# Patient Record
Sex: MALE | Race: WHITE | URBAN - METROPOLITAN AREA
[De-identification: names, ages, dates, MRNs, and addresses within clinical notes are randomized per-mention and may not be internally consistent; named-entity substitution may affect disease eponyms.]

---

## 2018-03-22 ENCOUNTER — TELEPHONE (OUTPATIENT)
Dept: SURGERY | Age: 33
End: 2018-03-22

## 2018-04-17 ENCOUNTER — TELEPHONE (OUTPATIENT)
Dept: SURGERY | Age: 33
End: 2018-04-17

## 2018-04-30 ENCOUNTER — TELEPHONE (OUTPATIENT)
Dept: SURGERY | Age: 33
End: 2018-04-30

## 2018-08-13 ENCOUNTER — TELEPHONE (OUTPATIENT)
Dept: SURGERY | Age: 33
End: 2018-08-13

## 2018-10-09 ENCOUNTER — TELEPHONE (OUTPATIENT)
Dept: SURGERY | Age: 33
End: 2018-10-09

## 2018-10-11 ENCOUNTER — TELEPHONE (OUTPATIENT)
Dept: SURGERY | Age: 33
End: 2018-10-11

## 2018-10-11 NOTE — TELEPHONE ENCOUNTER
Nursing called from [place] [address] homecare and stated patient is now discharged. Wound has healed. Per Vladimir Glover prn for follow up care. Sx sheet scanned to media.

## 2022-03-04 ENCOUNTER — PROCEDURE VISIT (OUTPATIENT)
Dept: PHYSICAL MEDICINE AND REHAB | Age: 37
End: 2022-03-04

## 2022-03-04 VITALS
HEART RATE: 99 BPM | DIASTOLIC BLOOD PRESSURE: 85 MMHG | SYSTOLIC BLOOD PRESSURE: 131 MMHG | WEIGHT: 315 LBS | BODY MASS INDEX: 46.65 KG/M2 | HEIGHT: 69 IN

## 2022-03-04 DIAGNOSIS — Z02.71 DISABILITY EXAMINATION: Primary | ICD-10-CM

## 2022-03-04 PROCEDURE — MISCBDD BUREAU OF DISABILITY DETERMINATION: Performed by: PHYSICAL MEDICINE & REHABILITATION

## 2022-03-04 NOTE — PROGRESS NOTES
Sandhya Rolon D.O. [facility]   [address]  Phone: [phone]  Fax: [phone]      3/4/2022     Corwin Fowler  : 1985    Corwin Fowler was seen in my office today for a Disability Determination Examination. The history was obtained from the claimant who was felt to be reliable. The claimant was identified by photo identification. I explained to the claimant that this examination was for evaluation purposes only and that no physician-patient relationship exists. The claimant expressed understanding and agreement. A release of information was signed and placed in the chart. I advised the claimant not to cause bodily harm or significant pain with any of the requested activities    Corwin Fowler is a right hand dominant man who reports to be disabled due to vision impairment and lymphedema. He has had lymphedema since childhood. 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 He is not a surgical candidate with the exception of cornea transplant which he declined due to risks. The onset of the disabling condition was with a sudden onset after a fall 7 years ago. The work up has included: Xray, [address]. Symptoms have been getting unchanged since onset. Currently, the pain is located in the bilateral legs and does not radiate. The pain is described as cracking, sore and rated as Pain Score:   5. The pain is intermittent and occurs daily. The pain is worse with activity and better with rest.  Associated symptoms include swelling. Prior treatment: Effective medications have included none. Ineffective medications have included none.      Records Reviewed   Continuing Disability Review Report   EILEEN Report:  Tony Owens DO; 61   Psychological Evaluation:  Abel Cline M.A; 1-9-10  Treatment YES/NO Effective/Ineffective   Therapy Yes Yes   Surgery Yes Yes   Injection No    Electric stimulation No    Massage No Ultrasound No    Heat No    Ice No    Chiropractic  No    Formal pain treatment program No      Past Medical History:   Diagnosis Date    Blood circulation, collateral     Chronic acquired lymphedema     Keratoconus     [facility] [address]     Sepsis ([facility] [address] 11/14/2014    due to cellulitis of LLE     Past Surgical History:   Procedure Laterality Date    BRONCHOSCOPY  04/18/2017    OTHER SURGICAL HISTORY Left 04/11/2017    direct excision of medial thigh lymphedema w/Dr Belen Gonzalez, Disection of lymph node w/Dr Analisa Antonio ,and control of bleeding w/dr DOWD    OTHER SURGICAL HISTORY Bilateral 08/22/2017    split thickness skin graft to l thigh, Right thigh shin graft site     Social History     Tobacco Use    Smoking status: Current Every Day Smoker     Years: 21.00     Types: E-Cigarettes, Cigarettes    Smokeless tobacco: Never Used   Substance Use Topics    Alcohol use: No    Drug use: No     The claimant has completed 10th grade education. 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 The claimant does not require the use of an assistive device. Guilherme Salamanca is not limited in activities of daily living including self care tasks of feeding, grooming, dressing, bathing,  and mobility tasks of getting out of bed, rising from chair, walking. He has difficulty balancing, cooking, cleaninggoing up and down steps. Guilherme Salamanca  feels his ADL's are limited due to pain, requiring increased time. The claimant reports sitting tolerance of  60 minutes but requires elevation of the legs, standing tolerance of 20 minutes, and the ability to lift unknown pounds. The claimant does not perform regular exercise. The claimant reports  difficulty walking.      Family History   Problem Relation Age of Onset    Diabetes Mother     Diabetes Father     Heart Disease Father        No Known Allergies    Current Outpatient Medications   Medication Sig Dispense Refill  ibuprofen (ADVIL;MOTRIN) 800 MG tablet       Probiotic Product (ADVANCED PROBIOTIC 10) CAPS as needed   5    bacitracin 500 UNIT/GM ointment Apply topically 2 times daily. (Patient not taking: Reported on 3/4/2022) 10 Tube 1    oxyCODONE-acetaminophen (PERCOCET) 5-325 MG per tablet Take 2 tablets by mouth every 4 hours as needed for Pain . (Patient not taking: Reported on 3/4/2022) 25 tablet 0    metFORMIN (GLUCOPHAGE) 500 MG tablet Take 500 mg by mouth 2 times daily (with meals) (Patient not taking: Reported on 3/4/2022)      mineral oil-hydrophilic petrolatum (HYDROPHOR) ointment Apply topically as needed. (Patient not taking: Reported on 3/4/2022) 1 Tube 0    furosemide (LASIX) 40 MG tablet Take 40 mg by mouth 2 times daily  (Patient not taking: Reported on 3/4/2022)  1    magnesium oxide (MAG-OX) 400 (240 MG) MG tablet Take 1 tablet by mouth daily (Patient not taking: Reported on 3/4/2022) 30 tablet 0    potassium chloride SA (K-DUR;KLOR-CON M) 20 MEQ tablet Take 1 tablet by mouth 2 times daily (Patient not taking: Reported on 3/4/2022) 60 tablet 0     No current facility-administered medications for this visit. Review of Systems - For review of systems, positive symptoms are underlined and negative findings are not underlined. General: chills, fatigue, fever, malaise, night sweats, weight gain,  weight loss. Psychological: anxiety, depression, suicidal ideation, sleep disturbances, behavioral disorder, difficulty concentrating, disorientation, hallucinations, mood swings, obsessive thoughts, physical abuse,  sexual abuse. Ophthalmic: blurry vision, decreased vision, double vision, loss of vision, photophobia, use of corrective device. Ear Nose Throat: hearing loss, tinnitus, phonophobia, sensitivity to smells, vertigo, or vocal changes. Allergy/Immunology: seasonal allergies, watery eyes, itchy eyes, frequent infections.   Hematological and Lymphatic: bleeding problems, blood clots, bruising, yellowing of the skin, swollen lymph nodes. Endocrine:  polydypsia, polyuria, temperature intolerance. Respiratory: cough, shortness of breath, wheezing. Cardiovascular: syncope, chest pain, dyspnea on exertion, edema, irregular heartbeat,  palpitations. Gastrointestinal: abdominal pain, constipation, diarrhea,  decreased appetite, heartburn, hematemesis, melena, nausea, vomiting, stool incontinence, abnormal swallowing. Genito-Urinary: dysuria, hematuria, incontinence, frequency, urgency. Musculoskeletal: joint pain, stiffness, swelling, muscle pain, muscle  tenderness. Neurological: confusion, memory loss, dizziness, gait disturbance, headaches, impaired coordination, decreased balance, numbness/tingling, seizures, speech problems, tremors,weakness. Dermatological:  hair changes, nail changes, pruritus, rash. PHYSICAL EXAMINATION: Blood pressure 131/85, pulse 99, height 5' 9\" (1.753 m), weight (!) 363 lb (164.7 kg). The claimant was cooperative with the examination. Please see the neuro-musculoskeletal data sheet for more details of the physical examination. General: No apparent distress. Appears of average intellect. Behavior was appropriate. Able to relate. Personal appearance was well groomed. Psychosocial: Mood and affect were appropriate. HEENT: Snellen eye chart unable. Intact to confrontation can count # of fingers and can see light. Unable to read distance. No palpable cervical lymph nodes. Anicteric. No conjunctival injection. Mucosa moist and pink. Cardiovascular: Regular Rate and Rhythm. Peripheral pulses 2+ at dorsalis pedis and radial arteries. Severe lymphedema left lower extremity, moderate on right. Peu d'orange skin with edema distally. Pulmonary: Unlabored and Regular Abdomen: Soft, non-tender. No abdominal bruit or pulsatile mass. Musculoskeletal: Spurling negative bilaterally. Straight leg raise negative bilaterally.    Otherwise, there was no crepitus, pain with ROM maneuvers, warmth, edema, effusion, erythema, tenderness to palpation, synovial thickening,  deformity including contracture, bony enlargement,varus/valgus deformity, ulnar deviation or joint instability or ligamentous laxity. Neurologic:  Awake, alert, and oriented to person place and time. Speech is fluent. Hearing is intact for conversation. Sensation was intact for temperature, light touch, vibration, proprioception. Coordination was intact in the upper extremities for finger to nose and in the lower extremities for heel to shin. Rapid alternating movements were intact in the upper and lower extremities. Muscle tendon reflexes were 2+ and symmetric at the biceps, triceps, brachioradialis, patella and achilles. Romberg was negative. Heel and toe walking were intact. Gait was wide based, antalgic. There was  a visible limp. It is safe for the claimant to walk without a hand-held assistive device. The claimant is able to walk both short and long distances on level and on uneven surfaces. Functional status: The claimant was able to sit/stand/walk without an assistive device independently. Lorita Fly  was able to dress independently and reach overhead. The claimant was able to write, shake hands and perform fine motor movements. Impression: This is a 42y.o. year old claimant with   1. Morbid obesity BMI 53  2. Bilateral lower extremity lymphedema  3. Vision impairment due to keratoconus bilaterally  4. Tobacco abuse    There are significant medically determinable impairments. The claimant can hear, speak, remember, understand, concentrate, interact, and adapt without limitation. The claimaint can lift/carry up to 20 pounds at the waist level. Lorita Fly can handle objects without limitation.  The claimant can sit for a maximum of 60 minute intervals for a maximum of 8 hours per day but should be able to elevate legs;  can stand for a maximum of 15 minute intervals for a total of 2 hours per day; can be in the upright position either standing or walking for a total of 2 hours a day. Renetta Gomez can walk independently short community distances without assistive device. The claimant is a falls risk and should avoid unprotected heights. Renetta Gomez  shoud avoid climbing stairs, ramps, stooping, kneeling, crouching, crawling, prolonged sitting/standing/walking. The claimant should have the ability to sit or stand at will. If awarded benefits the claimant would be able to manage them. The claimant does report psychiatric conditions as a cause of disability. A psychiatric consultative examination is  recommended to further evaluate. The above analysis is based upon the available information at the time of this examination including the history given by the claimant,  the medical records and tests reviewed and the physical findings. It is assumed that the material provided is correct. Any medical recommendations offered are provided as guidance and not as medical orders. I have not provided care for this claimaint. I have seen the claimaint one time on 3/4/2022 , for the purpose of a disability determination. The opinions expressed are based solely on the information provided to me and on the history and medical examination performed on 3/4/2022. Respectfully submitted,       Simon Singh D.O., P.T.   Board Certified Physical Medicine and Rehabilitation  Board Certified Electrodiagnostic Medicine

## 2023-05-18 NOTE — TELEPHONE ENCOUNTER
Office called patient regarding appointment on 3-21-18 that needed changed to Conner's schedule on 3-26-18. Patient stated has difficulty ambulating and is currently trying to establish with visiting physicians. Patient stated he would notify office when he knows appointment with visiting physician.
18-May-2023

## 2024-07-19 ENCOUNTER — APPOINTMENT (OUTPATIENT)
Dept: RADIOLOGY | Facility: HOSPITAL | Age: 39
End: 2024-07-19
Payer: COMMERCIAL

## 2024-07-19 ENCOUNTER — HOSPITAL ENCOUNTER (INPATIENT)
Facility: HOSPITAL | Age: 39
End: 2024-07-19
Attending: SURGERY | Admitting: SURGERY
Payer: COMMERCIAL

## 2024-07-19 ENCOUNTER — APPOINTMENT (OUTPATIENT)
Dept: CARDIOLOGY | Facility: HOSPITAL | Age: 39
End: 2024-07-19
Payer: COMMERCIAL

## 2024-07-19 DIAGNOSIS — R57.9 SHOCK (MULTI): ICD-10-CM

## 2024-07-19 DIAGNOSIS — M79.89 NECROTIZING SOFT TISSUE INFECTION: Primary | ICD-10-CM

## 2024-07-19 LAB
ABO GROUP (TYPE) IN BLOOD: NORMAL
ANION GAP BLDA CALCULATED.4IONS-SCNC: 10 MMO/L (ref 10–25)
ANION GAP BLDA CALCULATED.4IONS-SCNC: 14 MMO/L (ref 10–25)
ANION GAP SERPL CALC-SCNC: 19 MMOL/L (ref 10–20)
ANTIBODY SCREEN: NORMAL
AORTIC VALVE PEAK VELOCITY: 1.69 M/S
APPEARANCE UR: ABNORMAL
APTT PPP: 30 SECONDS (ref 27–38)
AV PEAK GRADIENT: 11.4 MMHG
AVA (PEAK VEL): 3.42 CM2
BASE EXCESS BLDA CALC-SCNC: -2 MMOL/L (ref -2–3)
BASE EXCESS BLDA CALC-SCNC: -3.9 MMOL/L (ref -2–3)
BILIRUB UR STRIP.AUTO-MCNC: NEGATIVE MG/DL
BODY TEMPERATURE: 37 DEGREES CELSIUS
BODY TEMPERATURE: 37 DEGREES CELSIUS
BUN SERPL-MCNC: 16 MG/DL (ref 6–23)
CA-I BLD-SCNC: 0.99 MMOL/L (ref 1.1–1.33)
CA-I BLDA-SCNC: 1.03 MMOL/L (ref 1.1–1.33)
CA-I BLDA-SCNC: 1.03 MMOL/L (ref 1.1–1.33)
CALCIUM SERPL-MCNC: 7.2 MG/DL (ref 8.6–10.6)
CFT FORM KAOLIN IND BLD RES TEG: 1.3 MIN (ref 0.8–2.1)
CHLORIDE BLDA-SCNC: 101 MMOL/L (ref 98–107)
CHLORIDE BLDA-SCNC: 104 MMOL/L (ref 98–107)
CHLORIDE SERPL-SCNC: 100 MMOL/L (ref 98–107)
CLOT ANGLE.KAOLIN INDUCED BLD RES TEG: 73 DEG (ref 63–78)
CLOT INIT KAO IND P HEP NEUT BLD RES TEG: 10.8 MIN (ref 4.6–9.1)
CLOT INIT KAO IND P HEP NEUT BLD RES TEG: 14.2 MIN (ref 4.3–8.3)
CO2 SERPL-SCNC: 22 MMOL/L (ref 21–32)
COLOR UR: ABNORMAL
CREAT SERPL-MCNC: 1.19 MG/DL (ref 0.5–1.3)
EGFRCR SERPLBLD CKD-EPI 2021: 79 ML/MIN/1.73M*2
EJECTION FRACTION APICAL 4 CHAMBER: 46.7
EJECTION FRACTION: 73 %
ERYTHROCYTE [DISTWIDTH] IN BLOOD BY AUTOMATED COUNT: 14.1 % (ref 11.5–14.5)
FIBRINOGEN BLD CALC-MCNC: 708 MG/DL (ref 278–581)
FIBRINOGEN PPP-MCNC: 509 MG/DL (ref 200–400)
GLUCOSE BLD MANUAL STRIP-MCNC: 160 MG/DL (ref 74–99)
GLUCOSE BLD MANUAL STRIP-MCNC: 186 MG/DL (ref 74–99)
GLUCOSE BLD MANUAL STRIP-MCNC: 222 MG/DL (ref 74–99)
GLUCOSE BLD MANUAL STRIP-MCNC: 269 MG/DL (ref 74–99)
GLUCOSE BLD MANUAL STRIP-MCNC: 291 MG/DL (ref 74–99)
GLUCOSE BLD MANUAL STRIP-MCNC: 330 MG/DL (ref 74–99)
GLUCOSE BLD MANUAL STRIP-MCNC: 382 MG/DL (ref 74–99)
GLUCOSE BLD MANUAL STRIP-MCNC: 396 MG/DL (ref 74–99)
GLUCOSE BLDA-MCNC: 357 MG/DL (ref 74–99)
GLUCOSE BLDA-MCNC: 441 MG/DL (ref 74–99)
GLUCOSE SERPL-MCNC: 428 MG/DL (ref 74–99)
GLUCOSE UR STRIP.AUTO-MCNC: ABNORMAL MG/DL
HCO3 BLDA-SCNC: 21.6 MMOL/L (ref 22–26)
HCO3 BLDA-SCNC: 23.1 MMOL/L (ref 22–26)
HCT VFR BLD AUTO: 25.4 % (ref 41–52)
HCT VFR BLD EST: 24 % (ref 41–52)
HCT VFR BLD EST: 32 % (ref 41–52)
HGB BLD-MCNC: 8.5 G/DL (ref 13.5–17.5)
HGB BLDA-MCNC: 10.7 G/DL (ref 13.5–17.5)
HGB BLDA-MCNC: 7.9 G/DL (ref 13.5–17.5)
INHALED O2 CONCENTRATION: 50 %
INHALED O2 CONCENTRATION: 50 %
INR PPP: 1.7 (ref 0.9–1.1)
KETONES UR STRIP.AUTO-MCNC: NEGATIVE MG/DL
LACTATE BLDA-SCNC: 3.2 MMOL/L (ref 0.4–2)
LACTATE BLDA-SCNC: 3.8 MMOL/L (ref 0.4–2)
LEFT ATRIUM VOLUME AREA LENGTH INDEX BSA: 17.3 ML/M2
LEFT VENTRICLE INTERNAL DIMENSION DIASTOLE: 4 CM (ref 3.5–6)
LEFT VENTRICULAR OUTFLOW TRACT DIAMETER: 2.3 CM
LEUKOCYTE ESTERASE UR QL STRIP.AUTO: ABNORMAL
MA KAOLIN BLD RES TEG: 70 MM (ref 52–69)
MA KAOLIN+TF BLD RES TEG: 71 MM (ref 52–70)
MA TF IND+IIB-IIIA INH BLD RES TEG: 39 MM (ref 15–32)
MAGNESIUM SERPL-MCNC: 1.34 MG/DL (ref 1.6–2.4)
MCH RBC QN AUTO: 27.5 PG (ref 26–34)
MCHC RBC AUTO-ENTMCNC: 33.5 G/DL (ref 32–36)
MCV RBC AUTO: 82 FL (ref 80–100)
MITRAL VALVE E/A RATIO: 1.49
MITRAL VALVE E/E' RATIO: 7.63
MUCOUS THREADS #/AREA URNS AUTO: ABNORMAL /LPF
NITRITE UR QL STRIP.AUTO: NEGATIVE
NRBC BLD-RTO: 0 /100 WBCS (ref 0–0)
OXYHGB MFR BLDA: 96.9 % (ref 94–98)
OXYHGB MFR BLDA: 97.2 % (ref 94–98)
PCO2 BLDA: 40 MM HG (ref 38–42)
PCO2 BLDA: 40 MM HG (ref 38–42)
PH BLDA: 7.34 PH (ref 7.38–7.42)
PH BLDA: 7.37 PH (ref 7.38–7.42)
PH UR STRIP.AUTO: 6 [PH]
PHOSPHATE SERPL-MCNC: 3.1 MG/DL (ref 2.5–4.9)
PLATELET # BLD AUTO: 230 X10*3/UL (ref 150–450)
PO2 BLDA: 204 MM HG (ref 85–95)
PO2 BLDA: 228 MM HG (ref 85–95)
POTASSIUM BLDA-SCNC: 3.2 MMOL/L (ref 3.5–5.3)
POTASSIUM BLDA-SCNC: 3.4 MMOL/L (ref 3.5–5.3)
POTASSIUM SERPL-SCNC: 3.5 MMOL/L (ref 3.5–5.3)
PROT UR STRIP.AUTO-MCNC: ABNORMAL MG/DL
PROTHROMBIN TIME: 19.4 SECONDS (ref 9.8–12.8)
RBC # BLD AUTO: 3.09 X10*6/UL (ref 4.5–5.9)
RBC # UR STRIP.AUTO: ABNORMAL /UL
RBC #/AREA URNS AUTO: >20 /HPF
RH FACTOR (ANTIGEN D): NORMAL
RIGHT VENTRICLE FREE WALL PEAK S': 23.3 CM/S
SAO2 % BLDA: 100 % (ref 94–100)
SAO2 % BLDA: 100 % (ref 94–100)
SODIUM BLDA-SCNC: 133 MMOL/L (ref 136–145)
SODIUM BLDA-SCNC: 134 MMOL/L (ref 136–145)
SODIUM SERPL-SCNC: 137 MMOL/L (ref 136–145)
SP GR UR STRIP.AUTO: 1.04
SQUAMOUS #/AREA URNS AUTO: ABNORMAL /HPF
TRICUSPID ANNULAR PLANE SYSTOLIC EXCURSION: 2.1 CM
UROBILINOGEN UR STRIP.AUTO-MCNC: ABNORMAL MG/DL
WBC # BLD AUTO: 25.4 X10*3/UL (ref 4.4–11.3)
WBC #/AREA URNS AUTO: ABNORMAL /HPF

## 2024-07-19 PROCEDURE — 99223 1ST HOSP IP/OBS HIGH 75: CPT | Performed by: SURGERY

## 2024-07-19 PROCEDURE — 86901 BLOOD TYPING SEROLOGIC RH(D): CPT | Performed by: STUDENT IN AN ORGANIZED HEALTH CARE EDUCATION/TRAINING PROGRAM

## 2024-07-19 PROCEDURE — 87040 BLOOD CULTURE FOR BACTERIA: CPT | Performed by: STUDENT IN AN ORGANIZED HEALTH CARE EDUCATION/TRAINING PROGRAM

## 2024-07-19 PROCEDURE — 36620 INSERTION CATHETER ARTERY: CPT

## 2024-07-19 PROCEDURE — 93005 ELECTROCARDIOGRAM TRACING: CPT

## 2024-07-19 PROCEDURE — 84132 ASSAY OF SERUM POTASSIUM: CPT

## 2024-07-19 PROCEDURE — 36415 COLL VENOUS BLD VENIPUNCTURE: CPT | Performed by: STUDENT IN AN ORGANIZED HEALTH CARE EDUCATION/TRAINING PROGRAM

## 2024-07-19 PROCEDURE — 93306 TTE W/DOPPLER COMPLETE: CPT

## 2024-07-19 PROCEDURE — 86923 COMPATIBILITY TEST ELECTRIC: CPT

## 2024-07-19 PROCEDURE — 2500000004 HC RX 250 GENERAL PHARMACY W/ HCPCS (ALT 636 FOR OP/ED): Performed by: STUDENT IN AN ORGANIZED HEALTH CARE EDUCATION/TRAINING PROGRAM

## 2024-07-19 PROCEDURE — 82947 ASSAY GLUCOSE BLOOD QUANT: CPT

## 2024-07-19 PROCEDURE — 71045 X-RAY EXAM CHEST 1 VIEW: CPT

## 2024-07-19 PROCEDURE — 82330 ASSAY OF CALCIUM: CPT | Performed by: STUDENT IN AN ORGANIZED HEALTH CARE EDUCATION/TRAINING PROGRAM

## 2024-07-19 PROCEDURE — 84132 ASSAY OF SERUM POTASSIUM: CPT | Performed by: STUDENT IN AN ORGANIZED HEALTH CARE EDUCATION/TRAINING PROGRAM

## 2024-07-19 PROCEDURE — 2500000004 HC RX 250 GENERAL PHARMACY W/ HCPCS (ALT 636 FOR OP/ED): Mod: JZ | Performed by: EMERGENCY MEDICINE

## 2024-07-19 PROCEDURE — 2500000005 HC RX 250 GENERAL PHARMACY W/O HCPCS

## 2024-07-19 PROCEDURE — 81001 URINALYSIS AUTO W/SCOPE: CPT | Performed by: STUDENT IN AN ORGANIZED HEALTH CARE EDUCATION/TRAINING PROGRAM

## 2024-07-19 PROCEDURE — 2500000004 HC RX 250 GENERAL PHARMACY W/ HCPCS (ALT 636 FOR OP/ED)

## 2024-07-19 PROCEDURE — 94002 VENT MGMT INPAT INIT DAY: CPT

## 2024-07-19 PROCEDURE — 93306 TTE W/DOPPLER COMPLETE: CPT | Performed by: INTERNAL MEDICINE

## 2024-07-19 PROCEDURE — 2500000005 HC RX 250 GENERAL PHARMACY W/O HCPCS: Performed by: EMERGENCY MEDICINE

## 2024-07-19 PROCEDURE — 3E043XZ INTRODUCTION OF VASOPRESSOR INTO CENTRAL VEIN, PERCUTANEOUS APPROACH: ICD-10-PCS | Performed by: STUDENT IN AN ORGANIZED HEALTH CARE EDUCATION/TRAINING PROGRAM

## 2024-07-19 PROCEDURE — 2500000004 HC RX 250 GENERAL PHARMACY W/ HCPCS (ALT 636 FOR OP/ED): Mod: JZ

## 2024-07-19 PROCEDURE — 2020000001 HC ICU ROOM DAILY

## 2024-07-19 PROCEDURE — 0BH17EZ INSERTION OF ENDOTRACHEAL AIRWAY INTO TRACHEA, VIA NATURAL OR ARTIFICIAL OPENING: ICD-10-PCS | Performed by: SURGERY

## 2024-07-19 PROCEDURE — P9045 ALBUMIN (HUMAN), 5%, 250 ML: HCPCS | Mod: JZ | Performed by: EMERGENCY MEDICINE

## 2024-07-19 PROCEDURE — 85610 PROTHROMBIN TIME: CPT | Performed by: STUDENT IN AN ORGANIZED HEALTH CARE EDUCATION/TRAINING PROGRAM

## 2024-07-19 PROCEDURE — 84100 ASSAY OF PHOSPHORUS: CPT | Performed by: STUDENT IN AN ORGANIZED HEALTH CARE EDUCATION/TRAINING PROGRAM

## 2024-07-19 PROCEDURE — 5A1955Z RESPIRATORY VENTILATION, GREATER THAN 96 CONSECUTIVE HOURS: ICD-10-PCS | Performed by: SURGERY

## 2024-07-19 PROCEDURE — 85347 COAGULATION TIME ACTIVATED: CPT | Performed by: STUDENT IN AN ORGANIZED HEALTH CARE EDUCATION/TRAINING PROGRAM

## 2024-07-19 PROCEDURE — 99291 CRITICAL CARE FIRST HOUR: CPT | Performed by: EMERGENCY MEDICINE

## 2024-07-19 PROCEDURE — 85730 THROMBOPLASTIN TIME PARTIAL: CPT | Performed by: STUDENT IN AN ORGANIZED HEALTH CARE EDUCATION/TRAINING PROGRAM

## 2024-07-19 PROCEDURE — 85027 COMPLETE CBC AUTOMATED: CPT | Performed by: STUDENT IN AN ORGANIZED HEALTH CARE EDUCATION/TRAINING PROGRAM

## 2024-07-19 PROCEDURE — 93010 ELECTROCARDIOGRAM REPORT: CPT | Performed by: INTERNAL MEDICINE

## 2024-07-19 PROCEDURE — 85384 FIBRINOGEN ACTIVITY: CPT | Performed by: STUDENT IN AN ORGANIZED HEALTH CARE EDUCATION/TRAINING PROGRAM

## 2024-07-19 PROCEDURE — 37799 UNLISTED PX VASCULAR SURGERY: CPT | Performed by: STUDENT IN AN ORGANIZED HEALTH CARE EDUCATION/TRAINING PROGRAM

## 2024-07-19 PROCEDURE — 71045 X-RAY EXAM CHEST 1 VIEW: CPT | Performed by: RADIOLOGY

## 2024-07-19 PROCEDURE — 83735 ASSAY OF MAGNESIUM: CPT | Performed by: STUDENT IN AN ORGANIZED HEALTH CARE EDUCATION/TRAINING PROGRAM

## 2024-07-19 PROCEDURE — 2500000005 HC RX 250 GENERAL PHARMACY W/O HCPCS: Performed by: STUDENT IN AN ORGANIZED HEALTH CARE EDUCATION/TRAINING PROGRAM

## 2024-07-19 PROCEDURE — 36600 WITHDRAWAL OF ARTERIAL BLOOD: CPT | Performed by: STUDENT IN AN ORGANIZED HEALTH CARE EDUCATION/TRAINING PROGRAM

## 2024-07-19 RX ORDER — FENTANYL CITRATE-0.9 % NACL/PF 10 MCG/ML
25-200 PLASTIC BAG, INJECTION (ML) INTRAVENOUS CONTINUOUS
Status: DISCONTINUED | OUTPATIENT
Start: 2024-07-19 | End: 2024-07-24

## 2024-07-19 RX ORDER — PROPOFOL 10 MG/ML
INJECTION, EMULSION INTRAVENOUS
Status: DISPENSED
Start: 2024-07-19 | End: 2024-07-20

## 2024-07-19 RX ORDER — ALBUMIN HUMAN 50 G/1000ML
25 SOLUTION INTRAVENOUS ONCE
Status: COMPLETED | OUTPATIENT
Start: 2024-07-19 | End: 2024-07-19

## 2024-07-19 RX ORDER — CLINDAMYCIN PHOSPHATE 900 MG/50ML
900 INJECTION, SOLUTION INTRAVENOUS EVERY 8 HOURS
Status: DISCONTINUED | OUTPATIENT
Start: 2024-07-19 | End: 2024-07-22

## 2024-07-19 RX ORDER — NOREPINEPHRINE BITARTRATE/D5W 8 MG/250ML
.01-1 PLASTIC BAG, INJECTION (ML) INTRAVENOUS CONTINUOUS
Status: DISCONTINUED | OUTPATIENT
Start: 2024-07-19 | End: 2024-07-19

## 2024-07-19 RX ORDER — SODIUM BICARBONATE 1 MEQ/ML
SYRINGE (ML) INTRAVENOUS
Status: COMPLETED
Start: 2024-07-19 | End: 2024-07-19

## 2024-07-19 RX ORDER — DEXTROSE 50 % IN WATER (D50W) INTRAVENOUS SYRINGE
12.5
Status: DISCONTINUED | OUTPATIENT
Start: 2024-07-19 | End: 2024-07-24

## 2024-07-19 RX ORDER — CALCIUM GLUCONATE 20 MG/ML
1 INJECTION, SOLUTION INTRAVENOUS EVERY 4 HOURS
Status: COMPLETED | OUTPATIENT
Start: 2024-07-19 | End: 2024-07-19

## 2024-07-19 RX ORDER — POTASSIUM CHLORIDE 29.8 MG/ML
40 INJECTION INTRAVENOUS ONCE
Status: COMPLETED | OUTPATIENT
Start: 2024-07-19 | End: 2024-07-19

## 2024-07-19 RX ORDER — FAMOTIDINE 10 MG/ML
20 INJECTION INTRAVENOUS 2 TIMES DAILY
Status: DISCONTINUED | OUTPATIENT
Start: 2024-07-19 | End: 2024-07-22

## 2024-07-19 RX ORDER — SODIUM CHLORIDE, SODIUM LACTATE, POTASSIUM CHLORIDE, CALCIUM CHLORIDE 600; 310; 30; 20 MG/100ML; MG/100ML; MG/100ML; MG/100ML
75 INJECTION, SOLUTION INTRAVENOUS CONTINUOUS
Status: DISCONTINUED | OUTPATIENT
Start: 2024-07-19 | End: 2024-07-25

## 2024-07-19 RX ORDER — ENOXAPARIN SODIUM 100 MG/ML
40 INJECTION SUBCUTANEOUS DAILY
Status: DISCONTINUED | OUTPATIENT
Start: 2024-07-19 | End: 2024-07-19

## 2024-07-19 RX ORDER — DEXTROSE 50 % IN WATER (D50W) INTRAVENOUS SYRINGE
25
Status: DISCONTINUED | OUTPATIENT
Start: 2024-07-19 | End: 2024-07-24

## 2024-07-19 RX ORDER — VANCOMYCIN HYDROCHLORIDE 1 G/20ML
INJECTION, POWDER, LYOPHILIZED, FOR SOLUTION INTRAVENOUS DAILY PRN
Status: DISCONTINUED | OUTPATIENT
Start: 2024-07-19 | End: 2024-07-23

## 2024-07-19 RX ORDER — ENOXAPARIN SODIUM 100 MG/ML
60 INJECTION SUBCUTANEOUS EVERY 12 HOURS SCHEDULED
Status: DISCONTINUED | OUTPATIENT
Start: 2024-07-19 | End: 2024-07-23

## 2024-07-19 RX ORDER — MAGNESIUM SULFATE HEPTAHYDRATE 40 MG/ML
4 INJECTION, SOLUTION INTRAVENOUS ONCE
Status: COMPLETED | OUTPATIENT
Start: 2024-07-19 | End: 2024-07-19

## 2024-07-19 RX ORDER — NOREPINEPHRINE BITARTRATE/D5W 8 MG/250ML
PLASTIC BAG, INJECTION (ML) INTRAVENOUS
Status: DISPENSED
Start: 2024-07-19 | End: 2024-07-20

## 2024-07-19 RX ORDER — ACETAMINOPHEN 10 MG/ML
1000 INJECTION, SOLUTION INTRAVENOUS ONCE
Status: COMPLETED | OUTPATIENT
Start: 2024-07-19 | End: 2024-07-19

## 2024-07-19 RX ORDER — PROPOFOL 10 MG/ML
5-40 INJECTION, EMULSION INTRAVENOUS CONTINUOUS
Status: DISCONTINUED | OUTPATIENT
Start: 2024-07-19 | End: 2024-07-23

## 2024-07-19 ASSESSMENT — PAIN - FUNCTIONAL ASSESSMENT
PAIN_FUNCTIONAL_ASSESSMENT: CPOT (CRITICAL CARE PAIN OBSERVATION TOOL)

## 2024-07-19 NOTE — PROGRESS NOTES
[facility]  TRAUMA ICU - PROGRESS NOTE    Patient Name: Artem Suarez  MRN: [id number]  Admit Date: 719  : 1985  AGE: 39 y.o.   GENDER: male  ==============================================================================  TODAY'S ASSESSMENT AND PLAN OF CARE:  Artem Suarez is a 39 y.o. 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     NEURO/PAIN/SEDATION:   Analgesia/Sedation: Propofol and Fentanyl    RESPIRATORY:    - Intubated    - Vent settings       - FiO2: 50       - Vt: 450       - RR: 16       - PEEP: 8    CARDIOVASC:    - MAP goal > 65    - requiring Levophed and Vasopressin   - start stress dose steroids    - echo ordered, follow up results    GI:    - maintain NPO    :    - keith in place   - follow up UA/UCx     FEN:    - on LR at 125cc/hr    - monitor electrolytes with RFP and Mg    - replete electrolytes    - follow up admission labs    HEMATOLOGIC:    - monitor Hgb with CBC   - follow up admission labs   - transfuse for symptomatic anemia, not currently indicated     ENDOCRINE:   #Diabetes, uncontrolled   - insulin drip     MUSCULOSKELETAL/SKIN:   #NSTI, Chiquita's gangrene   - antibiotics: vancomycin, zosyn, clindamycin   - possible return to the OR with ACS within the next 24 hours    INFECTIOUS DISEASE:   #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 Shabbir Askew MD  PGY-1   Trauma ICU  TSICU [id number]    ==============================================================================  CHIEF COMPLAINT / OVERNIGHT EVENTS / HPI:   Artem Ordazrudy is a 39 y.o. male with a history of diabetes and lymphadema  "who was found to have air in perineal and thigh wounds concerning for Chiquita's gangrene/NSTI at an OSH. At the OSH, he was taken to the OR overnight for initial debridement. During his hospitalization at the OSH, he was requiring variable pressors intraoperatively and postoperatively, as well as an insulin drip for hyperglycemia. He is now in the ICU for septic shock management in the setting of Chiquita's gangrene/NSTI.    PHYSICAL EXAM:  Heart Rate:  [102-131]   Temp:  [36.1 °C (97 °F)]   Resp:  [16-28]   BP: ()/(40-70)   Height:  [167 cm (5' 5.75\")]   Weight:  [181 kg (399 lb 0.5 oz)]   SpO2:  [100 %]   Physical Exam  Constitutional:       Appearance: He is obese.   HENT:      Head: Atraumatic.      Mouth/Throat:      Comments: ETT in place  Cardiovascular:      Rate and Rhythm: Tachycardia present.   Pulmonary:      Effort: No respiratory distress.      Comments: Mechanical ventilation, mechanical breath sounds  Abdominal:      General: There is no distension.      Palpations: Abdomen is soft.   Genitourinary:     Comments: Wolfe in place, concentrated cloudy urine  Musculoskeletal:      Comments: Bilateral inner thigh/perineum: extensive debridement, wound repacked with Kerlix     Skin:     General: Skin is dry.      Comments: Bilateral feet and lower extremity dryness and cracking  R big toe wound, dark in color   Neurological:      Comments: Intubated and sedated       IMAGING SUMMARY:  (summary of new imaging findings, not a copy of dictation)  N/A    LABS:  Results from last 7 days   Lab Units 07/19/24  1435   WBC AUTO x10*3/uL 25.4*   HEMOGLOBIN g/dL 8.5*   HEMATOCRIT % 25.4*   PLATELETS AUTO x10*3/uL 230     Results from last 7 days   Lab Units 07/19/24  1435   APTT seconds 30   INR  1.7*     Results from last 7 days   Lab Units 07/19/24  1435   SODIUM mmol/L 137   POTASSIUM mmol/L 3.5   CHLORIDE mmol/L 100   CO2 mmol/L 22   BUN mg/dL 16   CREATININE mg/dL 1.19   CALCIUM mg/dL 7.2*   GLUCOSE mg/dL " 428*         Results from last 7 days   Lab Units 07/19/24  1437   POCT PH, ARTERIAL pH 7.34*   POCT PCO2, ARTERIAL mm Hg 40   POCT PO2, ARTERIAL mm Hg 204*   POCT HCO3 CALCULATED, ARTERIAL mmol/L 21.6*   POCT BASE EXCESS, ARTERIAL mmol/L -3.9*     I have reviewed all medications, laboratory results, and imaging pertinent for today's encounter.

## 2024-07-19 NOTE — PROGRESS NOTES
Pharmacy Medication History Review    Artem Suarez is a 39 y.o. male admitted for Necrotizing soft tissue infection. Pharmacy reviewed the patient's [id number] medications and allergies for accuracy.    The list below reflects the updated PTA list. Comments regarding how patient may be taking medications differently can be found in the Admit Orders Activity  None        The list below reflects the updated allergy list. Please review each documented allergy for additional clarification and justification.  Allergies  Reviewed by Chandni Kolb PharmD on 7/19/2024   No Known Allergies         Patient accepts M2B at discharge. Pharmacy has been updated to [facility].    Sources used to complete the med history include: spoke with emergency contact (Rupali Palomo, [phone]) -  reported not taking any prescription or OTC medications at home/ Pharmacy - [facility] - [phone], has not filled any prescription for patient in years/ Chart review - [facility] clinical summary - any maintenance medications have start date of 2017, unlikely patient has taken within last few years    Chandni Kolb PharmD  Transitions of Care Pharmacist  [facility]  Please reach out via Secure Chat for questions, or if no response call "[employer] or [facility]

## 2024-07-19 NOTE — CONSULTS
"Vancomycin Dosing by Pharmacy  INITIAL CONSULT      Artem Suarez is a 39 y.o. male who Pharmacy is consulted to dose vancomycin for cellulitis/skin and soft tissue infection/Sepsis/Necrotizing soft skin tissue     Based on the patient's indication and renal status, this patient will be dosed based on a goal vancomycin AUC of 500-600.     Renal function is currently stable.    Estimated Creatinine Clearance: 125 mL/min (by C-G formula based on SCr of 1.19 mg/dL).    No results found for: \"VANCORANDOM\", \"VANCOTROUGH\"    Results from last 7 days   Lab Units 07/19/24  1435   CREATININE mg/dL 1.19   BUN mg/dL 16   WBC AUTO x10*3/uL 25.4*        Visit Vitals  Pulse (!) 112   Temp 36.1 °C (97 °F) (Temporal)   Resp (!) 28       No results found for: \"STAPHMRSASCR\", \"GRAMSTAIN\", \"URINECULTURE\", \"BLOODCULT\", \"TISWDCULTSME\"     No results found for the last 90 days.      Assessment/Plan     Will order loading dose of 2000 mg followed by maintenance dose of 1250 mg every 12 hours. This dosing regimen is predicted by [employer] to result in the following pharmacokinetic parameters:  Regimen: 1250 mg IV every 12 hours.  Start time: 15:52 on 07/19/2024  Exposure target: AUC24 (range)400-600 mg/L.hr   AUC24,ss: 526 mg/L.hr  Probability of AUC24 > 400: 70 %  Ctrough,ss: 14.2 mg/L  Probability of Ctrough,ss > 20: 34 %  Probability of nephrotoxicity (Lodise CURLY 2009): 9 %    Vancomycin follow-up level will be ordered for 7/20 at 12pm , unless clinically indicated sooner.  Will continue to monitor renal function daily while on vancomycin and order serum creatinine at least every 48 hours if not already ordered.  Will follow for continued vancomycin needs, clinical response, and signs/symptoms of toxicity.       Heriberto Vernon, PharmD     "

## 2024-07-19 NOTE — H&P
"[facility]  ACUTE CARE SURGERY - HISTORY AND PHYSICAL     Patient Name: Artem Suarez  MRN: [id number]  Admit Date: 719  : 1985  AGE: 39 y.o.   GENDER: male  ==============================================================================  TODAY'S ASSESSMENT AND PLAN OF CARE:  38 yo male presents as a transfer after surgical debridement of NSTI of perineum and thighs with ongoing evidence of septic shock    - ongoing resuscitation in ICIU  - broad spectrum abx (cefepime, vanc, clinda)  - anticipate return to OR within 24 hours for second look  - maintain keith    Seen w Dr. Wale Franklin  [facility]  [id number]    ==============================================================================  CHIEF COMPLAINT/REASON FOR CONSULT:  38 yo male, history of DM and lymphedema, was found to have significant amount of air in perineal wounds concerning for NSTI. Per report was having fevers and R thigh pain for 2 days pta. Was taken to OR overnight at OSH for initial debridement. Variable pressors doses intra-op and post-op. Insulin gtt for hyperglycemia. Transferred to [facility] for further management. Arrived intubated and sedated.    Procedure performed: \"wide debridement of bilateral thigh and perineal necrotic muscle and fascia (including epidermis, dermis, and subcutaneous tissue (30 x 30 x 8 [right], 15 x 15 x 2 [left], abscess drainage, debridement of cellulitis\"    EBL: 500 ml  IVF: 2.7 L    Received 3 units pRBC post-op    OSH labs (24 AM):  WBC 31  Hgb 7.7  Plt 286    INR 1.5    BUN 14  Cr 1.5  Glucose 465    CRP 34    PAST MEDICAL HISTORY:   PMH: DM, lymphedema, obesity    PSH: Wide debridement of perineum and thighs (24)  - Otherwise unknown    FH: unknown    SOCIAL HISTORY: no etoh or drugs use, \"heavy\" smoker and chewing tobacco per OSH records, lives at home with daughter and 2 roommates per records    ALLERGIES: NKDA per OSH records      REVIEW OF SYSTEMS:  Review of " "Systems unable to be obtained    PHYSICAL EXAM:  Physical Exam  Pulse (!) 112   Temp 36.1 °C (97 °F) (Temporal)   Resp (!) 28   Ht 1.67 m (5' 5.75\")   SpO2 100%   BMI 64.90 kg/m²     Intubated and sedated  ETT in place  Tachy to 110s  Stable on vent  Abd soft, nondistended  Wofle in place with clear yellow urine  Extensive debridement of perineum and inner thighs; exposed tissue overall appears viable (pictures in media tab)  R 1st digit ulcer        IMAGING SUMMARY:   CT a/p (OSH): extensive soft tissue gas of perineum and inner thighs    LABS:  Results from last 7 days   Lab Units 07/19/24  1435   WBC AUTO x10*3/uL 25.4*   HEMOGLOBIN g/dL 8.5*   HEMATOCRIT % 25.4*   PLATELETS AUTO x10*3/uL 230               No lab exists for component: \"LABALBU\"      Results from last 7 days   Lab Units 07/19/24  1437   POCT PH, ARTERIAL pH 7.34*   POCT PCO2, ARTERIAL mm Hg 40   POCT PO2, ARTERIAL mm Hg 204*   POCT HCO3 CALCULATED, ARTERIAL mmol/L 21.6*   POCT BASE EXCESS, ARTERIAL mmol/L -3.9*         I have reviewed all laboratory and imaging results ordered/pertinent for this encounter.   "

## 2024-07-19 NOTE — PROGRESS NOTES
[facility]  TRAUMA ICU - ATTENDING PROGRESS NOTE    I personally saw and evaluated the patient with the resident physician/advanced pactice provider.  I reviewed the case on multidisciplinary rounds this morning.  I reviewed all of the pertinent imaging and laboratory data.  I reviewed the resident/SIMEON note.  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  Can use ketamine as an adjunct if difficult to sedate  Scheduled Tylenol  Continue mechanical ventilation, not appropriate for weaning due to anticipated OR in the next 24 hours  Maintain MAP greater than 65.  Currently on Levophed.  Start vasopressin.  Add stress dose steroids  Check echocardiogram  N.p.o.  Urine output acceptable.  Electrolytes/CMP pending  Continue insulin infusion until critical illness has resolved  Continue vancomycin/Zosyn/clindamycin.  Repeat blood cultures here.  Duration to be determined pending clinical course  Transfuse for symptomatic anemia, no current indication      DVT Prophylaxis: Loveonx  GI Prophylaxis: Pepcid  Diet: NPO  CVC: RIJ POA  Mayra: Left Radial POA  Wolfe: Yes, POA  Restraints: yes  Code Status: Full Code  Dispo: ICU, Critically Ill    Critical Care Time: 32 min

## 2024-07-20 ENCOUNTER — APPOINTMENT (OUTPATIENT)
Dept: RADIOLOGY | Facility: HOSPITAL | Age: 39
End: 2024-07-20
Payer: COMMERCIAL

## 2024-07-20 ENCOUNTER — ANESTHESIA EVENT (OUTPATIENT)
Dept: OPERATING ROOM | Facility: HOSPITAL | Age: 39
End: 2024-07-20
Payer: COMMERCIAL

## 2024-07-20 ENCOUNTER — ANESTHESIA (OUTPATIENT)
Dept: OPERATING ROOM | Facility: HOSPITAL | Age: 39
End: 2024-07-20
Payer: COMMERCIAL

## 2024-07-20 PROBLEM — D64.9 ANEMIA: Status: ACTIVE | Noted: 2024-07-20

## 2024-07-20 PROBLEM — D68.9 COAGULOPATHY (MULTI): Status: ACTIVE | Noted: 2024-07-20

## 2024-07-20 PROBLEM — E11.9 DIABETES MELLITUS, TYPE 2 (MULTI): Status: ACTIVE | Noted: 2024-07-20

## 2024-07-20 LAB
ABO GROUP (TYPE) IN BLOOD: NORMAL
ALBUMIN SERPL BCP-MCNC: 2.3 G/DL (ref 3.4–5)
ALBUMIN SERPL BCP-MCNC: 2.7 G/DL (ref 3.4–5)
ANION GAP BLDA CALCULATED.4IONS-SCNC: 13 MMO/L (ref 10–25)
ANION GAP BLDA CALCULATED.4IONS-SCNC: 16 MMO/L (ref 10–25)
ANION GAP BLDA CALCULATED.4IONS-SCNC: 6 MMO/L (ref 10–25)
ANION GAP SERPL CALC-SCNC: 13 MMOL/L (ref 10–20)
ANION GAP SERPL CALC-SCNC: 19 MMOL/L (ref 10–20)
APTT PPP: 31 SECONDS (ref 27–38)
BASE EXCESS BLDA CALC-SCNC: -6.1 MMOL/L (ref -2–3)
BASE EXCESS BLDA CALC-SCNC: -6.4 MMOL/L (ref -2–3)
BASE EXCESS BLDA CALC-SCNC: 3.5 MMOL/L (ref -2–3)
BLOOD EXPIRATION DATE: NORMAL
BODY TEMPERATURE: 37 DEGREES CELSIUS
BUN SERPL-MCNC: 12 MG/DL (ref 6–23)
BUN SERPL-MCNC: 13 MG/DL (ref 6–23)
CA-I BLD-SCNC: 1.06 MMOL/L (ref 1.1–1.33)
CA-I BLD-SCNC: 1.06 MMOL/L (ref 1.1–1.33)
CA-I BLDA-SCNC: 1.02 MMOL/L (ref 1.1–1.33)
CA-I BLDA-SCNC: 1.08 MMOL/L (ref 1.1–1.33)
CA-I BLDA-SCNC: 1.11 MMOL/L (ref 1.1–1.33)
CALCIUM SERPL-MCNC: 6.9 MG/DL (ref 8.6–10.6)
CALCIUM SERPL-MCNC: 7.4 MG/DL (ref 8.6–10.6)
CFT FORM KAOLIN IND BLD RES TEG: 1.2 MIN (ref 0.8–2.1)
CFT FORM KAOLIN IND BLD RES TEG: 1.2 MIN (ref 0.8–2.1)
CHLORIDE BLDA-SCNC: 103 MMOL/L (ref 98–107)
CHLORIDE BLDA-SCNC: 104 MMOL/L (ref 98–107)
CHLORIDE BLDA-SCNC: 106 MMOL/L (ref 98–107)
CHLORIDE SERPL-SCNC: 100 MMOL/L (ref 98–107)
CHLORIDE SERPL-SCNC: 101 MMOL/L (ref 98–107)
CLOT ANGLE.KAOLIN INDUCED BLD RES TEG: 74 DEG (ref 63–78)
CLOT ANGLE.KAOLIN INDUCED BLD RES TEG: 75 DEG (ref 63–78)
CLOT INIT KAO IND P HEP NEUT BLD RES TEG: 5.9 MIN (ref 4.3–8.3)
CLOT INIT KAO IND P HEP NEUT BLD RES TEG: 8.3 MIN (ref 4.3–8.3)
CLOT INIT KAO IND P HEP NEUT BLD RES TEG: 8.4 MIN (ref 4.6–9.1)
CLOT INIT KAO IND P HEP NEUT BLD RES TEG: 8.4 MIN (ref 4.6–9.1)
CO2 SERPL-SCNC: 22 MMOL/L (ref 21–32)
CO2 SERPL-SCNC: 27 MMOL/L (ref 21–32)
CREAT SERPL-MCNC: 0.79 MG/DL (ref 0.5–1.3)
CREAT SERPL-MCNC: 0.81 MG/DL (ref 0.5–1.3)
DISPENSE STATUS: NORMAL
EGFRCR SERPLBLD CKD-EPI 2021: >90 ML/MIN/1.73M*2
EGFRCR SERPLBLD CKD-EPI 2021: >90 ML/MIN/1.73M*2
ERYTHROCYTE [DISTWIDTH] IN BLOOD BY AUTOMATED COUNT: 14.1 % (ref 11.5–14.5)
ERYTHROCYTE [DISTWIDTH] IN BLOOD BY AUTOMATED COUNT: 14.7 % (ref 11.5–14.5)
ERYTHROCYTE [DISTWIDTH] IN BLOOD BY AUTOMATED COUNT: 15.1 % (ref 11.5–14.5)
ERYTHROCYTE [DISTWIDTH] IN BLOOD BY AUTOMATED COUNT: 15.3 % (ref 11.5–14.5)
FIBRINOGEN BLD CALC-MCNC: 513 MG/DL (ref 278–581)
FIBRINOGEN BLD CALC-MCNC: 571 MG/DL (ref 278–581)
GLUCOSE BLD MANUAL STRIP-MCNC: 107 MG/DL (ref 74–99)
GLUCOSE BLD MANUAL STRIP-MCNC: 111 MG/DL (ref 74–99)
GLUCOSE BLD MANUAL STRIP-MCNC: 116 MG/DL (ref 74–99)
GLUCOSE BLD MANUAL STRIP-MCNC: 118 MG/DL (ref 74–99)
GLUCOSE BLD MANUAL STRIP-MCNC: 128 MG/DL (ref 74–99)
GLUCOSE BLD MANUAL STRIP-MCNC: 143 MG/DL (ref 74–99)
GLUCOSE BLD MANUAL STRIP-MCNC: 151 MG/DL (ref 74–99)
GLUCOSE BLD MANUAL STRIP-MCNC: 160 MG/DL (ref 74–99)
GLUCOSE BLD MANUAL STRIP-MCNC: 197 MG/DL (ref 74–99)
GLUCOSE BLD MANUAL STRIP-MCNC: 198 MG/DL (ref 74–99)
GLUCOSE BLD MANUAL STRIP-MCNC: 237 MG/DL (ref 74–99)
GLUCOSE BLD MANUAL STRIP-MCNC: 238 MG/DL (ref 74–99)
GLUCOSE BLD MANUAL STRIP-MCNC: 247 MG/DL (ref 74–99)
GLUCOSE BLD MANUAL STRIP-MCNC: 247 MG/DL (ref 74–99)
GLUCOSE BLD MANUAL STRIP-MCNC: 266 MG/DL (ref 74–99)
GLUCOSE BLD MANUAL STRIP-MCNC: 271 MG/DL (ref 74–99)
GLUCOSE BLD MANUAL STRIP-MCNC: 280 MG/DL (ref 74–99)
GLUCOSE BLD MANUAL STRIP-MCNC: 287 MG/DL (ref 74–99)
GLUCOSE BLD MANUAL STRIP-MCNC: 288 MG/DL (ref 74–99)
GLUCOSE BLD MANUAL STRIP-MCNC: 289 MG/DL (ref 74–99)
GLUCOSE BLD MANUAL STRIP-MCNC: 290 MG/DL (ref 74–99)
GLUCOSE BLD MANUAL STRIP-MCNC: 296 MG/DL (ref 74–99)
GLUCOSE BLD MANUAL STRIP-MCNC: 305 MG/DL (ref 74–99)
GLUCOSE BLDA-MCNC: 121 MG/DL (ref 74–99)
GLUCOSE BLDA-MCNC: 224 MG/DL (ref 74–99)
GLUCOSE BLDA-MCNC: 243 MG/DL (ref 74–99)
GLUCOSE SERPL-MCNC: 116 MG/DL (ref 74–99)
GLUCOSE SERPL-MCNC: 251 MG/DL (ref 74–99)
HCO3 BLDA-SCNC: 19.6 MMOL/L (ref 22–26)
HCO3 BLDA-SCNC: 20.2 MMOL/L (ref 22–26)
HCO3 BLDA-SCNC: 27.8 MMOL/L (ref 22–26)
HCT VFR BLD AUTO: 18.7 % (ref 41–52)
HCT VFR BLD AUTO: 18.8 % (ref 41–52)
HCT VFR BLD AUTO: 19.7 % (ref 41–52)
HCT VFR BLD AUTO: 21.2 % (ref 41–52)
HCT VFR BLD EST: 19 % (ref 41–52)
HCT VFR BLD EST: 19 % (ref 41–52)
HCT VFR BLD EST: 21 % (ref 41–52)
HGB BLD-MCNC: 6.4 G/DL (ref 13.5–17.5)
HGB BLD-MCNC: 6.5 G/DL (ref 13.5–17.5)
HGB BLD-MCNC: 6.5 G/DL (ref 13.5–17.5)
HGB BLD-MCNC: 7.3 G/DL (ref 13.5–17.5)
HGB BLDA-MCNC: 6.2 G/DL (ref 13.5–17.5)
HGB BLDA-MCNC: 6.4 G/DL (ref 13.5–17.5)
HGB BLDA-MCNC: 7 G/DL (ref 13.5–17.5)
INHALED O2 CONCENTRATION: 30 %
INHALED O2 CONCENTRATION: 30 %
INHALED O2 CONCENTRATION: 50 %
INR PPP: 1.3 (ref 0.9–1.1)
LACTATE BLDA-SCNC: 0.6 MMOL/L (ref 0.4–2)
LACTATE BLDA-SCNC: 1.2 MMOL/L (ref 0.4–2)
LACTATE BLDA-SCNC: 1.2 MMOL/L (ref 0.4–2)
LACTATE SERPL-SCNC: 1.2 MMOL/L (ref 0.4–2)
MA KAOLIN BLD RES TEG: 67 MM (ref 52–69)
MA KAOLIN BLD RES TEG: 69 MM (ref 52–69)
MA KAOLIN+TF BLD RES TEG: 66 MM (ref 52–70)
MA KAOLIN+TF BLD RES TEG: 68 MM (ref 52–70)
MA TF IND+IIB-IIIA INH BLD RES TEG: 28 MM (ref 15–32)
MA TF IND+IIB-IIIA INH BLD RES TEG: 31 MM (ref 15–32)
MAGNESIUM SERPL-MCNC: 1.69 MG/DL (ref 1.6–2.4)
MAGNESIUM SERPL-MCNC: 1.74 MG/DL (ref 1.6–2.4)
MCH RBC QN AUTO: 27.9 PG (ref 26–34)
MCH RBC QN AUTO: 28.6 PG (ref 26–34)
MCH RBC QN AUTO: 29 PG (ref 26–34)
MCH RBC QN AUTO: 29.4 PG (ref 26–34)
MCHC RBC AUTO-ENTMCNC: 33 G/DL (ref 32–36)
MCHC RBC AUTO-ENTMCNC: 34.2 G/DL (ref 32–36)
MCHC RBC AUTO-ENTMCNC: 34.4 G/DL (ref 32–36)
MCHC RBC AUTO-ENTMCNC: 34.6 G/DL (ref 32–36)
MCV RBC AUTO: 81 FL (ref 80–100)
MCV RBC AUTO: 84 FL (ref 80–100)
MCV RBC AUTO: 86 FL (ref 80–100)
MCV RBC AUTO: 88 FL (ref 80–100)
NRBC BLD-RTO: 0 /100 WBCS (ref 0–0)
NRBC BLD-RTO: 0 /100 WBCS (ref 0–0)
NRBC BLD-RTO: 0.1 /100 WBCS (ref 0–0)
NRBC BLD-RTO: 0.2 /100 WBCS (ref 0–0)
OXYHGB MFR BLDA: 96.6 % (ref 94–98)
OXYHGB MFR BLDA: 97 % (ref 94–98)
OXYHGB MFR BLDA: 97.5 % (ref 94–98)
PCO2 BLDA: 39 MM HG (ref 38–42)
PCO2 BLDA: 40 MM HG (ref 38–42)
PCO2 BLDA: 45 MM HG (ref 38–42)
PH BLDA: 7.26 PH (ref 7.38–7.42)
PH BLDA: 7.31 PH (ref 7.38–7.42)
PH BLDA: 7.45 PH (ref 7.38–7.42)
PHOSPHATE SERPL-MCNC: 1.8 MG/DL (ref 2.5–4.9)
PHOSPHATE SERPL-MCNC: 3.5 MG/DL (ref 2.5–4.9)
PLATELET # BLD AUTO: 136 X10*3/UL (ref 150–450)
PLATELET # BLD AUTO: 152 X10*3/UL (ref 150–450)
PLATELET # BLD AUTO: 158 X10*3/UL (ref 150–450)
PLATELET # BLD AUTO: 187 X10*3/UL (ref 150–450)
PO2 BLDA: 128 MM HG (ref 85–95)
PO2 BLDA: 130 MM HG (ref 85–95)
PO2 BLDA: 145 MM HG (ref 85–95)
POTASSIUM BLDA-SCNC: 3.2 MMOL/L (ref 3.5–5.3)
POTASSIUM BLDA-SCNC: 3.8 MMOL/L (ref 3.5–5.3)
POTASSIUM BLDA-SCNC: 4 MMOL/L (ref 3.5–5.3)
POTASSIUM SERPL-SCNC: 3.3 MMOL/L (ref 3.5–5.3)
POTASSIUM SERPL-SCNC: 4.1 MMOL/L (ref 3.5–5.3)
PRODUCT BLOOD TYPE: 5100
PRODUCT BLOOD TYPE: 9500
PRODUCT BLOOD TYPE: NORMAL
PRODUCT CODE: NORMAL
PROTHROMBIN TIME: 14.9 SECONDS (ref 9.8–12.8)
RBC # BLD AUTO: 2.24 X10*6/UL (ref 4.5–5.9)
RBC # BLD AUTO: 2.24 X10*6/UL (ref 4.5–5.9)
RBC # BLD AUTO: 2.33 X10*6/UL (ref 4.5–5.9)
RBC # BLD AUTO: 2.48 X10*6/UL (ref 4.5–5.9)
RH FACTOR (ANTIGEN D): NORMAL
SAO2 % BLDA: 100 % (ref 94–100)
SAO2 % BLDA: 100 % (ref 94–100)
SAO2 % BLDA: 99 % (ref 94–100)
SODIUM BLDA-SCNC: 135 MMOL/L (ref 136–145)
SODIUM SERPL-SCNC: 137 MMOL/L (ref 136–145)
SODIUM SERPL-SCNC: 138 MMOL/L (ref 136–145)
UNIT ABO: NORMAL
UNIT NUMBER: NORMAL
UNIT RH: NORMAL
UNIT VOLUME: 215
UNIT VOLUME: 270
UNIT VOLUME: 282
UNIT VOLUME: 298
UNIT VOLUME: 299
UNIT VOLUME: 350
VANCOMYCIN SERPL-MCNC: 11.1 UG/ML (ref 5–20)
WBC # BLD AUTO: 13.2 X10*3/UL (ref 4.4–11.3)
WBC # BLD AUTO: 13.9 X10*3/UL (ref 4.4–11.3)
WBC # BLD AUTO: 15.1 X10*3/UL (ref 4.4–11.3)
WBC # BLD AUTO: 16.3 X10*3/UL (ref 4.4–11.3)
XM INTEP: NORMAL

## 2024-07-20 PROCEDURE — 94003 VENT MGMT INPAT SUBQ DAY: CPT

## 2024-07-20 PROCEDURE — 36430 TRANSFUSION BLD/BLD COMPNT: CPT

## 2024-07-20 PROCEDURE — 2500000005 HC RX 250 GENERAL PHARMACY W/O HCPCS: Performed by: ANESTHESIOLOGIST ASSISTANT

## 2024-07-20 PROCEDURE — 84132 ASSAY OF SERUM POTASSIUM: CPT

## 2024-07-20 PROCEDURE — 83735 ASSAY OF MAGNESIUM: CPT

## 2024-07-20 PROCEDURE — 2500000005 HC RX 250 GENERAL PHARMACY W/O HCPCS: Performed by: SURGERY

## 2024-07-20 PROCEDURE — 2020000001 HC ICU ROOM DAILY

## 2024-07-20 PROCEDURE — 80202 ASSAY OF VANCOMYCIN: CPT

## 2024-07-20 PROCEDURE — 2500000004 HC RX 250 GENERAL PHARMACY W/ HCPCS (ALT 636 FOR OP/ED): Performed by: SURGERY

## 2024-07-20 PROCEDURE — 2500000004 HC RX 250 GENERAL PHARMACY W/ HCPCS (ALT 636 FOR OP/ED): Mod: JZ | Performed by: EMERGENCY MEDICINE

## 2024-07-20 PROCEDURE — 85027 COMPLETE CBC AUTOMATED: CPT

## 2024-07-20 PROCEDURE — 71045 X-RAY EXAM CHEST 1 VIEW: CPT | Performed by: RADIOLOGY

## 2024-07-20 PROCEDURE — 84132 ASSAY OF SERUM POTASSIUM: CPT | Performed by: ANESTHESIOLOGIST ASSISTANT

## 2024-07-20 PROCEDURE — 2500000005 HC RX 250 GENERAL PHARMACY W/O HCPCS

## 2024-07-20 PROCEDURE — 83036 HEMOGLOBIN GLYCOSYLATED A1C: CPT | Performed by: EMERGENCY MEDICINE

## 2024-07-20 PROCEDURE — 85384 FIBRINOGEN ACTIVITY: CPT

## 2024-07-20 PROCEDURE — 2500000004 HC RX 250 GENERAL PHARMACY W/ HCPCS (ALT 636 FOR OP/ED)

## 2024-07-20 PROCEDURE — 0JBL0ZZ EXCISION OF RIGHT UPPER LEG SUBCUTANEOUS TISSUE AND FASCIA, OPEN APPROACH: ICD-10-PCS | Performed by: SURGERY

## 2024-07-20 PROCEDURE — 85347 COAGULATION TIME ACTIVATED: CPT

## 2024-07-20 PROCEDURE — 85610 PROTHROMBIN TIME: CPT

## 2024-07-20 PROCEDURE — 82330 ASSAY OF CALCIUM: CPT

## 2024-07-20 PROCEDURE — 3700000001 HC GENERAL ANESTHESIA TIME - INITIAL BASE CHARGE: Performed by: SURGERY

## 2024-07-20 PROCEDURE — 85576 BLOOD PLATELET AGGREGATION: CPT

## 2024-07-20 PROCEDURE — 3700000002 HC GENERAL ANESTHESIA TIME - EACH INCREMENTAL 1 MINUTE: Performed by: SURGERY

## 2024-07-20 PROCEDURE — 2500000004 HC RX 250 GENERAL PHARMACY W/ HCPCS (ALT 636 FOR OP/ED): Performed by: ANESTHESIOLOGIST ASSISTANT

## 2024-07-20 PROCEDURE — 2720000007 HC OR 272 NO HCPCS: Performed by: SURGERY

## 2024-07-20 PROCEDURE — 99291 CRITICAL CARE FIRST HOUR: CPT | Performed by: EMERGENCY MEDICINE

## 2024-07-20 PROCEDURE — 11043 DBRDMT MUSC&/FSCA 1ST 20/<: CPT | Performed by: SURGERY

## 2024-07-20 PROCEDURE — 37799 UNLISTED PX VASCULAR SURGERY: CPT

## 2024-07-20 PROCEDURE — 2500000004 HC RX 250 GENERAL PHARMACY W/ HCPCS (ALT 636 FOR OP/ED): Performed by: STUDENT IN AN ORGANIZED HEALTH CARE EDUCATION/TRAINING PROGRAM

## 2024-07-20 PROCEDURE — 11046 DBRDMT MUSC&/FSCA EA ADDL: CPT | Performed by: SURGERY

## 2024-07-20 PROCEDURE — 0JBM0ZZ EXCISION OF LEFT UPPER LEG SUBCUTANEOUS TISSUE AND FASCIA, OPEN APPROACH: ICD-10-PCS | Performed by: SURGERY

## 2024-07-20 PROCEDURE — 82947 ASSAY GLUCOSE BLOOD QUANT: CPT

## 2024-07-20 PROCEDURE — 83605 ASSAY OF LACTIC ACID: CPT

## 2024-07-20 PROCEDURE — 2500000004 HC RX 250 GENERAL PHARMACY W/ HCPCS (ALT 636 FOR OP/ED): Mod: JZ | Performed by: STUDENT IN AN ORGANIZED HEALTH CARE EDUCATION/TRAINING PROGRAM

## 2024-07-20 PROCEDURE — P9017 PLASMA 1 DONOR FRZ W/IN 8 HR: HCPCS

## 2024-07-20 PROCEDURE — 71045 X-RAY EXAM CHEST 1 VIEW: CPT

## 2024-07-20 PROCEDURE — 3600000003 HC OR TIME - INITIAL BASE CHARGE - PROCEDURE LEVEL THREE: Performed by: SURGERY

## 2024-07-20 PROCEDURE — P9045 ALBUMIN (HUMAN), 5%, 250 ML: HCPCS | Mod: JZ | Performed by: EMERGENCY MEDICINE

## 2024-07-20 PROCEDURE — P9016 RBC LEUKOCYTES REDUCED: HCPCS

## 2024-07-20 PROCEDURE — 3600000008 HC OR TIME - EACH INCREMENTAL 1 MINUTE - PROCEDURE LEVEL THREE: Performed by: SURGERY

## 2024-07-20 PROCEDURE — 2500000005 HC RX 250 GENERAL PHARMACY W/O HCPCS: Performed by: STUDENT IN AN ORGANIZED HEALTH CARE EDUCATION/TRAINING PROGRAM

## 2024-07-20 RX ORDER — POTASSIUM CHLORIDE 29.8 MG/ML
40 INJECTION INTRAVENOUS ONCE
Status: COMPLETED | OUTPATIENT
Start: 2024-07-20 | End: 2024-07-20

## 2024-07-20 RX ORDER — ACETAMINOPHEN 325 MG/1
650 TABLET ORAL EVERY 6 HOURS PRN
Status: DISCONTINUED | OUTPATIENT
Start: 2024-07-20 | End: 2024-07-26

## 2024-07-20 RX ORDER — CALCIUM GLUCONATE 20 MG/ML
2 INJECTION, SOLUTION INTRAVENOUS ONCE
Status: COMPLETED | OUTPATIENT
Start: 2024-07-20 | End: 2024-07-20

## 2024-07-20 RX ORDER — SODIUM CHLORIDE 0.9 G/100ML
IRRIGANT IRRIGATION AS NEEDED
Status: DISCONTINUED | OUTPATIENT
Start: 2024-07-20 | End: 2024-07-20 | Stop reason: HOSPADM

## 2024-07-20 RX ORDER — ALBUMIN HUMAN 50 G/1000ML
25 SOLUTION INTRAVENOUS ONCE
Status: COMPLETED | OUTPATIENT
Start: 2024-07-20 | End: 2024-07-20

## 2024-07-20 RX ORDER — EPINEPHRINE 1 MG/ML
INJECTION, SOLUTION, CONCENTRATE INTRAVENOUS AS NEEDED
Status: DISCONTINUED | OUTPATIENT
Start: 2024-07-20 | End: 2024-07-20 | Stop reason: HOSPADM

## 2024-07-20 RX ORDER — ROCURONIUM BROMIDE 10 MG/ML
INJECTION, SOLUTION INTRAVENOUS AS NEEDED
Status: DISCONTINUED | OUTPATIENT
Start: 2024-07-20 | End: 2024-07-20

## 2024-07-20 RX ORDER — FAMOTIDINE 10 MG/ML
INJECTION INTRAVENOUS AS NEEDED
Status: DISCONTINUED | OUTPATIENT
Start: 2024-07-20 | End: 2024-07-20

## 2024-07-20 RX ORDER — HYDROMORPHONE HYDROCHLORIDE 1 MG/ML
INJECTION, SOLUTION INTRAMUSCULAR; INTRAVENOUS; SUBCUTANEOUS AS NEEDED
Status: DISCONTINUED | OUTPATIENT
Start: 2024-07-20 | End: 2024-07-20

## 2024-07-20 RX ORDER — FENTANYL CITRATE 50 UG/ML
INJECTION, SOLUTION INTRAMUSCULAR; INTRAVENOUS AS NEEDED
Status: DISCONTINUED | OUTPATIENT
Start: 2024-07-20 | End: 2024-07-20

## 2024-07-20 RX ORDER — MIDAZOLAM HYDROCHLORIDE 1 MG/ML
INJECTION INTRAMUSCULAR; INTRAVENOUS AS NEEDED
Status: DISCONTINUED | OUTPATIENT
Start: 2024-07-20 | End: 2024-07-20

## 2024-07-20 SDOH — SOCIAL STABILITY: SOCIAL INSECURITY: ARE THERE ANY APPARENT SIGNS OF INJURIES/BEHAVIORS THAT COULD BE RELATED TO ABUSE/NEGLECT?: UNABLE TO ASSESS

## 2024-07-20 SDOH — HEALTH STABILITY: MENTAL HEALTH: HOW OFTEN DO YOU HAVE A DRINK CONTAINING ALCOHOL?: PATIENT UNABLE TO ANSWER

## 2024-07-20 SDOH — SOCIAL STABILITY: SOCIAL INSECURITY: DO YOU FEEL UNSAFE GOING BACK TO THE PLACE WHERE YOU ARE LIVING?: UNABLE TO ASSESS

## 2024-07-20 SDOH — HEALTH STABILITY: MENTAL HEALTH: HOW OFTEN DO YOU HAVE 6 OR MORE DRINKS ON ONE OCCASION?: PATIENT UNABLE TO ANSWER

## 2024-07-20 SDOH — SOCIAL STABILITY: SOCIAL INSECURITY: HAVE YOU HAD THOUGHTS OF HARMING ANYONE ELSE?: UNABLE TO ASSESS

## 2024-07-20 SDOH — SOCIAL STABILITY: SOCIAL INSECURITY: WERE YOU ABLE TO COMPLETE ALL THE BEHAVIORAL HEALTH SCREENINGS?: NO

## 2024-07-20 SDOH — SOCIAL STABILITY: SOCIAL INSECURITY: DO YOU FEEL ANYONE HAS EXPLOITED OR TAKEN ADVANTAGE OF YOU FINANCIALLY OR OF YOUR PERSONAL PROPERTY?: UNABLE TO ASSESS

## 2024-07-20 SDOH — SOCIAL STABILITY: SOCIAL INSECURITY: ARE YOU OR HAVE YOU BEEN THREATENED OR ABUSED PHYSICALLY, EMOTIONALLY, OR SEXUALLY BY ANYONE?: UNABLE TO ASSESS

## 2024-07-20 SDOH — SOCIAL STABILITY: SOCIAL INSECURITY: HAVE YOU HAD ANY THOUGHTS OF HARMING ANYONE ELSE?: UNABLE TO ASSESS

## 2024-07-20 SDOH — HEALTH STABILITY: MENTAL HEALTH: CURRENT SMOKER: 1

## 2024-07-20 SDOH — SOCIAL STABILITY: SOCIAL INSECURITY: HAS ANYONE EVER THREATENED TO HURT YOUR FAMILY OR YOUR PETS?: UNABLE TO ASSESS

## 2024-07-20 SDOH — SOCIAL STABILITY: SOCIAL INSECURITY: DOES ANYONE TRY TO KEEP YOU FROM HAVING/CONTACTING OTHER FRIENDS OR DOING THINGS OUTSIDE YOUR HOME?: UNABLE TO ASSESS

## 2024-07-20 SDOH — HEALTH STABILITY: MENTAL HEALTH: HOW MANY STANDARD DRINKS CONTAINING ALCOHOL DO YOU HAVE ON A TYPICAL DAY?: PATIENT UNABLE TO ANSWER

## 2024-07-20 SDOH — SOCIAL STABILITY: SOCIAL INSECURITY: ABUSE: ADULT

## 2024-07-20 ASSESSMENT — ACTIVITIES OF DAILY LIVING (ADL)
PATIENT'S MEMORY ADEQUATE TO SAFELY COMPLETE DAILY ACTIVITIES?: UNABLE TO ASSESS
ADEQUATE_TO_COMPLETE_ADL: UNABLE TO ASSESS
GROOMING: UNABLE TO ASSESS
TOILETING: UNABLE TO ASSESS
HEARING - RIGHT EAR: UNABLE TO ASSESS
ADEQUATE_TO_COMPLETE_ADL: UNABLE TO ASSESS
ASSISTIVE_DEVICE: OTHER (COMMENT)
PATIENT'S MEMORY ADEQUATE TO SAFELY COMPLETE DAILY ACTIVITIES?: UNABLE TO ASSESS
HEARING - LEFT EAR: UNABLE TO ASSESS
WALKS IN HOME: UNABLE TO ASSESS
TOILETING: UNABLE TO ASSESS
HEARING - RIGHT EAR: UNABLE TO ASSESS
DRESSING YOURSELF: UNABLE TO ASSESS
LACK_OF_TRANSPORTATION: PATIENT UNABLE TO ANSWER
FEEDING YOURSELF: UNABLE TO ASSESS
FEEDING YOURSELF: UNABLE TO ASSESS
DRESSING YOURSELF: UNABLE TO ASSESS
WALKS IN HOME: UNABLE TO ASSESS
JUDGMENT_ADEQUATE_SAFELY_COMPLETE_DAILY_ACTIVITIES: UNABLE TO ASSESS
BATHING: UNABLE TO ASSESS
HEARING - LEFT EAR: UNABLE TO ASSESS
ASSISTIVE_DEVICE: OTHER (COMMENT)
BATHING: UNABLE TO ASSESS
JUDGMENT_ADEQUATE_SAFELY_COMPLETE_DAILY_ACTIVITIES: UNABLE TO ASSESS

## 2024-07-20 ASSESSMENT — LIFESTYLE VARIABLES
SKIP TO QUESTIONS 9-10: 0
AUDIT-C TOTAL SCORE: -1
HOW MANY STANDARD DRINKS CONTAINING ALCOHOL DO YOU HAVE ON A TYPICAL DAY: PATIENT UNABLE TO ANSWER
HOW OFTEN DO YOU HAVE 6 OR MORE DRINKS ON ONE OCCASION: PATIENT UNABLE TO ANSWER
AUDIT-C TOTAL SCORE: -1
AUDIT-C TOTAL SCORE: -1
HOW OFTEN DO YOU HAVE A DRINK CONTAINING ALCOHOL: PATIENT UNABLE TO ANSWER
SKIP TO QUESTIONS 9-10: 0

## 2024-07-20 ASSESSMENT — PATIENT HEALTH QUESTIONNAIRE - PHQ9
1. LITTLE INTEREST OR PLEASURE IN DOING THINGS: NOT AT ALL
2. FEELING DOWN, DEPRESSED OR HOPELESS: NOT AT ALL
SUM OF ALL RESPONSES TO PHQ9 QUESTIONS 1 & 2: 0

## 2024-07-20 ASSESSMENT — PAIN - FUNCTIONAL ASSESSMENT
PAIN_FUNCTIONAL_ASSESSMENT: CPOT (CRITICAL CARE PAIN OBSERVATION TOOL)

## 2024-07-20 ASSESSMENT — COGNITIVE AND FUNCTIONAL STATUS - GENERAL: PATIENT BASELINE BEDBOUND: UNABLE TO ASSESS AT THIS TIME

## 2024-07-20 NOTE — PROCEDURES
Right radial arterial line placement    A time out was performed identifying the correct procedure and correct location with nursing staff.  The right radial site was prepped with 2% Chlorhexidine and draped with a sterile sheet in the usual fashion. 1% lidocaine was administered subcutaneously for local anesthesia. The artery was cannulated and a catheter was placed over the wire with return of pulsatile red blood. The catheter was sutured in place and a sterile dressing was applied to the site. The line flushes and drawsn back blood easily. The catheter was attached to a monitor which revealed an appropriate arterial waveform. No complications were noted from this procedure.     Obinna Grewal PA-C  Trauma Surgery  [id number]

## 2024-07-20 NOTE — BRIEF OP NOTE
Date: 2024 - 2024  OR Location: [facility] OR    Name: Artem Suarez : 1985, Age: 39 y.o., MRN: [id number], Sex: male    Diagnosis  Pre-op Diagnosis      * Necrotizing soft tissue infection [M79.89] Post-op Diagnosis     * Necrotizing soft tissue infection [M79.89]     Procedures  Debridement Lower Extremity  78028 - WI DEBRIDEMENT MUSCLE &/FASCIA 1ST 20 SQ CM/<      Surgeons      * Kimberly Lopez - Primary    Resident/Fellow/Other Assistant:  Surgeons and Role:  * No surgeons found with a matching role *    Procedure Summary  Anesthesia: Anesthesia type not filed in the log.  ASA: IV  Anesthesia Staff: Anesthesiologist: Leon Brar DO  C-AA: GLORIA Vargas; GLORIA Ignacio  Estimated Blood Loss: 750mL  Intra-op Medications:   Administrations occurring from 0800 to 0915 on 24:   Medication Name Total Dose   clindamycin (Cleocin) 900 mg in dextrose 5% IV 50 mL Cannot be calculated   dextrose 50 % injection 12.5 g Cannot be calculated   dextrose 50 % injection 25 g Cannot be calculated   enoxaparin (Lovenox) syringe 60 mg Cannot be calculated   famotidine PF (Pepcid) injection 20 mg Cannot be calculated   fentanyl (Sublimaze) 1000 mcg in sodium chloride 0.9% 100 mL (10 mcg/mL) infusion (premix) Cannot be calculated   glucagon (Glucagen) injection 1 mg Cannot be calculated   glucagon (Glucagen) injection 1 mg Cannot be calculated   hydrocortisone sod succ (PF) (Solu-CORTEF) injection 50 mg Cannot be calculated   insulin regular (HumuLIN, NovoLIN) bolus from bag 2-6 Units Cannot be calculated   insulin regular 100 unit/100 mL (1 unit/mL) in 0.9 % NaCl infusion 5.63 Units   lactated Ringer's infusion Cannot be calculated   perflutren protein A microsphere (Optison) injection 0.5 mL Cannot be calculated   piperacillin-tazobactam (Zosyn) 4.5 g in dextrose (iso)  mL Cannot be calculated   propofol (Diprivan) infusion 81.45 mg   sulfur hexafluoride microsphr  (Lumason) injection 24.28 mg Cannot be calculated   vancomycin (Vancocin) in dextrose 5 % water (D5W) 250 mL IV 1,250 mg Cannot be calculated   vancomycin (Vancocin) pharmacy to dose - pharmacy monitoring Cannot be calculated   vasopressin (Vasostrict) 0.2 unit/mL in 5% dextrose 100 mL IV 1.56 Units   potassium chloride 40 mEq in sterile water for injection 100 mL Cannot be calculated              Anesthesia Record               Intraprocedure I/O Totals          Intake    Fentanyl Drip 0.00 mL    The total shown is the total volume documented since Anesthesia Start was filed.    PLASMA 515.00 mL    PRBC 350.00 mL    Transfuse RBC :30 Minutes 700.00 mL    Norepinephrine Drip 0.00 mL    The total shown is the total volume documented since Anesthesia Start was filed.    Insulin Drip 0.00 mL    The total shown is the total volume documented since Anesthesia Start was filed.    Propofol Drip 0.00 mL    The total shown is the total volume documented since Anesthesia Start was filed.    Vasopressin Drip 0.00 mL    The total shown is the total volume documented since Anesthesia Start was filed.    Total Intake 1565 mL       Output    Urine 175 mL    Est. Blood Loss 100 mL    Total Output 275 mL       Net    Net Volume 1290 mL          Specimen: No specimens collected     Staff:   Circulator: Juana  Scrub Person: Kathryn          Findings: necrotic tissue surrounding the posterior aspect of the bilateral groin incision and on the medial aspect of the bilateral incisions, areas of dark but blanching tissue to the posterior, debrided    Complications:  None; patient tolerated the procedure well.     Disposition: ICU - intubated and critically ill.  Condition: stable  Specimens Collected: No specimens collected    Kimberly Lopez  Phone Number: [phone]

## 2024-07-20 NOTE — ANESTHESIA PREPROCEDURE EVALUATION
Patient: Artem Suarez    Procedure Information       Anesthesia Start Date/Time: 07/20/24 0724    Procedure: Debridement Lower Extremity (Right)    Location: [facility] OR 11 / [facility] OR    Surgeons: Kimberly Lopez DO        40 yo male presents as a transfer after surgical debridement of NSTI of perineum and thighs with ongoing evidence of septic shock     Relevant Problems   Anesthesia (within normal limits)      Cardiac  Sepsis Vasopressin 0.03 Unit/min; Norepinephrine 0.08 mcg/kg/min      Pulmonary  Respiratory failure - FiO2: 50, Vt: 450, RR: 16, PEEP: 8        Neuro  Sedation - Propofol 30mcg/kg/min; Fentanyl 25mcg/hr      Endocrine  Insulin gtt   (+) Diabetes mellitus, type 2 (Multi)      Hematology   (+) Anemia   (+) Coagulopathy (Multi)      ID   (+) Necrotizing soft tissue infection     Vitals:    07/20/24 0600   BP: (!) 107/48   Pulse: 83   Resp:    Temp: (!) 38.4 °C (101.1 °F)   SpO2: 96%       History reviewed. No pertinent surgical history.  History reviewed. No pertinent past medical history.    Current Facility-Administered Medications:     clindamycin (Cleocin) 900 mg in dextrose 5% IV 50 mL, 900 mg, intravenous, q8h, Randall Ledbetter MD, Stopped at 07/20/24 0453    dextrose 50 % injection 12.5 g, 12.5 g, intravenous, q15 min PRN, Randall Ledbetter MD    dextrose 50 % injection 25 g, 25 g, intravenous, q15 min PRN, Randall Ledbetter MD    enoxaparin (Lovenox) syringe 60 mg, 60 mg, subcutaneous, q12h CHICHI, Randall Ledbetter MD, 60 mg at 07/19/24 2047    famotidine PF (Pepcid) injection 20 mg, 20 mg, intravenous, BID, Randall Ledbetter MD, 20 mg at 07/19/24 2042    fentanyl (Sublimaze) 1000 mcg in sodium chloride 0.9% 100 mL (10 mcg/mL) infusion (premix),  mcg/hr, intravenous, Continuous, Randall Ledbetter MD, Last Rate: 2.5 mL/hr at 07/19/24 1500, 25 mcg/hr at 07/19/24 1500    glucagon (Glucagen) injection 1 mg, 1 mg, intramuscular, q15 min PRN, Randall Ledbetter MD    glucagon (Glucagen) injection 1 mg, 1 mg,  intramuscular, q15 min PRN, Randall Ledbetter MD    hydrocortisone sod succ (PF) (Solu-CORTEF) injection 50 mg, 50 mg, intravenous, q6h, Randall Ledbetter MD, 50 mg at 07/20/24 0507    insulin regular (HumuLIN, NovoLIN) bolus from bag 2-6 Units, 2-6 Units, intravenous, PRN, Randall Ledbetter MD, 2 Units at 07/19/24 2005    insulin regular 100 unit/100 mL (1 unit/mL) in 0.9 % NaCl infusion, 0-40 Units/hr, intravenous, Continuous, Randall Ledbetter MD, Last Rate: 4.5 mL/hr at 07/20/24 0700, 4.5 Units/hr at 07/20/24 0700    lactated Ringer's infusion, 125 mL/hr, intravenous, Continuous, Randall Ledbetter MD, Last Rate: 125 mL/hr at 07/19/24 2354, 125 mL/hr at 07/19/24 2354    norepinephrine (Levophed) 16 mg in dextrose 5% 250 mL (0.064 mg/mL) infusion, 0-0.5 mcg/kg/min, intravenous, Continuous, Randall Ledbetter MD, Last Rate: 13.58 mL/hr at 07/20/24 0453, 0.08 mcg/kg/min at 07/20/24 0453    oxygen (O2) therapy, , inhalation, Continuous - Inhalation, Ulises Shea DO, 40 percent at 07/19/24 2000    oxygen (O2) therapy, , inhalation, Continuous - Inhalation, Randall Ledbetter MD, 40 percent at 07/19/24 2000    perflutren protein A microsphere (Optison) injection 0.5 mL, 0.5 mL, intravenous, Once in imaging, Ulises Shea DO    piperacillin-tazobactam (Zosyn) 4.5 g in dextrose (iso)  mL, 4.5 g, intravenous, q6h, Randall Ledbetter MD, Stopped at 07/20/24 0632    potassium chloride 40 mEq in sterile water for injection 100 mL, 40 mEq, intravenous, Once, Obinna Grewal PA-C, Last Rate: 25 mL/hr at 07/20/24 0444, 40 mEq at 07/20/24 0444    propofol (Diprivan) infusion, 5-40 mcg/kg/min, intravenous, Continuous, Randall Ledbetter MD, Last Rate: 32.6 mL/hr at 07/20/24 0539, 30 mcg/kg/min at 07/20/24 0539    sulfur hexafluoride microsphr (Lumason) injection 24.28 mg, 2 mL, intravenous, Once in imaging, Ulises Shea,     vancomycin (Vancocin) in dextrose 5 % water (D5W) 250 mL IV 1,250 mg, 1,250 mg, intravenous, q12h, Randall Ledbetter MD, Last Rate: 200 mL/hr at  07/20/24 0623, 1,250 mg at 07/20/24 0623    vancomycin (Vancocin) pharmacy to dose - pharmacy monitoring, , miscellaneous, Daily PRN, Randall Ledbetter MD    vasopressin (Vasostrict) 0.2 unit/mL in 5% dextrose 100 mL IV, 0.03 Units/min, intravenous, Continuous, Randall Ledbetter MD, Last Rate: 9 mL/hr at 07/19/24 2256, 0.03 Units/min at 07/19/24 2256  Prior to Admission medications    Not on File     No Known Allergies  Social History     Tobacco Use    Smoking status: Unknown    Smokeless tobacco: Not on file   Substance Use Topics    Alcohol use: Not on file         Chemistry    Lab Results   Component Value Date/Time     07/20/2024 0057    K 3.3 (L) 07/20/2024 0057     07/20/2024 0057    CO2 27 07/20/2024 0057    BUN 13 07/20/2024 0057    CREATININE 0.79 07/20/2024 0057    Lab Results   Component Value Date/Time    CALCIUM 7.4 (L) 07/20/2024 0057          Lab Results   Component Value Date/Time    WBC 15.1 (H) 07/20/2024 0057    HGB 6.5 (LL) 07/20/2024 0057    HCT 18.8 (L) 07/20/2024 0057     07/20/2024 0057     Lab Results   Component Value Date/Time    PROTIME 19.4 (H) 07/19/2024 1435    INR 1.7 (H) 07/19/2024 1435     Clinical information reviewed:   Tobacco  Allergies  Meds   Med Hx  Surg Hx   Fam Hx  Soc Hx        NPO Detail:  No data recorded     Physical Exam    Airway  Mallampati: unable to assess  Comments: Intubated   Cardiovascular   Rhythm: regular     Dental    Pulmonary   (+) decreased breath sounds     Abdominal            Anesthesia Plan    History of general anesthesia?: yes  History of complications of general anesthesia?: no    ASA 4 - emergent     general   (GETA; arterial line and central line in-situ. Anesthesia discussed with emergency contact)  The patient is a current smoker.    Plan/risks discussed with: Rupali Beal (Emergency Contact) jeremie telephone.  Okay for blood.  Use of blood products discussed with who consented to blood products.    Plan discussed with  CAA.       Repair Type: Repair deferred until later date

## 2024-07-20 NOTE — PROGRESS NOTES
"[facility]  ACUTE CARE SURGERY - PROGRESS NOTE    Patient Name: Artem Suarez  MRN: [id number]  Admit Date: 719  : 1985  AGE: 39 y.o.   GENDER: male  ==============================================================================  TODAY'S ASSESSMENT AND PLAN OF CARE:  39 M presented as a transfer for NSTI of perineum and thighs in septic shock. Remains in the ICU with ongoing evidence of septic shock, requiring pressors. Drop in Hb requiring blood products. Will plan to proceed to OR for second look and additional debridement of thigh, buttock, perineum and scrotum.    - OR with ACS  - 2u of pRBCs on hold for OR  - continue broad spectrum abx  - maintain keith  - resuscitation per ICU    Plans discussed with Attending Physician Dr. Echevarria.    Alisha Abarca MD PGY-2  Acute Care Surgery [id number]      ==============================================================================  CHIEF COMPLAINT / EVENTS LAST 24HRS / HPI:  Patient admitted to ICU. Hb dropped to 6.5 from 8.5, failed to increment after 1u pRBCs. Febrile to 38.8 Remains on levo 0.08 and vaso 0.0.3. WBC decreased to 15.    MEDICAL HISTORY / ROS:   Admission history and ROS reviewed. Pertinent changes as follows:  none    PHYSICAL EXAM:  Heart Rate:  []   Temp:  [36.1 °C (97 °F)-38.8 °C (101.8 °F)]   Resp:  [20-28]   BP: ()/(38-70)   Height:  [167 cm (5' 5.75\")]   SpO2:  [88 %-100 %]   Physical Exam  Intubated, sedated  ETT in place, stable on vent  Abd soft, ND  Keith in place draining clear yellow urine  Debridement of perineum and inner thighs, packed with kerlix  1st R digit ulcer      IMAGING SUMMARY:   CXR with ETT in place, L Injection at atriocaval junction    LABS:  Results from last 7 days   Lab Units 24  0701 24  0057 24  1435   WBC AUTO x10*3/uL 13.9* 15.1* 25.4*   HEMOGLOBIN g/dL 6.4* 6.5* 8.5*   HEMATOCRIT % 18.7* 18.8* 25.4*   PLATELETS AUTO x10*3/uL 158 187 230 "     Results from last 7 days   Lab Units 07/19/24  1435   APTT seconds 30   INR  1.7*     Results from last 7 days   Lab Units 07/20/24  0057 07/19/24  1435   SODIUM mmol/L 138 137   POTASSIUM mmol/L 3.3* 3.5   CHLORIDE mmol/L 101 100   CO2 mmol/L 27 22   BUN mg/dL 13 16   CREATININE mg/dL 0.79 1.19   CALCIUM mg/dL 7.4* 7.2*   GLUCOSE mg/dL 116* 428*         Results from last 7 days   Lab Units 07/20/24  0057 07/19/24  1630 07/19/24  1437   POCT PH, ARTERIAL pH 7.45* 7.37* 7.34*   POCT PCO2, ARTERIAL mm Hg 40 40 40   POCT PO2, ARTERIAL mm Hg 130* 228* 204*   POCT HCO3 CALCULATED, ARTERIAL mmol/L 27.8* 23.1 21.6*   POCT BASE EXCESS, ARTERIAL mmol/L 3.5* -2.0 -3.9*       I have reviewed all medications, laboratory results, and imaging pertinent for today's encounter.

## 2024-07-20 NOTE — PROGRESS NOTES
Vancomycin Dosing by Pharmacy  FOLLOW UP    Artem Suarez is a 39 y.o. male who Pharmacy is consulted to dose vancomycin for cellulitis/skin and soft tissue infection, sepsis, necrotizing soft tissue infection    Based on the patient's indication and renal status, this patient is being dosed based on a goal vancomycin AUC of 500-600.     Current vancomycin dose: 1250 mg every 12 hours    Estimated vancomycin AUC on current dose: 261 mg/L.hr    Renal function is currently stable.    Estimated Creatinine Clearance: 125 mL/min (by C-G formula based on SCr of 0.81 mg/dL).    Vancomycin   Date Value Ref Range Status   07/20/2024 11.1 5.0 - 20.0 ug/mL Final       Results from last 7 days   Lab Units 07/20/24  1159 07/20/24  0701 07/20/24  0057 07/19/24  1435   CREATININE mg/dL 0.81  --  0.79 1.19   BUN mg/dL 12  --  13 16   WBC AUTO x10*3/uL 13.2* 13.9* 15.1* 25.4*        Visit Vitals  /58   Pulse 102   Temp 37.6 °C (99.7 °F) (Bladder)   Resp 21       Blood Culture   Date/Time Value Ref Range Status   07/19/2024 03:57 PM Loaded on Instrument - Culture in progress  Preliminary   07/19/2024 03:57 PM Loaded on Instrument - Culture in progress  Preliminary        No results found for the last 90 days.      Assessment/Plan    AUC is below goal range. Orders placed for new vancomycin regimen of 1750mg every 8 hours. This dosing regimen is predicted by [facility] to result in the following pharmacokinetic parameters:    The next vancomycin level will be ordered for 7/21 at AM, unless clinically indicated sooner.  Will continue to monitor renal function daily while on vancomycin and order serum creatinine at least every 48 hours if not already ordered.  Will follow for continued vancomycin needs, clinical response, and signs/symptoms of toxicity.       Heriberto Vernon, PharmD

## 2024-07-20 NOTE — NURSING NOTE
Report received from OR 1110 (Gila)  In OR 3 units PRBCs and 2 units plasma    1145 patient returned from OR. Levo and vaso restarted upon return to unit (MAPs in 40s). Prop rate/dose change to 30. Fent @25. Insulin @6units/hr. Per anesthesia patient not reversed.

## 2024-07-20 NOTE — SIGNIFICANT EVENT
Reached out to patient emergency contact Qian Beal ([phone]) to discuss procedure. She provided contact info for 19 yo daughter Sydney Suarez ([phone]). Attempted to reach out to daughter for consent, unable to reach her at the number provided. Will plan to consent emergency contact as we are unable to reach NOK.     Alisha Abarca MD  Acute Care Surgery [id number]

## 2024-07-20 NOTE — PROGRESS NOTES
[facility]  TRAUMA ICU - PROGRESS NOTE    Patient Name: Artem Suarez  MRN: [id number]  Admit Date: 719  : 1985  AGE: 39 y.o.   GENDER: male  ==============================================================================  TODAY'S ASSESSMENT AND PLAN OF CARE:  Artem Suarez is a 39 y.o. male with a history of severe lymphedema, here for management of septic shock with suspected source bilateral NSTI of the perineum and posterior medial thigh. Recovering from further debridement of NSTI at 8AM    NEURO/PAIN/SEDATION:   GCS of 7T, Propofol 30, Fent 25  RASS of -4    RESPIRATORY:   Intubated   Vent settings FiO2: 30 TV: 450 RR: 16 PEEP: 8    CARDIOVASC/CIRCULATORY:   Hx of poorly managed lymphedema since , monitor edema and fluids, strict I/O  Requiring Levo at 0.04, titrating down, and Vaso at 0.03, Hydrocortisone started yesterday  BP has been persistently hypertensive on 131-172, MAP goal > 65  Echo revealed no gross abnormalities, poorly visualized exam due to body habitus    GI:   TF, ADAT    :   Wolfe in place  Given 5.4 L of fluids, Produced 1.8L, Net of 3.6L  On LR at 125 ml, 500 Bolus  Monitor Electrolytes     HEMATOLOGIC:   Given 3U PRBC, 2U FFP in OR, 4U of PRBC outside of OR, monitor CBC, ABG    ENDOCRINE:   Hx of Uncontrolled Diabetes, last glucose 237, Managed with insulin drip     MUSCULOSKELETAL/SKIN:   NSTI, Further debridement to be determined  Hx of limited Mobility  Hx of Removal of Non malignant Left Thigh Mass    INFECTIOUS DISEASE:   Septic shock due to NSTI, Temperature of 101, persistent fever, WBC of 13.9, trending down   Blood Cultures drawn , awaiting results  Antibiotics: vancomycin, zosyn, clindamycin      GI PROPHYLAXIS: Pepcid    DVT PROPHYLAXIS: Lovenox     Lines/Drains/Tubes:  R. Art Line, CVIJ, Wolfe    DISPOSITION: remain in the TSICU        ==============================================================================  CHIEF  "COMPLAINT / OVERNIGHT EVENTS / HPI:   Artem Suarez is a 39 y.o. 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 At the OSH, he was taken to the OR overnight for initial debridement. During his hospitalization at the OSH, he was requiring variable pressors intraoperatively and postoperatively, as well as an insulin drip for hyperglycemia. He is now in the ICU for septic shock management in the setting of Chiquita's gangrene/NSTI.    Review of Systems   Reason unable to perform ROS: Patient is Sedated and Intubated.        PHYSICAL EXAM:  Heart Rate:  []   Temp:  [36.1 °C (97 °F)-38.8 °C (101.8 °F)]   Resp:  [20-28]   BP: ()/(38-70)   Height:  [167 cm (5' 5.75\")]   SpO2:  [88 %-100 %]   Physical Exam  Constitutional:       Appearance: He is morbidly obese. He is ill-appearing.      Interventions: He is sedated and intubated.   HENT:      Head: Normocephalic.      Mouth/Throat:      Mouth: Mucous membranes are dry.   Cardiovascular:      Rate and Rhythm: Normal rate and regular rhythm.   Pulmonary:      Effort: No respiratory distress. He is intubated.   Abdominal:      Palpations: Abdomen is soft.      Tenderness: There is no guarding.   Musculoskeletal:         General: Swelling present.   Feet:      Right foot:      Skin integrity: Skin breakdown and dry skin present.      Left foot:      Skin integrity: Dry skin present.   Skin:     Findings: Wound present.      Comments: Bilateral Debridement of LE, perineum, inner thighs    Neurological:      Mental Status: He is lethargic and disoriented.      GCS: GCS eye subscore is 2. GCS verbal subscore is 1. GCS motor subscore is 4.         IMAGING SUMMARY:   XR chest 1 view    Result Date: 7/20/2024  Interpreted By:  Johanna Hernandez, STUDY: XR CHEST 1 VIEW; 7/20/2024 6:52 am   INDICATION: Signs/Symptoms:Intubated.   COMPARISON: Radiograph dated " 07/19/2024   ACCESSION NUMBER(S): [id number]   ORDERING CLINICIAN: KAMALJIT LEOS   FINDINGS: ET tube is terminating slightly below the clavicular heads. Enteric tube is in place with the tip beyond the field of view. Right IJ central venous catheter is projecting over mid SVC.   The cardiac silhouette size is within normal limits. There is no focal consolidation, edema or pneumothorax. No sizeable pleural effusion. No acute osseous abnormality.       1. Medical devices as described above. 2. No focal infiltrate, pleural effusion, edema or pneumothorax.       Signed by: Johanna Steele 7/20/2024 12:32 PM Dictation workstation:   [id number]    XR chest 1 view    Result Date: 7/20/2024  Interpreted By:  Johanna Hernandez,  Margarita Dailey STUDY: XR CHEST 1 VIEW;  7/19/2024 4:39 pm   INDICATION: Signs/Symptoms:intubated, check tube position.   COMPARISON: Chest radiographs since 07/18/2024.   ACCESSION NUMBER(S): [id number]   ORDERING CLINICIAN: BRANDI GOMEZ   FINDINGS: AP radiograph of the chest was provided.   MEDICAL DEVICES: Enteric tube overlies the esophagus and stomach with distal tip not imaged. ETT distal tip 7.2 cm from the elyse. Right IJ CVC distal tip overlying the mid SVC.   CARDIOMEDIASTINAL SILHOUETTE: Cardiomediastinal silhouette is normal in size and configuration.   LUNGS: No pneumothorax, pleural effusion or focal consolidation.   ABDOMEN: No remarkable upper abdominal findings.   BONES: No acute osseous changes.       1.  No focal infiltrate, pleural effusion, edema or pneumothorax. 2. ETT distal tip 7.2 cm from the elyse.   I personally reviewed the images/study and I agree with the findings as stated by Dr. Asim Judge. This study was interpreted at [place], [place].   MACRO: None   Signed by: Johanna Steele 7/20/2024 12:32 PM Dictation workstation:   [id number]    Transthoracic Echo (TTE) Complete    Result Date:  7/19/2024   [facility], [address] [place] [address]                Tel [phone] and Fax [phone] TRANSTHORACIC ECHOCARDIOGRAM REPORT  Patient Name:      MILADIS CRAIG         Reading Physician:    [id number] Luis Felipe Ravi MD Study Date:        7/19/2024            Ordering Provider:    [id number] TIFFANIE MARCUM MRN/PID:           [id number]             Fellow:               [id number] Mandi Snow MD Accession#:        [id number]         Nurse: Date of Birth/Age: 1985 / 39 years  Sonographer:          Rico Puga                                                               RDCS, RCS, FASE,                                                               ACS Gender:            M                    Additional Staff: Height:            167.64 cm            Admit Date:           7/19/2024 Weight:            180.99 kg            Admission Status:     Inpatient -                                                               Routine BSA / BMI:         2.68 m2 / 64.40      Encounter#:           [id number]                    kg/m2 Blood Pressure:    96/48 mmHg           Department Location:  [facility] Study Type:    TRANSTHORACIC ECHO (TTE) COMPLETE Diagnosis/ICD: Shock, unspecified-R57.9 Patient History: Pertinent History: Septic shock, Chiquita's gangrene, S/P surgical debridement                    of perineum and thighs, DM, lymphedema. Study Detail: The following Echo studies were performed: 2D, M-Mode, color flow               and Doppler. Technically challenging study due to poor acoustic               windows, patient lying in supine position and body habitus.               Definity used as a contrast agent for endocardial border               definition. Total contrast used for this procedure  was 4.0 mL via               IV push.  PHYSICIAN INTERPRETATION: Left Ventricle: The left ventricular systolic function is hyperdynamic, with a visually estimated ejection fraction of 70-75%. There are no regional wall motion abnormalities. The left ventricular cavity size is normal. There is left ventricular concentric remodeling. Abnormal (paradoxical) septal motion, consistent with an intraventricular conduction delay. Spectral Doppler shows a normal pattern of left ventricular diastolic filling. There is no definite left ventricular thrombus visualized. Left Atrium: The left atrium is normal in size. Right Ventricle: The right ventricle is normal in size. There is normal right ventricular global systolic function. Right Atrium: The right atrium is normal in size. Aortic Valve: The aortic valve is probably trileaflet. There is no evidence of aortic valve regurgitation. The peak instantaneous gradient of the aortic valve is 11.4 mmHg. Mitral Valve: The mitral valve is normal in structure. There is no evidence of mitral valve regurgitation. Tricuspid Valve: The tricuspid valve was not well visualized. No evidence of tricuspid regurgitation. The right ventricular systolic pressure is unable to be estimated. Pulmonic Valve: The pulmonic valve is structurally normal. There is physiologic pulmonic valve regurgitation. Pericardium: There is no pericardial effusion noted. Aorta: The aortic root is normal. Pulmonary Artery: The pulmonary artery is not well visualized. Systemic Veins: The inferior vena cava appears mildly dilated. On PPV, no variability seen. In comparison to the previous echocardiogram(s): There are no prior studies on this patient for comparison purposes.  CONCLUSIONS:  1. Poorly visualized anatomical structures due to suboptimal image quality.  2. The left ventricular systolic function is hyperdynamic, with a visually estimated ejection fraction of 70-75%.  3. No left ventricular thrombus visualized.   4. There is normal right ventricular global systolic function.  5. The pulmonary artery is not well visualized. QUANTITATIVE DATA SUMMARY: 2D MEASUREMENTS:                          Normal Ranges: Ao Root d:     2.90 cm   (2.0-3.7cm) LAs:           3.00 cm   (2.7-4.0cm) IVSd:          1.00 cm   (0.6-1.1cm) LVPWd:         1.00 cm   (0.6-1.1cm) LVIDd:         4.00 cm   (3.9-5.9cm) LVIDs:         2.60 cm LV Mass Index: 47.4 g/m2 LV % FS        35.0 % LA VOLUME:                               Normal Ranges: LA Vol A4C:        30.4 ml    (22+/-6mL/m2) LA Vol A2C:        66.8 ml LA Vol BP:         46.3 ml LA Vol Index A4C:  11.3ml/m2 LA Vol Index A2C:  24.9 ml/m2 LA Vol Index BP:   17.3 ml/m2 LA Area A4C:       14.1 cm2 LA Area A2C:       21.5 cm2 LA Major Axis A4C: 5.6 cm LA Major Axis A2C: 5.9 cm LA Volume Index:   15.9 ml/m2 RA VOLUME BY A/L METHOD:                       Normal Ranges: RA Area A4C: 11.9 cm2 AORTA MEASUREMENTS:                    Normal Ranges: Asc Ao, d: 2.60 cm (2.1-3.4cm) LV SYSTOLIC FUNCTION BY 2D PLANIMETRY (MOD):                      Normal Ranges: EF-A4C View:    47 % (>=55%) EF-A2C View:    61 % EF-Biplane:     56 % EF-Visual:      73 % LV EF Reported: 73 % LV DIASTOLIC FUNCTION:                              Normal Ranges: MV Peak E:        0.80 m/s   (0.7-1.2 m/s) MV Peak A:        0.54 m/s   (0.42-0.7 m/s) E/A Ratio:        1.49       (1.0-2.2) MV e'             0.105 m/s  (>8.0) MV lateral e'     0.11 m/s MV medial e'      0.10 m/s E/e' Ratio:       7.63       (<8.0) PulmV Sys Rob:    55.00 cm/s PulmV Lieberman Rob:   57.80 cm/s PulmV S/D Rob:    1.00 PulmV A Revs Rob: 42.60 cm/s MITRAL VALVE:                 Normal Ranges: MV DT: 275 msec (150-240msec) AORTIC VALVE:                          Normal Ranges: AoV Vmax:      1.69 m/s  (<=1.7m/s) AoV Peak P.4 mmHg (<20mmHg) LVOT Max Rob:  1.39 m/s  (<=1.1m/s) LVOT VTI:      17.80 cm LVOT Diameter: 2.30 cm   (1.8-2.4cm) AoV Area,Vmax: 3.42 cm2   (2.5-4.5cm2)  RIGHT VENTRICLE: TAPSE: 21.4 mm RV s'  0.23 m/s TRICUSPID VALVE/RVSP:                           Normal Ranges: Est. RA Pressure: 8 mmHg IVC Diam:         2.40 cm PULMONIC VALVE:                         Normal Ranges: PV Accel Time: 135 msec (>120ms) PV Max Rob:    1.0 m/s  (0.6-0.9m/s) PV Max P.4 mmHg Pulmonary Veins: PulmV A Revs Rob: 42.60 cm/s PulmV Lieberman Rob:   57.80 cm/s PulmV S/D Rob:    1.00 PulmV Sys Rob:    55.00 cm/s  [id number] Luis Felipe Ravi MD Electronically signed on 2024 at 5:58:24 PM  ** Final **     LABS:  Results from last 7 days   Lab Units 24  1435   WBC AUTO x10*3/uL 15.1* 25.4*   HEMOGLOBIN g/dL 6.5* 8.5*   HEMATOCRIT % 18.8* 25.4*   PLATELETS AUTO x10*3/uL 187 230     Results from last 7 days   Lab Units 24  1435   APTT seconds 30   INR  1.7*     Results from last 7 days   Lab Units 24  1435   SODIUM mmol/L 138 137   POTASSIUM mmol/L 3.3* 3.5   CHLORIDE mmol/L 101 100   CO2 mmol/L 27 22   BUN mg/dL 13 16   CREATININE mg/dL 0.79 1.19   CALCIUM mg/dL 7.4* 7.2*   GLUCOSE mg/dL 116* 428*         Results from last 7 days   Lab Units 24  1630 24  1437   POCT PH, ARTERIAL pH 7.45* 7.37* 7.34*   POCT PCO2, ARTERIAL mm Hg 40 40 40   POCT PO2, ARTERIAL mm Hg 130* 228* 204*   POCT HCO3 CALCULATED, ARTERIAL mmol/L 27.8* 23.1 21.6*   POCT BASE EXCESS, ARTERIAL mmol/L 3.5* -2.0 -3.9*     I have reviewed all medications, laboratory results, and imaging pertinent for today's encounter.

## 2024-07-20 NOTE — ANESTHESIA POSTPROCEDURE EVALUATION
Patient: Artem Suarez    Procedure Summary       Date: 07/20/24 Room / Location: [facility] OR 11 / [facility] OR    Anesthesia Start: 0825 Anesthesia Stop: 1138    Procedure: Debridement Lower Extremity (Bilateral) Diagnosis:       Necrotizing soft tissue infection      (Necrotizing soft tissue infection [M79.89])    Surgeons: Kimberly Lopez DO Responsible Provider: Leon Brar DO    Anesthesia Type: general ASA Status: 4 - Emergent            Anesthesia Type: general    Vitals Value Taken Time   /59 07/20/24 1400   Temp 37.7 °C (99.9 °F) 07/20/24 1339   Pulse 98 07/20/24 1400   Resp 21 07/20/24 1400   SpO2 100 % 07/20/24 1400   Vitals shown include unfiled device data.    Anesthesia Post Evaluation    Patient location during evaluation: ICU  Patient participation: complete - patient cannot participate  Level of consciousness: sedated  Pain management: adequate  Airway patency: patent  Cardiovascular status: acceptable and blood pressure returned to baseline  Respiratory status: acceptable and ETT  Hydration status: acceptable  Postoperative Nausea and Vomiting: none        There were no known notable events for this encounter.

## 2024-07-20 NOTE — OP NOTE
Debridement B groins and thighs Operative Note     Date: 2024  OR Location: [facility] OR    Name: Aretm Suarez : 1985, Age: 39 y.o., MRN: [id number], Sex: male    Diagnosis  Pre-op Diagnosis      * Necrotizing soft tissue infection [M79.89] Post-op Diagnosis     * Necrotizing soft tissue infection [M79.89]     Procedures  Sharp Debridement of B groins and thighs skin, subcutaneous tissue and fascia  R [id number] cm  L 06f23h6 c      Surgeons      * Kimberly Lopez - Primary    Resident/Fellow/Other Assistant:  Alisha Abarca MD resident assisting    Procedure Summary  Anesthesia: General  ASA: IV  Anesthesia Staff: Anesthesiologist: Leon Brar DO  C-AA: GLORIA Vargas; GLORIA Ignacio  Estimated Blood Loss: 750mL  Intra-op Medications:   Administrations occurring from 0800 to 0915 on 24:   Medication Name Total Dose   clindamycin (Cleocin) 900 mg in dextrose 5% IV 50 mL Cannot be calculated   dextrose 50 % injection 12.5 g Cannot be calculated   dextrose 50 % injection 25 g Cannot be calculated   enoxaparin (Lovenox) syringe 60 mg Cannot be calculated   famotidine PF (Pepcid) injection 20 mg Cannot be calculated   fentanyl (Sublimaze) 1000 mcg in sodium chloride 0.9% 100 mL (10 mcg/mL) infusion (premix) Cannot be calculated   glucagon (Glucagen) injection 1 mg Cannot be calculated   glucagon (Glucagen) injection 1 mg Cannot be calculated   hydrocortisone sod succ (PF) (Solu-CORTEF) injection 50 mg Cannot be calculated   insulin regular (HumuLIN, NovoLIN) bolus from bag 2-6 Units Cannot be calculated   insulin regular 100 unit/100 mL (1 unit/mL) in 0.9 % NaCl infusion 5.63 Units   lactated Ringer's infusion Cannot be calculated   piperacillin-tazobactam (Zosyn) 4.5 g in dextrose (iso)  mL Cannot be calculated   propofol (Diprivan) infusion 81.45 mg   vancomycin (Vancocin) in dextrose 5 % water (D5W) 250 mL IV 1,250 mg Cannot be calculated   vancomycin (Vancocin)  pharmacy to dose - pharmacy monitoring Cannot be calculated   potassium chloride 40 mEq in sterile water for injection 100 mL Cannot be calculated   perflutren protein A microsphere (Optison) injection 0.5 mL Cannot be calculated   sulfur hexafluoride microsphr (Lumason) injection 24.28 mg Cannot be calculated   vasopressin (Vasostrict) 0.2 unit/mL in 5% dextrose 100 mL IV 1.56 Units              Anesthesia Record               Intraprocedure I/O Totals          Intake    Fentanyl Drip 0.00 mL    The total shown is the total volume documented since Anesthesia Start was filed.    PLASMA 515.00 mL    PRBC 350.00 mL    Transfuse RBC :30 Minutes 700.00 mL    Norepinephrine Drip 0.00 mL    The total shown is the total volume documented since Anesthesia Start was filed.    Insulin Drip 0.00 mL    The total shown is the total volume documented since Anesthesia Start was filed.    Propofol Drip 0.00 mL    The total shown is the total volume documented since Anesthesia Start was filed.    lactated Ringer's infusion 500.00 mL    Vasopressin Drip 0.00 mL    The total shown is the total volume documented since Anesthesia Start was filed.    Total Intake 2065 mL       Output    Urine 175 mL    Est. Blood Loss 750 mL    Total Output 925 mL       Net    Net Volume 1140 mL          Specimen: No specimens collected     Staff:   Circulator: Juana Parkub Person: Kathryn         Drains and/or Catheters:   NG/OG/Feeding Tube Center mouth (Active)   Tube Status Low intermittent suction 07/20/24 1200   Placement Verification Measurements 07/20/24 1200   Distal Tube Measurement 60 cm 07/20/24 1200   Site Assessment Dry;Intact 07/20/24 1200   Irrigant Other (Comment) 07/20/24 0800   Response To Intervention No resistance met 07/20/24 0800   Output (mL) 50 mL 07/20/24 1200       Urethral Catheter (Active)   Site Assessment Edema 07/20/24 1200   Collection Container Urometer 07/20/24 1200   Securement Method Securing device (Describe)  07/20/24 1200   Reason for Continuing Urinary Catheterization accurate hourly measurement of urine volume in a critically ill patient that cannot be assessed by other volumes and urine collection strategies 07/20/24 1200   Output (mL) 250 mL 07/20/24 1200       [REMOVED] Urethral Catheter (Removed)   Site Assessment Edema 07/19/24 1500   Collection Container Urometer 07/19/24 1500   Securement Method Securing device (Describe) 07/19/24 1500   Reason for Continuing Urinary Catheterization accurate hourly measurement of urine volume in a critically ill patient that cannot be assessed by other volumes and urine collection strategies 07/19/24 1500   Output (mL) 245 mL 07/19/24 1600         The patient was seen and evaluated in the intensive care unit.  His labs were reviewed.  Had been receiving transfusions and resuscitation overnight.  His pressor requirement had decreased.  Per the referring hospital, the patient had a girlfriend and a child.  We did have a phone number for the girlfriend and we reached out to her.  She provided us with the child's contact information but we were unable to reach her.  The patient's status was then discussed with the girlfriend.  Informed consent was obtained from her as we were unable to reach the patient's child.    The patient was subsequently transported to the operating room.  He was properly identified.  He was transferred to the OR bed and placed in a supine position.  General anesthesia was induced there was pre-existing endotracheal tube.  The patient already had a Wolfe catheter in place.  He was on scheduled antibiotics.  He was receiving blood transfusions.  He was then placed in a low lithotomy position, the previous packing was removed, and the area was prepped and draped with Betadine.  A critical pause occurred and all were in agreement.    Debridement occurred of the skin, subcutaneous tissues, and fascia of the bilateral thighs and into the right groin region.  This  was undertaken with electrocautery.  Electrocautery as well as suture ligation was used to obtain hemostasis throughout the case.  Epinephrine soaked sponges were also placed on the wound bed to assist with hemostasis.    The left thigh was much more vascular than the right thigh.  Small amount of tissue was debrided in comparison to the right thigh.  The right thigh was more edematous and more consistent with lymphedema.  There was some skin that was not debrided but had been isolated from the first surgery.  This was debrided as it was ischemic.  There was some necrotic tissue within the wound that was debrided as well.  The patient's bilateral buttocks were a purple hue.  The wounds were palpated and there was no evidence that the infection tunnel down to these areas.  The skin was blanching and perfused.  At this point, it should be noted that the patient was off of pressors that he had been on starting the case.  Examination of the right-sided wound showed an easily created tunnel in the right groin crease.  This was opened with electrocautery but again, this was more consistent with lymphedema than infection.    At the end of the case, the right sided wound measured 30 x 35 x 5 cm and the left-sided wound measured 25 x 14 x 4 cm.  The wounds were packed with Kerlix and covered with ABD pads.    All sponge, needle, instrument counts were correct prior to the termination of the procedure.    The patient was transported back to the intensive care unit intubated and in critical condition.    Attending Attestation: I was present and scrubbed for the entire procedure.    Kimberly Lopez  Phone Number: [phone]

## 2024-07-20 NOTE — PROGRESS NOTES
[facility]  TRAUMA ICU - ATTENDING PROGRESS NOTE    I personally saw and evaluated the patient with the resident physician/advanced pactice provider.  I reviewed the case on multidisciplinary rounds this morning.  I reviewed all of the pertinent imaging and laboratory data.  I reviewed the resident/SIMEON note.  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    Scheduled Tylenol  Continue mechanical ventilation, not appropriate for weaning due to anticipated OR in the next 24 hours  Maintain MAP greater than 65.  Currently on Levophed/Vaso.  CVP = 3.  Still volume response   N.p.o., start tube feeds today   Urine output acceptable.   Continue insulin infusion until critical illness has resolved  Continue vancomycin/Zosyn/clindamycin.  Repeat blood cultures pending.  Duration to be determined pending clinical course  Ongoing PRBC needs - check TEG    DVT Prophylaxis: Loveonx  GI Prophylaxis: Pepcid  Diet: NPO  CVC: RIJ POA  Mayra: Left Radial POA  Wolfe: Yes, POA  Restraints: yes  Code Status: Full Code  Dispo: ICU, Critically Ill    Critical Care Time: 32 min

## 2024-07-20 NOTE — NURSING NOTE
1430 patient arrived to [facility]  ETT 24 @lip   OG 60@ lip    RIJ Triple lumen, 20 R AC, 18 L AC, L radial christiano    Gtts running on arrival to unit: insulin, ketamine, bicarb, levo    Patient started on insulin gtt, levo, propofol, fentanyl, LR, and vaso.     TICU team/ ACS team at bedside assessing wounds.   Four eyes assessment completed by HONORIO Gregg, RN and HONORIO Cardenas RN. Wound consults placed    Patient blood pressure labile with pressors. Team at bedside for titrations. (MAP goal >65)  TICU notified of levo titrations (lowest  0.1 to highest 0.35)   1600: 2 L LR boluses ordered, keith exchanged and cultures sent   Patient T waves appeared peaked on monitor - EKG obtained. TICU MD at bedside.     1645: 1 L albumin ordered      1900: patient OG to WS per Dr. Anatoly MD. 290ml of green output suctioned out - TICU MD at bedside to assess.

## 2024-07-20 NOTE — NURSING NOTE
2012: Notified NEVAEH Rajan regarding patient's arterial line not transducing and not having a reliable wave form. NEVAEH Yeboah at bedside. Awaiting new arterial line placement  2235: New arterial line placed in right radial artery   2310: Notified TICU team regarding patient become febrile, tylenol ordered and ice packs placed. Also notified team regarding patient's decreased urine output, fluid bolus ordered

## 2024-07-21 ENCOUNTER — APPOINTMENT (OUTPATIENT)
Dept: RADIOLOGY | Facility: HOSPITAL | Age: 39
End: 2024-07-21
Payer: COMMERCIAL

## 2024-07-21 ENCOUNTER — ANESTHESIA EVENT (OUTPATIENT)
Dept: OPERATING ROOM | Facility: HOSPITAL | Age: 39
End: 2024-07-21
Payer: COMMERCIAL

## 2024-07-21 ENCOUNTER — ANESTHESIA (OUTPATIENT)
Dept: OPERATING ROOM | Facility: HOSPITAL | Age: 39
End: 2024-07-21
Payer: COMMERCIAL

## 2024-07-21 LAB
ALBUMIN SERPL BCP-MCNC: 2.4 G/DL (ref 3.4–5)
ALBUMIN SERPL BCP-MCNC: 2.6 G/DL (ref 3.4–5)
ANION GAP BLDA CALCULATED.4IONS-SCNC: 10 MMO/L (ref 10–25)
ANION GAP BLDA CALCULATED.4IONS-SCNC: 17 MMO/L (ref 10–25)
ANION GAP SERPL CALC-SCNC: 15 MMOL/L (ref 10–20)
ANION GAP SERPL CALC-SCNC: 19 MMOL/L (ref 10–20)
APTT PPP: 28 SECONDS (ref 27–38)
APTT PPP: 30 SECONDS (ref 27–38)
BACTERIA BLD CULT: NORMAL
BACTERIA BLD CULT: NORMAL
BASE EXCESS BLDA CALC-SCNC: -4.9 MMOL/L (ref -2–3)
BASE EXCESS BLDA CALC-SCNC: 0.8 MMOL/L (ref -2–3)
BLOOD EXPIRATION DATE: NORMAL
BODY TEMPERATURE: 37 DEGREES CELSIUS
BODY TEMPERATURE: 37 DEGREES CELSIUS
BUN SERPL-MCNC: 6 MG/DL (ref 6–23)
BUN SERPL-MCNC: 8 MG/DL (ref 6–23)
CA-I BLD-SCNC: 1.1 MMOL/L (ref 1.1–1.33)
CA-I BLD-SCNC: 1.11 MMOL/L (ref 1.1–1.33)
CA-I BLDA-SCNC: 1.12 MMOL/L (ref 1.1–1.33)
CA-I BLDA-SCNC: 1.19 MMOL/L (ref 1.1–1.33)
CALCIUM SERPL-MCNC: 7.1 MG/DL (ref 8.6–10.6)
CALCIUM SERPL-MCNC: 7.5 MG/DL (ref 8.6–10.6)
CHLORIDE BLDA-SCNC: 100 MMOL/L (ref 98–107)
CHLORIDE BLDA-SCNC: 104 MMOL/L (ref 98–107)
CHLORIDE SERPL-SCNC: 101 MMOL/L (ref 98–107)
CHLORIDE SERPL-SCNC: 99 MMOL/L (ref 98–107)
CO2 SERPL-SCNC: 21 MMOL/L (ref 21–32)
CO2 SERPL-SCNC: 26 MMOL/L (ref 21–32)
CREAT SERPL-MCNC: 0.6 MG/DL (ref 0.5–1.3)
CREAT SERPL-MCNC: 0.62 MG/DL (ref 0.5–1.3)
DISPENSE STATUS: NORMAL
EGFRCR SERPLBLD CKD-EPI 2021: >90 ML/MIN/1.73M*2
EGFRCR SERPLBLD CKD-EPI 2021: >90 ML/MIN/1.73M*2
ERYTHROCYTE [DISTWIDTH] IN BLOOD BY AUTOMATED COUNT: 14.7 % (ref 11.5–14.5)
ERYTHROCYTE [DISTWIDTH] IN BLOOD BY AUTOMATED COUNT: 14.8 % (ref 11.5–14.5)
ERYTHROCYTE [DISTWIDTH] IN BLOOD BY AUTOMATED COUNT: 14.9 % (ref 11.5–14.5)
EST. AVERAGE GLUCOSE BLD GHB EST-MCNC: 160 MG/DL
GLUCOSE BLD MANUAL STRIP-MCNC: 114 MG/DL (ref 74–99)
GLUCOSE BLD MANUAL STRIP-MCNC: 121 MG/DL (ref 74–99)
GLUCOSE BLD MANUAL STRIP-MCNC: 136 MG/DL (ref 74–99)
GLUCOSE BLD MANUAL STRIP-MCNC: 141 MG/DL (ref 74–99)
GLUCOSE BLD MANUAL STRIP-MCNC: 151 MG/DL (ref 74–99)
GLUCOSE BLD MANUAL STRIP-MCNC: 160 MG/DL (ref 74–99)
GLUCOSE BLD MANUAL STRIP-MCNC: 185 MG/DL (ref 74–99)
GLUCOSE BLD MANUAL STRIP-MCNC: 186 MG/DL (ref 74–99)
GLUCOSE BLD MANUAL STRIP-MCNC: 206 MG/DL (ref 74–99)
GLUCOSE BLD MANUAL STRIP-MCNC: 206 MG/DL (ref 74–99)
GLUCOSE BLD MANUAL STRIP-MCNC: 209 MG/DL (ref 74–99)
GLUCOSE BLD MANUAL STRIP-MCNC: 227 MG/DL (ref 74–99)
GLUCOSE BLD MANUAL STRIP-MCNC: 239 MG/DL (ref 74–99)
GLUCOSE BLD MANUAL STRIP-MCNC: 241 MG/DL (ref 74–99)
GLUCOSE BLD MANUAL STRIP-MCNC: 242 MG/DL (ref 74–99)
GLUCOSE BLD MANUAL STRIP-MCNC: 246 MG/DL (ref 74–99)
GLUCOSE BLD MANUAL STRIP-MCNC: 255 MG/DL (ref 74–99)
GLUCOSE BLD MANUAL STRIP-MCNC: 266 MG/DL (ref 74–99)
GLUCOSE BLD MANUAL STRIP-MCNC: 267 MG/DL (ref 74–99)
GLUCOSE BLD MANUAL STRIP-MCNC: 267 MG/DL (ref 74–99)
GLUCOSE BLD MANUAL STRIP-MCNC: 278 MG/DL (ref 74–99)
GLUCOSE BLDA-MCNC: 253 MG/DL (ref 74–99)
GLUCOSE BLDA-MCNC: 279 MG/DL (ref 74–99)
GLUCOSE SERPL-MCNC: 252 MG/DL (ref 74–99)
GLUCOSE SERPL-MCNC: 271 MG/DL (ref 74–99)
HBA1C MFR BLD: 7.2 %
HCO3 BLDA-SCNC: 20.6 MMOL/L (ref 22–26)
HCO3 BLDA-SCNC: 26 MMOL/L (ref 22–26)
HCT VFR BLD AUTO: 21.3 % (ref 41–52)
HCT VFR BLD AUTO: 21.4 % (ref 41–52)
HCT VFR BLD AUTO: 22.5 % (ref 41–52)
HCT VFR BLD EST: 21 % (ref 41–52)
HCT VFR BLD EST: 24 % (ref 41–52)
HGB BLD-MCNC: 7.2 G/DL (ref 13.5–17.5)
HGB BLD-MCNC: 7.3 G/DL (ref 13.5–17.5)
HGB BLD-MCNC: 7.5 G/DL (ref 13.5–17.5)
HGB BLDA-MCNC: 7.1 G/DL (ref 13.5–17.5)
HGB BLDA-MCNC: 8 G/DL (ref 13.5–17.5)
INHALED O2 CONCENTRATION: 30 %
INHALED O2 CONCENTRATION: 40 %
INR PPP: 1.1 (ref 0.9–1.1)
INR PPP: 1.1 (ref 0.9–1.1)
LACTATE BLDA-SCNC: 1 MMOL/L (ref 0.4–2)
LACTATE BLDA-SCNC: 1.1 MMOL/L (ref 0.4–2)
MAGNESIUM SERPL-MCNC: 1.82 MG/DL (ref 1.6–2.4)
MAGNESIUM SERPL-MCNC: 2.01 MG/DL (ref 1.6–2.4)
MCH RBC QN AUTO: 29.1 PG (ref 26–34)
MCH RBC QN AUTO: 29.3 PG (ref 26–34)
MCH RBC QN AUTO: 29.5 PG (ref 26–34)
MCHC RBC AUTO-ENTMCNC: 33.3 G/DL (ref 32–36)
MCHC RBC AUTO-ENTMCNC: 33.8 G/DL (ref 32–36)
MCHC RBC AUTO-ENTMCNC: 34.1 G/DL (ref 32–36)
MCV RBC AUTO: 85 FL (ref 80–100)
MCV RBC AUTO: 87 FL (ref 80–100)
MCV RBC AUTO: 88 FL (ref 80–100)
NRBC BLD-RTO: 0 /100 WBCS (ref 0–0)
NRBC BLD-RTO: 0.2 /100 WBCS (ref 0–0)
NRBC BLD-RTO: 0.3 /100 WBCS (ref 0–0)
OXYHGB MFR BLDA: 94.5 % (ref 94–98)
OXYHGB MFR BLDA: 96.2 % (ref 94–98)
PCO2 BLDA: 39 MM HG (ref 38–42)
PCO2 BLDA: 44 MM HG (ref 38–42)
PH BLDA: 7.33 PH (ref 7.38–7.42)
PH BLDA: 7.38 PH (ref 7.38–7.42)
PHOSPHATE SERPL-MCNC: 1.7 MG/DL (ref 2.5–4.9)
PHOSPHATE SERPL-MCNC: 1.8 MG/DL (ref 2.5–4.9)
PLATELET # BLD AUTO: 138 X10*3/UL (ref 150–450)
PLATELET # BLD AUTO: 139 X10*3/UL (ref 150–450)
PLATELET # BLD AUTO: 174 X10*3/UL (ref 150–450)
PO2 BLDA: 116 MM HG (ref 85–95)
PO2 BLDA: 72 MM HG (ref 85–95)
POTASSIUM BLDA-SCNC: 3.2 MMOL/L (ref 3.5–5.3)
POTASSIUM BLDA-SCNC: 3.7 MMOL/L (ref 3.5–5.3)
POTASSIUM SERPL-SCNC: 3.2 MMOL/L (ref 3.5–5.3)
POTASSIUM SERPL-SCNC: 3.7 MMOL/L (ref 3.5–5.3)
PRODUCT BLOOD TYPE: 5100
PRODUCT BLOOD TYPE: 600
PRODUCT BLOOD TYPE: 6200
PRODUCT BLOOD TYPE: 6200
PRODUCT CODE: NORMAL
PROTHROMBIN TIME: 12.2 SECONDS (ref 9.8–12.8)
PROTHROMBIN TIME: 12.9 SECONDS (ref 9.8–12.8)
RBC # BLD AUTO: 2.44 X10*6/UL (ref 4.5–5.9)
RBC # BLD AUTO: 2.51 X10*6/UL (ref 4.5–5.9)
RBC # BLD AUTO: 2.56 X10*6/UL (ref 4.5–5.9)
SAO2 % BLDA: 97 % (ref 94–100)
SAO2 % BLDA: 99 % (ref 94–100)
SODIUM BLDA-SCNC: 134 MMOL/L (ref 136–145)
SODIUM BLDA-SCNC: 136 MMOL/L (ref 136–145)
SODIUM SERPL-SCNC: 136 MMOL/L (ref 136–145)
SODIUM SERPL-SCNC: 138 MMOL/L (ref 136–145)
UNIT ABO: NORMAL
UNIT NUMBER: NORMAL
UNIT RH: NORMAL
UNIT VOLUME: 276
UNIT VOLUME: 278
UNIT VOLUME: 285
UNIT VOLUME: 297
UNIT VOLUME: 309
UNIT VOLUME: 327
UNIT VOLUME: 350
UNIT VOLUME: 357
VANCOMYCIN SERPL-MCNC: 14 UG/ML (ref 5–20)
WBC # BLD AUTO: 16.5 X10*3/UL (ref 4.4–11.3)
WBC # BLD AUTO: 8.7 X10*3/UL (ref 4.4–11.3)
WBC # BLD AUTO: 8.7 X10*3/UL (ref 4.4–11.3)
XM INTEP: NORMAL

## 2024-07-21 PROCEDURE — 85027 COMPLETE CBC AUTOMATED: CPT

## 2024-07-21 PROCEDURE — 2500000004 HC RX 250 GENERAL PHARMACY W/ HCPCS (ALT 636 FOR OP/ED)

## 2024-07-21 PROCEDURE — 94003 VENT MGMT INPAT SUBQ DAY: CPT

## 2024-07-21 PROCEDURE — 11042 DBRDMT SUBQ TIS 1ST 20SQCM/<: CPT | Performed by: SURGERY

## 2024-07-21 PROCEDURE — 84132 ASSAY OF SERUM POTASSIUM: CPT

## 2024-07-21 PROCEDURE — 3700000001 HC GENERAL ANESTHESIA TIME - INITIAL BASE CHARGE: Performed by: SURGERY

## 2024-07-21 PROCEDURE — 2500000004 HC RX 250 GENERAL PHARMACY W/ HCPCS (ALT 636 FOR OP/ED): Performed by: EMERGENCY MEDICINE

## 2024-07-21 PROCEDURE — 2500000005 HC RX 250 GENERAL PHARMACY W/O HCPCS

## 2024-07-21 PROCEDURE — 71045 X-RAY EXAM CHEST 1 VIEW: CPT | Performed by: RADIOLOGY

## 2024-07-21 PROCEDURE — 85610 PROTHROMBIN TIME: CPT

## 2024-07-21 PROCEDURE — 2720000007 HC OR 272 NO HCPCS: Performed by: SURGERY

## 2024-07-21 PROCEDURE — 2020000001 HC ICU ROOM DAILY

## 2024-07-21 PROCEDURE — 83735 ASSAY OF MAGNESIUM: CPT

## 2024-07-21 PROCEDURE — 99291 CRITICAL CARE FIRST HOUR: CPT | Performed by: EMERGENCY MEDICINE

## 2024-07-21 PROCEDURE — 0KBQ0ZZ EXCISION OF RIGHT UPPER LEG MUSCLE, OPEN APPROACH: ICD-10-PCS | Performed by: SURGERY

## 2024-07-21 PROCEDURE — 0JBM0ZZ EXCISION OF LEFT UPPER LEG SUBCUTANEOUS TISSUE AND FASCIA, OPEN APPROACH: ICD-10-PCS | Performed by: SURGERY

## 2024-07-21 PROCEDURE — 3600000003 HC OR TIME - INITIAL BASE CHARGE - PROCEDURE LEVEL THREE: Performed by: SURGERY

## 2024-07-21 PROCEDURE — 37799 UNLISTED PX VASCULAR SURGERY: CPT

## 2024-07-21 PROCEDURE — 36430 TRANSFUSION BLD/BLD COMPNT: CPT | Mod: GC

## 2024-07-21 PROCEDURE — 36430 TRANSFUSION BLD/BLD COMPNT: CPT

## 2024-07-21 PROCEDURE — 82330 ASSAY OF CALCIUM: CPT

## 2024-07-21 PROCEDURE — 3600000008 HC OR TIME - EACH INCREMENTAL 1 MINUTE - PROCEDURE LEVEL THREE: Performed by: SURGERY

## 2024-07-21 PROCEDURE — 71045 X-RAY EXAM CHEST 1 VIEW: CPT

## 2024-07-21 PROCEDURE — 3700000002 HC GENERAL ANESTHESIA TIME - EACH INCREMENTAL 1 MINUTE: Performed by: SURGERY

## 2024-07-21 PROCEDURE — 80202 ASSAY OF VANCOMYCIN: CPT

## 2024-07-21 PROCEDURE — P9016 RBC LEUKOCYTES REDUCED: HCPCS

## 2024-07-21 PROCEDURE — 2500000005 HC RX 250 GENERAL PHARMACY W/O HCPCS: Performed by: SURGERY

## 2024-07-21 PROCEDURE — 82947 ASSAY GLUCOSE BLOOD QUANT: CPT

## 2024-07-21 RX ORDER — POTASSIUM CHLORIDE 29.8 MG/ML
40 INJECTION INTRAVENOUS ONCE
Status: COMPLETED | OUTPATIENT
Start: 2024-07-21 | End: 2024-07-21

## 2024-07-21 RX ORDER — SODIUM CHLORIDE 0.9 G/100ML
IRRIGANT IRRIGATION AS NEEDED
Status: DISCONTINUED | OUTPATIENT
Start: 2024-07-21 | End: 2024-07-21 | Stop reason: HOSPADM

## 2024-07-21 RX ORDER — POTASSIUM CHLORIDE 14.9 MG/ML
20 INJECTION INTRAVENOUS ONCE
Status: COMPLETED | OUTPATIENT
Start: 2024-07-21 | End: 2024-07-21

## 2024-07-21 RX ORDER — MAGNESIUM SULFATE HEPTAHYDRATE 40 MG/ML
2 INJECTION, SOLUTION INTRAVENOUS ONCE
Status: COMPLETED | OUTPATIENT
Start: 2024-07-21 | End: 2024-07-21

## 2024-07-21 RX ORDER — ROCURONIUM BROMIDE 10 MG/ML
INJECTION, SOLUTION INTRAVENOUS AS NEEDED
Status: DISCONTINUED | OUTPATIENT
Start: 2024-07-21 | End: 2024-07-21

## 2024-07-21 RX ADMIN — ROCURONIUM 50 MG: 50 INJECTION, SOLUTION INTRAVENOUS at 15:48

## 2024-07-21 RX ADMIN — ROCURONIUM 50 MG: 50 INJECTION, SOLUTION INTRAVENOUS at 14:53

## 2024-07-21 RX ADMIN — SODIUM CHLORIDE, SODIUM LACTATE, POTASSIUM CHLORIDE, AND CALCIUM CHLORIDE: 600; 310; 30; 20 INJECTION, SOLUTION INTRAVENOUS at 14:52

## 2024-07-21 RX ADMIN — PROPOFOL 40 MG: 10 INJECTION, EMULSION INTRAVENOUS at 14:53

## 2024-07-21 SDOH — HEALTH STABILITY: MENTAL HEALTH: CURRENT SMOKER: 1

## 2024-07-21 ASSESSMENT — PAIN - FUNCTIONAL ASSESSMENT
PAIN_FUNCTIONAL_ASSESSMENT: CPOT (CRITICAL CARE PAIN OBSERVATION TOOL)

## 2024-07-21 NOTE — PROGRESS NOTES
[facility]  TRAUMA ICU - PROGRESS NOTE    Patient Name: Artem Suarez  MRN: [id number]  Admit Date: 719  : 1985  AGE: 39 y.o.   GENDER: male  ==============================================================================  TODAY'S ASSESSMENT AND PLAN OF CARE:  Artem Suarez is a 39 y.o. male with a history of severe lymphedema, TICU day 2 here for management of septic shock with source of bilateral NSTI of the perineum and posterior medial thigh. Has underwent two debridements, most recent yesterday, and is pending scheduling for additional debridement today.    AOE:   During dressing change there was a small arterial bleeding vessel, one 3.0 silk Figure 8 suture placed with hemostasis achieved  at bedside      NEURO/PAIN/SEDATION:   GCS of 9T, responds to commands, opens eyes to stimulation, Propofol 35, Fent 100  RASS of -3    RESPIRATORY:   Intubated   Vent settings FiO2: 30 TV: 450 RR: 16 PEEP: 8    CARDIOVASC/CIRCULATORY:   Hx of poorly managed lymphedema since , monitor edema and fluids, strict I/O  Requiring Levo at 0.02, titrating down, and off Vaso, Hydrocortisone started yesterday  BP has been persistently hypertensive on 112-185, AVG in 140s, continue to wean off pressors, MAP goal > 65  Echo revealed no gross abnormalities, poorly visualized exam due to body habitus    GI:   NPO for OR,   Post OR: TF    :   Wolfe in place  Given 10.2 L of fluids, Produced 3.8 L, Net of 6.4L  On LR at 125 ml  Monitor Electrolytes    HEMATOLOGIC:   Yesterday given 3U PRBC, 2U FFP in OR, 4U of PRBC outside of OR, monitor CBC, ABG  Hemoglobin of 7.5, trending up    ENDOCRINE:   Hx of Uncontrolled Diabetes, last glucose 185, trending down, Managed with insulin drip , if trending back up will expand insulin range    MUSCULOSKELETAL/SKIN:   NSTI, Further debridement to be determined  Hx of limited Mobility  Hx of Removal of Non malignant Left Thigh Mass    INFECTIOUS DISEASE:    Septic shock due to NSTI, Temperature of 98.2, fever resolved, WBC 16.5, trending up post OR visit yesterday  Blood Cultures drawn 7/19, awaiting results  Antibiotics: vancomycin, zosyn, clindamycin      GI PROPHYLAXIS: Pepcid    DVT PROPHYLAXIS: Lovenox     Lines/Drains/Tubes:  R. Art Line, IJCVC, Peripheral IVs, Foleys    DISPOSITION: remain in the TSICU        ==============================================================================  CHIEF COMPLAINT / OVERNIGHT EVENTS / HPI:   Artem Suarez is a 39 y.o. 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 At the OSH, he was taken to the OR overnight for initial debridement. During his hospitalization at the OSH, he was requiring variable pressors intraoperatively and postoperatively, as well as an insulin drip for hyperglycemia. He is now in the ICU for septic shock management in the setting of Chiquita's gangrene/NSTI.    Review of Systems   Reason unable to perform ROS: Patient is Sedated and Intubated.        PHYSICAL EXAM:  Heart Rate:  []   Temp:  [36.8 °C (98.2 °F)-38.4 °C (101.1 °F)]   Resp:  [14-30]   BP: ()/()   Weight:  [186 kg (409 lb 6.3 oz)]   SpO2:  [91 %-100 %]   Physical Exam  Constitutional:       Appearance: He is morbidly obese.      Interventions: He is sedated and intubated.   HENT:      Head: Normocephalic.      Mouth/Throat:      Mouth: Mucous membranes are moist.   Cardiovascular:      Rate and Rhythm: Normal rate and regular rhythm.   Pulmonary:      Effort: No respiratory distress. He is intubated.   Abdominal:      Palpations: Abdomen is soft.   Musculoskeletal:         General: Swelling present.   Feet:      Right foot:      Skin integrity: Skin breakdown and dry skin present.      Left foot:      Skin integrity: Dry skin present.   Skin:     Findings: Wound present.      Comments: Bilateral Debridement of LE,  perineum, inner thighs    Neurological:      Mental Status: He is lethargic and disoriented.      GCS: GCS eye subscore is 2. GCS verbal subscore is 1. GCS motor subscore is 4.         IMAGING SUMMARY:   XR chest 1 view    Result Date: 7/20/2024  Interpreted By:  Johanna Hernandez, STUDY: XR CHEST 1 VIEW; 7/20/2024 6:52 am   INDICATION: Signs/Symptoms:Intubated.   COMPARISON: Radiograph dated 07/19/2024   ACCESSION NUMBER(S): [id number]   ORDERING CLINICIAN: KAMALJIT LEOS   FINDINGS: ET tube is terminating slightly below the clavicular heads. Enteric tube is in place with the tip beyond the field of view. Right IJ central venous catheter is projecting over mid SVC.   The cardiac silhouette size is within normal limits. There is no focal consolidation, edema or pneumothorax. No sizeable pleural effusion. No acute osseous abnormality.       1. Medical devices as described above. 2. No focal infiltrate, pleural effusion, edema or pneumothorax.       Signed by: Johanna Steele 7/20/2024 12:32 PM Dictation workstation:   [id number]    XR chest 1 view    Result Date: 7/20/2024  Interpreted By:  Johanna Hernandez,  and Nu Dailey STUDY: XR CHEST 1 VIEW;  7/19/2024 4:39 pm   INDICATION: Signs/Symptoms:intubated, check tube position.   COMPARISON: Chest radiographs since 07/18/2024.   ACCESSION NUMBER(S): [id number]   ORDERING CLINICIAN: BRANDI GOMEZ   FINDINGS: AP radiograph of the chest was provided.   MEDICAL DEVICES: Enteric tube overlies the esophagus and stomach with distal tip not imaged. ETT distal tip 7.2 cm from the elyse. Right IJ CVC distal tip overlying the mid SVC.   CARDIOMEDIASTINAL SILHOUETTE: Cardiomediastinal silhouette is normal in size and configuration.   LUNGS: No pneumothorax, pleural effusion or focal consolidation.   ABDOMEN: No remarkable upper abdominal findings.   BONES: No acute osseous changes.       1.  No focal infiltrate, pleural effusion, edema or  pneumothorax. 2. ETT distal tip 7.2 cm from the elyse.   I personally reviewed the images/study and I agree with the findings as stated by Dr. Asim Judge. This study was interpreted at [place], [place].   MACRO: None   Signed by: Andraevane Benitezlalita Steele 7/20/2024 12:32 PM Dictation workstation:   [id number]    Transthoracic Echo (TTE) Complete    Result Date: 7/19/2024   [facility], [address] [place] [address]                Tel [phone] and Fax [phone] TRANSTHORACIC ECHOCARDIOGRAM REPORT  Patient Name:      MILADIS CRAIG         Reading Physician:    [id number] Luis Felipe Ravi MD Study Date:        7/19/2024            Ordering Provider:    [id number] TIFFANIE MARCUM MRN/PID:           [id number]             Fellow:               [id number] Mandi Snow MD Accession#:        [id number]         Nurse: Date of Birth/Age: 1985 / 39 years  Sonographer:          Rico Puga RDCS, RCS, FASE,                                                               ACS Gender:            M                    Additional Staff: Height:            167.64 cm            Admit Date:           7/19/2024 Weight:            180.99 kg            Admission Status:     Inpatient -                                                               Routine BSA / BMI:         2.68 m2 / 64.40      Encounter#:           [id number]                    kg/m2 Blood Pressure:    96/48 mmHg           Department Location:  [facility] Study Type:    TRANSTHORACIC ECHO (TTE) COMPLETE Diagnosis/ICD: Shock, unspecified-R57.9 Patient History: Pertinent History: Septic shock, Chiquita's gangrene, S/P surgical debridement                    of  perineum and thighs, DM, lymphedema. Study Detail: The following Echo studies were performed: 2D, M-Mode, color flow               and Doppler. Technically challenging study due to poor acoustic               windows, patient lying in supine position and body habitus.               Definity used as a contrast agent for endocardial border               definition. Total contrast used for this procedure was 4.0 mL via               IV push.  PHYSICIAN INTERPRETATION: Left Ventricle: The left ventricular systolic function is hyperdynamic, with a visually estimated ejection fraction of 70-75%. There are no regional wall motion abnormalities. The left ventricular cavity size is normal. There is left ventricular concentric remodeling. Abnormal (paradoxical) septal motion, consistent with an intraventricular conduction delay. Spectral Doppler shows a normal pattern of left ventricular diastolic filling. There is no definite left ventricular thrombus visualized. Left Atrium: The left atrium is normal in size. Right Ventricle: The right ventricle is normal in size. There is normal right ventricular global systolic function. Right Atrium: The right atrium is normal in size. Aortic Valve: The aortic valve is probably trileaflet. There is no evidence of aortic valve regurgitation. The peak instantaneous gradient of the aortic valve is 11.4 mmHg. Mitral Valve: The mitral valve is normal in structure. There is no evidence of mitral valve regurgitation. Tricuspid Valve: The tricuspid valve was not well visualized. No evidence of tricuspid regurgitation. The right ventricular systolic pressure is unable to be estimated. Pulmonic Valve: The pulmonic valve is structurally normal. There is physiologic pulmonic valve regurgitation. Pericardium: There is no pericardial effusion noted. Aorta: The aortic root is normal. Pulmonary Artery: The pulmonary artery is not well visualized. Systemic Veins: The inferior vena cava appears mildly  dilated. On PPV, no variability seen. In comparison to the previous echocardiogram(s): There are no prior studies on this patient for comparison purposes.  CONCLUSIONS:  1. Poorly visualized anatomical structures due to suboptimal image quality.  2. The left ventricular systolic function is hyperdynamic, with a visually estimated ejection fraction of 70-75%.  3. No left ventricular thrombus visualized.  4. There is normal right ventricular global systolic function.  5. The pulmonary artery is not well visualized. QUANTITATIVE DATA SUMMARY: 2D MEASUREMENTS:                          Normal Ranges: Ao Root d:     2.90 cm   (2.0-3.7cm) LAs:           3.00 cm   (2.7-4.0cm) IVSd:          1.00 cm   (0.6-1.1cm) LVPWd:         1.00 cm   (0.6-1.1cm) LVIDd:         4.00 cm   (3.9-5.9cm) LVIDs:         2.60 cm LV Mass Index: 47.4 g/m2 LV % FS        35.0 % LA VOLUME:                               Normal Ranges: LA Vol A4C:        30.4 ml    (22+/-6mL/m2) LA Vol A2C:        66.8 ml LA Vol BP:         46.3 ml LA Vol Index A4C:  11.3ml/m2 LA Vol Index A2C:  24.9 ml/m2 LA Vol Index BP:   17.3 ml/m2 LA Area A4C:       14.1 cm2 LA Area A2C:       21.5 cm2 LA Major Axis A4C: 5.6 cm LA Major Axis A2C: 5.9 cm LA Volume Index:   15.9 ml/m2 RA VOLUME BY A/L METHOD:                       Normal Ranges: RA Area A4C: 11.9 cm2 AORTA MEASUREMENTS:                    Normal Ranges: Asc Ao, d: 2.60 cm (2.1-3.4cm) LV SYSTOLIC FUNCTION BY 2D PLANIMETRY (MOD):                      Normal Ranges: EF-A4C View:    47 % (>=55%) EF-A2C View:    61 % EF-Biplane:     56 % EF-Visual:      73 % LV EF Reported: 73 % LV DIASTOLIC FUNCTION:                              Normal Ranges: MV Peak E:        0.80 m/s   (0.7-1.2 m/s) MV Peak A:        0.54 m/s   (0.42-0.7 m/s) E/A Ratio:        1.49       (1.0-2.2) MV e'             0.105 m/s  (>8.0) MV lateral e'     0.11 m/s MV medial e'      0.10 m/s E/e' Ratio:       7.63       (<8.0) PulmV Sys Rob:    55.00 cm/s  PulmV Lieberman Rob:   57.80 cm/s PulmV S/D Rob:    1.00 PulmV A Revs Rob: 42.60 cm/s MITRAL VALVE:                 Normal Ranges: MV DT: 275 msec (150-240msec) AORTIC VALVE:                          Normal Ranges: AoV Vmax:      1.69 m/s  (<=1.7m/s) AoV Peak P.4 mmHg (<20mmHg) LVOT Max Rob:  1.39 m/s  (<=1.1m/s) LVOT VTI:      17.80 cm LVOT Diameter: 2.30 cm   (1.8-2.4cm) AoV Area,Vmax: 3.42 cm2  (2.5-4.5cm2)  RIGHT VENTRICLE: TAPSE: 21.4 mm RV s'  0.23 m/s TRICUSPID VALVE/RVSP:                           Normal Ranges: Est. RA Pressure: 8 mmHg IVC Diam:         2.40 cm PULMONIC VALVE:                         Normal Ranges: PV Accel Time: 135 msec (>120ms) PV Max Rob:    1.0 m/s  (0.6-0.9m/s) PV Max P.4 mmHg Pulmonary Veins: PulmV A Revs Rob: 42.60 cm/s PulmV Lieberman Rob:   57.80 cm/s PulmV S/D Rob:    1.00 PulmV Sys Rob:    55.00 cm/s  [id number] Luis Felipe Ravi MD Electronically signed on 2024 at 5:58:24 PM  ** Final **     LABS:  Results from last 7 days   Lab Units 24  0011 24  1810 24  1159   WBC AUTO x10*3/uL 16.5* 16.3* 13.2*   HEMOGLOBIN g/dL 7.5* 7.3* 6.5*   HEMATOCRIT % 22.5* 21.2* 19.7*   PLATELETS AUTO x10*3/uL 174 152 136*     Results from last 7 days   Lab Units 24  0018 24  1159 24  1435   APTT seconds 30 31 30   INR  1.1 1.3* 1.7*     Results from last 7 days   Lab Units 24  0012 24  1159 24  0057   SODIUM mmol/L 136 137 138   POTASSIUM mmol/L 3.2* 4.1 3.3*   CHLORIDE mmol/L 99 100 101   CO2 mmol/L 21 22 27   BUN mg/dL 8 12 13   CREATININE mg/dL 0.60 0.81 0.79   CALCIUM mg/dL 7.5* 6.9* 7.4*   GLUCOSE mg/dL 271* 251* 116*         Results from last 7 days   Lab Units 24  0018 24  1159 24  0954   POCT PH, ARTERIAL pH 7.33* 7.31* 7.26*   POCT PCO2, ARTERIAL mm Hg 39 39 45*   POCT PO2, ARTERIAL mm Hg 116* 128* 145*   POCT HCO3 CALCULATED, ARTERIAL mmol/L 20.6* 19.6* 20.2*   POCT BASE EXCESS, ARTERIAL mmol/L -4.9* -6.1* -6.4*      I have reviewed all medications, laboratory results, and imaging pertinent for today's encounter.

## 2024-07-21 NOTE — PROGRESS NOTES
[facility]  ACUTE CARE SURGERY - PROGRESS NOTE    Patient Name: Artem Suarez  MRN: [id number]  Admit Date: 719  : 1985  AGE: 39 y.o.   GENDER: male  ==============================================================================  TODAY'S ASSESSMENT AND PLAN OF CARE:  39 M presented as a transfer for NSTI of perineum and thighs in septic shock. Remains in the ICU with ongoing evidence of septic shock, requiring pressors. Continued leukocytosis on labs. Remains intubated and sedated.     Went to OR with ACS  for second look with additional debridement, Continues to require pressors in the ICU      - OR with ACS for third look incision and debridement  - 2u of pRBCs on hold for OR  - continue broad spectrum abx  - maintain keith  - resuscitation per ICU    Plans discussed with Attending Physician Dr. Echevarria.    Alisha Abarca MD PGY-2  Acute Care Surgery [id number]      ==============================================================================  CHIEF COMPLAINT / EVENTS LAST 24HRS / HPI:  To OR with ACS for additional debridement. Bleeding from surgical site overnight, stitch placed. Remains intubated and sedated. On levo and vaso.    MEDICAL HISTORY / ROS:   Admission history and ROS reviewed. Pertinent changes as follows:  none    PHYSICAL EXAM:  Heart Rate:  []   Temp:  [36.3 °C (97.3 °F)-38 °C (100.4 °F)]   Resp:  [7-27]   BP: ()/()   Weight:  [186 kg (409 lb 6.3 oz)]   SpO2:  [91 %-100 %]   Physical Exam  Intubated, sedated  ETT in place, stable on vent  Abd soft, ND  Keith in place draining clear yellow urine  Debridement of perineum and inner thighs, packed with kerlix  1st R digit ulcer      IMAGING SUMMARY:   CXR with ETT in place, L Injection at atriocaval junction    LABS:  Results from last 7 days   Lab Units 24  0011 24  1810 24  1159   WBC AUTO x10*3/uL 16.5* 16.3* 13.2*   HEMOGLOBIN g/dL 7.5* 7.3* 6.5*   HEMATOCRIT %  22.5* 21.2* 19.7*   PLATELETS AUTO x10*3/uL 174 152 136*     Results from last 7 days   Lab Units 07/21/24  0018 07/20/24  1159 07/19/24  1435   APTT seconds 30 31 30   INR  1.1 1.3* 1.7*     Results from last 7 days   Lab Units 07/21/24  0012 07/20/24  1159 07/20/24  0057   SODIUM mmol/L 136 137 138   POTASSIUM mmol/L 3.2* 4.1 3.3*   CHLORIDE mmol/L 99 100 101   CO2 mmol/L 21 22 27   BUN mg/dL 8 12 13   CREATININE mg/dL 0.60 0.81 0.79   CALCIUM mg/dL 7.5* 6.9* 7.4*   GLUCOSE mg/dL 271* 251* 116*         Results from last 7 days   Lab Units 07/21/24  0018 07/20/24  1159 07/20/24  0954   POCT PH, ARTERIAL pH 7.33* 7.31* 7.26*   POCT PCO2, ARTERIAL mm Hg 39 39 45*   POCT PO2, ARTERIAL mm Hg 116* 128* 145*   POCT HCO3 CALCULATED, ARTERIAL mmol/L 20.6* 19.6* 20.2*   POCT BASE EXCESS, ARTERIAL mmol/L -4.9* -6.1* -6.4*       I have reviewed all medications, laboratory results, and imaging pertinent for today's encounter.

## 2024-07-21 NOTE — PROGRESS NOTES
Communication Note    Patient Name: Artem Suarez  MRN: [id number]  Today's Date: 7/21/2024   Room: 01/01-A    Discipline: Physical Therapy      Missed Visit: Yes  Missed Visit Reason:  (PT evaluation received and reviewed, patient pending further medical stability and workup.  Will monitor and follow up as appropriate.)      07/21/24 at 8:39 AM   Benedicto La, PT   Rehab Office: [phone]

## 2024-07-21 NOTE — PROGRESS NOTES
Vancomycin Dosing by Pharmacy- FOLLOW UP    Artem Suarez is a 39 y.o. year old male who Pharmacy has been consulted for vancomycin dosing for sepsis/septic shock 2/2 to Chiquita's gangrene. Based on the patient's indication and renal status this patient is being dosed based on a goal AUC of 500-600.     Renal function is currently Stable.    Current vancomycin dose: 1750 mg every 8 hours    Estimated vancomycin AUC on current dose: 448 mg/L.hr     Estimated Creatinine Clearance: 125 mL/min (by C-G formula based on SCr of 0.6 mg/dL).     Results from last 7 days   Lab Units 07/21/24  0456 07/21/24  0012 07/21/24  0011 07/20/24  1810 07/20/24  1159 07/20/24  0701 07/20/24  0057   BUN mg/dL  --  8  --   --  12  --  13   CREATININE mg/dL  --  0.60  --   --  0.81  --  0.79   WBC AUTO x10*3/uL  --   --  16.5* 16.3* 13.2*   < > 15.1*   VANCOMYCIN RM ug/mL 14.0  --   --   --  11.1   < >  --     < > = values in this interval not displayed.        Blood Culture   Date/Time Value Ref Range Status   07/19/2024 03:57 PM No growth at 1 day  Preliminary   07/19/2024 03:57 PM No growth at 1 day  Preliminary        No results found for the last 90 days.      Visit Vitals  /76   Pulse 69   Temp 36.8 °C (98.2 °F) (Bladder)   Resp 15        Assessment/Plan    Below goal AUC. Orders placed for new vancomcyin regimen of 2000 mg every 8 hours to begin at 7/21 @1430.     This dosing regimen is predicted by [employer] to result in the following pharmacokinetic parameters:  Regimen: 2000 mg IV every 8 hours.  Start time: 13:50 on 07/21/2024  Exposure target: AUC24 (range)400-600 mg/L.hr   AUC24,ss: 511 mg/L.hr  Probability of AUC24 > 400: 100 %  Ctrough,ss: 14.9 mg/L  Probability of Ctrough,ss > 20: 1 %  Probability of nephrotoxicity (Lodise CURLY 2009): 10 %      The next level will be obtained on 7/22 AM labs. May be obtained sooner if clinically indicated.   Will continue to monitor renal function daily while on vancomycin and order  serum creatinine at least every 48 hours if not already ordered.  Follow for continued vancomycin needs, clinical response, and signs/symptoms of toxicity.       Karina Bustamante, PharmD

## 2024-07-21 NOTE — ANESTHESIA POSTPROCEDURE EVALUATION
Patient: Artem Suarez    Procedure Summary       Date: 07/21/24 Room / Location: [facility] OR 11 / [facility] OR    Anesthesia Start: 1452 Anesthesia Stop: 1715    Procedure: Debridement Lower Extremity (Bilateral: Thigh - Leg Upper) Diagnosis:       Necrotizing soft tissue infection      (Necrotizing soft tissue infection [M79.89])    Surgeons: Kimberly Lopez DO Responsible Provider: Leon Brar DO    Anesthesia Type: general ASA Status: 4 - Emergent            Anesthesia Type: general    Vitals Value Taken Time   /57 07/21/24 1721   Temp 36.2 07/21/24 1724   Pulse 77 07/21/24 1723   Resp 18 07/21/24 1723   SpO2 100 % 07/21/24 1723   Vitals shown include unfiled device data.    Anesthesia Post Evaluation    Patient location during evaluation: ICU  Patient participation: complete - patient cannot participate  Level of consciousness: sedated  Pain management: adequate  Multimodal analgesia pain management approach  Airway patency: patent  Cardiovascular status: acceptable  Respiratory status: acceptable, ETT, ventilator and intubated  Hydration status: acceptable  Postoperative Nausea and Vomiting: none        No notable events documented.

## 2024-07-21 NOTE — ANESTHESIA PREPROCEDURE EVALUATION
Patient: Artem Suarez    Procedure Information       Anesthesia Start Date/Time: 07/20/24 0724    Procedure: Debridement Lower Extremity (Right)    Location: [facility] OR 11 / [facility] OR    Surgeons: Kimberly Lopez DO        40 yo male presents as a transfer after surgical debridement of NSTI of perineum and thighs with ongoing evidence of septic shock     Relevant Problems   Anesthesia (within normal limits)      Cardiac  Sepsis; Norepinephrine 0.02 mcg/kg/min      Pulmonary  Respiratory failure - FiO2: 50, Vt: 450, RR: 16, PEEP: 8        Neuro  Sedation - Propofol 30mcg/kg/min; Fentanyl 100mcg/hr      Endocrine  Insulin gtt   (+) Diabetes mellitus, type 2 (Multi)      Hematology   (+) Anemia   (+) Coagulopathy (Multi)      ID   (+) Necrotizing soft tissue infection     Vitals:    07/21/24 1345   BP:    Pulse: 77   Resp: (!) 0   Temp:    SpO2: 100%       History reviewed. No pertinent surgical history.  History reviewed. No pertinent past medical history.    Current Facility-Administered Medications:     acetaminophen (Tylenol) tablet 650 mg, 650 mg, oral, q6h PRN, Ulises Shea DO    clindamycin (Cleocin) 900 mg in dextrose 5% IV 50 mL, 900 mg, intravenous, q8h, Bryanna Askew MD, Stopped at 07/21/24 1102    dextrose 50 % injection 12.5 g, 12.5 g, intravenous, q15 min PRN, Bryanna Askew MD    dextrose 50 % injection 25 g, 25 g, intravenous, q15 min PRN, Bryanna Askew MD    enoxaparin (Lovenox) syringe 60 mg, 60 mg, subcutaneous, q12h CHICHI, Bryanna Askew MD, 60 mg at 07/21/24 0804    famotidine PF (Pepcid) injection 20 mg, 20 mg, intravenous, BID, Bryanna Askew MD, 20 mg at 07/21/24 0804    fentanyl (Sublimaze) 1000 mcg in sodium chloride 0.9% 100 mL (10 mcg/mL) infusion (premix),  mcg/hr, intravenous, Continuous, Bryanna Askew MD, Last Rate: 10 mL/hr at 07/21/24 0700, 100 mcg/hr at 07/21/24 0700    glucagon (Glucagen) injection 1 mg, 1 mg,  intramuscular, q15 min PRN, Bryanna Askew MD    glucagon (Glucagen) injection 1 mg, 1 mg, intramuscular, q15 min PRN, Bryanna Askew MD    hydrocortisone sod succ (PF) (Solu-CORTEF) injection 50 mg, 50 mg, intravenous, q6h, Ulises Shea DO, 50 mg at 07/21/24 1248    insulin regular (HumuLIN, NovoLIN) bolus from bag 2-6 Units, 2-6 Units, intravenous, PRN, Bryanna Askew MD, 4 Units at 07/20/24 1804    insulin regular 100 unit/100 mL (1 unit/mL) in 0.9 % NaCl infusion, 0-40 Units/hr, intravenous, Continuous, Bryanna Askew MD, Last Rate: 4 mL/hr at 07/21/24 1300, 4 Units/hr at 07/21/24 1300    lactated Ringer's infusion, 125 mL/hr, intravenous, Continuous, Bryanna Askew MD, Last Rate: 125 mL/hr at 07/21/24 0137, 125 mL/hr at 07/21/24 0137    norepinephrine (Levophed) 16 mg in dextrose 5% 250 mL (0.064 mg/mL) infusion, 0-0.5 mcg/kg/min, intravenous, Continuous, Bryanna Askew MD, Last Rate: 3.39 mL/hr at 07/21/24 1036, 0.02 mcg/kg/min at 07/21/24 1036    oxygen (O2) therapy, , inhalation, Continuous - Inhalation, Bryanna Askew MD, 40 percent at 07/19/24 2000    oxygen (O2) therapy, , inhalation, Continuous - Inhalation, Bryanna Askew MD, 40 percent at 07/19/24 2000    piperacillin-tazobactam (Zosyn) 4.5 g in dextrose (iso)  mL, 4.5 g, intravenous, q6h, Bryanna Askew MD, Stopped at 07/21/24 1317    propofol (Diprivan) infusion, 5-40 mcg/kg/min, intravenous, Continuous, Bryanna Askew MD, Last Rate: 38 mL/hr at 07/21/24 1249, 35 mcg/kg/min at 07/21/24 1249    vancomycin (Vancocin) 2000 mg/500 ml in D5W 500 mL IV piggyback 2,000 mg, 2,000 mg, intravenous, q8h, Jose De Jesus H Robby, PharmD    vancomycin (Vancocin) pharmacy to dose - pharmacy monitoring, , miscellaneous, Daily PRN, Bryanna Askew MD  Prior to Admission medications    Not on File     No Known Allergies  Social History     Tobacco Use    Smoking status: Unknown    Smokeless tobacco: Not  on file   Substance Use Topics    Alcohol use: Not on file         Chemistry    Lab Results   Component Value Date/Time     07/21/2024 0012    K 3.2 (L) 07/21/2024 0012    CL 99 07/21/2024 0012    CO2 21 07/21/2024 0012    BUN 8 07/21/2024 0012    CREATININE 0.60 07/21/2024 0012    Lab Results   Component Value Date/Time    CALCIUM 7.5 (L) 07/21/2024 0012          Lab Results   Component Value Date/Time    WBC 16.5 (H) 07/21/2024 0011    HGB 7.5 (L) 07/21/2024 0011    HCT 22.5 (L) 07/21/2024 0011     07/21/2024 0011     Lab Results   Component Value Date/Time    PROTIME 12.9 (H) 07/21/2024 0018    INR 1.1 07/21/2024 0018     Clinical information reviewed:   Tobacco  Allergies  Meds   Med Hx  Surg Hx   Fam Hx  Soc Hx        NPO Detail:  No data recorded     Physical Exam    Airway  Mallampati: unable to assess  Comments: Intubated   Cardiovascular   Rhythm: regular     Dental    Pulmonary   (+) decreased breath sounds     Abdominal            Anesthesia Plan    History of general anesthesia?: yes  History of complications of general anesthesia?: no    ASA 4 - emergent     general   (GETA; arterial line and central line in-situ. Anesthesia discussed with emergency contact)  The patient is a current smoker.    Plan/risks discussed with: Rupali Beal (Emergency Contact) at bedside.  Okay for blood.  Use of blood products discussed with who consented to blood products.    Plan discussed with resident.

## 2024-07-21 NOTE — SIGNIFICANT EVENT
Called to room while nursing was performing dressing change of patients R groin/perineum NSTI. Patient noted to have a small arterial bleeding vessel to the R lateral outer wound edge, attempted direct pressure with Surgicel and pressure but continued to bleed. One 3.0 silk Figure 8 suture placed with hemostasis achieved. Standard wound care and packing completed bedside by nursing. Will continue to monitor.    aCndis South,    EM PGY-3

## 2024-07-21 NOTE — BRIEF OP NOTE
Date: 2024  OR Location: [facility] OR    Name: Artem Suarez : 1985, Age: 39 y.o., MRN: [id number], Sex: male    Diagnosis  Pre-op Diagnosis      * Necrotizing soft tissue infection [M79.89] Post-op Diagnosis     * Necrotizing soft tissue infection [M79.89]     Procedures  Debridement Lower Extremity  43376 - CT DEBRIDEMENT MUSCLE &/FASCIA 1ST 20 SQ CM/<      Surgeons      * Kimberly Lopez - Primary    Resident/Fellow/Other Assistant:  Surgeons and Role:     * Major Kumar MD - Resident - Assisting    Procedure Summary  Anesthesia: General  ASA: IV  Anesthesia Staff: Anesthesiologist: Leon Brar DO  Anesthesia Resident: Torsten Orta DO  Estimated Blood Loss: 25mL  Intra-op Medications: * Intraprocedure medication information is unavailable because the case start and end events have not been set *           Anesthesia Record               Intraprocedure I/O Totals          Intake    Fentanyl Drip 0.00 mL    The total shown is the total volume documented since Anesthesia Start was filed.    PRBC 350.00 mL    LR bolus 500.00 mL    Norepinephrine Drip 0.00 mL    The total shown is the total volume documented since Anesthesia Start was filed.    Insulin Drip 0.00 mL    The total shown is the total volume documented since Anesthesia Start was filed.    Propofol Drip 0.00 mL    The total shown is the total volume documented since Anesthesia Start was filed.    Total Intake 850 mL       Output    Urine 200 mL    Est. Blood Loss 25 mL    Total Output 225 mL       Net    Net Volume 625 mL          Specimen: No specimens collected     Staff:   Circulator: Silvestre Elmore Person: Shruti Brand Circulator: Alley          Findings: Further debridement of prior incisions to healthy tissue. Appeared less necrotic compared to prior takebacks.     Complications:  None; patient tolerated the procedure well.     Disposition: ICU - intubated and hemodynamically stable.  Condition:  stable  Specimens Collected: No specimens collected  Attending Attestation:     Kimberly Lopez  Phone Number: [phone]

## 2024-07-21 NOTE — ANESTHESIA POSTPROCEDURE EVALUATION
Patient: Artem Suarez    Procedure Summary       Date: 07/21/24 Room / Location: [facility] OR 11 / Virtual [facility] OR    Anesthesia Start: 1452 Anesthesia Stop: 1715    Procedure: Debridement Lower Extremity (Bilateral: Thigh - Leg Upper) Diagnosis:       Necrotizing soft tissue infection      (Necrotizing soft tissue infection [M79.89])    Surgeons: Kimberly Lopez DO Responsible Provider: Leon Brar DO    Anesthesia Type: general ASA Status: 4 - Emergent            Anesthesia Type: general    Vitals Value Taken Time   /60 07/21/24 1831   Temp 35.9 °C (96.6 °F) 07/21/24 1810   Pulse 73 07/21/24 1835   Resp 10 07/21/24 1835   SpO2 100 % 07/21/24 1835   Vitals shown include unfiled device data.    Anesthesia Post Evaluation    Patient location during evaluation: PACU  Patient participation: complete - patient cannot participate  Level of consciousness: sedated  Pain management: adequate  Airway patency: patent  Cardiovascular status: acceptable and hemodynamically stable (Norepinephrine off)  Respiratory status: ETT, intubated, ventilator and acceptable  Hydration status: acceptable  Postoperative Nausea and Vomiting: none        No notable events documented.

## 2024-07-21 NOTE — NURSING NOTE
RN alerted resident Candis South of patient insulin drip max of 40 units/hr but patient needing 43 units/hr according to the scale of increasing by 25%. Per Candis South, run drip at 40 units .

## 2024-07-21 NOTE — PROGRESS NOTES
[facility]  TRAUMA ICU - ATTENDING PROGRESS NOTE    I personally saw and evaluated the patient with the resident physician/advanced pactice provider.  I reviewed the case on multidisciplinary rounds this morning.  I reviewed all of the pertinent imaging and laboratory data.  I reviewed the resident/SIMEON note.  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    Continue mechanical ventilation, not appropriate for weaning due to anticipated OR in the next 24 hours  Maintain MAP greater than 65.  Currently on Levophed.  Off Vaso.  Levo weaning down.   N.p.o., tube feeds post op   Urine output acceptable.   Continue insulin infusion until critical illness has resolved. Check A1C  Stress dose steroids thru 7/24  Continue vancomycin/Zosyn/clindamycin. Cultures have been negative.  Duration to be determined pending clinical course  Transfusion requirements improved.     DVT Prophylaxis: Loveonx  GI Prophylaxis: Pepcid  Diet: NPO  CVC: RIJ POA  Mayra: Left Radial POA  Wolfe: Yes, POA  Restraints: yes  Code Status: Full Code  Dispo: ICU, Critically Ill    Critical Care Time: 32 min

## 2024-07-22 ENCOUNTER — ANESTHESIA EVENT (OUTPATIENT)
Dept: OPERATING ROOM | Facility: HOSPITAL | Age: 39
End: 2024-07-22
Payer: COMMERCIAL

## 2024-07-22 ENCOUNTER — APPOINTMENT (OUTPATIENT)
Dept: RADIOLOGY | Facility: HOSPITAL | Age: 39
End: 2024-07-22
Payer: COMMERCIAL

## 2024-07-22 VITALS
HEART RATE: 74 BPM | RESPIRATION RATE: 18 BRPM | SYSTOLIC BLOOD PRESSURE: 103 MMHG | DIASTOLIC BLOOD PRESSURE: 52 MMHG | BODY MASS INDEX: 50.62 KG/M2 | TEMPERATURE: 97.2 F | OXYGEN SATURATION: 99 % | HEIGHT: 66 IN | WEIGHT: 315 LBS

## 2024-07-22 LAB
ALBUMIN SERPL BCP-MCNC: 2.5 G/DL (ref 3.4–5)
ALBUMIN SERPL BCP-MCNC: 2.5 G/DL (ref 3.4–5)
ANION GAP SERPL CALC-SCNC: 11 MMOL/L (ref 10–20)
ANION GAP SERPL CALC-SCNC: 13 MMOL/L (ref 10–20)
BASOPHILS # BLD MANUAL: 0 X10*3/UL (ref 0–0.1)
BASOPHILS NFR BLD MANUAL: 0 %
BLOOD EXPIRATION DATE: NORMAL
BUN SERPL-MCNC: 11 MG/DL (ref 6–23)
BUN SERPL-MCNC: 8 MG/DL (ref 6–23)
CA-I BLD-SCNC: 1.07 MMOL/L (ref 1.1–1.33)
CALCIUM SERPL-MCNC: 7.2 MG/DL (ref 8.6–10.6)
CALCIUM SERPL-MCNC: 7.6 MG/DL (ref 8.6–10.6)
CHLORIDE SERPL-SCNC: 101 MMOL/L (ref 98–107)
CHLORIDE SERPL-SCNC: 99 MMOL/L (ref 98–107)
CO2 SERPL-SCNC: 27 MMOL/L (ref 21–32)
CO2 SERPL-SCNC: 29 MMOL/L (ref 21–32)
CREAT SERPL-MCNC: 0.99 MG/DL (ref 0.5–1.3)
CREAT SERPL-MCNC: 1.44 MG/DL (ref 0.5–1.3)
DISPENSE STATUS: NORMAL
EGFRCR SERPLBLD CKD-EPI 2021: 63 ML/MIN/1.73M*2
EGFRCR SERPLBLD CKD-EPI 2021: >90 ML/MIN/1.73M*2
EOSINOPHIL # BLD MANUAL: 0 X10*3/UL (ref 0–0.7)
EOSINOPHIL NFR BLD MANUAL: 0 %
ERYTHROCYTE [DISTWIDTH] IN BLOOD BY AUTOMATED COUNT: 15.2 % (ref 11.5–14.5)
ERYTHROCYTE [DISTWIDTH] IN BLOOD BY AUTOMATED COUNT: 15.2 % (ref 11.5–14.5)
GLUCOSE BLD MANUAL STRIP-MCNC: 115 MG/DL (ref 74–99)
GLUCOSE BLD MANUAL STRIP-MCNC: 132 MG/DL (ref 74–99)
GLUCOSE BLD MANUAL STRIP-MCNC: 138 MG/DL (ref 74–99)
GLUCOSE BLD MANUAL STRIP-MCNC: 138 MG/DL (ref 74–99)
GLUCOSE BLD MANUAL STRIP-MCNC: 157 MG/DL (ref 74–99)
GLUCOSE BLD MANUAL STRIP-MCNC: 162 MG/DL (ref 74–99)
GLUCOSE BLD MANUAL STRIP-MCNC: 168 MG/DL (ref 74–99)
GLUCOSE BLD MANUAL STRIP-MCNC: 169 MG/DL (ref 74–99)
GLUCOSE BLD MANUAL STRIP-MCNC: 180 MG/DL (ref 74–99)
GLUCOSE BLD MANUAL STRIP-MCNC: 183 MG/DL (ref 74–99)
GLUCOSE BLD MANUAL STRIP-MCNC: 192 MG/DL (ref 74–99)
GLUCOSE BLD MANUAL STRIP-MCNC: 194 MG/DL (ref 74–99)
GLUCOSE BLD MANUAL STRIP-MCNC: 203 MG/DL (ref 74–99)
GLUCOSE BLD MANUAL STRIP-MCNC: 203 MG/DL (ref 74–99)
GLUCOSE BLD MANUAL STRIP-MCNC: 204 MG/DL (ref 74–99)
GLUCOSE BLD MANUAL STRIP-MCNC: 204 MG/DL (ref 74–99)
GLUCOSE BLD MANUAL STRIP-MCNC: 208 MG/DL (ref 74–99)
GLUCOSE BLD MANUAL STRIP-MCNC: 213 MG/DL (ref 74–99)
GLUCOSE BLD MANUAL STRIP-MCNC: 230 MG/DL (ref 74–99)
GLUCOSE BLD MANUAL STRIP-MCNC: 234 MG/DL (ref 74–99)
GLUCOSE BLD MANUAL STRIP-MCNC: 248 MG/DL (ref 74–99)
GLUCOSE BLD MANUAL STRIP-MCNC: 258 MG/DL (ref 74–99)
GLUCOSE BLD MANUAL STRIP-MCNC: 274 MG/DL (ref 74–99)
GLUCOSE BLD MANUAL STRIP-MCNC: 281 MG/DL (ref 74–99)
GLUCOSE SERPL-MCNC: 201 MG/DL (ref 74–99)
GLUCOSE SERPL-MCNC: 257 MG/DL (ref 74–99)
HCT VFR BLD AUTO: 22.2 % (ref 41–52)
HCT VFR BLD AUTO: 22.5 % (ref 41–52)
HGB BLD-MCNC: 7.4 G/DL (ref 13.5–17.5)
HGB BLD-MCNC: 7.5 G/DL (ref 13.5–17.5)
IMM GRANULOCYTES # BLD AUTO: 1.07 X10*3/UL (ref 0–0.7)
IMM GRANULOCYTES NFR BLD AUTO: 8.8 % (ref 0–0.9)
LYMPHOCYTES # BLD MANUAL: 0.95 X10*3/UL (ref 1.2–4.8)
LYMPHOCYTES NFR BLD MANUAL: 7.8 %
MAGNESIUM SERPL-MCNC: 2.02 MG/DL (ref 1.6–2.4)
MCH RBC QN AUTO: 28.9 PG (ref 26–34)
MCH RBC QN AUTO: 29.3 PG (ref 26–34)
MCHC RBC AUTO-ENTMCNC: 32.9 G/DL (ref 32–36)
MCHC RBC AUTO-ENTMCNC: 33.8 G/DL (ref 32–36)
MCV RBC AUTO: 87 FL (ref 80–100)
MCV RBC AUTO: 88 FL (ref 80–100)
METAMYELOCYTES # BLD MANUAL: 0.21 X10*3/UL
METAMYELOCYTES NFR BLD MANUAL: 1.7 %
MONOCYTES # BLD MANUAL: 0.74 X10*3/UL (ref 0.1–1)
MONOCYTES NFR BLD MANUAL: 6.1 %
MYELOCYTES # BLD MANUAL: 0.11 X10*3/UL
MYELOCYTES NFR BLD MANUAL: 0.9 %
NEUTROPHILS # BLD MANUAL: 10.19 X10*3/UL (ref 1.2–7.7)
NEUTS BAND # BLD MANUAL: 1.06 X10*3/UL (ref 0–0.7)
NEUTS BAND NFR BLD MANUAL: 8.7 %
NEUTS SEG # BLD MANUAL: 9.13 X10*3/UL (ref 1.2–7)
NEUTS SEG NFR BLD MANUAL: 74.8 %
NRBC BLD-RTO: 0.4 /100 WBCS (ref 0–0)
NRBC BLD-RTO: 1 /100 WBCS (ref 0–0)
PHOSPHATE SERPL-MCNC: 2.2 MG/DL (ref 2.5–4.9)
PHOSPHATE SERPL-MCNC: 3.9 MG/DL (ref 2.5–4.9)
PLATELET # BLD AUTO: 141 X10*3/UL (ref 150–450)
PLATELET # BLD AUTO: 158 X10*3/UL (ref 150–450)
POTASSIUM SERPL-SCNC: 3.8 MMOL/L (ref 3.5–5.3)
POTASSIUM SERPL-SCNC: 3.9 MMOL/L (ref 3.5–5.3)
PRODUCT BLOOD TYPE: 5100
PRODUCT BLOOD TYPE: 6200
PRODUCT BLOOD TYPE: 6200
PRODUCT BLOOD TYPE: NORMAL
PRODUCT CODE: NORMAL
RBC # BLD AUTO: 2.56 X10*6/UL (ref 4.5–5.9)
RBC # BLD AUTO: 2.56 X10*6/UL (ref 4.5–5.9)
RBC MORPH BLD: ABNORMAL
SODIUM SERPL-SCNC: 135 MMOL/L (ref 136–145)
SODIUM SERPL-SCNC: 137 MMOL/L (ref 136–145)
TOTAL CELLS COUNTED BLD: 115
UNIT ABO: NORMAL
UNIT NUMBER: NORMAL
UNIT RH: NORMAL
UNIT VOLUME: 276
UNIT VOLUME: 282
UNIT VOLUME: 285
UNIT VOLUME: 309
UNIT VOLUME: 327
UNIT VOLUME: 350
VANCOMYCIN SERPL-MCNC: 35.7 UG/ML (ref 5–20)
VANCOMYCIN TROUGH SERPL-MCNC: 39 UG/ML (ref 5–20)
WBC # BLD AUTO: 12.2 X10*3/UL (ref 4.4–11.3)
WBC # BLD AUTO: 9 X10*3/UL (ref 4.4–11.3)
XM INTEP: NORMAL

## 2024-07-22 PROCEDURE — 37799 UNLISTED PX VASCULAR SURGERY: CPT | Performed by: STUDENT IN AN ORGANIZED HEALTH CARE EDUCATION/TRAINING PROGRAM

## 2024-07-22 PROCEDURE — 71045 X-RAY EXAM CHEST 1 VIEW: CPT | Performed by: RADIOLOGY

## 2024-07-22 PROCEDURE — 2500000004 HC RX 250 GENERAL PHARMACY W/ HCPCS (ALT 636 FOR OP/ED): Performed by: STUDENT IN AN ORGANIZED HEALTH CARE EDUCATION/TRAINING PROGRAM

## 2024-07-22 PROCEDURE — 80069 RENAL FUNCTION PANEL: CPT | Performed by: STUDENT IN AN ORGANIZED HEALTH CARE EDUCATION/TRAINING PROGRAM

## 2024-07-22 PROCEDURE — 85027 COMPLETE CBC AUTOMATED: CPT | Performed by: STUDENT IN AN ORGANIZED HEALTH CARE EDUCATION/TRAINING PROGRAM

## 2024-07-22 PROCEDURE — 2500000004 HC RX 250 GENERAL PHARMACY W/ HCPCS (ALT 636 FOR OP/ED)

## 2024-07-22 PROCEDURE — 2500000005 HC RX 250 GENERAL PHARMACY W/O HCPCS

## 2024-07-22 PROCEDURE — 99291 CRITICAL CARE FIRST HOUR: CPT | Performed by: PHYSICIAN ASSISTANT

## 2024-07-22 PROCEDURE — 82330 ASSAY OF CALCIUM: CPT

## 2024-07-22 PROCEDURE — 83735 ASSAY OF MAGNESIUM: CPT

## 2024-07-22 PROCEDURE — 80069 RENAL FUNCTION PANEL: CPT

## 2024-07-22 PROCEDURE — 37799 UNLISTED PX VASCULAR SURGERY: CPT

## 2024-07-22 PROCEDURE — 94003 VENT MGMT INPAT SUBQ DAY: CPT

## 2024-07-22 PROCEDURE — 80202 ASSAY OF VANCOMYCIN: CPT

## 2024-07-22 PROCEDURE — 85027 COMPLETE CBC AUTOMATED: CPT

## 2024-07-22 PROCEDURE — 82947 ASSAY GLUCOSE BLOOD QUANT: CPT

## 2024-07-22 PROCEDURE — 71045 X-RAY EXAM CHEST 1 VIEW: CPT

## 2024-07-22 PROCEDURE — 2020000001 HC ICU ROOM DAILY

## 2024-07-22 PROCEDURE — 2500000005 HC RX 250 GENERAL PHARMACY W/O HCPCS: Performed by: STUDENT IN AN ORGANIZED HEALTH CARE EDUCATION/TRAINING PROGRAM

## 2024-07-22 PROCEDURE — 99291 CRITICAL CARE FIRST HOUR: CPT | Performed by: SURGERY

## 2024-07-22 PROCEDURE — 80202 ASSAY OF VANCOMYCIN: CPT | Performed by: STUDENT IN AN ORGANIZED HEALTH CARE EDUCATION/TRAINING PROGRAM

## 2024-07-22 PROCEDURE — 85007 BL SMEAR W/DIFF WBC COUNT: CPT | Performed by: STUDENT IN AN ORGANIZED HEALTH CARE EDUCATION/TRAINING PROGRAM

## 2024-07-22 RX ORDER — POTASSIUM CHLORIDE 14.9 MG/ML
20 INJECTION INTRAVENOUS
Status: DISCONTINUED | OUTPATIENT
Start: 2024-07-22 | End: 2024-07-22

## 2024-07-22 ASSESSMENT — PAIN - FUNCTIONAL ASSESSMENT
PAIN_FUNCTIONAL_ASSESSMENT: CPOT (CRITICAL CARE PAIN OBSERVATION TOOL)

## 2024-07-22 ASSESSMENT — ACTIVITIES OF DAILY LIVING (ADL): LACK_OF_TRANSPORTATION: PATIENT UNABLE TO ANSWER

## 2024-07-22 NOTE — PROGRESS NOTES
Vancomycin Dosing by Pharmacy- FOLLOW UP    Artem Suarez is a 39 y.o. year old male who Pharmacy has been consulted for vancomycin dosing for Necrotizing soft tissue infection . Based on the patient's indication and renal status this patient is being dosed based on a goal AUC of 500-600.     Renal function is currently stable.    Current vancomycin dose: 2000 mg given every 8 hours    Estimated vancomycin AUC on current dose: 1054 mg/L.hr     Visit Vitals  /75   Pulse 68   Temp 36.2 °C (97.2 °F) (Bladder)   Resp 16        Lab Results   Component Value Date    CREATININE 0.99 2024    CREATININE 0.62 2024    CREATININE 0.60 2024    CREATININE 0.81 2024        Patient weight is as follows:   Vitals:    24 0100   Weight: (!) 186 kg (409 lb 6.3 oz)       Cultures:  No results found for the encounter in last 14 days.       I/O last 3 completed shifts:  In: 25695.8 (54.5 mL/kg) [I.V.:7210.8 (38.8 mL/kg); Blood:626; NG/GT:390; IV Piggyback:1900]  Out: 3540 (19.1 mL/kg) [Urine:3415 (0.5 mL/kg/hr); Emesis/NG output:100; Blood:25]  Weight: 185.7 kg   I/O during current shift:  I/O this shift:  In: 3462.6 [I.V.:1177.6; NG/GT:330; IV Piggyback:]  Out: 160 [Urine:160]    Temp (24hrs), Av.1 °C (96.9 °F), Min:35.9 °C (96.6 °F), Max:36.2 °C (97.2 °F)      Assessment/Plan    Above goal AUC. Orders placed for new vancomcyin regimen of 1500 every 12 hours to begin at 24 at 0800. Patient, appears to be, starting to accumulate drug    This dosing regimen is predicted by [employer] to result in the following pharmacokinetic parameters:  Loading dose: N/A  Regimen: 1500 mg IV every 12 hours.  Start time: 22:13 on 2024  Exposure target: AUC24 (range)500-600 mg/L.hr   AUC24,ss: 527 mg/L.hr  Probability of AUC24 > 400: 100 %  Ctrough,ss: 9.2 mg/L  Probability of Ctrough,ss > 20: 0 %  Probability of nephrotoxicity (Lodise CURLY ): 5 %      The next level will be obtained on 24 at [facility]  AM labs. May be obtained sooner if clinically indicated.   Will continue to monitor renal function daily while on vancomycin and order serum creatinine at least every 48 hours if not already ordered.  Follow for continued vancomycin needs, clinical response, and signs/symptoms of toxicity.       Oskar Pereira, PharmD

## 2024-07-22 NOTE — CARE PLAN
Problem: Skin  Goal: Decreased wound size/increased tissue granulation at next dressing change  7/22/2024 1537 by William Rosenberg RN  Outcome: Progressing  Flowsheets (Taken 7/22/2024 1537)  Decreased wound size/increased tissue granulation at next dressing change: Utilize specialty bed per algorithm  7/22/2024 1531 by William Rosenberg RN  Outcome: Progressing  Flowsheets (Taken 7/22/2024 1531)  Decreased wound size/increased tissue granulation at next dressing change:   Protective dressings over bony prominences   Utilize specialty bed per algorithm  Goal: Participates in plan/prevention/treatment measures  7/22/2024 1537 by William Rosenberg RN  Outcome: Progressing  Flowsheets (Taken 7/22/2024 1537)  Participates in plan/prevention/treatment measures: Elevate heels  7/22/2024 1531 by William Rosenberg RN  Outcome: Progressing  Flowsheets (Taken 7/22/2024 1531)  Participates in plan/prevention/treatment measures: Elevate heels  Goal: Prevent/manage excess moisture  7/22/2024 1537 by William Rosenberg RN  Outcome: Progressing  Flowsheets (Taken 7/22/2024 1537)  Prevent/manage excess moisture: Follow provider orders for dressing changes  7/22/2024 1532 by William Rosenberg RN  Flowsheets (Taken 7/22/2024 1532)  Prevent/manage excess moisture: Follow provider orders for dressing changes  7/22/2024 1531 by William Rosenberg RN  Outcome: Progressing  Goal: Prevent/minimize sheer/friction injuries  7/22/2024 1537 by William Rosenberg RN  Outcome: Progressing  Flowsheets (Taken 7/22/2024 1537)  Prevent/minimize sheer/friction injuries: Turn/reposition every 2 hours/use positioning/transfer devices  7/22/2024 1531 by William Rosenberg RN  Outcome: Progressing  Goal: Promote/optimize nutrition  7/22/2024 1537 by William Rosenberg RN  Outcome: Progressing  Flowsheets (Taken 7/22/2024 1537)  Promote/optimize nutrition: Monitor/record intake including meals  7/22/2024 1531 by William Rosenberg RN  Outcome: Progressing  Goal: Promote skin  healing  7/22/2024 1537 by William Rosenberg RN  Outcome: Progressing  Flowsheets (Taken 7/22/2024 1537)  Promote skin healing: Protective dressings over bony prominences  7/22/2024 1531 by William Rosenberg RN  Outcome: Progressing     Problem: Pain  Goal: Takes deep breaths with improved pain control throughout the shift  7/22/2024 1537 by William Rosenberg RN  Outcome: Progressing  7/22/2024 1531 by William Rosenberg RN  Outcome: Progressing  Goal: Turns in bed with improved pain control throughout the shift  7/22/2024 1537 by William Rosenberg RN  Outcome: Progressing  7/22/2024 1531 by William Rosenberg RN  Outcome: Progressing  Goal: Walks with improved pain control throughout the shift  7/22/2024 1537 by William Rosenberg RN  Outcome: Progressing  7/22/2024 1531 by William Rosenberg RN  Outcome: Progressing  Goal: Performs ADL's with improved pain control throughout shift  7/22/2024 1537 by William Rosenberg RN  Outcome: Progressing  7/22/2024 1531 by William Rosenberg RN  Outcome: Progressing  Goal: Participates in PT with improved pain control throughout the shift  7/22/2024 1537 by William Rosenberg RN  Outcome: Progressing  7/22/2024 1531 by William Rosenberg RN  Outcome: Progressing  Goal: Free from opioid side effects throughout the shift  7/22/2024 1537 by William Rosenberg RN  Outcome: Progressing  7/22/2024 1531 by William Rosenberg RN  Outcome: Progressing  Goal: Free from acute confusion related to pain meds throughout the shift  7/22/2024 1537 by William Rosenberg RN  Outcome: Progressing  7/22/2024 1531 by William Rosenberg RN  Outcome: Progressing     Problem: Safety - Medical Restraint  Goal: Remains free of injury from restraints (Restraint for Interference with Medical Device)  7/22/2024 1537 by William Rosenberg RN  Outcome: Progressing  7/22/2024 1531 by William Rosenberg RN  Outcome: Progressing  Goal: Free from restraint(s) (Restraint for Interference with Medical Device)  7/22/2024 1537 by William Rosenberg RN  Outcome: Progressing  7/22/2024  1531 by William Rosenberg RN  Outcome: Progressing   The patient's goals for the shift include      The clinical goals for the shift include hemodynamically stable

## 2024-07-22 NOTE — OP NOTE
Debridement Lower Extremity (B) Operative Note     Date: 2024  OR Location: [facility] OR    Name: Artem Suarez : 1985, Age: 39 y.o., MRN: [id number], Sex: male    Diagnosis  Pre-op Diagnosis      * Necrotizing soft tissue infection [M79.89] Post-op Diagnosis     * Necrotizing soft tissue infection [M79.89]     Procedures  Sharp excisional debridement of subcutaneous fat, muscle and fascia of right groin and thigh  Sharp excisional debridement of subcutaneous fat left thigh      Surgeons      * Kimberly Lopez - Primary    Resident/Fellow/Other Assistant:  Surgeons and Role:     * Major Kumar MD - Resident - Assisting    Procedure Summary  Anesthesia: General  ASA: IV  Anesthesia Staff: Anesthesiologist: Leon Brar DO  Anesthesia Resident: Torsten Orta DO  Estimated Blood Loss: 75mL  Intra-op Medications: * Intraprocedure medication information is unavailable because the case start and end events have not been set *           Anesthesia Record               Intraprocedure I/O Totals          Intake    Fentanyl Drip 22.00 mL    The total shown is the total volume documented since Anesthesia Start was filed.    PRBC 350.00 mL    LR bolus 500.00 mL    Norepinephrine Drip 0.00 mL    The total shown is the total volume documented since Anesthesia Start was filed.    Insulin Drip 9.00 mL    The total shown is the total volume documented since Anesthesia Start was filed.    Propofol Drip 115.58 mL    The total shown is the total volume documented since Anesthesia Start was filed.    lactated Ringer's infusion 281.25 mL    Total Intake 1277.83 mL       Output    Urine 200 mL    Est. Blood Loss 25 mL    Total Output 225 mL       Net    Net Volume 1052.83 mL          Specimen: No specimens collected     Staff:   Circulator: Silvestre Elmore Person: Shruti Brand Circulator: Alley         Drains and/or Catheters:   NG/OG/Feeding Tube Center mouth (Active)   Tube Status Continuous tube  feeding 07/22/24 0400   Placement Verification Measurements 07/21/24 2000   Distal Tube Measurement 60 cm 07/21/24 2000   Site Assessment Clean;Dry;Intact 07/22/24 0357   Irrigant Tap water 07/21/24 2000   Response To Intervention No resistance met 07/21/24 2000   Intake (mL) 40 mL 07/22/24 0300   Intake - Flush (mL) 40 mL 07/21/24 2200   Output (mL) 50 mL 07/21/24 2000       Urethral Catheter (Active)   Site Assessment Skin intact;Clean 07/22/24 0400   Collection Container Standard drainage bag 07/22/24 0400   Securement Method Securing device (Describe) 07/22/24 0400   Reason for Continuing Urinary Catheterization accurate hourly measurement of urine volume in a critically ill patient that cannot be assessed by other volumes and urine collection strategies 07/21/24 2000   Output (mL) 35 mL 07/22/24 0700       [REMOVED] Urethral Catheter (Removed)   Site Assessment Edema 07/19/24 1500   Collection Container Urometer 07/19/24 1500   Securement Method Securing device (Describe) 07/19/24 1500   Reason for Continuing Urinary Catheterization accurate hourly measurement of urine volume in a critically ill patient that cannot be assessed by other volumes and urine collection strategies 07/19/24 1500   Output (mL) 245 mL 07/19/24 1600       The patient was brought from the ICU to the operating suite where he was properly identified.  He was moved to the OR table and placed in the supine position. General anesthesia was induced via existing endotracheal tube.  A keith was already in place. SCDs and safety straps were applied.  The patient was then placed in the lo lithotomy position. Existing dressings were removed and the operative area was prepped and draped in the usual sterile fashion. He was on scheduled antibiotics and DVT chemoprophylaxis. A critical pause occurred and all were in agreement.    The right sided wound was addressed first. The area near the inguinal ligament and groin crease were sharply debrided of  grey appearing fat.  Following this down, the posterior thigh fascia and muscle were debrided as well.  There were a few spotty areas of ischemia, but overall improved from prior debridements.     The left sided wound required minimal sharp debridement of fat.      Electrocautery and suture ligation were used for hemostasis.  The area was irrigated and repacked with Kerlix.  ABD pads were placed over the Kerlix.      All sponge, needle, and instrument counts were correct prior to the termination of the procedure.      The patient was transported back to ICU intubated with no pressor requirement.    Kimberly Lopez  Phone Number: [phone]

## 2024-07-22 NOTE — ANESTHESIA PREPROCEDURE EVALUATION
"Patient: Miladis Suarez    Procedure Information       Date/Time: 07/23/24 0950    Procedure: Wound Debridement of perineum and thighs    Location: [facility] [place] OR 11 / [facility]    Surgeons: Tyson Owens MD        ALLERGIES:  No Known Allergies     MEDICAL HISTORY:  History reviewed. No pertinent past medical history.     Relevant Problems   Endocrine   (+) Diabetes mellitus, type 2 (Multi)      Hematology   (+) Anemia   (+) Coagulopathy (Multi)      ID   (+) Necrotizing soft tissue infection        SURGICAL HISTORY:  History reviewed. No pertinent surgical history.     MEDICATIONS:  No current outpatient medications     VITALS:      7/22/2024    12:00 PM 7/22/2024    11:55 AM 7/22/2024    11:00 AM   Vitals   Systolic 99  117   Diastolic 51  70   Heart Rate 77  82   Resp 16 19 13       LABS:   BMP   Lab Results   Component Value Date    GLUCOSE 257 (H) 07/22/2024    CALCIUM 7.2 (L) 07/22/2024     07/22/2024    K 3.8 07/22/2024    CO2 27 07/22/2024     07/22/2024    BUN 8 07/22/2024    CREATININE 0.99 07/22/2024   , LFT No results found for: \"ALT\", \"AST\", \"GGT\", \"ALKPHOS\", \"BILITOT\", CBC  Lab Results   Component Value Date    WBC 9.0 07/22/2024    HGB 7.4 (L) 07/22/2024    HCT 22.5 (L) 07/22/2024    MCV 88 07/22/2024     (L) 07/22/2024          , Coags   Lab Results   Component Value Date/Time    PROTIME 12.2 07/21/2024 1745    INR 1.1 07/21/2024 1745    APTT 28 07/21/2024 1745      , A1C   Lab Results   Component Value Date    HGBA1C 7.2 (H) 07/20/2024       IMAGES:  EKG        No results found for this or any previous visit (from the past 4464 hour(s)).   , ECHO         Transthoracic Echo (TTE) Complete With Contrast 07/19/2024    [facility], [address] [place] [address]  Tel [phone] and Fax [phone]    TRANSTHORACIC ECHOCARDIOGRAM REPORT      Patient Name:      MILADIS SUAREZ         Reading Physician:    [id number] Luis Felipe Ravi MD  Study " Date:        7/19/2024            Ordering Provider:    [id number] TIFFANIE MARCUM  MRN/PID:           [id number]             Fellow:               [id number] Mandi Snow MD  Accession#:        [id number]         Nurse:  Date of Birth/Age: 1985 / 39 years  Sonographer:          Rico Puga  RDCS, NE, EDVIN,  ACS  Gender:            M                    Additional Staff:  Height:            167.64 cm            Admit Date:           7/19/2024  Weight:            180.99 kg            Admission Status:     Inpatient -  Routine  BSA / BMI:         2.68 m2 / 64.40      Encounter#:           [id number]  kg/m2  Blood Pressure:    96/48 mmHg           Department Location:  [facility]    Study Type:    TRANSTHORACIC ECHO (TTE) COMPLETE  Diagnosis/ICD: Shock, unspecified-R57.9    Patient History:  Pertinent History: Septic shock, Chiquita's gangrene, S/P surgical debridement  of perineum and thighs, DM, lymphedema.    Study Detail: The following Echo studies were performed: 2D, M-Mode, color flow  and Doppler. Technically challenging study due to poor acoustic  windows, patient lying in supine position and body habitus.  Definity used as a contrast agent for endocardial border  definition. Total contrast used for this procedure was 4.0 mL via  IV push.      PHYSICIAN INTERPRETATION:  Left Ventricle: The left ventricular systolic function is hyperdynamic, with a visually estimated ejection fraction of 70-75%. There are no regional wall motion abnormalities. The left ventricular cavity size is normal. There is left ventricular concentric remodeling. Abnormal (paradoxical) septal motion, consistent with an intraventricular conduction delay. Spectral Doppler shows a normal pattern of left ventricular diastolic filling. There is no definite left ventricular thrombus visualized.  Left Atrium: The left atrium is normal in size.  Right Ventricle: The right ventricle is normal in size. There is normal right ventricular  global systolic function.  Right Atrium: The right atrium is normal in size.  Aortic Valve: The aortic valve is probably trileaflet. There is no evidence of aortic valve regurgitation. The peak instantaneous gradient of the aortic valve is 11.4 mmHg.  Mitral Valve: The mitral valve is normal in structure. There is no evidence of mitral valve regurgitation.  Tricuspid Valve: The tricuspid valve was not well visualized. No evidence of tricuspid regurgitation. The right ventricular systolic pressure is unable to be estimated.  Pulmonic Valve: The pulmonic valve is structurally normal. There is physiologic pulmonic valve regurgitation.  Pericardium: There is no pericardial effusion noted.  Aorta: The aortic root is normal.  Pulmonary Artery: The pulmonary artery is not well visualized.  Systemic Veins: The inferior vena cava appears mildly dilated. On PPV, no variability seen.  In comparison to the previous echocardiogram(s): There are no prior studies on this patient for comparison purposes.      CONCLUSIONS:  1. Poorly visualized anatomical structures due to suboptimal image quality.  2. The left ventricular systolic function is hyperdynamic, with a visually estimated ejection fraction of 70-75%.  3. No left ventricular thrombus visualized.  4. There is normal right ventricular global systolic function.  5. The pulmonary artery is not well visualized.    QUANTITATIVE DATA SUMMARY:  2D MEASUREMENTS:  Normal Ranges:  Ao Root d:     2.90 cm   (2.0-3.7cm)  LAs:           3.00 cm   (2.7-4.0cm)  IVSd:          1.00 cm   (0.6-1.1cm)  LVPWd:         1.00 cm   (0.6-1.1cm)  LVIDd:         4.00 cm   (3.9-5.9cm)  LVIDs:         2.60 cm  LV Mass Index: 47.4 g/m2  LV % FS        35.0 %    LA VOLUME:  Normal Ranges:  LA Vol A4C:        30.4 ml    (22+/-6mL/m2)  LA Vol A2C:        66.8 ml  LA Vol BP:         46.3 ml  LA Vol Index A4C:  11.3ml/m2  LA Vol Index A2C:  24.9 ml/m2  LA Vol Index BP:   17.3 ml/m2  LA Area A4C:       14.1  cm2  LA Area A2C:       21.5 cm2  LA Major Axis A4C: 5.6 cm  LA Major Axis A2C: 5.9 cm  LA Volume Index:   15.9 ml/m2    RA VOLUME BY A/L METHOD:  Normal Ranges:  RA Area A4C: 11.9 cm2    AORTA MEASUREMENTS:  Normal Ranges:  Asc Ao, d: 2.60 cm (2.1-3.4cm)    LV SYSTOLIC FUNCTION BY 2D PLANIMETRY (MOD):  Normal Ranges:  EF-A4C View:    47 % (>=55%)  EF-A2C View:    61 %  EF-Biplane:     56 %  EF-Visual:      73 %  LV EF Reported: 73 %    LV DIASTOLIC FUNCTION:  Normal Ranges:  MV Peak E:        0.80 m/s   (0.7-1.2 m/s)  MV Peak A:        0.54 m/s   (0.42-0.7 m/s)  E/A Ratio:        1.49       (1.0-2.2)  MV e'             0.105 m/s  (>8.0)  MV lateral e'     0.11 m/s  MV medial e'      0.10 m/s  E/e' Ratio:       7.63       (<8.0)  PulmV Sys Rob:    55.00 cm/s  PulmV Lieberman Rob:   57.80 cm/s  PulmV S/D Rob:    1.00  PulmV A Revs Rob: 42.60 cm/s    MITRAL VALVE:  Normal Ranges:  MV DT: 275 msec (150-240msec)    AORTIC VALVE:  Normal Ranges:  AoV Vmax:      1.69 m/s  (<=1.7m/s)  AoV Peak P.4 mmHg (<20mmHg)  LVOT Max Rob:  1.39 m/s  (<=1.1m/s)  LVOT VTI:      17.80 cm  LVOT Diameter: 2.30 cm   (1.8-2.4cm)  AoV Area,Vmax: 3.42 cm2  (2.5-4.5cm2)      RIGHT VENTRICLE:  TAPSE: 21.4 mm  RV s'  0.23 m/s    TRICUSPID VALVE/RVSP:  Normal Ranges:  Est. RA Pressure: 8 mmHg  IVC Diam:         2.40 cm    PULMONIC VALVE:  Normal Ranges:  PV Accel Time: 135 msec (>120ms)  PV Max Rob:    1.0 m/s  (0.6-0.9m/s)  PV Max P.4 mmHg    Pulmonary Veins:  PulmV A Revs Rob: 42.60 cm/s  PulmV Lieberman Rob:   57.80 cm/s  PulmV S/D Rob:    1.00  PulmV Sys Rob:    55.00 cm/s      [id number] Luis Felipe Ravi MD  Electronically signed on 2024 at 5:58:24 PM        ** Final **    , CARDIAC CATH      No results found for this or any previous visit from the past 730 days.   , CXR       XR chest 1 view 2024    Narrative  Interpreted By:  Johanna Hernandez,  and Nu Dailey  STUDY:  XR CHEST 1 VIEW;  2024 8:20  am    INDICATION:  Signs/Symptoms:Intubated ICU patient, comparison.    COMPARISON:  Chest radiographs since 07/18/2024.    ACCESSION NUMBER(S):  [id number]    ORDERING CLINICIAN:  HERB MORENO    FINDINGS:  AP radiograph of the chest was provided.    MEDICAL DEVICES:  ETT distal tip 6.8 cm from the elyse.  Right IJ CVC distal tip overlying the superior cavoatrial junction.    CARDIOMEDIASTINAL SILHOUETTE:  Cardiomediastinal silhouette is stable in size and configuration.    LUNGS:  Interval improvement in pulmonary vascular congestion. No  pneumothorax, pleural effusion or focal consolidation.    ABDOMEN:  No remarkable upper abdominal findings.    BONES:  No acute osseous changes.    Impression  1.  Interval improvement in pulmonary vascular congestion.    I personally reviewed the images/study and I agree with the findings  as stated by Dr. Asim Judge. This study was interpreted at  [place], [place].    MACRO:  None    Signed by: Johanna Steele 7/22/2024 10:50 AM  Dictation workstation:   [id number]    , CT Head/Neck     No results found for this or any previous visit from the past 760 days.    , CT Chest      No results found for this or any previous visit from the past 730 days.   , CT Abdomin     No results found for this or any previous visit from the past 730 days.    SOCIAL:  Social History     Tobacco Use   Smoking Status Unknown   Smokeless Tobacco Not on file      Social History     Substance and Sexual Activity   Alcohol Use None      Social History     Substance and Sexual Activity   Drug Use Not on file        NPO STATUS:  No data recorded    Clinical Areas Reviewed:   Tobacco  Allergies  Meds   Med Hx  Surg Hx   Fam Hx  Soc Hx        Anesthesia Assessment:    Physical Exam    Airway  Mallampati: unable to assess     Cardiovascular    Dental    Pulmonary    Abdominal            Anesthesia Plan    History of general anesthesia?:  yes  History of complications of general anesthesia?: no    ASA 4     general     intravenous induction     Plan discussed with CAA.

## 2024-07-22 NOTE — PROGRESS NOTES
07/22/24 0910   Discharge Planning   Living Arrangements Spouse/significant other   Support Systems Spouse/significant other;Family members;Friends/neighbors   Assistance Needed IPTA   Type of Residence Private residence   Number of Stairs Within Residence 15   Who is requesting discharge planning? Provider   Expected Discharge Disposition [facility]   Does the patient need discharge transport arranged? Yes   RoundTrip coordination needed? Yes   Financial Resource Strain   How hard is it for you to pay for the very basics like food, housing, medical care, and heating? Pt Unable   Housing Stability   In the last 12 months, was there a time when you were not able to pay the mortgage or rent on time? Pt Unable   In the past 12 months, how many times have you moved where you were living? 1   At any time in the past 12 months, were you homeless or living in a shelter (including now)? Pt Unable   Transportation Needs   In the past 12 months, has lack of transportation kept you from medical appointments or from getting medications? Pt Unable   In the past 12 months, has lack of transportation kept you from meetings, work, or from getting things needed for daily living? Pt Unable   Patient Choice   Provider Choice list and CMS website (https://medicare.gov/care-compare#search) for post-acute Quality and Resource Measure Data were provided and reviewed with: Family         PLAN: 39 M presented as a transfer for NSTI of perineum and thighs in septic shock. Remains in the ICU with ongoing evidence of septic shock, requiring pressors. Continued leukocytosis on labs. Remains intubated and sedated.      Went to OR with [facility] 7/20 for second look with additional debridement, Continues to require pressors in the ICU        - OR with [facility] for third look incision and debridement  - 2u of pRBCs on hold for OR  - continue broad spectrum abx  - maintain keith  - resuscitation per ICU  -off pressors  -SBT today    PAYOR: Sharon- needs  precert for placement     DISPO: LTAC for complex wound care, discussed with Qian and referral sent to [facility]     SUPPORT/CONTACT: Qian [phone], Sydney (dtr) [phone]  Spoke with Qian to complete assessment. Patient resides with her in [address] [facility] home at [address] [place] [address]. 15+ stairs to access BR, IPTA, not active with PCP, denies use of DME. Dispo pending medical clearance.

## 2024-07-22 NOTE — CONSULTS
"Nutrition Initial Assessment:   Nutrition Assessment    Reason for Assessment: Admission nursing screening    Patient is a 39 y.o. male presenting with NSTI of perineum and thighs in septic shock. Currently is intubated and sedated (fentanyl, propofol @ 32.6 ml/hr = 1174 kcal/day). S/p debridement at OSH as well as on 7/20 and 7/21. Plan to return to OR on 7/23. Enteral feeds of Glucerna 1.5 running at 40 ml/hr. Per nursing pt has been on/off levo.    Nutrition History:  Food and Nutrient History: unable to obtain from pt as pt is currently intubated and sedated  Food Allergies/Intolerances:  None  GI Symptoms: None  Oral Problems: None     Anthropometrics:  Height: 167 cm (5' 5.75\")   Weight: (!) 186 kg (409 lb 6.3 oz)   BMI (Calculated): 66.59  IBW/kg (Dietitian Calculated): 64.5 kg    Weight History:   Wt Readings from Last 10 Encounters:   07/21/24 (!) 186 kg (409 lb 6.3 oz)   07/19/24 (!) 181 kg (399 lb 0.5 oz)     Nutrition Focused Physical Exam Findings:    Subcutaneous Fat Loss:   Orbital Fat Pads: Well nourished (slightly bulging fat pads)  Buccal Fat Pads: Well nourished (full, rounded cheeks)  Triceps: Well nourished (ample fat tissue)  Muscle Wasting:  Temporalis: Well nourished (well-defined muscle)  Pectoralis (Clavicular Region): Well nourished (clavicle not visible)  Deltoid/Trapezius: Well nourished (rounded appearance at arm, shoulder, neck)  Edema:  Edema: +1 trace  Edema Location: generalized  Physical Findings:  Skin:  (soft tissue necrosis groin/thighs, area to buttocks, diabetic toe ulcer)    Nutrition Significant Labs:  BG POCT trend:   Results from last 7 days   Lab Units 07/22/24  1401 07/22/24  1309 07/22/24  1156 07/22/24  1057 07/22/24  0959   POCT GLUCOSE mg/dL 138* 138* 115* 132* 157*    , Renal Lab Trend:   Results from last 7 days   Lab Units 07/22/24  0314 07/21/24  1745 07/21/24  0012 07/20/24  1159   POTASSIUM mmol/L 3.8 3.7 3.2* 4.1   PHOSPHORUS mg/dL 2.2* 1.8* 1.7* 3.5   SODIUM " mmol/L 137 138 136 137   MAGNESIUM mg/dL 2.02 2.01 1.82 1.74   EGFR mL/min/1.73m*2 >90 >90 >90 >90   BUN mg/dL 8 6 8 12   CREATININE mg/dL 0.99 0.62 0.60 0.81        Nutrition Specific Medications:  Continuous medications  fentaNYL,  mcg/hr, Last Rate: 100 mcg/hr (07/22/24 1200)  insulin regular infusion, 0-40 Units/hr, Last Rate: 1.5 Units/hr (07/22/24 1401)  lactated Ringer's, 75 mL/hr, Last Rate: 75 mL/hr (07/22/24 1200)  norepinephrine, 0-0.5 mcg/kg/min, Last Rate: 0.01 mcg/kg/min (07/22/24 1400)  propofol, 5-40 mcg/kg/min, Last Rate: 30 mcg/kg/min (07/22/24 1230)      I/O:    ; Stool Appearance: Unable to assess (07/22/24 0801)    Dietary Orders (From admission, onward)       Start     Ordered    07/21/24 1853  Enteral feeding with NPO Glucerna 1.5; NG (nasogastric tube); 40; Water; Tap water (50); Other (specify); Flush as needed with 50cc water to ensure tube does not become clogged.  Diet effective now        Comments: Start at 10cc/hr and advance as tolerated by 10cc every 6 hours.   Question Answer Comment   Tube feeding formula: Glucerna 1.5    Feeding route: NG (nasogastric tube)    Tube feeding continuous rate (mL/hr): 40    Flush type: Water    Water type: Tap water 50   Flush frequency: Other (specify)    Additional Details Flush as needed with 50cc water to ensure tube does not become clogged.        07/21/24 1904                     Estimated Needs:   Total Energy Estimated Needs (kCal):  (4649-5116)  Method for Estimating Needs: 22-25 kcal/kg IBW for BMI >50  Total Protein Estimated Needs (g):  (130-160)  Method for Estimating Needs: 2.0-2.5 g/kg IBW  Total Fluid Estimated Needs (mL):  (per TICU)      Nutrition Diagnosis   Malnutrition Diagnosis  Patient has Malnutrition Diagnosis: No    Nutrition Diagnosis  Patient has Nutrition Diagnosis: Yes  Diagnosis Status (1): New  Nutrition Diagnosis 1: Increased nutrient needs (protein)  Related to (1): increased metabolic demand  As Evidenced by  (1): critical illness, NSTI requiring multiple trips to OR.       Nutrition Interventions/Recommendations         Nutrition Prescription:  Individualized Nutrition Prescription Provided for : enteral nutrition support        Nutrition Interventions:   Interventions: Enteral intake, Vitamin supplement therapy  Enteral Intake: Modify composition of enteral nutrition, Modify rate of enteral nutrition  Goal: While on propofol would run Pivot 1.5 @ 15 ml/hr + 1 pkt prostat (640 kcal, 51 g protein)  once off propofol would increase Pivot 1.5 to 40 ml/hr + 3 pkt prostat daily (1740 kcal, 141 g protein, 720  ml free H20)  Minimum free H20 flush to prevent tube clogging is 30 ml q4h  There is a <20 g carb per day difference between Pivot 1.5 at goal rate and Glucerna 1.5 that is currently running--Pivot 1.5 would be able to better meet protein needs.  Vitamin Supplement Therapy: Other (Comment)  Goal: 1/2 dose liquid MVI daily due to low enteral formula volume    Collaboration and Referral of Nutrition Care: Other (Comment)  Goal: pt would benfit from indirect calorimetry/metabolic study        Nutrition Monitoring and Evaluation   Food/Nutrient Related History Monitoring  Monitoring and Evaluation Plan: Enteral and parenteral nutrition intake  Enteral and Parenteral Nutrition Intake: Enteral nutrition intake, Enteral nutrition formula/solution  Criteria: Pt will receive >/= 75% recommended enteral feeds         Biochemical Data, Medical Tests and Procedures  Monitoring and Evaluation Plan: Electrolyte/renal panel, Glucose/endocrine profile  Criteria: lytes WNL  Criteria: POC glucose 140-180 mg/dl      Time Spent (min): 45 minutes

## 2024-07-22 NOTE — CONSULTS
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      Pertinent Labs:   Albumin   Date Value Ref Range Status   07/22/2024 2.5 (L) 3.4 - 5.0 g/dL Final       Wound Assessment:  Wound 07/19/24 Diabetic Ulcer Toe (Comment  which one) Right (Active)   Wound Image   07/22/24 1407   Site Assessment Black;Red 07/22/24 1407   Camelia-Wound Assessment Calloused 07/22/24 1407   Non-staged Wound Description Full thickness 07/22/24 1407   Shape Oval 07/22/24 1407   Wound Length (cm) 1.5 cm 07/22/24 1407   Wound Width (cm) 2 cm 07/22/24 1407   Wound Surface Area (cm^2) 3 cm^2 07/22/24 1407   Wound Depth (cm) 0.7 cm 07/22/24 1407   Wound Volume (cm^3) 2.1 cm^3 07/22/24 1407   Wound Healing % -275 07/22/24 1407   State of Healing Non-healing 07/22/24 1407   Margins Epibole (Rolled edges) 07/22/24 1407   Drainage Description Purulent;Red 07/22/24 1407   Drainage Amount Small 07/22/24 1407   Dressing Dry dressing 07/22/24 1407   Dressing Changed New 07/22/24 1407   Dressing Status Clean;Dry 07/22/24 1407       Wound 07/19/24  Buttocks (Active)   Wound Image   07/20/24 0522   Site Assessment Red 07/22/24 1201   Camelia-Wound Assessment Blanchable erythema 07/22/24 1201   Wound Length (cm) 14 cm 07/20/24 0537   Wound Width (cm) 10 cm 07/20/24 0537   Wound Surface Area (cm^2) 140 cm^2 07/20/24 0537   Wound Depth (cm) 0 cm 07/20/24 0537   Wound Volume (cm^3) 0 cm^3 07/20/24 0537   Drainage Description None 07/22/24 1201   Drainage Amount None 07/22/24 1201   Dressing Foam 07/22/24 1201   Dressing Changed Reinforced 07/21/24 0800   Dressing Status Clean;Dry;Occlusive 07/22/24 1201       Wound Team  Summary Assessment: The wound care team came to bedside to assess the patient's right great toe diabetic wound.  The wound is necrotic, calloused with purulent drainage from the base of the wound.  The wound was cleansed with vashe wound cleanser and pat dry with a 4x4 cover sponge. The wound then painted with betadine and cover with a 4x4 cover sponge wrapped around the toe and paper tape. The patient groin and buttock dressing were changed prior to the visit. Based on the phote of the buttock, it appears to be bruising opposed to DTPI at this time.       Wound Team Plan:     Recommendations: Daily   Cleanse the toe wound with normal saline and pat the toe dry   Paint the toe wound with betadine and cover with a  dry dressing      LYNDA Sewell  7/22/2024  3:46 PM

## 2024-07-22 NOTE — SIGNIFICANT EVENT
7/22/2024  8:48 AM    Artem Suarez  [id number]    I evaluated and examined the patient with the critical care team. As a multidisciplinary team, we discussed all findings, as well as assessment and plan. I personally spent 35 minutes of critical care time excluding procedures at the bedside with the patient. I personally reviewed all labs, results and studies. My independent note with assessment and plan are below:    Neuro  GCS10T. Prop/Fent. Weaning sedation for weaning trial.     CV  Off pressors. ECHO     Pulmonary  Weaning vent. SBT today. CXR pending.     GI  On Tube feeds/Glucerna. Nutrition formal recs pending.     Renal/  Phosphorus replacement.   Decreasing mIVF.     Heme  ABLA stable. Will check this afternoon.     MSK  No active issues.     Endo  Insulin gtt stil needed. Stopping stress dose steroids.     ID  NSTI-> V/Z/Clinda. Blood cultures still negative. DC clindamycin. Discuss with [facility].    Skin  Wound vac in place holding suction.     Lines/Tubes/Drains  R art, R internal jugular, ETT, Wolfe. NGT.     Prophylaxis  Pepcid. Lovenox BID. DC pepcid.     Restraints  Renewed this morning.     Disposition  Continue TICU care for intubation, insulin gtt    Jorge Luis Marie, DO, FACS

## 2024-07-22 NOTE — PROGRESS NOTES
[facility]  TRAUMA ICU - PROGRESS NOTE    Patient Name: Artem Suarez  MRN: [id number]  Admit Date: 719  : 1985  AGE: 39 y.o.   GENDER: male  ==============================================================================  TODAY'S ASSESSMENT AND PLAN OF CARE:  Artem Suarez is a 39 y.o. male with a history of severe lymphedema, TICU day 2 here for management of septic shock with source of bilateral NSTI of the perineum and posterior medial thigh. Has underwent two debridements, most recent yesterday, and is pending scheduling for additional debridement today.     NEURO/PAIN/SEDATION:   -GCS of 10T, responds to commands, opens eyes to verbal,   -Propofol 30  -Fent 100  -RASS of -2     RESPIRATORY:   -Intubated, cooperating with vent  -Vent settings FiO2: 30 TV: 450 RR: 16 PEEP: 8  -CXR: pending improved aeration  -Attempted SBT, failed d/t agitation     CARDIOVASC: (PMH lymphedema)  -SBP better controlled  -Off pressors  -ECHO without gross abnormalities    GI:   -Nutrition consulted, appreciate assist    --> TF@15/hr, Pivot 1.5, pending nutrition reccs, 1 pkt prostat daily  -Bowel reg    :   -Wolfe in place  -Bun/Creat stable  -UO overnight 475 (1,710 last 24), Net +6.4L  -On LR at 75 ml  -Monitor Electrolytes    -->Replete hypokalemia and hypophosphatemia     HEMATOLOGIC:  -Total 7u PRBC, 2u FFP  -Trend Hgb Q12 hours  -Hemoglobin of 7.4, trending up    ENDOCRINE: (PMH: Uncontrolled Diabetes)  ##Hyperglycemia  -BGL OVN: 230-270  -Managing with insulin infusion with coverage   -->27u via infusion  -Anion Gap 9  -HA1C 7.2    MUSCULOSKELETAL/SKIN: (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)  ##NSTI to groin/perineum  -S/p debriedment of b/l groins and thigh sking, SubQ tissue, fascia x2  -To return to surgery   -Further debridement to be determined    -->follow up any other surg reccs    INFECTIOUS DISEASE:   ##Septic shock due to NSTI  Tmax: Temperature of  97.3 OVN  WBC 9.0 (from 16.5)  Blood Cultures drawn 7/19, NGTD Day #2  Antibiotics: vancomycin, zosyn, clindamycin    GI PROPHYLAXIS:   Pepcid    DVT PROPHYLAXIS:   Lovenox 60 BID    T/L/D:  R art line  R Internal jugular CVC  ETT, OG tube  PIV  Wolfe    DISPOSITION: Ongoing TICU level care, attempt SBT when appropriate    Total face to face time spent with patient/family of 35 minutes critical care time, with >50% of the time spent discussing plan of care/management, counseling/educating on disease processes, explaining results of diagnostic testing.      ==============================================================================  CHIEF COMPLAINT / OVERNIGHT EVENTS / HPI:   No acute events overnight    MEDICAL HISTORY / ROS:  Admission history and ROS reviewed. Pertinent changes as follows: None    PHYSICAL EXAM:  Heart Rate:  [69-82]   Temp:  [35.9 °C (96.6 °F)-36.5 °C (97.7 °F)]   Resp:  [0-29]   BP: ()/(50-77)   SpO2:  [98 %-100 %]   Physical Exam  Physical Exam  Constitutional:       Appearance: He is morbidly obese.      Interventions: He is sedated and intubated.   HENT:      Head: Normocephalic.      Mouth/Throat:      Mouth: Mucous membranes are moist.   Cardiovascular:      Rate and Rhythm: Normal rate and regular rhythm.   Pulmonary:      Effort: No respiratory distress. He is intubated.   Abdominal:      Palpations: Abdomen is soft.   Musculoskeletal:         General: Swelling present.   Feet:      Right foot:      Skin integrity: Skin breakdown and dry skin present.      Left foot:      Skin integrity: Dry skin present.   Skin:     Findings: Wound present.      Comments: Bilateral Debridement of LE, perineum, inner thighs    Neurological:      Mental Status: He is lethargic and disoriented.      GCS: GCS eye subscore is 3. GCS verbal subscore is 1. GCS motor subscore is 6. 10T    IMAGING SUMMARY:  (summary of new imaging findings, not a copy of dictation)  CXR: Medical appliances positioned  appropriately. There is no focal consolidation, edema or pneumothorax. No sizeable pleural effusion. No acute osseous abnormality. Improvement in vascular congestion.        LABS:  Results from last 7 days   Lab Units 07/22/24 0314 07/21/24 2129 07/21/24  1745   WBC AUTO x10*3/uL 9.0 8.7 8.7   HEMOGLOBIN g/dL 7.4* 7.3* 7.2*   HEMATOCRIT % 22.5* 21.4* 21.3*   PLATELETS AUTO x10*3/uL 141* 138* 139*     Results from last 7 days   Lab Units 07/21/24  1745 07/21/24  0018 07/20/24  1159   APTT seconds 28 30 31   INR  1.1 1.1 1.3*     Results from last 7 days   Lab Units 07/22/24 0314 07/21/24 1745 07/21/24  0012   SODIUM mmol/L 137 138 136   POTASSIUM mmol/L 3.8 3.7 3.2*   CHLORIDE mmol/L 101 101 99   CO2 mmol/L 27 26 21   BUN mg/dL 8 6 8   CREATININE mg/dL 0.99 0.62 0.60   CALCIUM mg/dL 7.2* 7.1* 7.5*   GLUCOSE mg/dL 257* 252* 271*         Results from last 7 days   Lab Units 07/21/24  1549 07/21/24  0018 07/20/24  1159   POCT PH, ARTERIAL pH 7.38 7.33* 7.31*   POCT PCO2, ARTERIAL mm Hg 44* 39 39   POCT PO2, ARTERIAL mm Hg 72* 116* 128*   POCT HCO3 CALCULATED, ARTERIAL mmol/L 26.0 20.6* 19.6*   POCT BASE EXCESS, ARTERIAL mmol/L 0.8 -4.9* -6.1*     I have reviewed all medications, laboratory results, and imaging pertinent for today's encounter.

## 2024-07-22 NOTE — PROGRESS NOTES
[facility]  ACUTE CARE SURGERY - PROGRESS NOTE    Patient Name: Artem Suarez  MRN: [id number]  Admit Date: 719  : 1985  AGE: 39 y.o.   GENDER: male  ==============================================================================  TODAY'S ASSESSMENT AND PLAN OF CARE:  39 M presented as a transfer for NSTI of perineum and thighs in septic shock. Remains in the ICU with ongoing evidence of septic shock, requiring pressors. Continued leukocytosis on labs. Remains intubated and sedated.     OR  @ OSH: wide debridement of perineum and thighs  OR : debridement  OR : debridement     Plan:   - Wean to extubate  - Continue tube feeds  - OR  for further debridement  - Maintain keith  - Continue broad spectrum antibiotics  - DVT Proph: SCDs, Lovenox    Dispo: Appreciate TSICU care     Patient discussed with Attending Dr. Graciela [facility]  Acute Care Surgery  Pager [phone]      ==============================================================================  CHIEF COMPLAINT / EVENTS LAST 24HRS / HPI:  No acute events overnight, off pressor support. Tolerating tube feeds.     MEDICAL HISTORY / ROS:   Admission history and ROS reviewed. Pertinent changes as follows:  none    PHYSICAL EXAM:  Heart Rate:  [70-82]   Temp:  [35.9 °C (96.6 °F)-36.5 °C (97.7 °F)]   Resp:  [0-29]   BP: ()/(50-63)   SpO2:  [98 %-100 %]   Physical Exam  Intubated, sedated  ETT in place, stable on vent  Abd soft, ND  RRR on monitor  Keith in place draining clear yellow urine  Debridement of perineum and inner thighs, packed with kerlix  1st R digit ulcer      IMAGING SUMMARY:       LABS:  Results from last 7 days   Lab Units 24  0314 249 24  1745   WBC AUTO x10*3/uL 9.0 8.7 8.7   HEMOGLOBIN g/dL 7.4* 7.3* 7.2*   HEMATOCRIT % 22.5* 21.4* 21.3*   PLATELETS AUTO x10*3/uL 141* 138* 139*     Results from last 7 days   Lab Units 24  1745 24  0018  07/20/24  1159   APTT seconds 28 30 31   INR  1.1 1.1 1.3*     Results from last 7 days   Lab Units 07/22/24  0314 07/21/24  1745 07/21/24  0012   SODIUM mmol/L 137 138 136   POTASSIUM mmol/L 3.8 3.7 3.2*   CHLORIDE mmol/L 101 101 99   CO2 mmol/L 27 26 21   BUN mg/dL 8 6 8   CREATININE mg/dL 0.99 0.62 0.60   CALCIUM mg/dL 7.2* 7.1* 7.5*   GLUCOSE mg/dL 257* 252* 271*         Results from last 7 days   Lab Units 07/21/24  1549 07/21/24  0018 07/20/24  1159   POCT PH, ARTERIAL pH 7.38 7.33* 7.31*   POCT PCO2, ARTERIAL mm Hg 44* 39 39   POCT PO2, ARTERIAL mm Hg 72* 116* 128*   POCT HCO3 CALCULATED, ARTERIAL mmol/L 26.0 20.6* 19.6*   POCT BASE EXCESS, ARTERIAL mmol/L 0.8 -4.9* -6.1*       I have reviewed all medications, laboratory results, and imaging pertinent for today's encounter.

## 2024-07-22 NOTE — SIGNIFICANT EVENT
S:    POD 0 from debridement of lower extremity for NSTI.     O:   Vital signs are stable, normotensive, afebrile, no new or worsening oxygen requirement, not tachycardic.     Visit Vitals  /53   Pulse 77   Temp 36.2 °C (97.2 °F) (Bladder)   Resp 15      Lab Results   Component Value Date    WBC 8.7 07/21/2024    HGB 7.3 (L) 07/21/2024    HCT 21.4 (L) 07/21/2024    MCV 85 07/21/2024     (L) 07/21/2024     UOP: 0.33 ml/kg/h    Constitutional: no acute distress, sedated on propofol  Skin: warm and dry overall   Neuro: A/O x4, no gross deficits   HEENT: Atraumatic, no scleral icterus  Cardiac: HDS  Pulmonary: ETT in place (FiO2 30%, PEEP 8); OGT in place with drainage of minimal thin yellow output.   Abdomen: Protuberant abdomen, soft, nontender.  GI: Wolfe draining CYU.   Surgical Site: ABD pads on upper thighs can only be partially examined, appear c/d/I, no soak through appreciated.     A/P:  Overall, patient is doing well postoperatively with no acute concerns.  Will continue to optimize pain control as needed.  Will continue to monitor clinical exam, vitals, I&O's, and labs when available.  Will follow up on the patient in the a.m. or sooner as needed.    Cy García MD  PGY-1 General Surgery  Acute Care Surgery [phone]

## 2024-07-22 NOTE — OP NOTE
Debridement Lower Extremity (B) Operative Note     Date: 2024  OR Location: [facility] OR    Name: Artem Suarez : 1985, Age: 39 y.o., MRN: [id number], Sex: male    Diagnosis  Pre-op Diagnosis      * Necrotizing soft tissue infection [M79.89] Post-op Diagnosis     * Necrotizing soft tissue infection [M79.89]     Procedures  Sharp excisional debridement of subcutaneous fat, muscle and fascia of right groin and thigh  Sharp excisional debridement of subcutaneous fat left thigh      Surgeons      * Kimberly Lopez - Primary    Resident/Fellow/Other Assistant:  Surgeons and Role:     * Major Kumar MD - Resident - Assisting    Procedure Summary  Anesthesia: General  ASA: IV  Anesthesia Staff: Anesthesiologist: Leon Brar DO  Anesthesia Resident: Torsten Orta DO  Estimated Blood Loss: 75mL  Intra-op Medications: * Intraprocedure medication information is unavailable because the case start and end events have not been set *           Anesthesia Record               Intraprocedure I/O Totals          Intake    Fentanyl Drip 22.00 mL    The total shown is the total volume documented since Anesthesia Start was filed.    PRBC 350.00 mL    LR bolus 500.00 mL    Norepinephrine Drip 0.00 mL    The total shown is the total volume documented since Anesthesia Start was filed.    Insulin Drip 9.00 mL    The total shown is the total volume documented since Anesthesia Start was filed.    Propofol Drip 115.58 mL    The total shown is the total volume documented since Anesthesia Start was filed.    lactated Ringer's infusion 281.25 mL    Total Intake 1277.83 mL       Output    Urine 200 mL    Est. Blood Loss 25 mL    Total Output 225 mL       Net    Net Volume 1052.83 mL          Specimen: No specimens collected     Staff:   Circulator: Silvestre Elmore Person: Shruti Brand Circulator: Alley         Drains and/or Catheters:   NG/OG/Feeding Tube Center mouth (Active)   Tube Status Continuous tube  feeding 07/22/24 0400   Placement Verification Measurements 07/21/24 2000   Distal Tube Measurement 60 cm 07/21/24 2000   Site Assessment Clean;Dry;Intact 07/22/24 0357   Irrigant Tap water 07/21/24 2000   Response To Intervention No resistance met 07/21/24 2000   Intake (mL) 40 mL 07/22/24 0300   Intake - Flush (mL) 40 mL 07/21/24 2200   Output (mL) 50 mL 07/21/24 2000       Urethral Catheter (Active)   Site Assessment Skin intact;Clean 07/22/24 0400   Collection Container Standard drainage bag 07/22/24 0400   Securement Method Securing device (Describe) 07/22/24 0400   Reason for Continuing Urinary Catheterization accurate hourly measurement of urine volume in a critically ill patient that cannot be assessed by other volumes and urine collection strategies 07/21/24 2000   Output (mL) 35 mL 07/22/24 0700       [REMOVED] Urethral Catheter (Removed)   Site Assessment Edema 07/19/24 1500   Collection Container Urometer 07/19/24 1500   Securement Method Securing device (Describe) 07/19/24 1500   Reason for Continuing Urinary Catheterization accurate hourly measurement of urine volume in a critically ill patient that cannot be assessed by other volumes and urine collection strategies 07/19/24 1500   Output (mL) 245 mL 07/19/24 1600       The patient was brought from the ICU to the operating suite where he was properly identified.  He was moved to the OR table and placed in the supine position. General anesthesia was induced via existing endotracheal tube.  A keith was already in place. SCDs and safety straps were applied.  The patient was then placed in the lo lithotomy position. Existing dressings were removed and the operative area was prepped and draped in the usual sterile fashion. He was on scheduled antibiotics and DVT chemoprophylaxis. A critical pause occurred and all were in agreement.    The right sided wound was addressed first. The area near the inguinal ligament and groin crease were sharply debrided of  grey appearing fat.  Following this down, the posterior thigh fascia and muscle were debrided as well.  There were a few spotty areas of ischemia, but overall improved from prior debridements.     The left sided wound required minimal sharp debridement of fat.      Electrocautery and suture ligation were used for hemostasis.  The area was irrigated and repacked with Kerlix.  ABD pads were placed over the Kerlix.      All sponge, needle, and instrument counts were correct prior to the termination of the procedure.      The patient was transported back to ICU intubated with no pressor requirement.    Kimberly Lopez  Phone Number: [phone]

## 2024-07-23 ENCOUNTER — APPOINTMENT (OUTPATIENT)
Dept: RADIOLOGY | Facility: HOSPITAL | Age: 39
End: 2024-07-23
Payer: COMMERCIAL

## 2024-07-23 ENCOUNTER — ANESTHESIA (OUTPATIENT)
Dept: OPERATING ROOM | Facility: HOSPITAL | Age: 39
End: 2024-07-23
Payer: COMMERCIAL

## 2024-07-23 LAB
ABO GROUP (TYPE) IN BLOOD: NORMAL
ALBUMIN SERPL BCP-MCNC: 2.3 G/DL (ref 3.4–5)
ALBUMIN SERPL BCP-MCNC: 2.4 G/DL (ref 3.4–5)
ANION GAP BLDA CALCULATED.4IONS-SCNC: 8 MMO/L (ref 10–25)
ANION GAP SERPL CALC-SCNC: 10 MMOL/L (ref 10–20)
ANION GAP SERPL CALC-SCNC: 13 MMOL/L (ref 10–20)
ANTIBODY SCREEN: NORMAL
BACTERIA BLD CULT: NORMAL
BACTERIA BLD CULT: NORMAL
BASE EXCESS BLDA CALC-SCNC: 3.2 MMOL/L (ref -2–3)
BODY TEMPERATURE: 37 DEGREES CELSIUS
BUN SERPL-MCNC: 17 MG/DL (ref 6–23)
BUN SERPL-MCNC: 21 MG/DL (ref 6–23)
CA-I BLD-SCNC: 1.09 MMOL/L (ref 1.1–1.33)
CA-I BLD-SCNC: 1.1 MMOL/L (ref 1.1–1.33)
CA-I BLDA-SCNC: 1.18 MMOL/L (ref 1.1–1.33)
CALCIUM SERPL-MCNC: 7.3 MG/DL (ref 8.6–10.6)
CALCIUM SERPL-MCNC: 7.4 MG/DL (ref 8.6–10.6)
CHLORIDE BLDA-SCNC: 102 MMOL/L (ref 98–107)
CHLORIDE SERPL-SCNC: 100 MMOL/L (ref 98–107)
CHLORIDE SERPL-SCNC: 100 MMOL/L (ref 98–107)
CHOLEST SERPL-MCNC: 96 MG/DL (ref 0–199)
CHOLESTEROL/HDL RATIO: 7.2
CO2 SERPL-SCNC: 28 MMOL/L (ref 21–32)
CO2 SERPL-SCNC: 30 MMOL/L (ref 21–32)
CREAT SERPL-MCNC: 1.93 MG/DL (ref 0.5–1.3)
CREAT SERPL-MCNC: 2.23 MG/DL (ref 0.5–1.3)
EGFRCR SERPLBLD CKD-EPI 2021: 37 ML/MIN/1.73M*2
EGFRCR SERPLBLD CKD-EPI 2021: 44 ML/MIN/1.73M*2
ERYTHROCYTE [DISTWIDTH] IN BLOOD BY AUTOMATED COUNT: 15.6 % (ref 11.5–14.5)
ERYTHROCYTE [DISTWIDTH] IN BLOOD BY AUTOMATED COUNT: 16.5 % (ref 11.5–14.5)
ERYTHROCYTE [DISTWIDTH] IN BLOOD BY AUTOMATED COUNT: 16.6 % (ref 11.5–14.5)
GLUCOSE BLD MANUAL STRIP-MCNC: 111 MG/DL (ref 74–99)
GLUCOSE BLD MANUAL STRIP-MCNC: 127 MG/DL (ref 74–99)
GLUCOSE BLD MANUAL STRIP-MCNC: 129 MG/DL (ref 74–99)
GLUCOSE BLD MANUAL STRIP-MCNC: 135 MG/DL (ref 74–99)
GLUCOSE BLD MANUAL STRIP-MCNC: 135 MG/DL (ref 74–99)
GLUCOSE BLD MANUAL STRIP-MCNC: 136 MG/DL (ref 74–99)
GLUCOSE BLD MANUAL STRIP-MCNC: 136 MG/DL (ref 74–99)
GLUCOSE BLD MANUAL STRIP-MCNC: 138 MG/DL (ref 74–99)
GLUCOSE BLD MANUAL STRIP-MCNC: 140 MG/DL (ref 74–99)
GLUCOSE BLD MANUAL STRIP-MCNC: 141 MG/DL (ref 74–99)
GLUCOSE BLD MANUAL STRIP-MCNC: 142 MG/DL (ref 74–99)
GLUCOSE BLD MANUAL STRIP-MCNC: 144 MG/DL (ref 74–99)
GLUCOSE BLD MANUAL STRIP-MCNC: 146 MG/DL (ref 74–99)
GLUCOSE BLD MANUAL STRIP-MCNC: 146 MG/DL (ref 74–99)
GLUCOSE BLD MANUAL STRIP-MCNC: 147 MG/DL (ref 74–99)
GLUCOSE BLD MANUAL STRIP-MCNC: 150 MG/DL (ref 74–99)
GLUCOSE BLD MANUAL STRIP-MCNC: 151 MG/DL (ref 74–99)
GLUCOSE BLD MANUAL STRIP-MCNC: 162 MG/DL (ref 74–99)
GLUCOSE BLDA-MCNC: 153 MG/DL (ref 74–99)
GLUCOSE SERPL-MCNC: 147 MG/DL (ref 74–99)
GLUCOSE SERPL-MCNC: 148 MG/DL (ref 74–99)
HCO3 BLDA-SCNC: 28.9 MMOL/L (ref 22–26)
HCT VFR BLD AUTO: 21.4 % (ref 41–52)
HCT VFR BLD AUTO: 22.4 % (ref 41–52)
HCT VFR BLD AUTO: 23.6 % (ref 41–52)
HCT VFR BLD EST: 23 % (ref 41–52)
HDLC SERPL-MCNC: 13.3 MG/DL
HGB BLD-MCNC: 6.9 G/DL (ref 13.5–17.5)
HGB BLD-MCNC: 7.1 G/DL (ref 13.5–17.5)
HGB BLD-MCNC: 7.5 G/DL (ref 13.5–17.5)
HGB BLDA-MCNC: 7.7 G/DL (ref 13.5–17.5)
INHALED O2 CONCENTRATION: 30 %
LACTATE BLDA-SCNC: 0.8 MMOL/L (ref 0.4–2)
LDLC SERPL CALC-MCNC: 38 MG/DL
MAGNESIUM SERPL-MCNC: 2.01 MG/DL (ref 1.6–2.4)
MCH RBC QN AUTO: 28.7 PG (ref 26–34)
MCH RBC QN AUTO: 28.7 PG (ref 26–34)
MCH RBC QN AUTO: 29.1 PG (ref 26–34)
MCHC RBC AUTO-ENTMCNC: 31.7 G/DL (ref 32–36)
MCHC RBC AUTO-ENTMCNC: 31.8 G/DL (ref 32–36)
MCHC RBC AUTO-ENTMCNC: 32.2 G/DL (ref 32–36)
MCV RBC AUTO: 90 FL (ref 80–100)
MCV RBC AUTO: 90 FL (ref 80–100)
MCV RBC AUTO: 91 FL (ref 80–100)
NON HDL CHOLESTEROL: 83 MG/DL (ref 0–149)
NRBC BLD-RTO: 1.7 /100 WBCS (ref 0–0)
NRBC BLD-RTO: 2.1 /100 WBCS (ref 0–0)
NRBC BLD-RTO: 2.3 /100 WBCS (ref 0–0)
OXYHGB MFR BLDA: 96.3 % (ref 94–98)
PCO2 BLDA: 50 MM HG (ref 38–42)
PH BLDA: 7.37 PH (ref 7.38–7.42)
PHOSPHATE SERPL-MCNC: 3.2 MG/DL (ref 2.5–4.9)
PHOSPHATE SERPL-MCNC: 3.9 MG/DL (ref 2.5–4.9)
PLATELET # BLD AUTO: 174 X10*3/UL (ref 150–450)
PLATELET # BLD AUTO: 180 X10*3/UL (ref 150–450)
PLATELET # BLD AUTO: 187 X10*3/UL (ref 150–450)
PO2 BLDA: 107 MM HG (ref 85–95)
POTASSIUM BLDA-SCNC: 3.5 MMOL/L (ref 3.5–5.3)
POTASSIUM SERPL-SCNC: 3.4 MMOL/L (ref 3.5–5.3)
POTASSIUM SERPL-SCNC: 3.5 MMOL/L (ref 3.5–5.3)
RBC # BLD AUTO: 2.37 X10*6/UL (ref 4.5–5.9)
RBC # BLD AUTO: 2.47 X10*6/UL (ref 4.5–5.9)
RBC # BLD AUTO: 2.61 X10*6/UL (ref 4.5–5.9)
RH FACTOR (ANTIGEN D): NORMAL
SAO2 % BLDA: 99 % (ref 94–100)
SODIUM BLDA-SCNC: 135 MMOL/L (ref 136–145)
SODIUM SERPL-SCNC: 137 MMOL/L (ref 136–145)
SODIUM SERPL-SCNC: 137 MMOL/L (ref 136–145)
TRIGL SERPL-MCNC: 223 MG/DL (ref 0–149)
VANCOMYCIN SERPL-MCNC: 46.7 UG/ML (ref 5–20)
VLDL: 45 MG/DL (ref 0–40)
WBC # BLD AUTO: 10 X10*3/UL (ref 4.4–11.3)
WBC # BLD AUTO: 11.4 X10*3/UL (ref 4.4–11.3)
WBC # BLD AUTO: 9.9 X10*3/UL (ref 4.4–11.3)

## 2024-07-23 PROCEDURE — 85027 COMPLETE CBC AUTOMATED: CPT | Performed by: PHYSICIAN ASSISTANT

## 2024-07-23 PROCEDURE — 97597 DBRDMT OPN WND 1ST 20 CM/<: CPT | Performed by: SURGERY

## 2024-07-23 PROCEDURE — 2500000005 HC RX 250 GENERAL PHARMACY W/O HCPCS: Performed by: PHYSICIAN ASSISTANT

## 2024-07-23 PROCEDURE — 71045 X-RAY EXAM CHEST 1 VIEW: CPT

## 2024-07-23 PROCEDURE — 97606 NEG PRS WND THER DME>50 SQCM: CPT | Performed by: SURGERY

## 2024-07-23 PROCEDURE — 86901 BLOOD TYPING SEROLOGIC RH(D): CPT

## 2024-07-23 PROCEDURE — 36430 TRANSFUSION BLD/BLD COMPNT: CPT

## 2024-07-23 PROCEDURE — 85027 COMPLETE CBC AUTOMATED: CPT

## 2024-07-23 PROCEDURE — P9016 RBC LEUKOCYTES REDUCED: HCPCS

## 2024-07-23 PROCEDURE — 74018 RADEX ABDOMEN 1 VIEW: CPT | Performed by: RADIOLOGY

## 2024-07-23 PROCEDURE — 2500000004 HC RX 250 GENERAL PHARMACY W/ HCPCS (ALT 636 FOR OP/ED)

## 2024-07-23 PROCEDURE — 82330 ASSAY OF CALCIUM: CPT | Performed by: PHYSICIAN ASSISTANT

## 2024-07-23 PROCEDURE — 3600000002 HC OR TIME - INITIAL BASE CHARGE - PROCEDURE LEVEL TWO: Performed by: SURGERY

## 2024-07-23 PROCEDURE — 37799 UNLISTED PX VASCULAR SURGERY: CPT | Performed by: PHYSICIAN ASSISTANT

## 2024-07-23 PROCEDURE — 97598 DBRDMT OPN WND ADDL 20CM/<: CPT | Performed by: SURGERY

## 2024-07-23 PROCEDURE — 2500000004 HC RX 250 GENERAL PHARMACY W/ HCPCS (ALT 636 FOR OP/ED): Performed by: STUDENT IN AN ORGANIZED HEALTH CARE EDUCATION/TRAINING PROGRAM

## 2024-07-23 PROCEDURE — 74018 RADEX ABDOMEN 1 VIEW: CPT

## 2024-07-23 PROCEDURE — 82947 ASSAY GLUCOSE BLOOD QUANT: CPT

## 2024-07-23 PROCEDURE — 84132 ASSAY OF SERUM POTASSIUM: CPT | Performed by: PHYSICIAN ASSISTANT

## 2024-07-23 PROCEDURE — 71045 X-RAY EXAM CHEST 1 VIEW: CPT | Performed by: RADIOLOGY

## 2024-07-23 PROCEDURE — 86923 COMPATIBILITY TEST ELECTRIC: CPT

## 2024-07-23 PROCEDURE — 0JDL0ZZ EXTRACTION OF RIGHT UPPER LEG SUBCUTANEOUS TISSUE AND FASCIA, OPEN APPROACH: ICD-10-PCS | Performed by: SURGERY

## 2024-07-23 PROCEDURE — 2500000005 HC RX 250 GENERAL PHARMACY W/O HCPCS

## 2024-07-23 PROCEDURE — 2500000005 HC RX 250 GENERAL PHARMACY W/O HCPCS: Performed by: SURGERY

## 2024-07-23 PROCEDURE — 3700000001 HC GENERAL ANESTHESIA TIME - INITIAL BASE CHARGE: Performed by: SURGERY

## 2024-07-23 PROCEDURE — 3700000002 HC GENERAL ANESTHESIA TIME - EACH INCREMENTAL 1 MINUTE: Performed by: SURGERY

## 2024-07-23 PROCEDURE — 80061 LIPID PANEL: CPT | Performed by: PHYSICIAN ASSISTANT

## 2024-07-23 PROCEDURE — 2500000004 HC RX 250 GENERAL PHARMACY W/ HCPCS (ALT 636 FOR OP/ED): Performed by: PHYSICIAN ASSISTANT

## 2024-07-23 PROCEDURE — 82565 ASSAY OF CREATININE: CPT

## 2024-07-23 PROCEDURE — 99291 CRITICAL CARE FIRST HOUR: CPT | Performed by: PHYSICIAN ASSISTANT

## 2024-07-23 PROCEDURE — 2500000002 HC RX 250 W HCPCS SELF ADMINISTERED DRUGS (ALT 637 FOR MEDICARE OP, ALT 636 FOR OP/ED): Performed by: PHYSICIAN ASSISTANT

## 2024-07-23 PROCEDURE — 0JDM0ZZ EXTRACTION OF LEFT UPPER LEG SUBCUTANEOUS TISSUE AND FASCIA, OPEN APPROACH: ICD-10-PCS | Performed by: SURGERY

## 2024-07-23 PROCEDURE — 2720000007 HC OR 272 NO HCPCS: Performed by: SURGERY

## 2024-07-23 PROCEDURE — 94003 VENT MGMT INPAT SUBQ DAY: CPT

## 2024-07-23 PROCEDURE — 3600000007 HC OR TIME - EACH INCREMENTAL 1 MINUTE - PROCEDURE LEVEL TWO: Performed by: SURGERY

## 2024-07-23 PROCEDURE — 80202 ASSAY OF VANCOMYCIN: CPT | Performed by: ANESTHESIOLOGY

## 2024-07-23 PROCEDURE — 37799 UNLISTED PX VASCULAR SURGERY: CPT

## 2024-07-23 PROCEDURE — 2020000001 HC ICU ROOM DAILY

## 2024-07-23 PROCEDURE — 83735 ASSAY OF MAGNESIUM: CPT | Performed by: PHYSICIAN ASSISTANT

## 2024-07-23 RX ORDER — DEXMEDETOMIDINE HYDROCHLORIDE 4 UG/ML
.2-1.5 INJECTION, SOLUTION INTRAVENOUS CONTINUOUS
Status: DISCONTINUED | OUTPATIENT
Start: 2024-07-23 | End: 2024-07-24

## 2024-07-23 RX ORDER — FENTANYL CITRATE 50 UG/ML
INJECTION, SOLUTION INTRAMUSCULAR; INTRAVENOUS AS NEEDED
Status: DISCONTINUED | OUTPATIENT
Start: 2024-07-23 | End: 2024-07-23

## 2024-07-23 RX ORDER — ROCURONIUM BROMIDE 10 MG/ML
INJECTION, SOLUTION INTRAVENOUS AS NEEDED
Status: DISCONTINUED | OUTPATIENT
Start: 2024-07-23 | End: 2024-07-23

## 2024-07-23 RX ORDER — SODIUM CHLORIDE 9 MG/ML
INJECTION, SOLUTION INTRAVENOUS CONTINUOUS PRN
Status: DISCONTINUED | OUTPATIENT
Start: 2024-07-23 | End: 2024-07-23

## 2024-07-23 RX ORDER — VANCOMYCIN HYDROCHLORIDE 1 G/20ML
INJECTION, POWDER, LYOPHILIZED, FOR SOLUTION INTRAVENOUS AS NEEDED
Status: DISCONTINUED | OUTPATIENT
Start: 2024-07-23 | End: 2024-07-23

## 2024-07-23 RX ORDER — POTASSIUM CHLORIDE 14.9 MG/ML
20 INJECTION INTRAVENOUS
Status: DISCONTINUED | OUTPATIENT
Start: 2024-07-23 | End: 2024-07-23

## 2024-07-23 RX ORDER — POTASSIUM CHLORIDE 20 MEQ/1
40 TABLET, EXTENDED RELEASE ORAL ONCE
Status: DISCONTINUED | OUTPATIENT
Start: 2024-07-23 | End: 2024-07-23

## 2024-07-23 RX ORDER — POTASSIUM CHLORIDE 20 MEQ/1
40 TABLET, EXTENDED RELEASE ORAL ONCE
Status: DISCONTINUED | OUTPATIENT
Start: 2024-07-23 | End: 2024-07-26

## 2024-07-23 RX ORDER — HEPARIN SODIUM 5000 [USP'U]/ML
7500 INJECTION, SOLUTION INTRAVENOUS; SUBCUTANEOUS EVERY 8 HOURS
Status: DISPENSED | OUTPATIENT
Start: 2024-07-23

## 2024-07-23 RX ORDER — SODIUM CHLORIDE 0.9 G/100ML
IRRIGANT IRRIGATION AS NEEDED
Status: DISCONTINUED | OUTPATIENT
Start: 2024-07-23 | End: 2024-07-23 | Stop reason: HOSPADM

## 2024-07-23 RX ORDER — POTASSIUM CHLORIDE 14.9 MG/ML
20 INJECTION INTRAVENOUS ONCE
Status: COMPLETED | OUTPATIENT
Start: 2024-07-23 | End: 2024-07-23

## 2024-07-23 RX ORDER — MIDAZOLAM HYDROCHLORIDE 1 MG/ML
INJECTION INTRAMUSCULAR; INTRAVENOUS AS NEEDED
Status: DISCONTINUED | OUTPATIENT
Start: 2024-07-23 | End: 2024-07-23

## 2024-07-23 RX ORDER — NORETHINDRONE AND ETHINYL ESTRADIOL 0.5-0.035
KIT ORAL AS NEEDED
Status: DISCONTINUED | OUTPATIENT
Start: 2024-07-23 | End: 2024-07-23

## 2024-07-23 ASSESSMENT — PAIN - FUNCTIONAL ASSESSMENT
PAIN_FUNCTIONAL_ASSESSMENT: CPOT (CRITICAL CARE PAIN OBSERVATION TOOL)

## 2024-07-23 NOTE — OP NOTE
Wound Debridement of perineum and thighs Operative Note     Date: 2024  OR Location: [facility] OR    Name: Artem Suarez, : 1985, Age: 39 y.o., MRN: [id number], Sex: male    Diagnosis  Pre-op Diagnosis      * Necrotizing soft tissue infection [M79.89] Post-op Diagnosis     * Necrotizing soft tissue infection [M79.89]     Procedures  Wound Debridement of perineum and thighs  80848 - CT DEBRIDEMENT OPEN WOUND FIRST 20 SQ CM/<    Debridement/irrigation of perineal and thigh wounds. Application of soft tissue wound vacs x2    Surgeons      * Tyson Owens - Primary    Resident/Fellow/Other Assistant:  Surgeons and Role:     * Jyothi Don MD - Resident - Assisting     * ROSA Franklin MD    Procedure Summary  Anesthesia: General  ASA: IV  Anesthesia Staff: Anesthesiologist: Rohan Virgen MD  C-AA: GLORIA Hobson  Estimated Blood Loss: 5 mL  Intra-op Medications:   Administrations occurring from 0715 to 0925 on 24:   Medication Name Total Dose   sodium chloride 0.9 % irrigation solution 1,000 mL   acetaminophen (Tylenol) tablet 650 mg Cannot be calculated   dextrose 50 % injection 12.5 g Cannot be calculated   dextrose 50 % injection 25 g Cannot be calculated   glucagon (Glucagen) injection 1 mg Cannot be calculated   glucagon (Glucagen) injection 1 mg Cannot be calculated   insulin regular (HumuLIN, NovoLIN) bolus from bag 2-6 Units Cannot be calculated   lactated Ringer's infusion Cannot be calculated   piperacillin-tazobactam (Zosyn) 4.5 g in dextrose (iso)  mL Cannot be calculated   potassium chloride 20 mEq in sterile water for injection 100 mL Cannot be calculated   propofol (Diprivan) infusion Cannot be calculated   enoxaparin (Lovenox) syringe 60 mg Cannot be calculated   vancomycin (Vancocin) pharmacy to dose - pharmacy monitoring Cannot be calculated              Anesthesia Record               Intraprocedure I/O Totals          Intake    Propofol Drip 0.00 mL    The total  shown is the total volume documented since Anesthesia Start was filed.    Total Intake 0 mL          Specimen: No specimens collected     Staff:   Circulator: Stephanie  Circulator: Shanel  Scrub Person: Zack  Scrub Person: Ede         Drains and/or Catheters:   NG/OG/Feeding Tube Center mouth (Active)   Tube Status Continuous tube feeding 07/22/24 2000   Placement Verification Measurements 07/23/24 0400   Distal Tube Measurement 60 cm 07/23/24 0400   Site Assessment Clean;Dry;Intact 07/23/24 0400   Irrigant Tap water 07/22/24 2000   Response To Intervention No resistance met 07/23/24 0400   Intake (mL) 15 mL 07/22/24 2100   Intake - Flush (mL) 50 mL 07/22/24 2000   Output (mL) 50 mL 07/21/24 2000       Urethral Catheter (Active)   Site Assessment Clean;Skin intact 07/23/24 0400   Collection Container Standard drainage bag 07/23/24 0400   Securement Method Securing device (Describe) 07/23/24 0400   Reason for Continuing Urinary Catheterization accurate hourly measurement of urine volume in a critically ill patient that cannot be assessed by other volumes and urine collection strategies 07/23/24 0400   Output (mL) 30 mL 07/23/24 0700       [REMOVED] Urethral Catheter (Removed)   Site Assessment Edema 07/19/24 1500   Collection Container Urometer 07/19/24 1500   Securement Method Securing device (Describe) 07/19/24 1500   Reason for Continuing Urinary Catheterization accurate hourly measurement of urine volume in a critically ill patient that cannot be assessed by other volumes and urine collection strategies 07/19/24 1500   Output (mL) 245 mL 07/19/24 1600           Findings: Wound healthy. No further debridement required    Measurements:  R 30 x 35 x 5 cm  L 25 x 14 x 4 cm      Indications: Artem Suarez is an 39 y.o. male who is having surgery for Necrotizing soft tissue infection [M79.89]. He has undergone multiple debridements. His wounds have appeared healthy during bedside dressing changes. Patient returns to  the OR today for a planned re-evaluation of wounds. Consent obtained from daughter.    The patient was seen in the ICU. The risks, benefits, complications, treatment options, non-operative alternatives, expected recovery and outcomes were discussed with the patient's daughter. The possibilities of reaction to medication, pulmonary aspiration, injury to surrounding structures, bleeding, recurrent infection, the need for additional procedures, failure to diagnose a condition, and creating a complication requiring transfusion or operation were discussed with the patient' daughter. The patient's daughter concurred with the proposed plan, giving informed consent.  The site of surgery was properly noted/marked if necessary per policy. The patient has been actively warmed in preoperative area. Preoperative antibiotics have been ordered and given within 1 hours of incision. Venous thrombosis prophylaxis have been ordered including bilateral sequential compression devices and chemical prophylaxis    Procedure Details:   The patient was brought to the operating room already intubated.  Timeout was performed and confirmed the patient's name, MRN, date of birth, and procedure.  The patient was moved to the operating table and placed in lithotomy with arms out.  All pressure points were padded.  Existing dressings were removed.  The patient was prepped and draped in sterile fashion.  A preincision timeout was performed and again confirmed the patient's name, MRN, and planned procedure.    Both wounds encompassing the bilateral thighs and perineum were explored.  There is no new purulence or evidence of necrotizing soft tissue infection.  All tissue appeared healthy.  We used the Pulsavac to irrigate the wounds.  Hemostasis was achieved.  Based on the appearance of the tissue, decision was made to place soft tissue wound vacs to facilitate further healing.  A single piece of black sponge was placed into the right thigh and  perineal wound.  Occlusive dressings were applied, and this was hooked up to vac suction.  This wound was irregular in shape, but measured roughly 30 X 35 X 5 cm.  Attention was turned to the left thigh and perineal wound.  This wound significantly smaller with less tunneling.  This wound roughly measured 25 X14 X 14 cm.  Again, a single piece of black sponge was applied and occlusive dressing was placed over top.  This was also hooked up to the suction device.  At the end of the procedure both wound vacs were holding appropriate suction with a single vac device..  All sponge, needle, and instrument counts were correct at the completion of the operation.  The patient was taken back to the SICU intubated in stable condition.    Complications:  None; patient tolerated the procedure well.    Disposition: ICU - intubated and hemodynamically stable.  Condition: stable         Attending Attestation:     Tyson Owens  Phone Number: [phone]

## 2024-07-23 NOTE — ANESTHESIA POSTPROCEDURE EVALUATION
Patient: Artem Suarez    Procedure Summary       Date: 07/23/24 Room / Location: [facility] OR 11 / [facility] OR    Anesthesia Start: 0747 Anesthesia Stop: 1056    Procedure: Wound Debridement of perineum and thighs Diagnosis:       Necrotizing soft tissue infection      (Necrotizing soft tissue infection [M79.89])    Surgeons: Tyson Owens MD Responsible Provider: Rohan Virgen MD    Anesthesia Type: general ASA Status: 4            Anesthesia Type: general    Vitals Value Taken Time   /54 07/23/24 1055   Temp 36.2 07/23/24 1057   Pulse 72 07/23/24 1056   Resp 23 07/23/24 1056   SpO2 96 % 07/23/24 1056   Vitals shown include unfiled device data.    Anesthesia Post Evaluation    Patient location during evaluation: floor  Patient participation: complete - patient cannot participate  Level of consciousness: sedated  Pain management: adequate  Multimodal analgesia pain management approach  Airway patency: patent  Cardiovascular status: acceptable  Respiratory status: acceptable, ETT and airway suctioned  Hydration status: acceptable  Postoperative Nausea and Vomiting: none  Comments: OG in place from ICU      No notable events documented.

## 2024-07-23 NOTE — ANESTHESIA PROCEDURE NOTES
Airway  Date/Time: 7/23/2024 8:40 AM  Urgency: elective    Airway not difficult    Staffing  Performed: GLORIA   Authorized by: Rohan Virgen MD    Performed by: GLORIA Hobson  Patient location during procedure: OR    Indications and Patient Condition  Indications for airway management: anesthesia  Spontaneous ventilation: present  Mask difficulty assessment: 0 - not attempted    Final Airway Details  Final airway type: endotracheal airway      Successful airway: ETT  Cuffed: yes   Endotracheal tube insertion site: oral  Placement verified by: chest auscultation and capnometry   Measured from: lips  ETT to lips (cm): 22  Number of attempts at approach: 1    Additional Comments  Hooked up directly to circuit, checked cuff pressure

## 2024-07-23 NOTE — PROGRESS NOTES
[facility]  TRAUMA ICU - PROGRESS NOTE    Patient Name: Artem Suarez  MRN: [id number]  Admit Date: 719  : 1985  AGE: 39 y.o.   GENDER: male  ==============================================================================  TODAY'S ASSESSMENT AND PLAN OF CARE:  Artem Suarez is a 39 y.o. male with a history of severe lymphedema, TICU day 2 here for management of septic shock with source of bilateral NSTI of the perineum and posterior medial thigh. Has underwent three debridements, and wound was washed out today with ACS with a vac placement.     Surgery/Procedures:  -OR  @ OSH: wide debridement of perineum and thighs  -OR : debridement  -OR : debridement   -OR : Washout and vac placement    NEURO/PAIN/SEDATION:   -GCS of 10T, (E3,V1,M5)  -Propofol 40  -Fent 100  -RASS of -2     RESPIRATORY:   -Intubated, cooperating with vent  -Vent settings FiO2: .30 TV: 450 RR: 16 PEEP: 8  -CXR: stable as compared to previous  -Attempted SBT, failed d/t agitation   -Transitioning propofol to precedex to facilitate SBT trial    -->tolerated SBT when off propofol     CARDIOVASC: ([facility] lymphedema)  -SBP better controlled  -Goal MAP 65mmHg  -Intermittent vasopressor use  -ECHO without gross abnormalities    GI:   -Nutrition consulted, appreciate assist    --> On prop: TF@15/hr, Pivot 1.5, 1 pkt prostat daily    --> Off prop: TF@40/hr, Pivot 1.5, 3 pkt prostat daily  -Bowel reg    :   #JOSE RAMON  -Creat 1.93 (baseline during admission <1.0)  -UO overnight 430 ( last 24), Net +5.2 L    -->38ml/kg/hr  -Wolfe in place  -On LR at 75 ml  -Monitor Electrolytes    -->Replete hypokalemia     HEMATOLOGIC:  -Total 7u PRBC, 2u FFP  -Trend Hgb Q12 hours  -Hemoglobin of 6.9    -->1uPRBC    -->rHgb 7.5    ENDOCRINE: (PMH: Uncontrolled Diabetes)  ##Hyperglycemia  -BGL OVN: 111-162 mg/dL  -Managing with insulin infusion   -Anion Gap 9  -HA1C 7.2    MUSCULOSKELETAL/SKIN: (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)  ##NSTI to groin/perineum  ##Chronic wound to dorsum R great toe  -s/p debriedment of b/l groins and thigh sking, SubQ tissue, fascia x3  -RTOR 7/23 for washout and vac placement    -->follow up any other ACS reccs  -Ongoing local wound care    INFECTIOUS DISEASE:   ##Septic shock due to NSTI  Tmax: Temperature of 98.8 OVN  WBC 10 (9.0, 16.5)  Blood Cultures drawn 7/19, NGTD Day #3  Antibiotics: Zosyn    GI PROPHYLAXIS:   Pepcid    DVT PROPHYLAXIS:   Heparin subcutaneous, 7500u TID    T/L/D:  R art line  R Internal jugular CVC (replaced 7/23)  ETT, OG tube  PIV  Wolfe    DISPOSITION: Ongoing TICU level care, .    Total face to face time spent with patient/family of 35 minutes critical care time, with >50% of the time spent discussing plan of care/management, counseling/educating on disease processes, explaining results of diagnostic testing.      ==============================================================================  CHIEF COMPLAINT / OVERNIGHT EVENTS / HPI:   No acute events overnight    MEDICAL HISTORY / ROS:  Admission history and ROS reviewed. Pertinent changes as follows: None    PHYSICAL EXAM:  Heart Rate:  [65-84]   Temp:  [36 °C (96.8 °F)-37.1 °C (98.8 °F)]   Resp:  [0-22]   BP: ()/(49-95)   SpO2:  [92 %-100 %]   Physical Exam  Physical Exam  Constitutional:       Appearance: He is morbidly obese.      Interventions: He is sedated and intubated.   HENT:      Head: Normocephalic.      Mouth/Throat:      Mouth: Mucous membranes are moist.   Cardiovascular:      Rate and Rhythm: Normal rate and regular rhythm.   Pulmonary:      Effort: No respiratory distress. He is intubated, synchronous with ventilator.   Abdominal:      Palpations: Abdomen is soft.   Musculoskeletal:         General: Swelling present.   Feet:      Right foot:      Skin integrity: Skin breakdown and dry skin present.      Left foot:      Skin integrity: Dry skin present.   Skin:     Findings:  Chronic wound present to dorsum of R great toe.       Comments: Bilateral Debridement of LE, perineum, inner thighs    Neurological:      GCS: GCS eye subscore is 3. GCS verbal subscore is 1. GCS motor subscore is 6. 10T    IMAGING SUMMARY:  (summary of new imaging findings, not a copy of dictation)  CXR: Medical appliances positioned appropriately. There is no focal consolidation, edema or pneumothorax. No sizeable pleural effusion. No acute osseous abnormality. Improvement in vascular congestion.    Please refer to radiology studies for all historical imaging and interpretations.       LABS:  Results from last 7 days   Lab Units 07/23/24  0253 07/22/24  1518 07/22/24  0314   WBC AUTO x10*3/uL 10.0 12.2* 9.0   HEMOGLOBIN g/dL 6.9* 7.5* 7.4*   HEMATOCRIT % 21.4* 22.2* 22.5*   PLATELETS AUTO x10*3/uL 174 158 141*   LYMPHO PCT MAN %  --  7.8  --    MONO PCT MAN %  --  6.1  --    EOSINO PCT MAN %  --  0.0  --      Results from last 7 days   Lab Units 07/21/24  1745 07/21/24  0018 07/20/24  1159   APTT seconds 28 30 31   INR  1.1 1.1 1.3*     Results from last 7 days   Lab Units 07/23/24  0253 07/22/24  1518 07/22/24  0314   SODIUM mmol/L 137 135* 137   POTASSIUM mmol/L 3.4* 3.9 3.8   CHLORIDE mmol/L 100 99 101   CO2 mmol/L 30 29 27   BUN mg/dL 17 11 8   CREATININE mg/dL 1.93* 1.44* 0.99   CALCIUM mg/dL 7.4* 7.6* 7.2*   GLUCOSE mg/dL 147* 201* 257*         Results from last 7 days   Lab Units 07/23/24  0254 07/21/24  1549 07/21/24  0018   POCT PH, ARTERIAL pH 7.37* 7.38 7.33*   POCT PCO2, ARTERIAL mm Hg 50* 44* 39   POCT PO2, ARTERIAL mm Hg 107* 72* 116*   POCT HCO3 CALCULATED, ARTERIAL mmol/L 28.9* 26.0 20.6*   POCT BASE EXCESS, ARTERIAL mmol/L 3.2* 0.8 -4.9*     I have reviewed all medications, laboratory results, and imaging pertinent for today's encounter.

## 2024-07-23 NOTE — CARE PLAN
The patient's goals for the shift include preventing skin breakdown with q1 turns.     The clinical goals for the shift include hemodynamically stable and preventing skin breakdown    Over the shift, the patient did not make progress toward the following goals. Barriers to progression include patient intubated and sedated. Recommendations to address these barriers include q1 turns and frequent skin assessments.

## 2024-07-23 NOTE — CONSULTS
Vancomycin Dosing by Pharmacy- Cessation of Therapy    Consult to pharmacy for vancomycin dosing has been discontinued by the prescriber, pharmacy will sign off at this time.    Please call pharmacy if there are further questions or re-enter a consult if vancomycin is resumed.     Clarita Armendariz, PharmD

## 2024-07-23 NOTE — ANESTHESIA PROCEDURE NOTES
Central Venous Line:    Date/Time: 7/23/2024 8:30 AM    A central venous line was placed  for the following indication(s): central venous access.  Staffing  Performed: attending   Authorized by: Rohan Virgen MD    Performed by: Rohan Virgen MD    Sterility preparation included the following: provider hand hygiene performed prior to central venous catheter insertion, all 5 sterile barriers used (gloves, gown, cap, mask, large sterile drape) during central venous catheter insertion, antiseptic used during central venous catheter insertion and skin prep agent completely dried prior to procedure.  Medical reason for not performing maximal sterile barrier technique: no  The patient was placed in supine position.    Right internal jugular vein was prepped.    The site was prepped with Chlorhexidine.  Size: 7.5 Fr   Length: 15  Number of Lumens: triple lumen    This catheter was not an oximetric catheter.    During the procedure, the following specific steps were taken: target vein identified, needle advanced into vein and blood aspirated and guidewire advanced into vein.  Seldinger technique used.  Procedure performed using ultrasound guidance.  Sterile gel and probe cover used in ultrasound-guided central venous catheter insertion.    Intravenous verification was obtained by ultrasound, venous blood return and manometry.      During the procedure the patient experienced: patient tolerated procedure well with no complications.

## 2024-07-23 NOTE — SIGNIFICANT EVENT
S:    POD 0 from wound debridement of perineum and thighs with VAC placement.  Patient returned to ICU stable.  Bilateral VAC dressings in place with serosanguineous output.  Intubated, sedated. Wound vac change 7/26    O:   Vital signs are stable, normotensive, afebrile, no new or worsening oxygen requirement, not tachycardic  Visit Vitals  /69   Pulse 75   Temp 35 °C (95 °F) (Bladder)   Resp 25        Constitutional: Intubated, sedated  Skin: warm and dry, bilateral lower extremity weeping and edema  Neuro: Not able to participate in exam intubated  Pulmonary: Intubated  Abdomen: Non distended, non tender  GI: Wolfe  Surgical Site: Bilateral wound vacs in place with appropriate suction.    A/P:  Overall, patient is doing well postoperatively with no acute concerns.  Will continue to optimize pain control as needed.  Will continue to monitor clinical exam, vitals, I&O's, and labs when available.  Will follow up on the patient in the a.m. or sooner as needed.      Jyothi Don MD PGY-1  Acute Care Surgery [phone]

## 2024-07-23 NOTE — PROGRESS NOTES
[facility]  ACUTE CARE SURGERY - PROGRESS NOTE    Patient Name: Artem Suarez  MRN: [id number]  Admit Date: 719  : 1985  AGE: 39 y.o.   GENDER: male  ==============================================================================  TODAY'S ASSESSMENT AND PLAN OF CARE:  39 M presented as a transfer for NSTI of perineum and thighs in septic shock. Remains in the ICU with ongoing evidence of septic shock, requiring pressors. Continued leukocytosis on labs. Remains intubated and sedated.     OR  @ OSH: wide debridement of perineum and thighs  OR : debridement  OR : debridement     Plan:   - OR today for further debridement  - Continue broad spectrum antibiotics  - DVT Proph: SCDs, Lovenox    Dispo: Appreciate TSICU care     Patient discussed with Attending Dr. Graciela Franklin  Acute Care Surgery  Pager [phone]      ==============================================================================  CHIEF COMPLAINT / EVENTS LAST 24HRS / HPI:  NAEON. Wounds clean yesterday.    MEDICAL HISTORY / ROS:   Admission history and ROS reviewed. Pertinent changes as follows:  none    PHYSICAL EXAM:  Heart Rate:  [65-84]   Temp:  [36 °C (96.8 °F)-37 °C (98.6 °F)]   Resp:  [0-22]   BP: ()/(49-75)   SpO2:  [92 %-100 %]   Physical Exam  Intubated, sedated  ETT in place, stable on vent  Abd soft, ND  RRR on monitor  Wolfe in place draining clear yellow urine  Debridement of perineum and inner thighs, packed with kerlix  1st R digit ulcer      IMAGING SUMMARY:   None new    LABS:  Results from last 7 days   Lab Units 24  0253 24  1518 24  0314   WBC AUTO x10*3/uL 10.0 12.2* 9.0   HEMOGLOBIN g/dL 6.9* 7.5* 7.4*   HEMATOCRIT % 21.4* 22.2* 22.5*   PLATELETS AUTO x10*3/uL 174 158 141*   LYMPHO PCT MAN %  --  7.8  --    MONO PCT MAN %  --  6.1  --    EOSINO PCT MAN %  --  0.0  --      Results from last 7 days   Lab Units 24  1745 24  0018 24  1159   APTT  seconds 28 30 31   INR  1.1 1.1 1.3*     Results from last 7 days   Lab Units 07/23/24  0253 07/22/24  1518 07/22/24  0314   SODIUM mmol/L 137 135* 137   POTASSIUM mmol/L 3.4* 3.9 3.8   CHLORIDE mmol/L 100 99 101   CO2 mmol/L 30 29 27   BUN mg/dL 17 11 8   CREATININE mg/dL 1.93* 1.44* 0.99   CALCIUM mg/dL 7.4* 7.6* 7.2*   GLUCOSE mg/dL 147* 201* 257*         Results from last 7 days   Lab Units 07/23/24  0254 07/21/24  1549 07/21/24  0018   POCT PH, ARTERIAL pH 7.37* 7.38 7.33*   POCT PCO2, ARTERIAL mm Hg 50* 44* 39   POCT PO2, ARTERIAL mm Hg 107* 72* 116*   POCT HCO3 CALCULATED, ARTERIAL mmol/L 28.9* 26.0 20.6*   POCT BASE EXCESS, ARTERIAL mmol/L 3.2* 0.8 -4.9*       I have reviewed all medications, laboratory results, and imaging pertinent for today's encounter.

## 2024-07-23 NOTE — CARE PLAN
Problem: Skin  Goal: Decreased wound size/increased tissue granulation at next dressing change  Outcome: Progressing  Goal: Participates in plan/prevention/treatment measures  Outcome: Progressing  Goal: Prevent/manage excess moisture  Outcome: Progressing  Goal: Prevent/minimize sheer/friction injuries  Outcome: Progressing  Goal: Promote/optimize nutrition  Outcome: Progressing  Goal: Promote skin healing  Outcome: Progressing     Problem: Pain  Goal: Takes deep breaths with improved pain control throughout the shift  Outcome: Progressing  Goal: Turns in bed with improved pain control throughout the shift  Outcome: Progressing  Goal: Walks with improved pain control throughout the shift  Outcome: Progressing  Goal: Performs ADL's with improved pain control throughout shift  Outcome: Progressing  Goal: Participates in PT with improved pain control throughout the shift  Outcome: Progressing  Goal: Free from opioid side effects throughout the shift  Outcome: Progressing  Goal: Free from acute confusion related to pain meds throughout the shift  Outcome: Progressing     Problem: Safety - Medical Restraint  Goal: Remains free of injury from restraints (Restraint for Interference with Medical Device)  Outcome: Progressing  Goal: Free from restraint(s) (Restraint for Interference with Medical Device)  Outcome: Progressing   The patient's goals for the shift include      The clinical goals for the shift include hemodynamically stable

## 2024-07-24 ENCOUNTER — APPOINTMENT (OUTPATIENT)
Dept: RADIOLOGY | Facility: HOSPITAL | Age: 39
End: 2024-07-24
Payer: COMMERCIAL

## 2024-07-24 LAB
ALBUMIN SERPL BCP-MCNC: 2.1 G/DL (ref 3.4–5)
ALBUMIN SERPL BCP-MCNC: 2.2 G/DL (ref 3.4–5)
ANION GAP BLDA CALCULATED.4IONS-SCNC: 9 MMO/L (ref 10–25)
ANION GAP SERPL CALC-SCNC: 16 MMOL/L (ref 10–20)
ANION GAP SERPL CALC-SCNC: 17 MMOL/L (ref 10–20)
ATRIAL RATE: 96 BPM
BASE EXCESS BLDA CALC-SCNC: 0.6 MMOL/L (ref -2–3)
BLOOD EXPIRATION DATE: NORMAL
BODY TEMPERATURE: 37 DEGREES CELSIUS
BUN SERPL-MCNC: 28 MG/DL (ref 6–23)
BUN SERPL-MCNC: 34 MG/DL (ref 6–23)
CA-I BLD-SCNC: 1.04 MMOL/L (ref 1.1–1.33)
CA-I BLDA-SCNC: 1.11 MMOL/L (ref 1.1–1.33)
CALCIUM SERPL-MCNC: 7.3 MG/DL (ref 8.6–10.6)
CALCIUM SERPL-MCNC: 7.5 MG/DL (ref 8.6–10.6)
CHLORIDE BLDA-SCNC: 102 MMOL/L (ref 98–107)
CHLORIDE SERPL-SCNC: 100 MMOL/L (ref 98–107)
CHLORIDE SERPL-SCNC: 100 MMOL/L (ref 98–107)
CO2 SERPL-SCNC: 27 MMOL/L (ref 21–32)
CO2 SERPL-SCNC: 27 MMOL/L (ref 21–32)
CREAT SERPL-MCNC: 2.79 MG/DL (ref 0.5–1.3)
CREAT SERPL-MCNC: 3.33 MG/DL (ref 0.5–1.3)
DISPENSE STATUS: NORMAL
EGFRCR SERPLBLD CKD-EPI 2021: 23 ML/MIN/1.73M*2
EGFRCR SERPLBLD CKD-EPI 2021: 28 ML/MIN/1.73M*2
ERYTHROCYTE [DISTWIDTH] IN BLOOD BY AUTOMATED COUNT: 16.8 % (ref 11.5–14.5)
ERYTHROCYTE [DISTWIDTH] IN BLOOD BY AUTOMATED COUNT: 17.2 % (ref 11.5–14.5)
GLUCOSE BLD MANUAL STRIP-MCNC: 114 MG/DL (ref 74–99)
GLUCOSE BLD MANUAL STRIP-MCNC: 117 MG/DL (ref 74–99)
GLUCOSE BLD MANUAL STRIP-MCNC: 120 MG/DL (ref 74–99)
GLUCOSE BLD MANUAL STRIP-MCNC: 121 MG/DL (ref 74–99)
GLUCOSE BLD MANUAL STRIP-MCNC: 121 MG/DL (ref 74–99)
GLUCOSE BLD MANUAL STRIP-MCNC: 124 MG/DL (ref 74–99)
GLUCOSE BLD MANUAL STRIP-MCNC: 130 MG/DL (ref 74–99)
GLUCOSE BLD MANUAL STRIP-MCNC: 132 MG/DL (ref 74–99)
GLUCOSE BLD MANUAL STRIP-MCNC: 145 MG/DL (ref 74–99)
GLUCOSE BLD MANUAL STRIP-MCNC: 150 MG/DL (ref 74–99)
GLUCOSE BLDA-MCNC: 130 MG/DL (ref 74–99)
GLUCOSE SERPL-MCNC: 112 MG/DL (ref 74–99)
GLUCOSE SERPL-MCNC: 145 MG/DL (ref 74–99)
HCO3 BLDA-SCNC: 26 MMOL/L (ref 22–26)
HCT VFR BLD AUTO: 21.7 % (ref 41–52)
HCT VFR BLD AUTO: 22.7 % (ref 41–52)
HCT VFR BLD EST: 24 % (ref 41–52)
HGB BLD-MCNC: 7 G/DL (ref 13.5–17.5)
HGB BLD-MCNC: 7.2 G/DL (ref 13.5–17.5)
HGB BLDA-MCNC: 8.1 G/DL (ref 13.5–17.5)
INHALED O2 CONCENTRATION: 30 %
LACTATE BLDA-SCNC: 0.6 MMOL/L (ref 0.4–2)
MAGNESIUM SERPL-MCNC: 2.24 MG/DL (ref 1.6–2.4)
MCH RBC QN AUTO: 28.2 PG (ref 26–34)
MCH RBC QN AUTO: 28.8 PG (ref 26–34)
MCHC RBC AUTO-ENTMCNC: 31.7 G/DL (ref 32–36)
MCHC RBC AUTO-ENTMCNC: 32.3 G/DL (ref 32–36)
MCV RBC AUTO: 89 FL (ref 80–100)
MCV RBC AUTO: 89 FL (ref 80–100)
NRBC BLD-RTO: 0.5 /100 WBCS (ref 0–0)
NRBC BLD-RTO: 1.1 /100 WBCS (ref 0–0)
OXYHGB MFR BLDA: 95.8 % (ref 94–98)
P AXIS: 71 DEGREES
P OFFSET: 213 MS
P ONSET: 153 MS
PCO2 BLDA: 45 MM HG (ref 38–42)
PH BLDA: 7.37 PH (ref 7.38–7.42)
PHOSPHATE SERPL-MCNC: 6 MG/DL (ref 2.5–4.9)
PHOSPHATE SERPL-MCNC: 6.7 MG/DL (ref 2.5–4.9)
PLATELET # BLD AUTO: 191 X10*3/UL (ref 150–450)
PLATELET # BLD AUTO: 207 X10*3/UL (ref 150–450)
PO2 BLDA: 100 MM HG (ref 85–95)
POTASSIUM BLDA-SCNC: 4.2 MMOL/L (ref 3.5–5.3)
POTASSIUM SERPL-SCNC: 4.5 MMOL/L (ref 3.5–5.3)
POTASSIUM SERPL-SCNC: 4.5 MMOL/L (ref 3.5–5.3)
PR INTERVAL: 132 MS
PRODUCT BLOOD TYPE: 5100
PRODUCT CODE: NORMAL
Q ONSET: 219 MS
QRS COUNT: 15 BEATS
QRS DURATION: 84 MS
QT INTERVAL: 364 MS
QTC CALCULATION(BAZETT): 459 MS
QTC FREDERICIA: 425 MS
R AXIS: 55 DEGREES
RBC # BLD AUTO: 2.43 X10*6/UL (ref 4.5–5.9)
RBC # BLD AUTO: 2.55 X10*6/UL (ref 4.5–5.9)
SAO2 % BLDA: 99 % (ref 94–100)
SODIUM BLDA-SCNC: 133 MMOL/L (ref 136–145)
SODIUM SERPL-SCNC: 138 MMOL/L (ref 136–145)
SODIUM SERPL-SCNC: 139 MMOL/L (ref 136–145)
T AXIS: 82 DEGREES
T OFFSET: 401 MS
UNIT ABO: NORMAL
UNIT NUMBER: NORMAL
UNIT RH: NORMAL
UNIT VOLUME: 350
VENTRICULAR RATE: 96 BPM
WBC # BLD AUTO: 10.9 X10*3/UL (ref 4.4–11.3)
WBC # BLD AUTO: 13 X10*3/UL (ref 4.4–11.3)
XM INTEP: NORMAL

## 2024-07-24 PROCEDURE — 82947 ASSAY GLUCOSE BLOOD QUANT: CPT

## 2024-07-24 PROCEDURE — 94003 VENT MGMT INPAT SUBQ DAY: CPT

## 2024-07-24 PROCEDURE — 2020000001 HC ICU ROOM DAILY

## 2024-07-24 PROCEDURE — 97530 THERAPEUTIC ACTIVITIES: CPT | Mod: GO

## 2024-07-24 PROCEDURE — 36415 COLL VENOUS BLD VENIPUNCTURE: CPT | Performed by: STUDENT IN AN ORGANIZED HEALTH CARE EDUCATION/TRAINING PROGRAM

## 2024-07-24 PROCEDURE — 36600 WITHDRAWAL OF ARTERIAL BLOOD: CPT

## 2024-07-24 PROCEDURE — 99291 CRITICAL CARE FIRST HOUR: CPT | Performed by: PHYSICIAN ASSISTANT

## 2024-07-24 PROCEDURE — 85027 COMPLETE CBC AUTOMATED: CPT | Performed by: PHYSICIAN ASSISTANT

## 2024-07-24 PROCEDURE — 84132 ASSAY OF SERUM POTASSIUM: CPT | Performed by: PHYSICIAN ASSISTANT

## 2024-07-24 PROCEDURE — 2500000004 HC RX 250 GENERAL PHARMACY W/ HCPCS (ALT 636 FOR OP/ED): Performed by: NURSE PRACTITIONER

## 2024-07-24 PROCEDURE — 2500000004 HC RX 250 GENERAL PHARMACY W/ HCPCS (ALT 636 FOR OP/ED): Performed by: PHYSICIAN ASSISTANT

## 2024-07-24 PROCEDURE — 83735 ASSAY OF MAGNESIUM: CPT | Performed by: PHYSICIAN ASSISTANT

## 2024-07-24 PROCEDURE — 37799 UNLISTED PX VASCULAR SURGERY: CPT | Performed by: PHYSICIAN ASSISTANT

## 2024-07-24 PROCEDURE — 82330 ASSAY OF CALCIUM: CPT | Performed by: PHYSICIAN ASSISTANT

## 2024-07-24 PROCEDURE — 71045 X-RAY EXAM CHEST 1 VIEW: CPT

## 2024-07-24 PROCEDURE — 80069 RENAL FUNCTION PANEL: CPT | Performed by: PHYSICIAN ASSISTANT

## 2024-07-24 PROCEDURE — 97166 OT EVAL MOD COMPLEX 45 MIN: CPT | Mod: GO

## 2024-07-24 PROCEDURE — 71045 X-RAY EXAM CHEST 1 VIEW: CPT | Performed by: RADIOLOGY

## 2024-07-24 PROCEDURE — 97530 THERAPEUTIC ACTIVITIES: CPT | Mod: GP

## 2024-07-24 PROCEDURE — 2500000004 HC RX 250 GENERAL PHARMACY W/ HCPCS (ALT 636 FOR OP/ED): Mod: JZ

## 2024-07-24 PROCEDURE — 2500000004 HC RX 250 GENERAL PHARMACY W/ HCPCS (ALT 636 FOR OP/ED)

## 2024-07-24 PROCEDURE — 97161 PT EVAL LOW COMPLEX 20 MIN: CPT | Mod: GP

## 2024-07-24 PROCEDURE — 2500000005 HC RX 250 GENERAL PHARMACY W/O HCPCS: Performed by: PHYSICIAN ASSISTANT

## 2024-07-24 PROCEDURE — 84132 ASSAY OF SERUM POTASSIUM: CPT | Performed by: STUDENT IN AN ORGANIZED HEALTH CARE EDUCATION/TRAINING PROGRAM

## 2024-07-24 RX ORDER — DEXTROSE 50 % IN WATER (D50W) INTRAVENOUS SYRINGE
12.5
Status: ACTIVE | OUTPATIENT
Start: 2024-07-24

## 2024-07-24 RX ORDER — HYDROMORPHONE HYDROCHLORIDE 1 MG/ML
0.4 INJECTION, SOLUTION INTRAMUSCULAR; INTRAVENOUS; SUBCUTANEOUS ONCE
Status: COMPLETED | OUTPATIENT
Start: 2024-07-24 | End: 2024-07-24

## 2024-07-24 RX ORDER — CALCIUM GLUCONATE 20 MG/ML
1 INJECTION, SOLUTION INTRAVENOUS ONCE
Status: COMPLETED | OUTPATIENT
Start: 2024-07-24 | End: 2024-07-24

## 2024-07-24 RX ORDER — HYDROMORPHONE HYDROCHLORIDE 1 MG/ML
0.5 INJECTION, SOLUTION INTRAMUSCULAR; INTRAVENOUS; SUBCUTANEOUS EVERY 2 HOUR PRN
Status: DISCONTINUED | OUTPATIENT
Start: 2024-07-24 | End: 2024-07-26

## 2024-07-24 RX ORDER — INSULIN LISPRO 100 [IU]/ML
0-10 INJECTION, SOLUTION INTRAVENOUS; SUBCUTANEOUS EVERY 4 HOURS
Status: DISPENSED | OUTPATIENT
Start: 2024-07-24

## 2024-07-24 RX ORDER — CALCIUM GLUCONATE 20 MG/ML
1 INJECTION, SOLUTION INTRAVENOUS EVERY 4 HOURS
Status: DISCONTINUED | OUTPATIENT
Start: 2024-07-24 | End: 2024-07-24

## 2024-07-24 RX ORDER — HYDROMORPHONE HYDROCHLORIDE 1 MG/ML
INJECTION, SOLUTION INTRAMUSCULAR; INTRAVENOUS; SUBCUTANEOUS
Status: COMPLETED
Start: 2024-07-24 | End: 2024-07-24

## 2024-07-24 RX ORDER — DEXTROSE 50 % IN WATER (D50W) INTRAVENOUS SYRINGE
25
Status: ACTIVE | OUTPATIENT
Start: 2024-07-24

## 2024-07-24 ASSESSMENT — COGNITIVE AND FUNCTIONAL STATUS - GENERAL
TURNING FROM BACK TO SIDE WHILE IN FLAT BAD: TOTAL
TOILETING: A LOT
PERSONAL GROOMING: A LITTLE
HELP NEEDED FOR BATHING: A LOT
DAILY ACTIVITIY SCORE: 16
WALKING IN HOSPITAL ROOM: TOTAL
DRESSING REGULAR UPPER BODY CLOTHING: A LITTLE
MOVING FROM LYING ON BACK TO SITTING ON SIDE OF FLAT BED WITH BEDRAILS: A LOT
STANDING UP FROM CHAIR USING ARMS: TOTAL
CLIMB 3 TO 5 STEPS WITH RAILING: TOTAL
MOBILITY SCORE: 7
MOVING TO AND FROM BED TO CHAIR: TOTAL
DRESSING REGULAR LOWER BODY CLOTHING: A LOT

## 2024-07-24 ASSESSMENT — PAIN SCALES - GENERAL
PAINLEVEL_OUTOF10: 0 - NO PAIN
PAINLEVEL_OUTOF10: 10 - WORST POSSIBLE PAIN
PAINLEVEL_OUTOF10: 0 - NO PAIN
PAINLEVEL_OUTOF10: 0 - NO PAIN
PAINLEVEL_OUTOF10: 5 - MODERATE PAIN
PAINLEVEL_OUTOF10: 0 - NO PAIN
PAINLEVEL_OUTOF10: 9
PAINLEVEL_OUTOF10: 0 - NO PAIN

## 2024-07-24 ASSESSMENT — PAIN - FUNCTIONAL ASSESSMENT
PAIN_FUNCTIONAL_ASSESSMENT: 0-10

## 2024-07-24 ASSESSMENT — ACTIVITIES OF DAILY LIVING (ADL)
ADL_ASSISTANCE: INDEPENDENT
BATHING_ASSISTANCE: MODERATE

## 2024-07-24 ASSESSMENT — PAIN SCALES - WONG BAKER: WONGBAKER_NUMERICALRESPONSE: HURTS WORST

## 2024-07-24 ASSESSMENT — PAIN DESCRIPTION - LOCATION: LOCATION: SCROTUM

## 2024-07-24 NOTE — PROGRESS NOTES
Occupational Therapy    Evaluation and Treatment    Patient Name: Artem Suarez  MRN: [id number]  Today's Date: 7/24/2024  Room: 01/01  Time Calculation  Start Time: 1417  Stop Time: 1457  Time Calculation (min): 40 min    Assessment  IP OT Assessment  OT Assessment: Pt is a 39 year old male who demonstrates decreased strength, balance, activity tolerance, and cognition, which impedes occupational performance.  Prognosis: Good  Barriers to Discharge: Inaccessible home environment, Decreased caregiver support  Evaluation/Treatment Tolerance: Patient tolerated treatment well  Medical Staff Made Aware: Yes  End of Session Communication: Bedside nurse  End of Session Patient Position: Bed, 3 rail up, Alarm off, not on at start of session    Plan:  Inpatient Plan  Treatment Interventions: ADL retraining, Functional transfer training, UE strengthening/ROM, Endurance training, Cognitive reorientation, Patient/family training, Equipment evaluation/education, Fine motor coordination activities, Neuromuscular reeducation, Compensatory technique education  OT Frequency: 3 times per week  OT Discharge Recommendations: High intensity level of continued care  Equipment Recommended upon Discharge: Wheeled walker  OT Recommended Transfer Status: Assist of 2  OT - OK to Discharge: Yes  OT Assessment  OT Assessment Results: Decreased ADL status, Decreased upper extremity strength, Decreased cognition, Decreased safe judgment during ADL, Decreased endurance, Decreased functional mobility, Decreased gross motor control, Decreased IADLs, Decreased fine motor control  Prognosis: Good  Barriers to Discharge: Inaccessible home environment, Decreased caregiver support  Evaluation/Treatment Tolerance: Patient tolerated treatment well  Medical Staff Made Aware: Yes  Barriers to Participation: Comorbidities    Subjective   Current Problem:  1. Necrotizing soft tissue infection  Case Request Operating Room: Debridement Lower Extremity    Case  Request Operating Room: Debridement Lower Extremity    Case Request Operating Room: Debridement Lower Extremity    Case Request Operating Room: Debridement Lower Extremity    Case Request Operating Room: Wound Debridement of perineum and thighs    Case Request Operating Room: Wound Debridement of perineum and thighs    Case Request Operating Room: Debridement Lower Extremity, perineum and thighs, with wound vac change    Case Request Operating Room: Debridement Lower Extremity, perineum and thighs, with wound vac change      2. Shock (Multi)  Transthoracic Echo (TTE) Complete    Transthoracic Echo (TTE) Complete        General:  Reason for Referral: presented from OSH with NSTI of perineum and thighs in septic shock; s/p OR 7/18, 7/20, 7/21, and 7/23 for debridement/washout.  Past Medical History Relevant to Rehab: DM and lymphedema  Co-Treatment: PT  Co-Treatment Reason: Anticipated AMPAC<10, BMI 66.59, also utilized rehab tech for session  Prior to Session Communication: Bedside nurse  Patient Position Received: Bed, 3 rail up, Alarm off, not on at start of session  General Comment: Pt supine in bed on arrival, pleasant and agreeable. On 8 L NC     Precautions:  Hearing/Visual Limitations: keratoconus  Medical Precautions: Fall precautions  Precautions Comment: MAP >60 per RN    Vital Signs:  Heart Rate: 78 (post: 81)  Resp: 12 (post: 16)  SpO2: 96 % (post: 93)  BP: 96/51 (EOB: 115/73; Post: 91/49)  MAP (mmHg): 63 (post: 62)    Pain:  Pain Assessment  Pain Assessment: 0-10  0-10 (Numeric) Pain Score: 0 - No pain    Lines/Tubes/Drains:  Urethral Catheter (Active)   Number of days: 4         Objective   Cognition:  Overall Cognitive Status: Impaired  Arousal/Alertness: Delayed responses to stimuli  Orientation Level:  (Oriented to self, hospital, month and year. Disoriented to city (reports [place]) and time of day.)  Following Commands: Follows one step commands with repetition  Cognition Comments: Mild emotional  "lability, occasionally nonsensical  Impulsive: Mildly  Processing Speed: Delayed  Cognition Test Scores  Cognition Tests: Cognition Test Performed  SBT Test Score: Pt scored a 15/28 indicating significant cognitive impairment.  Confusion Assessment Method (CAM)  Acute Onset and Fluctuating Course (1A): Yes  Acute Onset and Fluctuating Course (1B): Yes  Inattention (2): No  Disorganized Thinking (3): No  Rate Patient's Level of Consciousness (4): Alert (Normal), No  Delirium Present: No     Home Living:  Type of Home: House  Lives With:  (\"roommates\" including daughter and others)  Home Adaptive Equipment: None  Home Layout: Two level, Bed/bath upstairs, Laundry in basement  Home Access: Stairs to enter with rails  Entrance Stairs-Number of Steps: 10  Bathroom Shower/Tub: Tub/shower unit     Prior Function:  Level of [place]: Independent with ADLs and functional transfers, Needs assistance with homemaking  ADL Assistance: Independent  Homemaking Assistance:  (dtr assists with laundry)  Ambulatory Assistance: Independent  Vocational: On disability  Leisure: raf  Prior Function Comments: questionable historian, reports baseline independence with ambulation and ADLs; denies falls. pt with multiple nonsensical responses when reporting PLOF and home set up     ADL:  Eating Assistance: Independent  Grooming Assistance: Minimal  Grooming Deficit:  (Hair care seated EOB)  Bathing Assistance: Moderate  UE Dressing Assistance: Minimal  LE Dressing Assistance: Maximal  Toileting Assistance with Device: Maximal    Activity Tolerance:  Endurance: Endurance does not limit participation in activity  Early Mobility/Exercise Safety Screen: Proceed with mobilization - No exclusion criteria met     Bed Mobility/Transfers: Bed Mobility  Bed Mobility: Yes  Bed Mobility 1  Bed Mobility 1: Supine to sitting, Sitting to supine  Level of Assistance 1: Maximum assistance (x3 assist)  Bed Mobility Comments 1: draw sheet, HOB max " elevation, able to assist with UE reach for bedrail   and Transfers  Transfer: No     Vision: Vision - Basic Assessment  Current Vision:  (keratoconus)     Sensation:  Sensation Comment: numbness/tingling BLEs    Strength:  Strength Comments: BUE WFL     Coordination:  Movements are Fluid and Coordinated: Yes  Finger to Nose: Impaired (possibly 2/2 vision)     Hand Function:  Hand Function  Gross Grasp: Functional    Extremities:   RUE   RUE : Within Functional Limits, LUE   LUE: Within Functional Limits,        Treatment Completed on Evaluation    Therapy/Activity:     Therapeutic Activity  Therapeutic Activity Performed: Yes  Therapeutic Activity 1: Pt tolerated ~15 minutes seated EOB. Initially max A with posterior lean, progressed to CGA-occasional min A with cues to correct for ant/post lean.    Outcome Measures: [facility] Daily Activity  Putting on and taking off regular lower body clothing: A lot  Bathing (including washing, rinsing, drying): A lot  Putting on and taking off regular upper body clothing: A little  Toileting, which includes using toilet, bedpan or urinal: A lot  Taking care of personal grooming such as brushing teeth: A little  Eating Meals: None  Daily Activity - Total Score: 16        ICU Mobility Screen  Early Mobility/Exercise Safety Screen: Proceed with mobilization - No exclusion criteria met  ICU Mobility Scale: Sitting over edge of bed,          Education Documentation  Body Mechanics, taught by Allison Perry OT at 7/24/2024  3:57 PM.  Learner: Patient  Readiness: Acceptance  Method: Explanation, Demonstration  Response: Verbalizes Understanding    Precautions, taught by Allison Perry OT at 7/24/2024  3:57 PM.  Learner: Patient  Readiness: Acceptance  Method: Explanation, Demonstration  Response: Verbalizes Understanding    ADL Training, taught by Allison Perry OT at 7/24/2024  3:57 PM.  Learner: Patient  Readiness: Acceptance  Method: Explanation,  Demonstration  Response: Verbalizes Understanding    Education Comments  No comments found.        Goals:   Encounter Problems       Encounter Problems (Active)       ADLs       Patient with complete upper body dressing with modified independent level of assistance donning and doffing all UE clothes       Start:  07/24/24    Expected End:  08/07/24            Patient with complete lower body dressing with minimal assist  level of assistance donning and doffing all LE clothes  with PRN adaptive equipment       Start:  07/24/24    Expected End:  08/07/24            Patient will complete daily grooming tasks with modified independent level of assistance and PRN adaptive equipment.       Start:  07/24/24    Expected End:  08/07/24               COGNITION/SAFETY       Patient will participate in cognitive activities to demonstrate WFL score on further cognitive assessments, including Medi-Cog, MoCA and remain A&O x3, CAM (-).        Start:  07/24/24    Expected End:  08/07/24               TRANSFERS       Patient will perform bed mobility moderate assist level of assistance in order to improve safety and independence with mobility       Start:  07/24/24    Expected End:  08/07/24            Patient will complete sit to stand transfer with moderate assist level of assistance and least restrictive device in order to improve safety and prepare for out of bed mobility.       Start:  07/24/24    Expected End:  08/07/24 07/24/24 at 3:58 PM   Allison Perry, OT   Rehab Office: [phone]

## 2024-07-24 NOTE — PROGRESS NOTES
[facility]  TRAUMA ICU - PROGRESS NOTE    Patient Name: Artem Suarez  MRN: [id number]  Admit Date: 719  : 1985  AGE: 39 y.o.   GENDER: male  ==============================================================================  TODAY'S ASSESSMENT AND PLAN OF CARE:  Artem Suarez is a 39 y.o. male with a history of severe lymphedema, TICU day 2 here for management of septic shock with source of bilateral NSTI of the perineum and posterior medial thigh. Has underwent three debridements, and wound was washed out today with ACS with a vac placement.     Surgery/Procedures:  -OR  @ OSH: wide debridement of perineum and thighs  -OR : debridement  -OR : debridement   -OR : Washout and vac placement    NEURO/PAIN/SEDATION:   -GCS of 11T, (E3,V1,M5)  -Precedex  -Fent      RESPIRATORY:   -Vent settings FiO2: .30 TV: 450 RR: 16 PEEP: 8  -extubate today  -CXR: stable as compared to previous    CARDIOVASC: (PMH lymphedema)  -SBP better controlled  -Goal MAP 65mmHg  -ECHO without gross abnormalities    GI:   -Nutrition consulted    --> On prop: TF@15/hr, Pivot 1.5, 1 pkt prostat daily    --> Off prop: TF@40/hr, Pivot 1.5, 3 pkt prostat daily  -TF held for extubation, reg diet post extubation  -Bowel reg    :   #JOSE RAMON  -marginal uop .2ml/kg/hr  -Cr  2.79 from 1.93- likely perioperative, on pressors yesterday. Will continue to trend  -Wolfe in place --> remove today  -On LR at 75 ml --> continue given JOSE RAMON  -Monitor Electrolytes    --> all are acceptable    HEMATOLOGIC:  -Total 8u PRBC, 2u FFP  -Trend Hgb Q12 hours    ENDOCRINE: (PMH: Uncontrolled Diabetes)  ##Hyperglycemia  -BGL OVN: 120-162 mg/dL  -transitioned to SSI  -HgbA1C 7.2    MUSCULOSKELETAL/SKIN: (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)  ##NSTI to groin/perineum  ##Chronic wound to dorsum R great toe  -s/p debriedment of b/l groins and thigh sking, SubQ tissue, fascia x3  -RTOR  for washout and vac  placement    -->plan to return to OR Friday, 7/26.  -Ongoing local wound care    INFECTIOUS DISEASE:   ##Septic shock due to NSTI, resolved  Blood Cultures drawn 7/19, NGTD  Antibiotics: Zosyn start date 7/19. Will establish stop date after RTOR on Friday.     GI PROPHYLAXIS:   Dc pepcid    DVT PROPHYLAXIS:   Heparin subcutaneous, 7500u TID    T/L/D:  R art line remove  R Internal jugular CVC (replaced 7/23) remove  ETT remove  OG tube remove  PIV  Wolfe remove    DISPOSITION: extubate RTOR Friday. Anticipate liberation to floor on 7/25    Pt discussed with Dr. Frank Dewey, PA-C  Trauma, Critical Care, and Acute Care Surgery  Ext. Floor [id number]; ICU [id number]        ==============================================================================  CHIEF COMPLAINT / OVERNIGHT EVENTS / HPI:   No acute events overnight    MEDICAL HISTORY / ROS:  Admission history and ROS reviewed. Pertinent changes as follows: None    PHYSICAL EXAM:  Heart Rate:  [67-80]   Temp:  [35 °C (95 °F)-37.3 °C (99.1 °F)]   Resp:  [15-26]   BP: ()/(49-83)   SpO2:  [97 %-100 %]   Physical Exam  Physical Exam  Constitutional:       Appearance: He is morbidly obese.      Interventions: He is sedated and intubated.   HENT:      Head: Normocephalic.      Mouth/Throat:      Mouth: Mucous membranes are moist.   Cardiovascular:      Rate and Rhythm: Normal rate and regular rhythm.   Pulmonary:      Effort: No respiratory distress. He is intubated, synchronous with ventilator.   Abdominal:      Palpations: Abdomen is soft.   Musculoskeletal:         General: Swelling present.   Feet:      Right foot:      Skin integrity: Skin breakdown and dry skin present.      Left foot:      Skin integrity: Dry skin present.   Skin:     Findings: Chronic wound present to dorsum of R great toe.       Comments: Bilateral Debridement of LE, perineum, inner thighs    Neurological:      GCS: GCS eye subscore is 4. GCS verbal subscore is 1. GCS motor subscore is  6. 11T    IMAGING SUMMARY:  (summary of new imaging findings, not a copy of dictation)    CXR unchanged    LABS:  Results from last 7 days   Lab Units 07/24/24  0215 07/23/24  1112 07/23/24  0628 07/23/24  0253 07/22/24  1518   WBC AUTO x10*3/uL 10.9 9.9 11.4*   < > 12.2*   HEMOGLOBIN g/dL 7.0* 7.1* 7.5*   < > 7.5*   HEMATOCRIT % 21.7* 22.4* 23.6*   < > 22.2*   PLATELETS AUTO x10*3/uL 191 180 187   < > 158   LYMPHO PCT MAN %  --   --   --   --  7.8   MONO PCT MAN %  --   --   --   --  6.1   EOSINO PCT MAN %  --   --   --   --  0.0    < > = values in this interval not displayed.     Results from last 7 days   Lab Units 07/21/24  1745 07/21/24  0018 07/20/24  1159   APTT seconds 28 30 31   INR  1.1 1.1 1.3*     Results from last 7 days   Lab Units 07/24/24  0215 07/23/24  1112 07/23/24  0253   SODIUM mmol/L 138 137 137   POTASSIUM mmol/L 4.5 3.5 3.4*   CHLORIDE mmol/L 100 100 100   CO2 mmol/L 27 28 30   BUN mg/dL 28* 21 17   CREATININE mg/dL 2.79* 2.23* 1.93*   CALCIUM mg/dL 7.3* 7.3* 7.4*   GLUCOSE mg/dL 145* 148* 147*         Results from last 7 days   Lab Units 07/24/24  0555 07/23/24  0254 07/21/24  1549   POCT PH, ARTERIAL pH 7.37* 7.37* 7.38   POCT PCO2, ARTERIAL mm Hg 45* 50* 44*   POCT PO2, ARTERIAL mm Hg 100* 107* 72*   POCT HCO3 CALCULATED, ARTERIAL mmol/L 26.0 28.9* 26.0   POCT BASE EXCESS, ARTERIAL mmol/L 0.6 3.2* 0.8     I have reviewed all medications, laboratory results, and imaging pertinent for today's encounter.

## 2024-07-24 NOTE — CARE PLAN
Problem: Skin  Goal: Decreased wound size/increased tissue granulation at next dressing change  Outcome: Progressing  Goal: Participates in plan/prevention/treatment measures  Outcome: Progressing  Goal: Prevent/manage excess moisture  Outcome: Progressing  Goal: Prevent/minimize sheer/friction injuries  Outcome: Progressing  Goal: Promote/optimize nutrition  Outcome: Progressing  Goal: Promote skin healing  Outcome: Progressing   The patient's goals for the shift include      The clinical goals for the shift include hemodynamically stable  Problem: Safety - Medical Restraint  Goal: Remains free of injury from restraints (Restraint for Interference with Medical Device)  Outcome: Progressing  Goal: Free from restraint(s) (Restraint for Interference with Medical Device)  Outcome: Progressing       Over the shift, the patient did not make progress toward the following goals. Barriers to progression include patient intubated and sedated. Recommendations to address these barriers include frequent turns and skin assessments.

## 2024-07-24 NOTE — CARE PLAN
Problem: Skin  Goal: Decreased wound size/increased tissue granulation at next dressing change  Outcome: Progressing  Flowsheets (Taken 7/24/2024 1529)  Decreased wound size/increased tissue granulation at next dressing change:   Promote sleep for wound healing   Protective dressings over bony prominences   Utilize specialty bed per algorithm  Goal: Participates in plan/prevention/treatment measures  Outcome: Progressing  Flowsheets (Taken 7/22/2024 1537 by William Rosenberg RN)  Participates in plan/prevention/treatment measures: Elevate heels  Goal: Prevent/manage excess moisture  Outcome: Progressing  Flowsheets (Taken 7/24/2024 1529)  Prevent/manage excess moisture:   Cleanse incontinence/protect with barrier cream   Monitor for/manage infection if present   Follow provider orders for dressing changes  Goal: Prevent/minimize sheer/friction injuries  Outcome: Progressing  Flowsheets (Taken 7/24/2024 1529)  Prevent/minimize sheer/friction injuries:   Use pull sheet   Complete micro-shifts as needed if patient unable. Adjust patient position to relieve pressure points, not a full turn   HOB 30 degrees or less   Turn/reposition every 2 hours/use positioning/transfer devices   Utilize specialty bed per algorithm  Goal: Promote/optimize nutrition  Outcome: Progressing  Flowsheets (Taken 7/24/2024 1529)  Promote/optimize nutrition: Discuss with provider if NPO > 2 days  Goal: Promote skin healing  Outcome: Progressing  Flowsheets (Taken 7/24/2024 1529)  Promote skin healing:   Rotate device position/do not position patient on device   Turn/reposition every 2 hours/use positioning/transfer devices   Protective dressings over bony prominences   Assess skin/pad under line(s)/device(s)     Problem: Pain  Goal: Takes deep breaths with improved pain control throughout the shift  Outcome: Progressing  Goal: Turns in bed with improved pain control throughout the shift  Outcome: Progressing  Goal: Walks with improved pain  control throughout the shift  Outcome: Progressing  Goal: Performs ADL's with improved pain control throughout shift  Outcome: Progressing  Goal: Participates in PT with improved pain control throughout the shift  Outcome: Progressing  Goal: Free from opioid side effects throughout the shift  Outcome: Progressing  Goal: Free from acute confusion related to pain meds throughout the shift  Outcome: Progressing   The patient's goals for the shift include      The clinical goals for the shift include hemodynamically stable

## 2024-07-24 NOTE — PROGRESS NOTES
Physical Therapy    Physical Therapy Evaluation & Treatment    Patient Name: Artem Suarez  MRN: [id number]  Today's Date: 7/24/2024   Time Calculation  Start Time: 1416  Stop Time: 1454  Time Calculation (min): 38 min    Assessment/Plan   PT Assessment  PT Assessment Results: Decreased skin integrity, Obesity, Decreased strength, Decreased endurance, Decreased range of motion, Impaired balance, Decreased mobility, Decreased cognition  Rehab Prognosis: Good  Barriers to Discharge: comorbidities  Evaluation/Treatment Tolerance: Patient tolerated treatment well  Strengths: Premorbid level of function  Barriers to Participation: Comorbidities, Coping skills  End of Session Communication: Bedside nurse  Assessment Comment: Pt presents with deficits in endurance, strength, balance, and overall functional mobility  End of Session Patient Position: Bed, 3 rail up, Alarm off, not on at start of session   IP OR SWING BED PT PLAN  Inpatient or Swing Bed: Inpatient  PT Plan  Treatment/Interventions: Bed mobility, Transfer training, Gait training, Stair training, Balance training, Neuromuscular re-education, Strengthening, Endurance training, Therapeutic exercise, Range of motion, Therapeutic activity  PT Plan: Ongoing PT  PT Frequency: 4 times per week  PT Discharge Recommendations: High intensity level of continued care  Equipment Recommended upon Discharge: Wheeled walker  PT Recommended Transfer Status: Assist x2  PT - OK to Discharge: Yes      Subjective     General Visit Information:  General  Reason for Referral: presented from OSH with NSTI of perineum and thighs in septic shock; multiple OR for debridement/washout. extubated 7/24  Past Medical History Relevant to Rehab: DM and lymphedema  Co-Treatment: OT  Co-Treatment Reason: 2/2 anticipated AMPAC <10  Prior to Session Communication: Bedside nurse  Patient Position Received: Bed, 3 rail up  General Comment: Pt supine in bed, pleasant and agreeable to PT (wound vac, FMS,  "inna, tele, PIV, 8L O2)  Home Living:  Home Living  Type of Home: House  Lives With:  (\"roommates\" including daughter and others)  Home Adaptive Equipment: None  Home Layout: Two level, Bed/bath upstairs, Laundry in basement  Home Access: Stairs to enter with rails  Entrance Stairs-Number of Steps: 10  Bathroom Shower/Tub: Tub/shower unit  Home Living Comments: sits on tub ledge  Prior Level of Function:  Prior Function Per Pt/Caregiver Report  Level of [place]: Independent with ADLs and functional transfers, Needs assistance with homemaking  Homemaking Assistance:  (dtr assists with laundry)  Vocational: On disability  Leisure: raf  Prior Function Comments: questionable historian, reports baseline independence with ambulation and ADLs; denies falls  Precautions:  Precautions  Hearing/Visual Limitations: keratoconus  Precautions Comment: MAP >60 per RN  Vital Signs:  Vital Signs  Heart Rate: 76 (post 81)  Resp: 15 (post 20)  SpO2: 96 % (post 94)  BP: 105/56 (91/49 (62))  MAP (mmHg): 69    Objective   Pain:  Pain Assessment  Pain Assessment: 0-10  0-10 (Numeric) Pain Score: 0 - No pain  Cognition:  Cognition  Overall Cognitive Status: Impaired  Arousal/Alertness: Delayed responses to stimuli  Orientation Level: Oriented X4  Following Commands: Follows one step commands with increased time  Cognition Comments: Pt mildly emotionaly at end of session, reporting fear of falling asleep and \"passing out\"  Processing Speed: Delayed    General Assessments:  Activity Tolerance  Endurance: Endurance does not limit participation in activity  Early Mobility/Exercise Safety Screen: Proceed with mobilization - No exclusion criteria met    Sensation  Light Touch:  (N/T BLE from calf down)    Strength  Strength Comments: BLE ~3/5 globally  Strength  Strength Comments: BLE ~3/5 globally    Coordination  Movements are Fluid and Coordinated: Yes         Static Sitting Balance  Static Sitting-Comment/Number of Minutes: ~15 " minutes EOB with initial maxA, progressed to Elliott with cueing       Functional Assessments:  Bed Mobility  Bed Mobility: Yes  Bed Mobility 1  Bed Mobility 1: Supine to sitting, Sitting to supine  Level of Assistance 1: Maximum assistance (x3)  Bed Mobility Comments 1: pt able to assist with BUE during log roll    Transfers  Transfer: No    Ambulation/Gait Training  Ambulation/Gait Training Performed: No    Stairs  Stairs: No  Extremity/Trunk Assessments:  RLE   RLE :  (PROM limited by lymphedema, pt grossly ~3/5 strength)  LLE   LLE :  (PROM limited by lymphedema, pt grossly ~3/5 strength)  Treatments:  Therapeutic Activity  Therapeutic Activity Performed: Yes  Therapeutic Activity 1: HOB elevation prior to progressing to EOB asessing vitals response  Therapeutic Activity 2: Sitting EOB ~15 minutes, initially maxA but progressed to Elliott with cueing. Pt completed self care tasks and static sitting for endurance  Outcome Measures:  [facility] Basic Mobility  Turning from your back to your side while in a flat bed without using bedrails: A lot  Moving from lying on your back to sitting on the side of a flat bed without using bedrails: Total  Moving to and from bed to chair (including a wheelchair): Total  Standing up from a chair using your arms (e.g. wheelchair or bedside chair): Total  To walk in hospital room: Total  Climbing 3-5 steps with railing: Total  Basic Mobility - Total Score: 7    FSS-ICU  Ambulation: Unable to attempt due to weakness  Rolling: Maximal assistance (performs 25% - 49% of task)  Sitting: Minimal assistance (performs 75% or more of task)  Transfer Sit-to-Stand: Unable to perform  Transfer Supine-to-Sit: Total assistance (performs 25% or requires another person)  Total Score: 7      Early Mobility/Exercise Safety Screen: Proceed with mobilization - No exclusion criteria met  ICU Mobility Scale: Sitting over edge of bed [3]    Encounter Problems       Encounter Problems (Active)       Balance        STG - Maintains static standing balance with upper extremity support >5 minutes Elliott        Start:  07/24/24            STG - Maintains static sitting balance without upper extremity support >10 minutes SBA for functional activities and endurance        Start:  07/24/24               Mobility       STG - Patient will ambulate >10ft using LRAD modA        Start:  07/24/24               PT Transfers       STG - Patient to transfer to and from sit to supine modA        Start:  07/24/24            STG - Patient will transfer sit to and from stand modA       Start:  07/24/24                   Education Documentation  Precautions, taught by Shayla Aguilar PT at 7/24/2024  3:52 PM.  Learner: Patient  Readiness: Acceptance  Method: Explanation  Response: Verbalizes Understanding    Body Mechanics, taught by Shayla Aguilar PT at 7/24/2024  3:52 PM.  Learner: Patient  Readiness: Acceptance  Method: Explanation  Response: Verbalizes Understanding    Mobility Training, taught by Shayla Aguilar PT at 7/24/2024  3:52 PM.  Learner: Patient  Readiness: Acceptance  Method: Explanation  Response: Verbalizes Understanding    Education Comments  No comments found.    Shayla Aguilar PT

## 2024-07-24 NOTE — PROGRESS NOTES
Spiritual Care Visit    Clinical Encounter Type  Visited With: Patient  Routine Visit: Introduction  Continue Visiting: Yes  Surgical Visit: Post-op  Crisis Visit: Trauma  Referral From: Nurse  Referral To:          Values/Beliefs  Cultural Requests During Hospitalization: none noted during visit  Spiritual Requests During Hospitalization: support, comfort                        PC-7 Assessment (Level of Unmet Needs)  Existential Struggle: Further assess  Spiritual/Baptist Struggle: Further assess  Legacy: Further assess  Relationships: Further assess  Fear of Death/Dying: Further assess  Values/Medical Decision Making: Further assess  Ritual/Other: Further assess  PC-7 Score: 0    SDAT (Spiritual Distress Assessment Tool)  Need for Life Balance: Substancial evidence of unmet spiritual need  Need for Connection: Substancial evidence of unmet spiritual need  Need for Values Acknowledgement: Substancial evidence of unmet spiritual need  Need to Maintain Control: Substancial evidence of unmet spiritual need  Need to Maintain Identity: Substancial evidence of unmet spiritual need  SDAT Score: 10  SDAT Average Score: 2    Taxonomy  Intended Effects: Convey a calming presence, Demonstrate caring and concern, Lessen anxiety, Preserve dignity and respect  Methods: Demonstrate acceptance, Offer emotional support, Offer support  Interventions: Silent prayer  Initial spiritual care visit with patient. He was not able to fully engage during my visit. He was still under some sedation,  was told. Patient did talk, but it was hard to understand him.  provided a non anxious, supportive presence and words of encouragement.  will continue to follow.

## 2024-07-24 NOTE — PROGRESS NOTES
[facility]  ACUTE CARE SURGERY - PROGRESS NOTE    Patient Name: Artem Suarez  MRN: [id number]  Admit Date: 719  : 1985  AGE: 39 y.o.   GENDER: male  ==============================================================================  TODAY'S ASSESSMENT AND PLAN OF CARE:  39 M presented as a transfer for NSTI of perineum and thighs in septic shock. Remains in the ICU with ongoing evidence of septic shock, requiring pressors. Continued leukocytosis on labs. Remains intubated and sedated.     OR  @ OSH: wide debridement of perineum and thighs  OR : debridement  OR : debridement   OR : wound debridement of perineum and thighs with wound vac placement     Plan:   - Wean to extubate  - Continue tube feeds as tolerated  - OK for PO diet once extubated and passes bedside swallow   - Return to OR  for wound vac dressing change   - Maintain keith  - Continue broad spectrum antibiotics  - DVT Proph: SCDs, Lovenox    Dispo: Appreciate TSICU care     Patient discussed with Attending Dr. Italia [facility]NUM[facility]  Acute Care Surgery  Pager [phone]      ==============================================================================  CHIEF COMPLAINT / EVENTS LAST 24HRS / HPI:  No acute events overnight, off pressor support and on CPAP this AM. Responds appropriately to commands.     MEDICAL HISTORY / ROS:   Admission history and ROS reviewed. Pertinent changes as follows:  none    PHYSICAL EXAM:  Heart Rate:  [67-76]   Temp:  [35 °C (95 °F)-37.5 °C (99.5 °F)]   Resp:  [15-26]   BP: ()/(49-83)   SpO2:  [97 %-100 %]   Physical Exam  Intubated, awake and alert, following comands  ETT in place, on CPAP   Abd soft, ND  RRR on monitor  Keith in place draining clear yellow urine  Perineum and bilateral thighs with wound vacs in place and holding suction   1st R digit ulcer      IMAGING SUMMARY:       LABS:  Results from last 7 days   Lab Units 24  5418  07/23/24  1112 07/23/24  0628 07/23/24  0253 07/22/24  1518   WBC AUTO x10*3/uL 10.9 9.9 11.4*   < > 12.2*   HEMOGLOBIN g/dL 7.0* 7.1* 7.5*   < > 7.5*   HEMATOCRIT % 21.7* 22.4* 23.6*   < > 22.2*   PLATELETS AUTO x10*3/uL 191 180 187   < > 158   LYMPHO PCT MAN %  --   --   --   --  7.8   MONO PCT MAN %  --   --   --   --  6.1   EOSINO PCT MAN %  --   --   --   --  0.0    < > = values in this interval not displayed.     Results from last 7 days   Lab Units 07/21/24  1745 07/21/24  0018 07/20/24  1159   APTT seconds 28 30 31   INR  1.1 1.1 1.3*     Results from last 7 days   Lab Units 07/24/24  0215 07/23/24  1112 07/23/24  0253   SODIUM mmol/L 138 137 137   POTASSIUM mmol/L 4.5 3.5 3.4*   CHLORIDE mmol/L 100 100 100   CO2 mmol/L 27 28 30   BUN mg/dL 28* 21 17   CREATININE mg/dL 2.79* 2.23* 1.93*   CALCIUM mg/dL 7.3* 7.3* 7.4*   GLUCOSE mg/dL 145* 148* 147*         Results from last 7 days   Lab Units 07/24/24  0555 07/23/24  0254 07/21/24  1549   POCT PH, ARTERIAL pH 7.37* 7.37* 7.38   POCT PCO2, ARTERIAL mm Hg 45* 50* 44*   POCT PO2, ARTERIAL mm Hg 100* 107* 72*   POCT HCO3 CALCULATED, ARTERIAL mmol/L 26.0 28.9* 26.0   POCT BASE EXCESS, ARTERIAL mmol/L 0.6 3.2* 0.8       I have reviewed all medications, laboratory results, and imaging pertinent for today's encounter.

## 2024-07-25 ENCOUNTER — ANESTHESIA EVENT (OUTPATIENT)
Dept: OPERATING ROOM | Facility: HOSPITAL | Age: 39
End: 2024-07-25
Payer: COMMERCIAL

## 2024-07-25 LAB
ALBUMIN SERPL BCP-MCNC: 2.1 G/DL (ref 3.4–5)
ANION GAP SERPL CALC-SCNC: 20 MMOL/L (ref 10–20)
BUN SERPL-MCNC: 36 MG/DL (ref 6–23)
CALCIUM SERPL-MCNC: 7.4 MG/DL (ref 8.6–10.6)
CHLORIDE SERPL-SCNC: 100 MMOL/L (ref 98–107)
CO2 SERPL-SCNC: 24 MMOL/L (ref 21–32)
CREAT SERPL-MCNC: 3.49 MG/DL (ref 0.5–1.3)
EGFRCR SERPLBLD CKD-EPI 2021: 22 ML/MIN/1.73M*2
ERYTHROCYTE [DISTWIDTH] IN BLOOD BY AUTOMATED COUNT: 17.2 % (ref 11.5–14.5)
GLUCOSE BLD MANUAL STRIP-MCNC: 114 MG/DL (ref 74–99)
GLUCOSE BLD MANUAL STRIP-MCNC: 117 MG/DL (ref 74–99)
GLUCOSE BLD MANUAL STRIP-MCNC: 127 MG/DL (ref 74–99)
GLUCOSE BLD MANUAL STRIP-MCNC: 128 MG/DL (ref 74–99)
GLUCOSE BLD MANUAL STRIP-MCNC: 137 MG/DL (ref 74–99)
GLUCOSE BLD MANUAL STRIP-MCNC: 152 MG/DL (ref 74–99)
GLUCOSE SERPL-MCNC: 123 MG/DL (ref 74–99)
HCT VFR BLD AUTO: 23.6 % (ref 41–52)
HGB BLD-MCNC: 7.4 G/DL (ref 13.5–17.5)
MAGNESIUM SERPL-MCNC: 2.36 MG/DL (ref 1.6–2.4)
MCH RBC QN AUTO: 28.5 PG (ref 26–34)
MCHC RBC AUTO-ENTMCNC: 31.4 G/DL (ref 32–36)
MCV RBC AUTO: 91 FL (ref 80–100)
NRBC BLD-RTO: 0.2 /100 WBCS (ref 0–0)
PHOSPHATE SERPL-MCNC: 7.5 MG/DL (ref 2.5–4.9)
PLATELET # BLD AUTO: 234 X10*3/UL (ref 150–450)
POTASSIUM SERPL-SCNC: 4.8 MMOL/L (ref 3.5–5.3)
RBC # BLD AUTO: 2.6 X10*6/UL (ref 4.5–5.9)
SODIUM SERPL-SCNC: 139 MMOL/L (ref 136–145)
WBC # BLD AUTO: 12.3 X10*3/UL (ref 4.4–11.3)

## 2024-07-25 PROCEDURE — 80069 RENAL FUNCTION PANEL: CPT | Performed by: NURSE PRACTITIONER

## 2024-07-25 PROCEDURE — 2500000004 HC RX 250 GENERAL PHARMACY W/ HCPCS (ALT 636 FOR OP/ED): Performed by: PHYSICIAN ASSISTANT

## 2024-07-25 PROCEDURE — 83735 ASSAY OF MAGNESIUM: CPT | Performed by: NURSE PRACTITIONER

## 2024-07-25 PROCEDURE — 99233 SBSQ HOSP IP/OBS HIGH 50: CPT | Performed by: SURGERY

## 2024-07-25 PROCEDURE — 85027 COMPLETE CBC AUTOMATED: CPT | Performed by: NURSE PRACTITIONER

## 2024-07-25 PROCEDURE — 36415 COLL VENOUS BLD VENIPUNCTURE: CPT | Performed by: NURSE PRACTITIONER

## 2024-07-25 PROCEDURE — 2500000004 HC RX 250 GENERAL PHARMACY W/ HCPCS (ALT 636 FOR OP/ED): Performed by: STUDENT IN AN ORGANIZED HEALTH CARE EDUCATION/TRAINING PROGRAM

## 2024-07-25 PROCEDURE — 82947 ASSAY GLUCOSE BLOOD QUANT: CPT

## 2024-07-25 PROCEDURE — 2020000001 HC ICU ROOM DAILY

## 2024-07-25 PROCEDURE — 2500000002 HC RX 250 W HCPCS SELF ADMINISTERED DRUGS (ALT 637 FOR MEDICARE OP, ALT 636 FOR OP/ED): Performed by: STUDENT IN AN ORGANIZED HEALTH CARE EDUCATION/TRAINING PROGRAM

## 2024-07-25 RX ORDER — SODIUM CHLORIDE, SODIUM LACTATE, POTASSIUM CHLORIDE, CALCIUM CHLORIDE 600; 310; 30; 20 MG/100ML; MG/100ML; MG/100ML; MG/100ML
75 INJECTION, SOLUTION INTRAVENOUS CONTINUOUS
Status: DISCONTINUED | OUTPATIENT
Start: 2024-07-25 | End: 2024-07-25

## 2024-07-25 ASSESSMENT — COGNITIVE AND FUNCTIONAL STATUS - GENERAL
TOILETING: A LOT
HELP NEEDED FOR BATHING: A LOT
DRESSING REGULAR LOWER BODY CLOTHING: A LOT
MOVING FROM LYING ON BACK TO SITTING ON SIDE OF FLAT BED WITH BEDRAILS: A LOT
MOBILITY SCORE: 10
WALKING IN HOSPITAL ROOM: TOTAL
EATING MEALS: A LITTLE
MOVING TO AND FROM BED TO CHAIR: A LOT
STANDING UP FROM CHAIR USING ARMS: A LOT
DAILY ACTIVITIY SCORE: 13
TURNING FROM BACK TO SIDE WHILE IN FLAT BAD: A LOT
DRESSING REGULAR UPPER BODY CLOTHING: A LOT
CLIMB 3 TO 5 STEPS WITH RAILING: TOTAL
PERSONAL GROOMING: A LOT

## 2024-07-25 ASSESSMENT — PAIN DESCRIPTION - LOCATION
LOCATION: LEG
LOCATION: LEG

## 2024-07-25 ASSESSMENT — PAIN SCALES - GENERAL
PAINLEVEL_OUTOF10: 6
PAINLEVEL_OUTOF10: 0 - NO PAIN
PAINLEVEL_OUTOF10: 7
PAINLEVEL_OUTOF10: 7

## 2024-07-25 ASSESSMENT — PAIN - FUNCTIONAL ASSESSMENT
PAIN_FUNCTIONAL_ASSESSMENT: 0-10

## 2024-07-25 NOTE — PROGRESS NOTES
[facility]  TRAUMA ICU - PROGRESS NOTE    Patient Name: Artem Suarez  MRN: [id number]  Admit Date: 719  : 1985  AGE: 39 y.o.   GENDER: male  ==============================================================================  TODAY'S ASSESSMENT AND PLAN OF CARE:  Artem Suarez is a 39 y.o. male with a history of severe lymphedema transferred to  for septic shock due to NSTI of perineum and posterior medial thighs bilaterally. He previously went to the OR with ACS for debridements x3 ( - at OSH,  - at ,  - at ) and vac placement ( - at ).      NEURO/PAIN/SEDATION:    - Pain: PRN tylenol and dilaudid    RESPIRATORY:    - extubated    - saturating well on RA   - SpO2 goal >92%    CARDIOVASC:    - MAP goal >65    GI:    - advanced to carb controlled diet     /FEN:   #JOSE RAMON   - likely 2/2 vancomycin use vs recent pressor requirements   - Cr 3.49 from 3.33, will continue to trend   - BUN 36 from 34, will continue to trend   - keith to remain in place     - continue LR at 75 until adequate PO intake   - daily RFP and Mg for electrolyte and kidney function monitoring   - no electrolyte replacements required this AM    HEMATOLOGIC:    - daily CBC to monitor Hgb   - transfuse for symptomatic anemia, no indications at this time       ENDOCRINE:   History of diabetes  #Hyperglycemia, improved   - glucose overnight 117-127   - glucose goal < 180   - SSI #2 available    MUSCULOSKELETAL/SKIN:   #NSTI, perineum and thighs   - s/p debridements with ACS (- at OSH,  at :  and ) and wound vac placement ()    - plan for possible return to OR with ACS on     - wound vac removed overnight due to leakage, BID dressing changes until RTOR    INFECTIOUS DISEASE:   #Septic shock 2/2 NSTI, resolved   - continue Zosyn, end date  - will re-evaluate antibiotics at this time     GI PROPHYLAXIS: Not indicated    DVT PROPHYLAXIS: SCDs and  heparin    DISPOSITION: remain in TSICU    Patient discussed with Dr. Frank Askew MD  PGY-1   Trauma ICU  TSICU [id number]    ==============================================================================  CHIEF COMPLAINT / OVERNIGHT EVENTS / HPI:   Overnight there was leakage of the wound vac requiring removal, and the wounds were packed wet to dry with ABD pads. This AM, he endorses feeling well overall without concerns. He denies chest pain, shortness of breath, abdominal pain, fevers, chills.     MEDICAL HISTORY / ROS:  Admission history and ROS reviewed. Pertinent changes as follows:  None    PHYSICAL EXAM:  Heart Rate:  [70-78]   Temp:  [35.7 °C (96.3 °F)-37.4 °C (99.3 °F)]   Resp:  [12-27]   BP: ()/(27-96)   Weight:  [190 kg (419 lb 1.5 oz)]   SpO2:  [90 %-100 %]     Physical Exam  Constitutional:       General: He is not in acute distress.     Appearance: He is obese.   HENT:      Head: Atraumatic.   Cardiovascular:      Rate and Rhythm: Normal rate and regular rhythm.   Pulmonary:      Effort: Pulmonary effort is normal. No respiratory distress.   Abdominal:      General: There is no distension.      Palpations: Abdomen is soft.      Tenderness: There is no abdominal tenderness.   Genitourinary:     Comments: Wolfe in place  Modified-rectal tube in place, draining soft/liquid stool  Musculoskeletal:      Comments: Able to move upper extremities bilaterally  Limited movement of lower extremities bilaterally    Skin:     Comments: Bilateral lower extremity dry skin   ABD pads covering wet to dry dressings for perineal/thigh wounds   Neurological:      Mental Status: He is alert and oriented to person, place, and time.      Comments: Sensation limited in the LLE   Lack of sensation RLE   Able to squeeze fingers and wiggle toes bilaterally        IMAGING SUMMARY:  (summary of new imaging findings, not a copy of dictation)  None    LABS:  Results from last 7 days   Lab Units 07/25/24  0148  07/24/24  1818 07/24/24  0215 07/23/24  0253 07/22/24  1518   WBC AUTO x10*3/uL 12.3* 13.0* 10.9   < > 12.2*   HEMOGLOBIN g/dL 7.4* 7.2* 7.0*   < > 7.5*   HEMATOCRIT % 23.6* 22.7* 21.7*   < > 22.2*   PLATELETS AUTO x10*3/uL 234 207 191   < > 158   LYMPHO PCT MAN %  --   --   --   --  7.8   MONO PCT MAN %  --   --   --   --  6.1   EOSINO PCT MAN %  --   --   --   --  0.0    < > = values in this interval not displayed.     Results from last 7 days   Lab Units 07/21/24  1745 07/21/24  0018 07/20/24  1159   APTT seconds 28 30 31   INR  1.1 1.1 1.3*     Results from last 7 days   Lab Units 07/25/24  0148 07/24/24  1818 07/24/24  0215   SODIUM mmol/L 139 139 138   POTASSIUM mmol/L 4.8 4.5 4.5   CHLORIDE mmol/L 100 100 100   CO2 mmol/L 24 27 27   BUN mg/dL 36* 34* 28*   CREATININE mg/dL 3.49* 3.33* 2.79*   CALCIUM mg/dL 7.4* 7.5* 7.3*   GLUCOSE mg/dL 123* 112* 145*         Results from last 7 days   Lab Units 07/24/24  0555 07/23/24  0254 07/21/24  1549   POCT PH, ARTERIAL pH 7.37* 7.37* 7.38   POCT PCO2, ARTERIAL mm Hg 45* 50* 44*   POCT PO2, ARTERIAL mm Hg 100* 107* 72*   POCT HCO3 CALCULATED, ARTERIAL mmol/L 26.0 28.9* 26.0   POCT BASE EXCESS, ARTERIAL mmol/L 0.6 3.2* 0.8     I have reviewed all medications, laboratory results, and imaging pertinent for today's encounter.

## 2024-07-25 NOTE — ANESTHESIA PREPROCEDURE EVALUATION
"Patient: Artem Suarez    Procedure Information       Date/Time: 07/26/24 0715    Procedure: Debridement Lower Extremity, perineum and thighs, with wound vac change (Bilateral)    Location: [facility] OR  / [facility] OR    Surgeons: Tyson Owens MD        ALLERGIES:  No Known Allergies     MEDICAL HISTORY:  History reviewed. No pertinent past medical history.     Relevant Problems   Anesthesia (within normal limits)      Endocrine   (+) Diabetes mellitus, type 2 (Multi)      Hematology   (+) Anemia   (+) Coagulopathy (Multi)      ID   (+) Necrotizing soft tissue infection        SURGICAL HISTORY:  History reviewed. No pertinent surgical history.     MEDICATIONS:  No current outpatient medications     VITALS:      7/25/2024     2:00 PM 7/25/2024     1:00 PM 7/25/2024    12:00 PM   Vitals   Systolic 121 114    Diastolic 50 45    Heart Rate 85 87 84   Resp 21 16 25       LABS:   BMP   Lab Results   Component Value Date    GLUCOSE 123 (H) 07/25/2024    CALCIUM 7.4 (L) 07/25/2024     07/25/2024    K 4.8 07/25/2024    CO2 24 07/25/2024     07/25/2024    BUN 36 (H) 07/25/2024    CREATININE 3.49 (H) 07/25/2024   , LFT No results found for: \"ALT\", \"AST\", \"GGT\", \"ALKPHOS\", \"BILITOT\", CBC  Lab Results   Component Value Date    WBC 12.3 (H) 07/25/2024    HGB 7.4 (L) 07/25/2024    HCT 23.6 (L) 07/25/2024    MCV 91 07/25/2024     07/25/2024          , Coags   Lab Results   Component Value Date/Time    PROTIME 12.2 07/21/2024 1745    INR 1.1 07/21/2024 1745    APTT 28 07/21/2024 1745      , A1C   Lab Results   Component Value Date    HGBA1C 7.2 (H) 07/20/2024       IMAGES:  EKG          Encounter Date: 07/19/24   Electrocardiogram, 12-lead PRN ACS symptoms   Result Value    Ventricular Rate 96    Atrial Rate 96    WV Interval 132    QRS Duration 84    QT Interval 364    QTC Calculation(Bazett) 459    P Axis 71    R Axis 55    T Axis 82    QRS Count 15    Q Onset 219    P Onset 153    P Offset 213 "    T Offset 401    QTC Fredericia 425    Narrative    Normal sinus rhythm  Low voltage QRS  ST elevation, consider early repolarization, pericarditis, or injury  Borderline ECG  No previous ECGs available  Confirmed by You Mccrary ([id number]) on 7/24/2024 3:52:02 PM      , ECHO         Transthoracic Echo (TTE) Complete With Contrast 07/19/2024    Macie  [facility], [address] [place] [address]  Tel [phone] and Fax [phone]    TRANSTHORACIC ECHOCARDIOGRAM REPORT      Patient Name:      MILADIS CRAIG         Reading Physician:    [id number] Luis Felipe Ravi MD  Study Date:        7/19/2024            Ordering Provider:    [id number] TIFFANIE MARCUM  MRN/PID:           [id number]             Fellow:               [id number] Mandi Snow MD  Accession#:        [id number]         Nurse:  Date of Birth/Age: 1985 / 39 years  Sonographer:          Rico Puga RDCS, NE, ZHANNA PARDO  Gender:            M                    Additional Staff:  Height:            167.64 cm            Admit Date:           7/19/2024  Weight:            180.99 kg            Admission Status:     Inpatient -  Routine  BSA / BMI:         2.68 m2 / 64.40      Encounter#:           [id number]  kg/m2  Blood Pressure:    96/48 mmHg           Department Location:  [facility]    Study Type:    TRANSTHORACIC ECHO (TTE) COMPLETE  Diagnosis/ICD: Shock, unspecified-R57.9    Patient History:  Pertinent History: Septic shock, Chiquita's gangrene, S/P surgical debridement  of perineum and thighs, DM, lymphedema.    Study Detail: The following Echo studies were performed: 2D, M-Mode, color flow  and Doppler. Technically challenging study due to poor acoustic  windows, patient lying in supine position and body habitus.  Definity used as a contrast agent for endocardial border  definition. Total contrast used for this procedure was 4.0 mL via  IV push.      PHYSICIAN INTERPRETATION:  Left Ventricle: The left ventricular  systolic function is hyperdynamic, with a visually estimated ejection fraction of 70-75%. There are no regional wall motion abnormalities. The left ventricular cavity size is normal. There is left ventricular concentric remodeling. Abnormal (paradoxical) septal motion, consistent with an intraventricular conduction delay. Spectral Doppler shows a normal pattern of left ventricular diastolic filling. There is no definite left ventricular thrombus visualized.  Left Atrium: The left atrium is normal in size.  Right Ventricle: The right ventricle is normal in size. There is normal right ventricular global systolic function.  Right Atrium: The right atrium is normal in size.  Aortic Valve: The aortic valve is probably trileaflet. There is no evidence of aortic valve regurgitation. The peak instantaneous gradient of the aortic valve is 11.4 mmHg.  Mitral Valve: The mitral valve is normal in structure. There is no evidence of mitral valve regurgitation.  Tricuspid Valve: The tricuspid valve was not well visualized. No evidence of tricuspid regurgitation. The right ventricular systolic pressure is unable to be estimated.  Pulmonic Valve: The pulmonic valve is structurally normal. There is physiologic pulmonic valve regurgitation.  Pericardium: There is no pericardial effusion noted.  Aorta: The aortic root is normal.  Pulmonary Artery: The pulmonary artery is not well visualized.  Systemic Veins: The inferior vena cava appears mildly dilated. On PPV, no variability seen.  In comparison to the previous echocardiogram(s): There are no prior studies on this patient for comparison purposes.      CONCLUSIONS:  1. Poorly visualized anatomical structures due to suboptimal image quality.  2. The left ventricular systolic function is hyperdynamic, with a visually estimated ejection fraction of 70-75%.  3. No left ventricular thrombus visualized.  4. There is normal right ventricular global systolic function.  5. The pulmonary artery  is not well visualized.    QUANTITATIVE DATA SUMMARY:  2D MEASUREMENTS:  Normal Ranges:  Ao Root d:     2.90 cm   (2.0-3.7cm)  LAs:           3.00 cm   (2.7-4.0cm)  IVSd:          1.00 cm   (0.6-1.1cm)  LVPWd:         1.00 cm   (0.6-1.1cm)  LVIDd:         4.00 cm   (3.9-5.9cm)  LVIDs:         2.60 cm  LV Mass Index: 47.4 g/m2  LV % FS        35.0 %    LA VOLUME:  Normal Ranges:  LA Vol A4C:        30.4 ml    (22+/-6mL/m2)  LA Vol A2C:        66.8 ml  LA Vol BP:         46.3 ml  LA Vol Index A4C:  11.3ml/m2  LA Vol Index A2C:  24.9 ml/m2  LA Vol Index BP:   17.3 ml/m2  LA Area A4C:       14.1 cm2  LA Area A2C:       21.5 cm2  LA Major Axis A4C: 5.6 cm  LA Major Axis A2C: 5.9 cm  LA Volume Index:   15.9 ml/m2    RA VOLUME BY A/L METHOD:  Normal Ranges:  RA Area A4C: 11.9 cm2    AORTA MEASUREMENTS:  Normal Ranges:  Asc Ao, d: 2.60 cm (2.1-3.4cm)    LV SYSTOLIC FUNCTION BY 2D PLANIMETRY (MOD):  Normal Ranges:  EF-A4C View:    47 % (>=55%)  EF-A2C View:    61 %  EF-Biplane:     56 %  EF-Visual:      73 %  LV EF Reported: 73 %    LV DIASTOLIC FUNCTION:  Normal Ranges:  MV Peak E:        0.80 m/s   (0.7-1.2 m/s)  MV Peak A:        0.54 m/s   (0.42-0.7 m/s)  E/A Ratio:        1.49       (1.0-2.2)  MV e'             0.105 m/s  (>8.0)  MV lateral e'     0.11 m/s  MV medial e'      0.10 m/s  E/e' Ratio:       7.63       (<8.0)  PulmV Sys Rob:    55.00 cm/s  PulmV Lieberman Rob:   57.80 cm/s  PulmV S/D Rob:    1.00  PulmV A Revs Rob: 42.60 cm/s    MITRAL VALVE:  Normal Ranges:  MV DT: 275 msec (150-240msec)    AORTIC VALVE:  Normal Ranges:  AoV Vmax:      1.69 m/s  (<=1.7m/s)  AoV Peak P.4 mmHg (<20mmHg)  LVOT Max Rob:  1.39 m/s  (<=1.1m/s)  LVOT VTI:      17.80 cm  LVOT Diameter: 2.30 cm   (1.8-2.4cm)  AoV Area,Vmax: 3.42 cm2  (2.5-4.5cm2)      RIGHT VENTRICLE:  TAPSE: 21.4 mm  RV s'  0.23 m/s    TRICUSPID VALVE/RVSP:  Normal Ranges:  Est. RA Pressure: 8 mmHg  IVC Diam:         2.40 cm    PULMONIC VALVE:  Normal Ranges:  PV  Accel Time: 135 msec (>120ms)  PV Max Rob:    1.0 m/s  (0.6-0.9m/s)  PV Max P.4 mmHg    Pulmonary Veins:  PulmV A Revs Rob: 42.60 cm/s  PulmV Lieberman Rob:   57.80 cm/s  PulmV S/D Rob:    1.00  PulmV Sys Rob:    55.00 cm/s      [id number] Luis Felipe Ravi MD  Electronically signed on 2024 at 5:58:24 PM        ** Final **    , CARDIAC CATH      No results found for this or any previous visit from the past 730 days.   , CXR       XR chest 1 view 2024    Narrative  Interpreted By:  Balta Lynch and Ohs Zachary  STUDY:  XR CHEST 1 VIEW;  2024 6:27 am    INDICATION:  Signs/Symptoms:eval ett and for effusions/ consolidations.    COMPARISON:  Chest radiographs 2024.    ACCESSION NUMBER(S):  [id number]    ORDERING CLINICIAN:  NICK DONOVAN    FINDINGS:  AP radiograph of the chest was provided.    MEDICAL DEVICES:  ETT distal tip 6.5 cm from the elyse.  Enteric tube side port overlying the expected location of the stomach  and distal tip not imaged.    CARDIOMEDIASTINAL SILHOUETTE:  Cardiomegaly versus pericardial effusion.    LUNGS:  interval development of medial right lower lung linear airspace  opacities. No pleural effusion or pneumothorax.    ABDOMEN:  No remarkable upper abdominal findings.    BONES:  No acute osseous changes.    Impression  1.  Medial right lower lung linear airspace opacities, favored to  represent atelectasis versus edema. Correlate with fluid status..    I personally reviewed the images/study and I agree with the findings  as stated by Dr. Asim Judge. This study was interpreted at  [place], [place].    MACRO:  None    Signed by: Balta Lynch 2024 9:25 AM  Dictation workstation:   [id number]    , CT Head/Neck     No results found for this or any previous visit from the past 760 days.    , CT Chest      No results found for this or any previous visit from the past 730 days.   , CT Abdomin     No results found for  this or any previous visit from the past 730 days.    SOCIAL:  Social History     Tobacco Use   Smoking Status Unknown   Smokeless Tobacco Not on file      Social History     Substance and Sexual Activity   Alcohol Use None      Social History     Substance and Sexual Activity   Drug Use Not on file        NPO STATUS:  No data recorded    Clinical Areas Reviewed:   Tobacco  Allergies  Meds   Med Hx  Surg Hx   Fam Hx  Soc Hx        Anesthesia Assessment:    PHYSICAL EXAM    Anesthesia Plan    History of general anesthesia?: yes  History of complications of general anesthesia?: no    ASA 3     general     intravenous induction     Plan discussed with CAA.

## 2024-07-25 NOTE — PROGRESS NOTES
[facility]  ACUTE CARE SURGERY - PROGRESS NOTE    Patient Name: Artem Suarez  MRN: [id number]  Admit Date: 719  : 1985  AGE: 39 y.o.   GENDER: male  ==============================================================================  TODAY'S ASSESSMENT AND PLAN OF CARE:  39 M presented as a transfer for NSTI of perineum and thighs in septic shock. Remains in the ICU- off pressors and not requiring ventilator support. WBC downtrending.    Wound vac off due to wound infiltration with stool.      OR  @ OSH: wide debridement of perineum and thighs  OR : debridement  OR : debridement   OR : wound vac placed     Plan:   - BID wet to dry dressing changes  - FMS/ miralax to manage stool output  - tentatively OR  for vac change, need to get stool concern addressed first  - Maintain keith  - Continue broad spectrum antibiotics  - DVT Proph: SCDs, Lovenox     Dispo: Appreciate TSICU care     Patient seen and plans discussed with Chief Resident Dr. ROSA Franklin, PGY-5 and Attending Physician Dr. Owens.    Marina Campos MD PGY-1  Acute Care Surgery [id number]   ==============================================================================  CHIEF COMPLAINT / EVENTS LAST 24HRS / HPI:  Wound vac removed due to loose stool in wound. Off pressor and extubated.     MEDICAL HISTORY / ROS:   Admission history and ROS reviewed. Pertinent changes as follows:  NA    PHYSICAL EXAM:  Heart Rate:  [70-81]   Temp:  [35.7 °C (96.3 °F)-37.4 °C (99.3 °F)]   Resp:  [12-29]   BP: ()/(27-96)   Weight:  [190 kg (419 lb 1.5 oz)]   SpO2:  [90 %-100 %]   Physical Exam    Gen: NAD  Resp: nonlabored breathing, on NC  CV: nontachy  GI: abdomen soft and nondistended, nontender  : keith in place  Skin: thigh and perineum wounds with wet to dry kerlix dressing in place, wound vac off    IMAGING SUMMARY:  (summary of new imaging findings, not a copy of dictation)  None new    LABS:  Results from last 7  days   Lab Units 07/25/24  0148 07/24/24  1818 07/24/24  0215 07/23/24  0253 07/22/24  1518   WBC AUTO x10*3/uL 12.3* 13.0* 10.9   < > 12.2*   HEMOGLOBIN g/dL 7.4* 7.2* 7.0*   < > 7.5*   HEMATOCRIT % 23.6* 22.7* 21.7*   < > 22.2*   PLATELETS AUTO x10*3/uL 234 207 191   < > 158   LYMPHO PCT MAN %  --   --   --   --  7.8   MONO PCT MAN %  --   --   --   --  6.1   EOSINO PCT MAN %  --   --   --   --  0.0    < > = values in this interval not displayed.     Results from last 7 days   Lab Units 07/21/24  1745 07/21/24  0018 07/20/24  1159   APTT seconds 28 30 31   INR  1.1 1.1 1.3*     Results from last 7 days   Lab Units 07/25/24  0148 07/24/24  1818 07/24/24  0215   SODIUM mmol/L 139 139 138   POTASSIUM mmol/L 4.8 4.5 4.5   CHLORIDE mmol/L 100 100 100   CO2 mmol/L 24 27 27   BUN mg/dL 36* 34* 28*   CREATININE mg/dL 3.49* 3.33* 2.79*   CALCIUM mg/dL 7.4* 7.5* 7.3*   GLUCOSE mg/dL 123* 112* 145*         Results from last 7 days   Lab Units 07/24/24  0555 07/23/24  0254 07/21/24  1549   POCT PH, ARTERIAL pH 7.37* 7.37* 7.38   POCT PCO2, ARTERIAL mm Hg 45* 50* 44*   POCT PO2, ARTERIAL mm Hg 100* 107* 72*   POCT HCO3 CALCULATED, ARTERIAL mmol/L 26.0 28.9* 26.0   POCT BASE EXCESS, ARTERIAL mmol/L 0.6 3.2* 0.8       I have reviewed all medications, laboratory results, and imaging pertinent for today's encounter.

## 2024-07-25 NOTE — PROGRESS NOTES
PLAN: 39 M presented as a transfer for NSTI of perineum and thighs in septic shock. Remains in the ICU- off pressors and not requiring ventilator support. WBC downtrending.  - BID wet to dry dressing changes  - FMS/ miralax to manage stool output  - tentatively OR 7/26 for vac change, need to get stool concern addressed first  - Maintain keith  - Continue broad spectrum antibiotics  - DVT Proph: SCDs, Lovenox     Wound vac off due to wound infiltration with stool.    PAYOR: Sharon- needs precert for placement      DISPO: [facility] ([facility])     SUPPORT/CONTACT: Qian [phone], Sydney (dtr) [phone]

## 2024-07-25 NOTE — CARE PLAN
Problem: Skin  Goal: Decreased wound size/increased tissue granulation at next dressing change  Outcome: Progressing  Goal: Participates in plan/prevention/treatment measures  Outcome: Progressing  Flowsheets (Taken 7/25/2024 0416)  Participates in plan/prevention/treatment measures: Elevate heels  Goal: Prevent/manage excess moisture  Outcome: Progressing  Flowsheets (Taken 7/25/2024 0416)  Prevent/manage excess moisture:   Cleanse incontinence/protect with barrier cream   Monitor for/manage infection if present  Goal: Prevent/minimize sheer/friction injuries  Outcome: Progressing  Flowsheets (Taken 7/25/2024 0416)  Prevent/minimize sheer/friction injuries:   Utilize specialty bed per algorithm   Turn/reposition every 2 hours/use positioning/transfer devices   HOB 30 degrees or less  Goal: Promote/optimize nutrition  Outcome: Progressing  Goal: Promote skin healing  Outcome: Progressing   The patient's goals for the shift include      The clinical goals for the shift include hemodynamically stable    Over the shift, the patient did not make progress toward the following goals. Barriers to progression include inability to shift weight himself. Recommendations to address these barriers include frequent turns and skin checks.

## 2024-07-25 NOTE — HOSPITAL COURSE
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 At the OSH, he was taken to the OR overnight 7/18 for initial debridement. During his hospitalization at the OSH, he was requiring variable pressors intraoperatively and postoperatively, as well as an insulin drip for hyperglycemia. Additionally, he was started on vancomycin, clindamycin, and zosyn at the OSH. He was transferred to  for septic shock management in the setting of Chiquita's gangrene/NSTI on 7/19.     Upon arrival to the TICU, he was intubated and sedated requiring pressor support and an insulin drip. He was continued on vancomycin, zosyn, and clindamycin on admission. He went to the OR for a debridement of the bilateral groins and thighs with ACS on 7/20. On 7/21, he returned to the OR for additional debridement. He stopped requiring pressors on 7/22. He returned to the OR on 7/23 for additional debridement of the perineal and thigh wounds with wound vac (x2) placement.

## 2024-07-26 ENCOUNTER — ANESTHESIA (OUTPATIENT)
Dept: OPERATING ROOM | Facility: HOSPITAL | Age: 39
End: 2024-07-26
Payer: COMMERCIAL

## 2024-07-26 LAB
ABO GROUP (TYPE) IN BLOOD: NORMAL
ALBUMIN SERPL BCP-MCNC: 2.2 G/DL (ref 3.4–5)
ANION GAP SERPL CALC-SCNC: 18 MMOL/L (ref 10–20)
ANTIBODY SCREEN: NORMAL
BUN SERPL-MCNC: 41 MG/DL (ref 6–23)
CA-I BLD-SCNC: 1 MMOL/L (ref 1.1–1.33)
CALCIUM SERPL-MCNC: 7.5 MG/DL (ref 8.6–10.6)
CHLORIDE SERPL-SCNC: 100 MMOL/L (ref 98–107)
CO2 SERPL-SCNC: 24 MMOL/L (ref 21–32)
CREAT SERPL-MCNC: 3.99 MG/DL (ref 0.5–1.3)
EGFRCR SERPLBLD CKD-EPI 2021: 18 ML/MIN/1.73M*2
ERYTHROCYTE [DISTWIDTH] IN BLOOD BY AUTOMATED COUNT: 17.6 % (ref 11.5–14.5)
ERYTHROCYTE [DISTWIDTH] IN BLOOD BY AUTOMATED COUNT: 17.8 % (ref 11.5–14.5)
GLUCOSE BLD MANUAL STRIP-MCNC: 106 MG/DL (ref 74–99)
GLUCOSE BLD MANUAL STRIP-MCNC: 107 MG/DL (ref 74–99)
GLUCOSE BLD MANUAL STRIP-MCNC: 119 MG/DL (ref 74–99)
GLUCOSE BLD MANUAL STRIP-MCNC: 119 MG/DL (ref 74–99)
GLUCOSE BLD MANUAL STRIP-MCNC: 91 MG/DL (ref 74–99)
GLUCOSE SERPL-MCNC: 134 MG/DL (ref 74–99)
HCT VFR BLD AUTO: 21 % (ref 41–52)
HCT VFR BLD AUTO: 23.7 % (ref 41–52)
HGB BLD-MCNC: 6.6 G/DL (ref 13.5–17.5)
HGB BLD-MCNC: 7.4 G/DL (ref 13.5–17.5)
MAGNESIUM SERPL-MCNC: 2.4 MG/DL (ref 1.6–2.4)
MCH RBC QN AUTO: 28.7 PG (ref 26–34)
MCH RBC QN AUTO: 28.9 PG (ref 26–34)
MCHC RBC AUTO-ENTMCNC: 31.2 G/DL (ref 32–36)
MCHC RBC AUTO-ENTMCNC: 31.4 G/DL (ref 32–36)
MCV RBC AUTO: 91 FL (ref 80–100)
MCV RBC AUTO: 93 FL (ref 80–100)
NRBC BLD-RTO: 0 /100 WBCS (ref 0–0)
NRBC BLD-RTO: 0 /100 WBCS (ref 0–0)
PHOSPHATE SERPL-MCNC: 7.5 MG/DL (ref 2.5–4.9)
PLATELET # BLD AUTO: 202 X10*3/UL (ref 150–450)
PLATELET # BLD AUTO: 229 X10*3/UL (ref 150–450)
POTASSIUM SERPL-SCNC: 4.2 MMOL/L (ref 3.5–5.3)
RBC # BLD AUTO: 2.3 X10*6/UL (ref 4.5–5.9)
RBC # BLD AUTO: 2.56 X10*6/UL (ref 4.5–5.9)
RH FACTOR (ANTIGEN D): NORMAL
SODIUM SERPL-SCNC: 138 MMOL/L (ref 136–145)
WBC # BLD AUTO: 9.3 X10*3/UL (ref 4.4–11.3)
WBC # BLD AUTO: 9.7 X10*3/UL (ref 4.4–11.3)

## 2024-07-26 PROCEDURE — 2500000005 HC RX 250 GENERAL PHARMACY W/O HCPCS: Performed by: ANESTHESIOLOGIST ASSISTANT

## 2024-07-26 PROCEDURE — 83735 ASSAY OF MAGNESIUM: CPT

## 2024-07-26 PROCEDURE — 3600000008 HC OR TIME - EACH INCREMENTAL 1 MINUTE - PROCEDURE LEVEL THREE: Performed by: SURGERY

## 2024-07-26 PROCEDURE — 3700000002 HC GENERAL ANESTHESIA TIME - EACH INCREMENTAL 1 MINUTE: Performed by: SURGERY

## 2024-07-26 PROCEDURE — 80069 RENAL FUNCTION PANEL: CPT

## 2024-07-26 PROCEDURE — 2500000004 HC RX 250 GENERAL PHARMACY W/ HCPCS (ALT 636 FOR OP/ED): Performed by: ANESTHESIOLOGIST ASSISTANT

## 2024-07-26 PROCEDURE — 2500000004 HC RX 250 GENERAL PHARMACY W/ HCPCS (ALT 636 FOR OP/ED): Performed by: PHYSICIAN ASSISTANT

## 2024-07-26 PROCEDURE — 97606 NEG PRS WND THER DME>50 SQCM: CPT | Performed by: SURGERY

## 2024-07-26 PROCEDURE — 2720000007 HC OR 272 NO HCPCS: Performed by: SURGERY

## 2024-07-26 PROCEDURE — 85027 COMPLETE CBC AUTOMATED: CPT

## 2024-07-26 PROCEDURE — 82330 ASSAY OF CALCIUM: CPT

## 2024-07-26 PROCEDURE — 2500000004 HC RX 250 GENERAL PHARMACY W/ HCPCS (ALT 636 FOR OP/ED)

## 2024-07-26 PROCEDURE — P9016 RBC LEUKOCYTES REDUCED: HCPCS

## 2024-07-26 PROCEDURE — 2500000004 HC RX 250 GENERAL PHARMACY W/ HCPCS (ALT 636 FOR OP/ED): Mod: JZ

## 2024-07-26 PROCEDURE — 36415 COLL VENOUS BLD VENIPUNCTURE: CPT

## 2024-07-26 PROCEDURE — 86901 BLOOD TYPING SEROLOGIC RH(D): CPT | Performed by: STUDENT IN AN ORGANIZED HEALTH CARE EDUCATION/TRAINING PROGRAM

## 2024-07-26 PROCEDURE — 82947 ASSAY GLUCOSE BLOOD QUANT: CPT

## 2024-07-26 PROCEDURE — 2500000005 HC RX 250 GENERAL PHARMACY W/O HCPCS: Performed by: STUDENT IN AN ORGANIZED HEALTH CARE EDUCATION/TRAINING PROGRAM

## 2024-07-26 PROCEDURE — 2500000005 HC RX 250 GENERAL PHARMACY W/O HCPCS

## 2024-07-26 PROCEDURE — 1200000002 HC GENERAL ROOM WITH TELEMETRY DAILY

## 2024-07-26 PROCEDURE — 99233 SBSQ HOSP IP/OBS HIGH 50: CPT | Performed by: SURGERY

## 2024-07-26 PROCEDURE — 3700000001 HC GENERAL ANESTHESIA TIME - INITIAL BASE CHARGE: Performed by: SURGERY

## 2024-07-26 PROCEDURE — 36430 TRANSFUSION BLD/BLD COMPNT: CPT

## 2024-07-26 PROCEDURE — 2500000004 HC RX 250 GENERAL PHARMACY W/ HCPCS (ALT 636 FOR OP/ED): Performed by: STUDENT IN AN ORGANIZED HEALTH CARE EDUCATION/TRAINING PROGRAM

## 2024-07-26 PROCEDURE — 2500000005 HC RX 250 GENERAL PHARMACY W/O HCPCS: Performed by: PHYSICIAN ASSISTANT

## 2024-07-26 PROCEDURE — 3600000003 HC OR TIME - INITIAL BASE CHARGE - PROCEDURE LEVEL THREE: Performed by: SURGERY

## 2024-07-26 RX ORDER — ONDANSETRON HYDROCHLORIDE 2 MG/ML
INJECTION, SOLUTION INTRAVENOUS AS NEEDED
Status: DISCONTINUED | OUTPATIENT
Start: 2024-07-26 | End: 2024-07-26

## 2024-07-26 RX ORDER — METOPROLOL TARTRATE 1 MG/ML
INJECTION, SOLUTION INTRAVENOUS AS NEEDED
Status: DISCONTINUED | OUTPATIENT
Start: 2024-07-26 | End: 2024-07-26

## 2024-07-26 RX ORDER — LIDOCAINE HYDROCHLORIDE 20 MG/ML
INJECTION, SOLUTION INFILTRATION; PERINEURAL AS NEEDED
Status: DISCONTINUED | OUTPATIENT
Start: 2024-07-26 | End: 2024-07-26

## 2024-07-26 RX ORDER — ACETAMINOPHEN 325 MG/1
650 TABLET ORAL EVERY 6 HOURS PRN
Status: DISPENSED | OUTPATIENT
Start: 2024-07-26

## 2024-07-26 RX ORDER — ROCURONIUM BROMIDE 10 MG/ML
INJECTION, SOLUTION INTRAVENOUS AS NEEDED
Status: DISCONTINUED | OUTPATIENT
Start: 2024-07-26 | End: 2024-07-26

## 2024-07-26 RX ORDER — FENTANYL CITRATE 50 UG/ML
INJECTION, SOLUTION INTRAMUSCULAR; INTRAVENOUS AS NEEDED
Status: DISCONTINUED | OUTPATIENT
Start: 2024-07-26 | End: 2024-07-26

## 2024-07-26 RX ORDER — CALCIUM GLUCONATE 20 MG/ML
1 INJECTION, SOLUTION INTRAVENOUS EVERY 4 HOURS
Status: COMPLETED | OUTPATIENT
Start: 2024-07-26 | End: 2024-07-26

## 2024-07-26 RX ORDER — SODIUM CHLORIDE, SODIUM LACTATE, POTASSIUM CHLORIDE, CALCIUM CHLORIDE 600; 310; 30; 20 MG/100ML; MG/100ML; MG/100ML; MG/100ML
50 INJECTION, SOLUTION INTRAVENOUS CONTINUOUS
Status: DISCONTINUED | OUTPATIENT
Start: 2024-07-26 | End: 2024-07-27

## 2024-07-26 RX ORDER — HYDROMORPHONE HYDROCHLORIDE 1 MG/ML
0.5 INJECTION, SOLUTION INTRAMUSCULAR; INTRAVENOUS; SUBCUTANEOUS
Status: DISCONTINUED | OUTPATIENT
Start: 2024-07-26 | End: 2024-07-28

## 2024-07-26 RX ORDER — SODIUM CHLORIDE 0.9 G/100ML
IRRIGANT IRRIGATION AS NEEDED
Status: DISCONTINUED | OUTPATIENT
Start: 2024-07-26 | End: 2024-07-26 | Stop reason: HOSPADM

## 2024-07-26 RX ORDER — PROPOFOL 10 MG/ML
INJECTION, EMULSION INTRAVENOUS AS NEEDED
Status: DISCONTINUED | OUTPATIENT
Start: 2024-07-26 | End: 2024-07-26

## 2024-07-26 ASSESSMENT — PAIN SCALES - GENERAL
PAINLEVEL_OUTOF10: 0 - NO PAIN
PAINLEVEL_OUTOF10: 5 - MODERATE PAIN
PAINLEVEL_OUTOF10: 5 - MODERATE PAIN
PAINLEVEL_OUTOF10: 0 - NO PAIN
PAINLEVEL_OUTOF10: 7
PAINLEVEL_OUTOF10: 7
PAINLEVEL_OUTOF10: 0 - NO PAIN
PAINLEVEL_OUTOF10: 3
PAINLEVEL_OUTOF10: 0 - NO PAIN
PAINLEVEL_OUTOF10: 0 - NO PAIN

## 2024-07-26 ASSESSMENT — PAIN - FUNCTIONAL ASSESSMENT
PAIN_FUNCTIONAL_ASSESSMENT: 0-10

## 2024-07-26 ASSESSMENT — PAIN DESCRIPTION - LOCATION
LOCATION: GROIN
LOCATION: COCCYX

## 2024-07-26 ASSESSMENT — ACTIVITIES OF DAILY LIVING (ADL): EFFECT OF PAIN ON DAILY ACTIVITIES: DECREASED ACTIVITY

## 2024-07-26 ASSESSMENT — PAIN DESCRIPTION - DESCRIPTORS
DESCRIPTORS: SHARP
DESCRIPTORS: SHARP

## 2024-07-26 NOTE — ANESTHESIA PROCEDURE NOTES
Airway  Date/Time: 7/26/2024 9:06 AM  Urgency: elective    Airway not difficult    Staffing  Performed: GLORIA   Authorized by: Artem Albarran MD    Performed by: GLORIA Rosen  Patient location during procedure: OR    Indications and Patient Condition  Indications for airway management: anesthesia  Spontaneous Ventilation: absent  Sedation level: deep  Preoxygenated: yes  Patient position: sniffing  MILS maintained throughout  Mask difficulty assessment: 1 - vent by mask    Final Airway Details  Final airway type: endotracheal airway      Successful airway: ETT  Cuffed: yes   Successful intubation technique: direct laryngoscopy  Endotracheal tube insertion site: oral  Blade: Glo  Blade size: #4  ETT size (mm): 7.5  Cormack-Lehane Classification: grade I - full view of glottis  Placement verified by: chest auscultation and capnometry   Cuff volume (mL): 6  Measured from: lips  ETT to lips (cm): 22  Number of attempts at approach: 1

## 2024-07-26 NOTE — OP NOTE
Debridement Lower Extremity, perineum and thighs, with wound vac change (B) Operative Note     Date: 2024  OR Location: [facility] OR    Name: Artem Suarez, : 1985, Age: 39 y.o., MRN: [id number], Sex: male    Diagnosis  Pre-op Diagnosis      * Necrotizing soft tissue infection [M79.89] Post-op Diagnosis     * Necrotizing soft tissue infection [M79.89]     Procedures  Debridement Lower Extremity, perineum and thighs, with wound vac change  33811 - [place] DEBRIDEMENT MUSCLE &/FASCIA 1ST 20 SQ CM/<      Surgeons      * Tyson Owens - Primary    Resident/Fellow/Other Assistant:  Surgeons and Role:     * Carl Franklin MD - Resident - Assisting     * Marina Campos MD - Resident    Procedure Summary  Anesthesia: General  ASA: III  Anesthesia Staff: Anesthesiologist: Artem Albarran MD  C-AA: GLORIA Rosen  Estimated Blood Loss: 2 mL  Intra-op Medications:   Administrations occurring from 0830 to 0940 on 24:   Medication Name Total Dose   acetaminophen (Tylenol) tablet 650 mg Cannot be calculated   dextrose 50 % injection 12.5 g Cannot be calculated   dextrose 50 % injection 25 g Cannot be calculated   glucagon (Glucagen) injection 1 mg Cannot be calculated   glucagon (Glucagen) injection 1 mg Cannot be calculated   heparin (porcine) injection 7,500 Units Cannot be calculated   HYDROmorphone (Dilaudid) injection 0.5 mg Cannot be calculated   insulin lispro (HumaLOG) injection 0-10 Units Cannot be calculated   piperacillin-tazobactam (Zosyn) 3.375 g in dextrose (iso) IV 50 mL Cannot be calculated   potassium chloride CR (Klor-Con M20) ER tablet 40 mEq Cannot be calculated              Anesthesia Record               Intraprocedure I/O Totals          Intake    lactated Ringer's infusion 1000.00 mL    Total Intake 1000 mL       Output    Urine 60 mL    Total Output 60 mL       Net    Net Volume 940 mL          Specimen: No specimens collected     Staff:   Circulator: Randy Elmore Person:  Zack  Circulator: Shanel Elmore Person: Karis         Drains and/or Catheters:   Urethral Catheter (Active)   Site Assessment Clean;Skin intact 07/26/24 0800   Collection Container Standard drainage bag 07/26/24 0800   Securement Method Securing device (Describe) 07/26/24 0800   Reason for Continuing Urinary Catheterization accurate hourly measurement of urine volume in a critically ill patient that cannot be assessed by other volumes and urine collection strategies 07/26/24 0800   Output (mL) 150 mL 07/26/24 0800       [REMOVED] NG/OG/Feeding Tube Center mouth (Removed)   Tube Status Unclamped;Low intermittent suction 07/24/24 0800   Placement Verification Measurements 07/24/24 0400   Distal Tube Measurement 60 cm 07/24/24 0400   Site Assessment Clean;Dry;Intact 07/24/24 0800   Irrigant Tap water 07/23/24 1100   Response To Intervention No resistance met 07/23/24 1600   Intake (mL) 0 mL 07/23/24 1100   Intake - Flush (mL) 50 mL 07/22/24 2000   Output (mL) 0 mL 07/24/24 0800       [REMOVED] Rectal Tube/Fecal Management With balloon (Removed)   Site Assessment Clean;Skin intact 07/24/24 2000   Patency Intact 07/24/24 2000   Rectal Tube I/O Output only 07/24/24 2000   Rectal Tube Output 0 mL 07/24/24 2000       [REMOVED] Urethral Catheter (Removed)   Site Assessment Edema 07/19/24 1500   Collection Container Urometer 07/19/24 1500   Securement Method Securing device (Describe) 07/19/24 1500   Reason for Continuing Urinary Catheterization accurate hourly measurement of urine volume in a critically ill patient that cannot be assessed by other volumes and urine collection strategies 07/19/24 1500   Output (mL) 245 mL 07/19/24 1600           Findings: Wound base healthy    Measurements:  Right: 35 x 30 x 5 cm  Left: 20 x 15 x 2 cm    Indications: Artem Suarez is an 39 y.o. male who is having surgery for Necrotizing soft tissue infection [M79.89].  Patient has undergone multiple debridements.  He previously went to the  operating room for wound VAC placement.  He returns today for planned reexploration, wound VAC replacement.    The patient was seen in the preoperative area. The risks, benefits, complications, treatment options, non-operative alternatives, expected recovery and outcomes were discussed with the patient. The possibilities of reaction to medication, pulmonary aspiration, injury to surrounding structures, bleeding, recurrent infection, the need for additional procedures, failure to diagnose a condition, and creating a complication requiring transfusion or operation were discussed with the patient. The patient concurred with the proposed plan, giving informed consent.  The site of surgery was properly noted/marked if necessary per policy. The patient has been actively warmed in preoperative area. Preoperative antibiotics have been ordered and given within 1 hours of incision. Venous thrombosis prophylaxis have been ordered including bilateral sequential compression devices and chemical prophylaxis    Procedure Details: Patient was brought to the operating room and placed supine on the operative table.  A timeout was performed confirming the patient's name, MRN, date of birth, procedure.  General anesthesia was induced without issues.  Patient was then placed in lithotomy with black fin stirrups, with all pressure points padded.  The patient was prepped and draped in sterile fashion.  A preincision timeout was performed again confirming the patient's name, MRN, procedure.  The previously debrided wounds to the perineum and bilateral thighs were explored.  All tissue appeared viable and no further debridement was needed.  All wounds were irrigated with the Pulsavac.  Hemostasis was achieved.  A single piece of black sponge cut to shape and was placed into the left thigh and perineal wound. An occlusive dressing was applied and hooked up to VAC suction.  Similarly a single spiraled piece of black sponge was placed into the  right thigh and perineal wound and occlusive dressings were applied for the right sided wound, 2 separate suction disks were placed to facilitate better suction.  At the end of the procedure both vacs were holding good suction.  All sponge, needle, instrument counts were correct.  The patient was extubated and taken back to the  ICU in stable condition.    Complications:  None; patient tolerated the procedure well.    Disposition: ICU - extubated and stable.  Condition: stable         Attending Attestation:     Tyson Owens  Phone Number: [phone]

## 2024-07-26 NOTE — PROGRESS NOTES
[facility]  TRAUMA ICU - PROGRESS NOTE    Patient Name: Artem Suarez  MRN: [id number]  Admit Date: 719  : 1985  AGE: 39 y.o.   GENDER: male  ==============================================================================  TODAY'S ASSESSMENT AND PLAN OF CARE:  Artem Suarez is a 39 y.o. male with a history of severe lymphedema transferred to  for septic shock due to NSTI of perineum and posterior medial thighs bilaterally. He previously went to the OR with ACS for debridements x3 ( - at OSH,  - at ,  - at ) and vac placement ( - at ).      NEURO/PAIN/SEDATION:   - Pain control: PRN Tylenol and dilaudid     RESPIRATORY:   - 2L NC this AM, saturating well - will wean as tolerated   - SpO2 goal > 92%    CARDIOVASC: no active concerns  - MAP goal > 65    GI:   - NPO prior to the OR  - restart carb controlled diet after the OR with Glucerna shakes with meals     /FEN:   #JOSE RAMON  - possibly secondary to recent vancomycin use vs recent pressor requirements    - will continue to monitor daily RFP and Mg    - LR at 100cc/hr for now, will decrease to 50cc/hr once adequate PO intake    - keith to remain in place for strict I/O monitoring   - no electrolyte repletion requirements this AM     HEMATOLOGIC:   - transition to every other day CBC   - Hgb 6.6 overnight, received 1u pRBCs  - transfuse if symptomatic anemia, no current indications    ENDOCRINE:   #History of diabetes  #Hyperglycemia, resolved  - glucose goal < 180  - SSI #2 available     MUSCULOSKELETAL/SKIN:   #NSTI, perineum and thighs   - s/p debridements ( at OSH) and with ACS ( and ) and wound vac placements ( and )    - wound vacs to suction     INFECTIOUS DISEASE:   #Septic shock 2/2 NSTI, resolved   - Zosyn to end tonight   - no current requirements for further antibiotics at this time   - remains afebrile without leukocytosis     GI PROPHYLAXIS: Not indicated    DVT PROPHYLAXIS:  SCDs and Heparin     DISPOSITION: remain in the TSICU    Patient discussed with Dr. Frank Askew MD  PGY-1   Trauma ICU  TSICU [id number]    ==============================================================================  CHIEF COMPLAINT / OVERNIGHT EVENTS / HPI:   Overnight his Hgb was 6.6 and he received 1u pRBCs. He was made NPO at midnight for the OR today. He felt well overall this morning with no acute concerns. He is interested in being able to drink more shakes after his surgery. He went to the OR this morning with [facility] for wound vac placement. Denies chest pain, shortness of breath, fevers, chills, abdominal pain, headaches.     MEDICAL HISTORY / ROS:  Admission history and ROS reviewed. Pertinent changes as follows:  None    PHYSICAL EXAM:  Heart Rate:  [75-94]   Temp:  [36.2 °C (97.2 °F)-37 °C (98.6 °F)]   Resp:  [16-30]   BP: ()/(39-71)   SpO2:  [91 %-99 %]     Physical Exam  Constitutional:       General: He is not in acute distress.     Appearance: He is obese.   HENT:      Head: Atraumatic.      Nose:      Comments: NC in place  Cardiovascular:      Rate and Rhythm: Normal rate and regular rhythm.   Pulmonary:      Effort: Pulmonary effort is normal. No respiratory distress.      Breath sounds: No wheezing.   Abdominal:      General: There is no distension.      Palpations: Abdomen is soft.      Tenderness: There is no abdominal tenderness.   Genitourinary:     Comments: Wolfe in place draining yellow urine  External rectal device in place draining soft/liquid stool   Musculoskeletal:      Comments: Limited movement of the bilateral lower extremities  Able movement for the bilateral upper extremities    Skin:     General: Skin is dry.   Neurological:      Mental Status: He is alert and oriented to person, place, and time.      Comments: Limited sensation in bilateral lower extremities (R decreased more than L)  Able to squeeze fingers bilaterally and wiggle toes bilaterally    Psychiatric:         Mood and Affect: Mood normal.         Behavior: Behavior normal.     Post OR evaluation - additional exam: wound vacs bilaterally    IMAGING SUMMARY:  (summary of new imaging findings, not a copy of dictation)  None     LABS:  Results from last 7 days   Lab Units 07/26/24  0636 07/26/24  0210 07/25/24  0148 07/23/24  0253 07/22/24  1518   WBC AUTO x10*3/uL 9.7 9.3 12.3*   < > 12.2*   HEMOGLOBIN g/dL 7.4* 6.6* 7.4*   < > 7.5*   HEMATOCRIT % 23.7* 21.0* 23.6*   < > 22.2*   PLATELETS AUTO x10*3/uL 229 202 234   < > 158   LYMPHO PCT MAN %  --   --   --   --  7.8   MONO PCT MAN %  --   --   --   --  6.1   EOSINO PCT MAN %  --   --   --   --  0.0    < > = values in this interval not displayed.     Results from last 7 days   Lab Units 07/21/24  1745 07/21/24  0018 07/20/24  1159   APTT seconds 28 30 31   INR  1.1 1.1 1.3*     Results from last 7 days   Lab Units 07/26/24  0057 07/25/24  0148 07/24/24  1818   SODIUM mmol/L 138 139 139   POTASSIUM mmol/L 4.2 4.8 4.5   CHLORIDE mmol/L 100 100 100   CO2 mmol/L 24 24 27   BUN mg/dL 41* 36* 34*   CREATININE mg/dL 3.99* 3.49* 3.33*   CALCIUM mg/dL 7.5* 7.4* 7.5*   GLUCOSE mg/dL 134* 123* 112*         Results from last 7 days   Lab Units 07/24/24  0555 07/23/24  0254 07/21/24  1549   POCT PH, ARTERIAL pH 7.37* 7.37* 7.38   POCT PCO2, ARTERIAL mm Hg 45* 50* 44*   POCT PO2, ARTERIAL mm Hg 100* 107* 72*   POCT HCO3 CALCULATED, ARTERIAL mmol/L 26.0 28.9* 26.0   POCT BASE EXCESS, ARTERIAL mmol/L 0.6 3.2* 0.8     I have reviewed all medications, laboratory results, and imaging pertinent for today's encounter.'

## 2024-07-26 NOTE — SIGNIFICANT EVENT
S:    POD 0 from wound washout, vac placement. Pt doing well, some issues with vac machine resolved. Pt hungry, otherwise minimal pain.    O:   Vital signs are stable, normotensive, afebrile, no new or worsening oxygen requirement, not tachycardic  Visit Vitals  /64   Pulse 76   Temp 36.2 °C (97.2 °F) (Temporal)   Resp 23        Constitutional: no acute distress  Skin: warm and dry overall   Neuro: A/O x4, no gross deficits   HEENT: Atraumatic, no scleral icterus  Cardiac: RRR  Pulmonary: Unlabored respirations   Abdomen: Non distended, non tender  : Voiding via keith  GI: Rectal pouch in place  Surgical Site: B/L wound vac present CDI with appropriate suction.    A/P:  Overall, patient is doing well postoperatively with no acute concerns.  Will continue to optimize pain control as needed.  Will continue to monitor clinical exam, vitals, I&O's, and labs when available.  Will follow up on the patient in the a.m. or sooner as needed.    Jyothi Don MD PGY-1  Acute Care Surgery [id number]

## 2024-07-26 NOTE — ANESTHESIA POSTPROCEDURE EVALUATION
Patient: Artem Suarez    Procedure Summary       Date: 07/26/24 Room / Location: [facility] OR 11 / [facility] OR    Anesthesia Start: 0853 Anesthesia Stop: 1054    Procedure: Debridement Lower Extremity, perineum and thighs, with wound vac change (Bilateral: Perineum) Diagnosis:       Necrotizing soft tissue infection      (Necrotizing soft tissue infection [M79.89])    Surgeons: Tyson Owens MD Responsible Provider: Artem Albarran MD    Anesthesia Type: general ASA Status: 3            Anesthesia Type: general    Vitals Value Taken Time   /60 07/26/24 1054   Temp 36.5 07/26/24 1054   Pulse 72 07/26/24 1054   Resp 14 07/26/24 1054   SpO2 98 07/26/24 1054       Anesthesia Post Evaluation    Patient location during evaluation: ICU  Patient participation: complete - patient participated  Level of consciousness: awake and alert  Pain management: satisfactory to patient  Airway patency: patent  Cardiovascular status: acceptable and hemodynamically stable  Respiratory status: acceptable, face mask, nonlabored ventilation and spontaneous ventilation  Hydration status: acceptable  Postoperative Nausea and Vomiting: none        No notable events documented.

## 2024-07-26 NOTE — PROGRESS NOTES
[facility]  ACUTE CARE SURGERY - PROGRESS NOTE    Patient Name: Artem Suarez  MRN: [id number]  Admit Date: 719  : 1985  AGE: 39 y.o.   GENDER: male  ==============================================================================  TODAY'S ASSESSMENT AND PLAN OF CARE:  39 M presented as a transfer for NSTI of perineum and thighs in septic shock. Remains in the ICU- off pressors and not requiring ventilator support. WBC downtrending.    Wound vac off due to wound infiltration with stool.      OR  @ OSH: wide debridement of perineum and thighs  OR : debridement  OR : debridement   OR : wound vac placed     Plan:   -OR today     Dispo: Appreciate TSICU care     Patient seen and plans discussed with Chief Resident Dr. ROSA Franklin, PGY-5 and Attending Physician Dr. Owens.    Marina Campos MD PGY-1  Acute Care Surgery [id number]   ==============================================================================  CHIEF COMPLAINT / EVENTS LAST 24HRS / HPI:  Wound vac removed due to loose stool in wound. Off pressor and extubated.     MEDICAL HISTORY / ROS:   Admission history and ROS reviewed. Pertinent changes as follows:  NA    PHYSICAL EXAM:  Heart Rate:  [75-94]   Temp:  [36.2 °C (97.2 °F)-37 °C (98.6 °F)]   Resp:  [16-30]   BP: ()/(39-71)   SpO2:  [91 %-99 %]   Physical Exam    Gen: NAD  Resp: nonlabored breathing, on NC  CV: nontachy  GI: abdomen soft and nondistended, nontender  : keith in place  Skin: thigh and perineum wounds with wet to dry kerlix dressing in place, wound vac off    IMAGING SUMMARY:  (summary of new imaging findings, not a copy of dictation)  None new    LABS:  Results from last 7 days   Lab Units 24  0636 24  0210 24  0148 24  0253 24  1518   WBC AUTO x10*3/uL 9.7 9.3 12.3*   < > 12.2*   HEMOGLOBIN g/dL 7.4* 6.6* 7.4*   < > 7.5*   HEMATOCRIT % 23.7* 21.0* 23.6*   < > 22.2*   PLATELETS AUTO x10*3/uL 229 202 234    < > 158   LYMPHO PCT MAN %  --   --   --   --  7.8   MONO PCT MAN %  --   --   --   --  6.1   EOSINO PCT MAN %  --   --   --   --  0.0    < > = values in this interval not displayed.     Results from last 7 days   Lab Units 07/21/24  1745 07/21/24  0018 07/20/24  1159   APTT seconds 28 30 31   INR  1.1 1.1 1.3*     Results from last 7 days   Lab Units 07/26/24  0057 07/25/24  0148 07/24/24  1818   SODIUM mmol/L 138 139 139   POTASSIUM mmol/L 4.2 4.8 4.5   CHLORIDE mmol/L 100 100 100   CO2 mmol/L 24 24 27   BUN mg/dL 41* 36* 34*   CREATININE mg/dL 3.99* 3.49* 3.33*   CALCIUM mg/dL 7.5* 7.4* 7.5*   GLUCOSE mg/dL 134* 123* 112*         Results from last 7 days   Lab Units 07/24/24  0555 07/23/24  0254 07/21/24  1549   POCT PH, ARTERIAL pH 7.37* 7.37* 7.38   POCT PCO2, ARTERIAL mm Hg 45* 50* 44*   POCT PO2, ARTERIAL mm Hg 100* 107* 72*   POCT HCO3 CALCULATED, ARTERIAL mmol/L 26.0 28.9* 26.0   POCT BASE EXCESS, ARTERIAL mmol/L 0.6 3.2* 0.8       I have reviewed all medications, laboratory results, and imaging pertinent for today's encounter.

## 2024-07-27 LAB
ALBUMIN SERPL BCP-MCNC: 2 G/DL (ref 3.4–5)
ANION GAP SERPL CALC-SCNC: 18 MMOL/L (ref 10–20)
BLOOD EXPIRATION DATE: NORMAL
BUN SERPL-MCNC: 46 MG/DL (ref 6–23)
CALCIUM SERPL-MCNC: 7.2 MG/DL (ref 8.6–10.6)
CHLORIDE SERPL-SCNC: 103 MMOL/L (ref 98–107)
CHLORIDE UR-SCNC: 22 MMOL/L
CHLORIDE/CREATININE (MMOL/G) IN URINE: 98 MMOL/G CREAT (ref 23–275)
CO2 SERPL-SCNC: 22 MMOL/L (ref 21–32)
CREAT SERPL-MCNC: 4.69 MG/DL (ref 0.5–1.3)
CREAT UR-MCNC: 22.5 MG/DL (ref 20–370)
DISPENSE STATUS: NORMAL
EGFRCR SERPLBLD CKD-EPI 2021: 15 ML/MIN/1.73M*2
GLUCOSE BLD MANUAL STRIP-MCNC: 106 MG/DL (ref 74–99)
GLUCOSE BLD MANUAL STRIP-MCNC: 117 MG/DL (ref 74–99)
GLUCOSE BLD MANUAL STRIP-MCNC: 135 MG/DL (ref 74–99)
GLUCOSE BLD MANUAL STRIP-MCNC: 136 MG/DL (ref 74–99)
GLUCOSE BLD MANUAL STRIP-MCNC: 144 MG/DL (ref 74–99)
GLUCOSE BLD MANUAL STRIP-MCNC: 200 MG/DL (ref 74–99)
GLUCOSE SERPL-MCNC: 138 MG/DL (ref 74–99)
MAGNESIUM SERPL-MCNC: 2.49 MG/DL (ref 1.6–2.4)
PHOSPHATE SERPL-MCNC: 8.7 MG/DL (ref 2.5–4.9)
POTASSIUM SERPL-SCNC: 5 MMOL/L (ref 3.5–5.3)
POTASSIUM UR-SCNC: 5 MMOL/L
POTASSIUM/CREAT UR-RTO: 22 MMOL/G CREAT
PRODUCT BLOOD TYPE: 9500
PRODUCT CODE: NORMAL
SODIUM SERPL-SCNC: 138 MMOL/L (ref 136–145)
SODIUM UR-SCNC: 34 MMOL/L
SODIUM/CREAT UR-RTO: 151 MMOL/G CREAT
UNIT ABO: NORMAL
UNIT NUMBER: NORMAL
UNIT RH: NORMAL
UNIT VOLUME: 350
XM INTEP: NORMAL

## 2024-07-27 PROCEDURE — 2500000005 HC RX 250 GENERAL PHARMACY W/O HCPCS

## 2024-07-27 PROCEDURE — 2500000004 HC RX 250 GENERAL PHARMACY W/ HCPCS (ALT 636 FOR OP/ED): Performed by: STUDENT IN AN ORGANIZED HEALTH CARE EDUCATION/TRAINING PROGRAM

## 2024-07-27 PROCEDURE — 2500000002 HC RX 250 W HCPCS SELF ADMINISTERED DRUGS (ALT 637 FOR MEDICARE OP, ALT 636 FOR OP/ED)

## 2024-07-27 PROCEDURE — 82947 ASSAY GLUCOSE BLOOD QUANT: CPT

## 2024-07-27 PROCEDURE — 1100000001 HC PRIVATE ROOM DAILY

## 2024-07-27 PROCEDURE — 83735 ASSAY OF MAGNESIUM: CPT | Performed by: STUDENT IN AN ORGANIZED HEALTH CARE EDUCATION/TRAINING PROGRAM

## 2024-07-27 PROCEDURE — 36415 COLL VENOUS BLD VENIPUNCTURE: CPT | Performed by: STUDENT IN AN ORGANIZED HEALTH CARE EDUCATION/TRAINING PROGRAM

## 2024-07-27 PROCEDURE — 80069 RENAL FUNCTION PANEL: CPT | Performed by: STUDENT IN AN ORGANIZED HEALTH CARE EDUCATION/TRAINING PROGRAM

## 2024-07-27 PROCEDURE — 82436 ASSAY OF URINE CHLORIDE: CPT | Performed by: STUDENT IN AN ORGANIZED HEALTH CARE EDUCATION/TRAINING PROGRAM

## 2024-07-27 PROCEDURE — 2500000004 HC RX 250 GENERAL PHARMACY W/ HCPCS (ALT 636 FOR OP/ED)

## 2024-07-27 RX ORDER — ALBUMIN HUMAN 50 G/1000ML
SOLUTION INTRAVENOUS
Status: DISPENSED
Start: 2024-07-27 | End: 2024-07-27

## 2024-07-27 RX ORDER — SODIUM CHLORIDE 9 MG/ML
100 INJECTION, SOLUTION INTRAVENOUS CONTINUOUS
Status: DISCONTINUED | OUTPATIENT
Start: 2024-07-27 | End: 2024-08-01

## 2024-07-27 ASSESSMENT — PAIN SCALES - GENERAL
PAINLEVEL_OUTOF10: 3
PAINLEVEL_OUTOF10: 0 - NO PAIN

## 2024-07-27 ASSESSMENT — COGNITIVE AND FUNCTIONAL STATUS - GENERAL
MOVING TO AND FROM BED TO CHAIR: TOTAL
CLIMB 3 TO 5 STEPS WITH RAILING: TOTAL
STANDING UP FROM CHAIR USING ARMS: TOTAL
WALKING IN HOSPITAL ROOM: TOTAL
TURNING FROM BACK TO SIDE WHILE IN FLAT BAD: TOTAL
TOILETING: A LOT
DRESSING REGULAR LOWER BODY CLOTHING: A LOT
PERSONAL GROOMING: A LITTLE
MOBILITY SCORE: 7
MOVING FROM LYING ON BACK TO SITTING ON SIDE OF FLAT BED WITH BEDRAILS: A LOT
DRESSING REGULAR UPPER BODY CLOTHING: A LITTLE
DAILY ACTIVITIY SCORE: 16
HELP NEEDED FOR BATHING: A LOT

## 2024-07-27 ASSESSMENT — PAIN - FUNCTIONAL ASSESSMENT
PAIN_FUNCTIONAL_ASSESSMENT: 0-10

## 2024-07-27 NOTE — PROGRESS NOTES
[facility]  ACUTE CARE SURGERY - PROGRESS NOTE    Patient Name: Artem Suarez  MRN: [id number]  Admit Date: 719  : 1985  AGE: 39 y.o.   GENDER: male  ==============================================================================  TODAY'S ASSESSMENT AND PLAN OF CARE:  39 M presented as a transfer for NSTI of perineum and thighs in septic shock.      OR  @ OSH: wide debridement of perineum and thighs  OR : debridement  OR : debridement   OR : wound vac placed  OR : wound vac     Recovering as expected    Plan:   - ok for diet, supplements, prealbumin tomorrow   - still with worsening JOSE RAMON; will switch to NSS at 125, follow-up urine lytes, maintain keith for strict I&Os  - off abx  - maintain rectal tube  - plan for vac change Monday       Dispo: transfer to T8/9      [facility]  Acute Care Surgery [id number]     ==============================================================================  CHIEF COMPLAINT / EVENTS LAST 24HRS / HPI:  Transferred to floor. Doing well. No new complaints    MEDICAL HISTORY / ROS:   Admission history and ROS reviewed. Pertinent changes as follows:  NA    PHYSICAL EXAM:  Heart Rate:  [69-77]   Temp:  [35.8 °C (96.4 °F)-36.6 °C (97.9 °F)]   Resp:  [12-28]   BP: (104-150)/(49-74)   SpO2:  [92 %-100 %]   Physical Exam  Gen: NAD  Resp: nonlabored breathing, on NC  CV: nontachy  GI: abdomen soft and nondistended, nontender  : keith in place  Skin: vac in place with good seal, serosang output    IMAGING SUMMARY:   None new    LABS:  Results from last 7 days   Lab Units 24  0636 24  0210 24  0148 24  0253 24  1518   WBC AUTO x10*3/uL 9.7 9.3 12.3*   < > 12.2*   HEMOGLOBIN g/dL 7.4* 6.6* 7.4*   < > 7.5*   HEMATOCRIT % 23.7* 21.0* 23.6*   < > 22.2*   PLATELETS AUTO x10*3/uL 229 202 234   < > 158   LYMPHO PCT MAN %  --   --   --   --  7.8   MONO PCT MAN %  --   --   --   --  6.1   EOSINO PCT MAN %  --   --   --   --   0.0    < > = values in this interval not displayed.     Results from last 7 days   Lab Units 07/21/24  1745 07/21/24  0018 07/20/24  1159   APTT seconds 28 30 31   INR  1.1 1.1 1.3*     Results from last 7 days   Lab Units 07/27/24  0256 07/26/24  0057 07/25/24  0148   SODIUM mmol/L 138 138 139   POTASSIUM mmol/L 5.0 4.2 4.8   CHLORIDE mmol/L 103 100 100   CO2 mmol/L 22 24 24   BUN mg/dL 46* 41* 36*   CREATININE mg/dL 4.69* 3.99* 3.49*   CALCIUM mg/dL 7.2* 7.5* 7.4*   GLUCOSE mg/dL 138* 134* 123*         I have reviewed all medications, laboratory results, and imaging pertinent for today's encounter.

## 2024-07-27 NOTE — CARE PLAN
Problem: Skin  Goal: Decreased wound size/increased tissue granulation at next dressing change  Outcome: Progressing  Goal: Participates in plan/prevention/treatment measures  Outcome: Progressing  Goal: Prevent/manage excess moisture  Outcome: Progressing  Goal: Prevent/minimize sheer/friction injuries  Outcome: Progressing  Goal: Promote/optimize nutrition  Outcome: Progressing  Goal: Promote skin healing  Outcome: Progressing     Problem: Pain  Goal: Takes deep breaths with improved pain control throughout the shift  Outcome: Progressing  Goal: Turns in bed with improved pain control throughout the shift  Outcome: Progressing  Goal: Walks with improved pain control throughout the shift  Outcome: Progressing  Goal: Performs ADL's with improved pain control throughout shift  Outcome: Progressing  Goal: Participates in PT with improved pain control throughout the shift  Outcome: Progressing  Goal: Free from opioid side effects throughout the shift  Outcome: Progressing  Goal: Free from acute confusion related to pain meds throughout the shift  Outcome: Progressing     The clinical goals for the shift include Keep patient comfortable

## 2024-07-28 LAB
ALBUMIN SERPL BCP-MCNC: 1.9 G/DL (ref 3.4–5)
ANION GAP SERPL CALC-SCNC: 16 MMOL/L (ref 10–20)
BUN SERPL-MCNC: 47 MG/DL (ref 6–23)
CALCIUM SERPL-MCNC: 7.1 MG/DL (ref 8.6–10.6)
CHLORIDE SERPL-SCNC: 104 MMOL/L (ref 98–107)
CO2 SERPL-SCNC: 23 MMOL/L (ref 21–32)
CREAT SERPL-MCNC: 4.98 MG/DL (ref 0.5–1.3)
EGFRCR SERPLBLD CKD-EPI 2021: 14 ML/MIN/1.73M*2
GLUCOSE BLD MANUAL STRIP-MCNC: 101 MG/DL (ref 74–99)
GLUCOSE BLD MANUAL STRIP-MCNC: 110 MG/DL (ref 74–99)
GLUCOSE BLD MANUAL STRIP-MCNC: 115 MG/DL (ref 74–99)
GLUCOSE BLD MANUAL STRIP-MCNC: 133 MG/DL (ref 74–99)
GLUCOSE BLD MANUAL STRIP-MCNC: 135 MG/DL (ref 74–99)
GLUCOSE BLD MANUAL STRIP-MCNC: 96 MG/DL (ref 74–99)
GLUCOSE SERPL-MCNC: 80 MG/DL (ref 74–99)
MAGNESIUM SERPL-MCNC: 2.32 MG/DL (ref 1.6–2.4)
PHOSPHATE SERPL-MCNC: 8.5 MG/DL (ref 2.5–4.9)
POTASSIUM SERPL-SCNC: 5.3 MMOL/L (ref 3.5–5.3)
PREALB SERPL-MCNC: 6.5 MG/DL (ref 18–40)
SODIUM SERPL-SCNC: 138 MMOL/L (ref 136–145)

## 2024-07-28 PROCEDURE — 80069 RENAL FUNCTION PANEL: CPT | Performed by: STUDENT IN AN ORGANIZED HEALTH CARE EDUCATION/TRAINING PROGRAM

## 2024-07-28 PROCEDURE — 2500000004 HC RX 250 GENERAL PHARMACY W/ HCPCS (ALT 636 FOR OP/ED)

## 2024-07-28 PROCEDURE — 1100000001 HC PRIVATE ROOM DAILY

## 2024-07-28 PROCEDURE — 36415 COLL VENOUS BLD VENIPUNCTURE: CPT | Performed by: STUDENT IN AN ORGANIZED HEALTH CARE EDUCATION/TRAINING PROGRAM

## 2024-07-28 PROCEDURE — 2500000004 HC RX 250 GENERAL PHARMACY W/ HCPCS (ALT 636 FOR OP/ED): Performed by: STUDENT IN AN ORGANIZED HEALTH CARE EDUCATION/TRAINING PROGRAM

## 2024-07-28 PROCEDURE — 83735 ASSAY OF MAGNESIUM: CPT | Performed by: STUDENT IN AN ORGANIZED HEALTH CARE EDUCATION/TRAINING PROGRAM

## 2024-07-28 PROCEDURE — 82947 ASSAY GLUCOSE BLOOD QUANT: CPT

## 2024-07-28 PROCEDURE — 84134 ASSAY OF PREALBUMIN: CPT | Performed by: STUDENT IN AN ORGANIZED HEALTH CARE EDUCATION/TRAINING PROGRAM

## 2024-07-28 PROCEDURE — 2500000002 HC RX 250 W HCPCS SELF ADMINISTERED DRUGS (ALT 637 FOR MEDICARE OP, ALT 636 FOR OP/ED): Performed by: STUDENT IN AN ORGANIZED HEALTH CARE EDUCATION/TRAINING PROGRAM

## 2024-07-28 PROCEDURE — 2500000001 HC RX 250 WO HCPCS SELF ADMINISTERED DRUGS (ALT 637 FOR MEDICARE OP): Performed by: STUDENT IN AN ORGANIZED HEALTH CARE EDUCATION/TRAINING PROGRAM

## 2024-07-28 RX ORDER — OXYCODONE HYDROCHLORIDE 5 MG/1
10 TABLET ORAL EVERY 6 HOURS PRN
Status: DISPENSED | OUTPATIENT
Start: 2024-07-28

## 2024-07-28 RX ORDER — OXYCODONE HYDROCHLORIDE 5 MG/1
5 TABLET ORAL EVERY 6 HOURS PRN
Status: ACTIVE | OUTPATIENT
Start: 2024-07-28

## 2024-07-28 RX ORDER — SEVELAMER CARBONATE 800 MG/1
800 TABLET, FILM COATED ORAL
Status: DISPENSED | OUTPATIENT
Start: 2024-07-28

## 2024-07-28 ASSESSMENT — PAIN SCALES - GENERAL
PAINLEVEL_OUTOF10: 2
PAINLEVEL_OUTOF10: 0 - NO PAIN
PAINLEVEL_OUTOF10: 8
PAINLEVEL_OUTOF10: 3
PAINLEVEL_OUTOF10: 0 - NO PAIN
PAINLEVEL_OUTOF10: 4

## 2024-07-28 ASSESSMENT — COGNITIVE AND FUNCTIONAL STATUS - GENERAL
TOILETING: A LOT
CLIMB 3 TO 5 STEPS WITH RAILING: A LOT
HELP NEEDED FOR BATHING: A LOT
MOVING FROM LYING ON BACK TO SITTING ON SIDE OF FLAT BED WITH BEDRAILS: A LOT
PERSONAL GROOMING: A LOT
DRESSING REGULAR LOWER BODY CLOTHING: A LOT
STANDING UP FROM CHAIR USING ARMS: A LOT
EATING MEALS: A LITTLE
DAILY ACTIVITIY SCORE: 13
WALKING IN HOSPITAL ROOM: A LOT
MOBILITY SCORE: 12
MOVING TO AND FROM BED TO CHAIR: A LOT
DRESSING REGULAR UPPER BODY CLOTHING: A LOT
TURNING FROM BACK TO SIDE WHILE IN FLAT BAD: A LOT

## 2024-07-28 ASSESSMENT — PAIN - FUNCTIONAL ASSESSMENT: PAIN_FUNCTIONAL_ASSESSMENT: 0-10

## 2024-07-28 NOTE — CARE PLAN
The clinical goals for the shift include patient will remain comfortable and safe      Problem: Skin  Goal: Decreased wound size/increased tissue granulation at next dressing change  7/28/2024 0649 by Lizzy Roy RN  Outcome: Progressing  7/28/2024 0647 by Lizzy Roy RN  Flowsheets (Taken 7/28/2024 0647)  Decreased wound size/increased tissue granulation at next dressing change:   Promote sleep for wound healing   Protective dressings over bony prominences   Utilize specialty bed per algorithm  Goal: Prevent/manage excess moisture  7/28/2024 0649 by Lizzy Roy RN  Outcome: Progressing  7/28/2024 0647 by Lizzy Roy RN  Flowsheets (Taken 7/28/2024 0647)  Prevent/manage excess moisture:   Cleanse incontinence/protect with barrier cream   Follow provider orders for dressing changes  Goal: Prevent/minimize sheer/friction injuries  Outcome: Progressing  Goal: Promote skin healing  7/28/2024 0649 by Lizzy Roy RN  Outcome: Progressing  7/28/2024 0647 by Lizzy Roy RN  Flowsheets (Taken 7/28/2024 0647)  Promote skin healing:   Protective dressings over bony prominences   Assess skin/pad under line(s)/device(s)     Problem: Pain - Adult  Goal: Verbalizes/displays adequate comfort level or baseline comfort level  Outcome: Progressing     Problem: Safety - Adult  Goal: Free from fall injury  Outcome: Progressing     Problem: Discharge Planning  Goal: Discharge to home or other facility with appropriate resources  Outcome: Progressing

## 2024-07-28 NOTE — PROGRESS NOTES
[facility]  ACUTE CARE SURGERY - PROGRESS NOTE    Patient Name: Artem Suarez  MRN: [id number]  Admit Date: 719  : 1985  AGE: 39 y.o.   GENDER: male  ==============================================================================  TODAY'S ASSESSMENT AND PLAN OF CARE:  39 M presented as a transfer for NSTI of perineum and thighs in septic shock.      OR  @ OSH: wide debridement of perineum and thighs  OR : debridement  OR : debridement   OR : wound vac placed  OR : wound vac     Recovering as expected. Wound vacs removed due to stool. Now wet to dry kerlix.     Plan:   - follow up prealbumin  - regular diet, encourage protein intake, appreciate nutrition recs  - still with worsening JOSE RAMON; will switch to NSS at 125, follow-up urine lytes, maintain keith for strict I&Os  - off abx  - maintain rectal tube  - BID WTD dressing changes       Patient seen and plans discussed with Chief Resident Dr. ROSA Franklin, PGY-5 and Attending Physician Dr. Owens.    Marina Campos MD PGY-1  Acute Care Surgery [id number]     ==============================================================================  CHIEF COMPLAINT / EVENTS LAST 24HRS / HPI:  Transferred to floor. Wound vac removed due to stool. Not eating much.     MEDICAL HISTORY / ROS:   Admission history and ROS reviewed. Pertinent changes as follows:  NA    PHYSICAL EXAM:  Heart Rate:  [76-80]   Temp:  [35.9 °C (96.6 °F)-36.6 °C (97.9 °F)]   Resp:  [14-16]   BP: ()/(59-70)   SpO2:  [95 %-99 %]   Physical Exam    Gen: NAD  Resp: nonlabored breathing, on NC  CV: nontachy  GI: abdomen soft and nondistended, nontender, fecal tube in place  : keith in place  Skin: WTD dressings in place    IMAGING SUMMARY:   None new    LABS:  Results from last 7 days   Lab Units 24  0636 24  0210 24  0148 24  0253 24  1518   WBC AUTO x10*3/uL 9.7 9.3 12.3*   < > 12.2*   HEMOGLOBIN g/dL 7.4* 6.6* 7.4*   < >  7.5*   HEMATOCRIT % 23.7* 21.0* 23.6*   < > 22.2*   PLATELETS AUTO x10*3/uL 229 202 234   < > 158   LYMPHO PCT MAN %  --   --   --   --  7.8   MONO PCT MAN %  --   --   --   --  6.1   EOSINO PCT MAN %  --   --   --   --  0.0    < > = values in this interval not displayed.     Results from last 7 days   Lab Units 07/21/24  1745   APTT seconds 28   INR  1.1     Results from last 7 days   Lab Units 07/27/24  0256 07/26/24  0057 07/25/24  0148   SODIUM mmol/L 138 138 139   POTASSIUM mmol/L 5.0 4.2 4.8   CHLORIDE mmol/L 103 100 100   CO2 mmol/L 22 24 24   BUN mg/dL 46* 41* 36*   CREATININE mg/dL 4.69* 3.99* 3.49*   CALCIUM mg/dL 7.2* 7.5* 7.4*   GLUCOSE mg/dL 138* 134* 123*         I have reviewed all medications, laboratory results, and imaging pertinent for today's encounter.

## 2024-07-29 LAB
ALBUMIN SERPL BCP-MCNC: 1.8 G/DL (ref 3.4–5)
ALBUMIN SERPL BCP-MCNC: 1.9 G/DL (ref 3.4–5)
ANION GAP SERPL CALC-SCNC: 16 MMOL/L (ref 10–20)
ANION GAP SERPL CALC-SCNC: 17 MMOL/L (ref 10–20)
APPEARANCE UR: CLEAR
BILIRUB UR STRIP.AUTO-MCNC: NEGATIVE MG/DL
BUN SERPL-MCNC: 49 MG/DL (ref 6–23)
BUN SERPL-MCNC: 49 MG/DL (ref 6–23)
CALCIUM SERPL-MCNC: 7 MG/DL (ref 8.6–10.6)
CALCIUM SERPL-MCNC: 7.2 MG/DL (ref 8.6–10.6)
CHLORIDE SERPL-SCNC: 106 MMOL/L (ref 98–107)
CHLORIDE SERPL-SCNC: 108 MMOL/L (ref 98–107)
CHLORIDE UR-SCNC: 53 MMOL/L
CHLORIDE/CREATININE (MMOL/G) IN URINE: 317 MMOL/G CREAT (ref 23–275)
CO2 SERPL-SCNC: 21 MMOL/L (ref 21–32)
CO2 SERPL-SCNC: 24 MMOL/L (ref 21–32)
COLOR UR: COLORLESS
CREAT SERPL-MCNC: 5.46 MG/DL (ref 0.5–1.3)
CREAT SERPL-MCNC: 5.67 MG/DL (ref 0.5–1.3)
CREAT UR-MCNC: 16.7 MG/DL (ref 20–370)
EGFRCR SERPLBLD CKD-EPI 2021: 12 ML/MIN/1.73M*2
EGFRCR SERPLBLD CKD-EPI 2021: 13 ML/MIN/1.73M*2
ERYTHROCYTE [DISTWIDTH] IN BLOOD BY AUTOMATED COUNT: 16.8 % (ref 11.5–14.5)
GLUCOSE BLD MANUAL STRIP-MCNC: 100 MG/DL (ref 74–99)
GLUCOSE BLD MANUAL STRIP-MCNC: 104 MG/DL (ref 74–99)
GLUCOSE BLD MANUAL STRIP-MCNC: 110 MG/DL (ref 74–99)
GLUCOSE BLD MANUAL STRIP-MCNC: 116 MG/DL (ref 74–99)
GLUCOSE BLD MANUAL STRIP-MCNC: 125 MG/DL (ref 74–99)
GLUCOSE BLD MANUAL STRIP-MCNC: 93 MG/DL (ref 74–99)
GLUCOSE BLD MANUAL STRIP-MCNC: 96 MG/DL (ref 74–99)
GLUCOSE SERPL-MCNC: 96 MG/DL (ref 74–99)
GLUCOSE SERPL-MCNC: 97 MG/DL (ref 74–99)
GLUCOSE UR STRIP.AUTO-MCNC: NORMAL MG/DL
HCT VFR BLD AUTO: 23.4 % (ref 41–52)
HGB BLD-MCNC: 7.4 G/DL (ref 13.5–17.5)
KETONES UR STRIP.AUTO-MCNC: NEGATIVE MG/DL
LEUKOCYTE ESTERASE UR QL STRIP.AUTO: NEGATIVE
MAGNESIUM SERPL-MCNC: 2.27 MG/DL (ref 1.6–2.4)
MCH RBC QN AUTO: 28.4 PG (ref 26–34)
MCHC RBC AUTO-ENTMCNC: 31.6 G/DL (ref 32–36)
MCV RBC AUTO: 90 FL (ref 80–100)
NITRITE UR QL STRIP.AUTO: NEGATIVE
NRBC BLD-RTO: 0 /100 WBCS (ref 0–0)
PH UR STRIP.AUTO: 6.5 [PH]
PHOSPHATE SERPL-MCNC: 8.2 MG/DL (ref 2.5–4.9)
PHOSPHATE SERPL-MCNC: 8.4 MG/DL (ref 2.5–4.9)
PLATELET # BLD AUTO: 228 X10*3/UL (ref 150–450)
POTASSIUM SERPL-SCNC: 5.3 MMOL/L (ref 3.5–5.3)
POTASSIUM SERPL-SCNC: 5.4 MMOL/L (ref 3.5–5.3)
POTASSIUM UR-SCNC: 13 MMOL/L
POTASSIUM/CREAT UR-RTO: 78 MMOL/G CREAT
PROT UR STRIP.AUTO-MCNC: NEGATIVE MG/DL
RBC # BLD AUTO: 2.61 X10*6/UL (ref 4.5–5.9)
RBC # UR STRIP.AUTO: ABNORMAL /UL
RBC #/AREA URNS AUTO: NORMAL /HPF
SODIUM SERPL-SCNC: 141 MMOL/L (ref 136–145)
SODIUM SERPL-SCNC: 141 MMOL/L (ref 136–145)
SODIUM UR-SCNC: 54 MMOL/L
SODIUM/CREAT UR-RTO: 323 MMOL/G CREAT
SP GR UR STRIP.AUTO: 1
UROBILINOGEN UR STRIP.AUTO-MCNC: NORMAL MG/DL
WBC # BLD AUTO: 7.6 X10*3/UL (ref 4.4–11.3)
WBC #/AREA URNS AUTO: NORMAL /HPF

## 2024-07-29 PROCEDURE — 84100 ASSAY OF PHOSPHORUS: CPT

## 2024-07-29 PROCEDURE — 82436 ASSAY OF URINE CHLORIDE: CPT

## 2024-07-29 PROCEDURE — 2500000004 HC RX 250 GENERAL PHARMACY W/ HCPCS (ALT 636 FOR OP/ED): Performed by: STUDENT IN AN ORGANIZED HEALTH CARE EDUCATION/TRAINING PROGRAM

## 2024-07-29 PROCEDURE — 2500000004 HC RX 250 GENERAL PHARMACY W/ HCPCS (ALT 636 FOR OP/ED)

## 2024-07-29 PROCEDURE — 97530 THERAPEUTIC ACTIVITIES: CPT | Mod: GO

## 2024-07-29 PROCEDURE — 81001 URINALYSIS AUTO W/SCOPE: CPT

## 2024-07-29 PROCEDURE — 85027 COMPLETE CBC AUTOMATED: CPT

## 2024-07-29 PROCEDURE — 2500000001 HC RX 250 WO HCPCS SELF ADMINISTERED DRUGS (ALT 637 FOR MEDICARE OP): Performed by: STUDENT IN AN ORGANIZED HEALTH CARE EDUCATION/TRAINING PROGRAM

## 2024-07-29 PROCEDURE — 97535 SELF CARE MNGMENT TRAINING: CPT | Mod: GO

## 2024-07-29 PROCEDURE — 36415 COLL VENOUS BLD VENIPUNCTURE: CPT | Performed by: STUDENT IN AN ORGANIZED HEALTH CARE EDUCATION/TRAINING PROGRAM

## 2024-07-29 PROCEDURE — 84100 ASSAY OF PHOSPHORUS: CPT | Performed by: STUDENT IN AN ORGANIZED HEALTH CARE EDUCATION/TRAINING PROGRAM

## 2024-07-29 PROCEDURE — 2500000005 HC RX 250 GENERAL PHARMACY W/O HCPCS

## 2024-07-29 PROCEDURE — 99231 SBSQ HOSP IP/OBS SF/LOW 25: CPT | Performed by: SURGERY

## 2024-07-29 PROCEDURE — 83735 ASSAY OF MAGNESIUM: CPT | Performed by: STUDENT IN AN ORGANIZED HEALTH CARE EDUCATION/TRAINING PROGRAM

## 2024-07-29 PROCEDURE — 97530 THERAPEUTIC ACTIVITIES: CPT | Mod: GP | Performed by: PHYSICAL THERAPIST

## 2024-07-29 PROCEDURE — 36415 COLL VENOUS BLD VENIPUNCTURE: CPT

## 2024-07-29 PROCEDURE — 2500000002 HC RX 250 W HCPCS SELF ADMINISTERED DRUGS (ALT 637 FOR MEDICARE OP, ALT 636 FOR OP/ED): Performed by: STUDENT IN AN ORGANIZED HEALTH CARE EDUCATION/TRAINING PROGRAM

## 2024-07-29 PROCEDURE — 1100000001 HC PRIVATE ROOM DAILY

## 2024-07-29 PROCEDURE — 82947 ASSAY GLUCOSE BLOOD QUANT: CPT

## 2024-07-29 ASSESSMENT — COGNITIVE AND FUNCTIONAL STATUS - GENERAL
TOILETING: TOTAL
MOBILITY SCORE: 7
DRESSING REGULAR UPPER BODY CLOTHING: A LOT
MOVING TO AND FROM BED TO CHAIR: TOTAL
EATING MEALS: A LITTLE
TURNING FROM BACK TO SIDE WHILE IN FLAT BAD: TOTAL
CLIMB 3 TO 5 STEPS WITH RAILING: TOTAL
PERSONAL GROOMING: A LITTLE
HELP NEEDED FOR BATHING: TOTAL
DRESSING REGULAR LOWER BODY CLOTHING: TOTAL
MOBILITY SCORE: 7
TOILETING: TOTAL
MOVING FROM LYING ON BACK TO SITTING ON SIDE OF FLAT BED WITH BEDRAILS: A LOT
STANDING UP FROM CHAIR USING ARMS: TOTAL
WALKING IN HOSPITAL ROOM: TOTAL
HELP NEEDED FOR BATHING: TOTAL
WALKING IN HOSPITAL ROOM: TOTAL
PERSONAL GROOMING: A LITTLE
DRESSING REGULAR UPPER BODY CLOTHING: A LOT
TURNING FROM BACK TO SIDE WHILE IN FLAT BAD: TOTAL
MOVING TO AND FROM BED TO CHAIR: TOTAL
DRESSING REGULAR LOWER BODY CLOTHING: TOTAL
DAILY ACTIVITIY SCORE: 11
DAILY ACTIVITIY SCORE: 11
STANDING UP FROM CHAIR USING ARMS: TOTAL
EATING MEALS: A LITTLE
CLIMB 3 TO 5 STEPS WITH RAILING: TOTAL
MOVING FROM LYING ON BACK TO SITTING ON SIDE OF FLAT BED WITH BEDRAILS: A LOT

## 2024-07-29 ASSESSMENT — PAIN SCALES - GENERAL
PAINLEVEL_OUTOF10: 5 - MODERATE PAIN
PAINLEVEL_OUTOF10: 7
PAINLEVEL_OUTOF10: 0 - NO PAIN
PAINLEVEL_OUTOF10: 3
PAINLEVEL_OUTOF10: 7
PAINLEVEL_OUTOF10: 7
PAINLEVEL_OUTOF10: 5 - MODERATE PAIN
PAINLEVEL_OUTOF10: 5 - MODERATE PAIN
PAINLEVEL_OUTOF10: 7

## 2024-07-29 ASSESSMENT — PAIN - FUNCTIONAL ASSESSMENT
PAIN_FUNCTIONAL_ASSESSMENT: 0-10

## 2024-07-29 ASSESSMENT — ACTIVITIES OF DAILY LIVING (ADL)
HOME_MANAGEMENT_TIME_ENTRY: 15
LACK_OF_TRANSPORTATION: NO

## 2024-07-29 NOTE — PROGRESS NOTES
Occupational Therapy    Occupational Therapy Treatment    Name: Artem Suarez  MRN: [id number]  : 1985  Date: 24  Time Calculation  Start Time: 1053  Stop Time: 1148  Time Calculation (min): 55 min  OT Student under direct supervision of OTR/L  Assessment:  OT Assessment: Patient requiring max A x3 for bed mobility and with decreased activity tolerance, anticipate patient will require increased length of time for skilled therapy to progress patient towards functional independence, therefore recommend mod intensity post-acute OT.  Evaluation/Treatment Tolerance: Patient limited by pain  Medical Staff Made Aware: Yes  End of Session Communication: Bedside nurse  End of Session Patient Position: Bed, 3 rail up, Alarm on  Plan:  Treatment Interventions: ADL retraining, Functional transfer training, UE strengthening/ROM, Endurance training, Cognitive reorientation, Patient/family training, Equipment evaluation/education, Fine motor coordination activities, Neuromuscular reeducation, Compensatory technique education  OT Frequency: 3 times per week  OT Discharge Recommendations: Moderate intensity level of continued care  Equipment Recommended upon Discharge: Wheeled walker  OT Recommended Transfer Status: Assist of 2  OT - OK to Discharge: Yes    Subjective   Previous Visit Info:  OT Last Visit  OT Received On: 24  General:  General  Family/Caregiver Present: No  Co-Treatment: PT  Co-Treatment Reason: to maximize patient outcomes and safety  Prior to Session Communication: Bedside nurse  Patient Position Received: Bed, 3 rail up, Alarm on  General Comment: Patient agreeable to OT session, upon arrival patient on tele, catheter, FMS, and IV  Precautions:  Medical Precautions: Fall precautions  Precautions Comment: MAP >60  Pain Assessment:  Pain Assessment  Pain Assessment: 0-10  0-10 (Numeric) Pain Score: 5 - Moderate pain  Pain Type: Acute pain  Pain Location:  (Upper legs and groin)   Objective    Cognition:  Overall Cognitive Status: Within Functional Limits  Arousal/Alertness: Appropriate responses to stimuli  Orientation Level:  (patient oriented to self and place, able to identify year, did not know month or date)  Following Commands: Follows one step commands with increased time  Activities of Daily Living: Grooming  Grooming Comments: At bed level patient washed his face and brushed his hair with supervision and min VCs for sequencing  LE Dressing  LE Dressing: Yes  LE Dressing Comments: Patient dependent to [facility] socks in supine  Toileting  Toileting Comments: Patient dependent for toilet hygiene at bed level, max A x 3 for rolling to complete pericare  Bed Mobility/Transfers: Bed Mobility  Bed Mobility: Yes  Bed Mobility 1  Bed Mobility 1: Rolling right, Rolling left  Level of Assistance 1: Maximum assistance, Maximum verbal cues, x 3  Bed Mobility Comments 1: fitted sheet utilized for bed mobility, patient maintained side lying positioing with max A of 2 for dressing change  Bed Mobility 2  Bed Mobility  2: Supine to sitting, Sitting to supine  Level of Assistance 2: Maximum assistance, x 3, Maximum verbal cues  Bed Mobility Comments 2: HOB elevated, fitted sheet used for assistance with bed mobility, bed deflated for transfer    Therapy/Activity: Therapeutic Activity  Therapeutic Activity Performed: Yes  Therapeutic Activity 1: Patient sat EOB for ~5 minutes, initally requiring mod A to maintain static balance progressed to min A with mod VCs for positioning    Outcome Measures:  [facility] Daily Activity  Putting on and taking off regular lower body clothing: Total  Bathing (including washing, rinsing, drying): Total  Putting on and taking off regular upper body clothing: A lot  Toileting, which includes using toilet, bedpan or urinal: Total  Taking care of personal grooming such as brushing teeth: A little  Eating Meals: A little  Daily Activity - Total Score: 11    , E = Exercise and Early  Mobility  Early Mobility/Exercise Safety Screen: Proceed with mobilization - No exclusion criteria met  ICU Mobility Scale: Sitting over edge of bed, and OT Adult Other Outcome Measures  4AT: Negative    Education Documentation  Body Mechanics, taught by TRICIA Conley at 7/29/2024 12:50 PM.  Learner: Patient  Readiness: Acceptance  Method: Explanation  Response: Verbalizes Understanding    Precautions, taught by TRICIA Conley at 7/29/2024 12:50 PM.  Learner: Patient  Readiness: Acceptance  Method: Explanation  Response: Verbalizes Understanding    ADL Training, taught by TRICAI Conley at 7/29/2024 12:50 PM.  Learner: Patient  Readiness: Acceptance  Method: Explanation  Response: Verbalizes Understanding    Education Comments  No comments found.      Goals:  Encounter Problems       Encounter Problems (Active)       ADLs       Patient with complete upper body dressing with modified independent level of assistance donning and doffing all UE clothes (Not Progressing)       Start:  07/24/24    Expected End:  08/07/24            Patient with complete lower body dressing with minimal assist  level of assistance donning and doffing all LE clothes  with PRN adaptive equipment (Not Progressing)       Start:  07/24/24    Expected End:  08/07/24            Patient will complete daily grooming tasks with modified independent level of assistance and PRN adaptive equipment. (Progressing)       Start:  07/24/24    Expected End:  08/07/24               COGNITION/SAFETY       Patient will participate in cognitive activities to demonstrate WFL score on further cognitive assessments, including Medi-Cog, MoCA and remain A&O x3, CAM (-).  (Progressing)       Start:  07/24/24    Expected End:  08/07/24               TRANSFERS       Patient will perform bed mobility moderate assist level of assistance in order to improve safety and independence with mobility (Progressing)       Start:  07/24/24     Expected End:  08/07/24            Patient will complete sit to stand transfer with moderate assist level of assistance and least restrictive device in order to improve safety and prepare for out of bed mobility. (Not Progressing)       Start:  07/24/24    Expected End:  08/07/24               Aydee Maharaj S/OT

## 2024-07-29 NOTE — PROGRESS NOTES
07/29/24 1610   Discharge Planning   Living Arrangements Children   Support Systems Children   Type of Residence [facility]   Do you have animals or pets at home? No   Who is requesting discharge planning? Provider   Home or Post Acute Services None   Expected Discharge Disposition [facility]   Does the patient need discharge transport arranged? No   RoundTrip coordination needed? No   Has discharge transport been arranged? No   Financial Resource Strain   How hard is it for you to pay for the very basics like food, housing, medical care, and heating? Not hard   Housing Stability   In the last 12 months, was there a time when you were not able to pay the mortgage or rent on time? N   In the past 12 months, how many times have you moved where you were living? 1   At any time in the past 12 months, were you homeless or living in a shelter (including now)? N   Transportation Needs   In the past 12 months, has lack of transportation kept you from medical appointments or from getting medications? no   In the past 12 months, has lack of transportation kept you from meetings, work, or from getting things needed for daily living? No   Patient Choice   Provider Choice list and CMS website (https://medicare.gov/care-compare#search) for post-acute Quality and Resource Measure Data were provided and reviewed with: Patient   Patient / Family choosing to utilize agency / facility established prior to hospitalization Yes     Transitional Care Coordinator Note: Met with patient to discuss discharge planning s/p admission.  Patient lives home with his daughter and 2 roommates.  Patient is dependant in all ADL's.  Patient will be going to [facility] after discharge. Demographics and contact information confirmed. Primary TCC will continue to monitor patient for all home going needs.      Alicia Brody RN, LISW-S, TCC.

## 2024-07-29 NOTE — PROGRESS NOTES
Physical Therapy    Physical Therapy Treatment    Patient Name: Artem Suarez  MRN: [id number]  Today's Date: 7/29/2024  Time Calculation  Start Time: 1054  Stop Time: 1149  Time Calculation (min): 55 min    Assessment/Plan   PT Assessment  PT Assessment Results: Decreased skin integrity, Obesity, Decreased strength, Decreased endurance, Decreased range of motion, Impaired balance, Decreased mobility  Rehab Prognosis: Good  Barriers to Discharge: comorbidities  Evaluation/Treatment Tolerance: Patient tolerated treatment well  End of Session Communication: Bedside nurse  Assessment Comment: Pt participated in supine to sit transfer during session. Pt limited by pain due to recent dressing change. He would benefit from continued skilled physical therapy to improve overall strength, balance, and endurance.  End of Session Patient Position: Bed, 3 rail up, Alarm on  PT Plan  Inpatient/Swing Bed or Outpatient: Inpatient  PT Plan  Treatment/Interventions: Bed mobility, Transfer training, Gait training, Balance training, Neuromuscular re-education, Strengthening, Endurance training, Therapeutic exercise, Therapeutic activity, Home exercise program, Positioning, Postural re-education  PT Plan: Ongoing PT  PT Frequency: 4 times per week  PT Discharge Recommendations: Moderate intensity level of continued care  Equipment Recommended upon Discharge: Wheeled walker  PT Recommended Transfer Status: Assist x2  PT - OK to Discharge: Yes (Once medically cleared)      General Visit Information:   PT  Visit  PT Received On: 07/29/24  Response to Previous Treatment: Patient reporting fatigue but able to participate.  General  Reason for Referral: presented from OSH with NSTI of perineum and thighs in septic shock; s/p OR 7/18, 7/20, 7/21, and 7/23 for debridement/washout.  Family/Caregiver Present: No  Co-Treatment: OT  Co-Treatment Reason: to maximize patient outcomes and safety  Prior to Session Communication: Bedside nurse  Patient  Position Received: Bed, 3 rail up, Alarm on  Preferred Learning Style: verbal  General Comment: Patient agreeable to OT session, upon arrival patient on tele, catheter, FMS, and IV    Subjective   Precautions:  Precautions  Medical Precautions: Fall precautions  Precautions Comment: MAP >60  Vital Signs:       Objective   Pain:  Pain Assessment  Pain Assessment: 0-10  0-10 (Numeric) Pain Score: 5 - Moderate pain  Pain Type: Acute pain  Cognition:     Coordination:     Postural Control:  Static Sitting Balance  Static Sitting-Comment/Number of Minutes: min A  Dynamic Sitting Balance  Dynamic Sitting-Comments: mod A  Static Standing Balance  Static Standing-Comment/Number of Minutes: not tested  Dynamic Standing Balance  Dynamic Standing-Comments: not tested    Activity Tolerance:  Activity Tolerance  Endurance: Tolerates 10 - 20 min exercise with multiple rests  Early Mobility/Exercise Safety Screen: Proceed with mobilization - No exclusion criteria met  Treatments:  Bed Mobility  Bed Mobility: Yes  Bed Mobility 1  Bed Mobility 1: Rolling right, Rolling left  Level of Assistance 1: Maximum assistance  Bed Mobility Comments 1: assistx2. use of fitted sheet.  Bed Mobility 2  Bed Mobility  2: Supine to sitting  Level of Assistance 2: Maximum assistance  Bed Mobility Comments 2: HOB elevated x3 assist. Bed deflated for transfer.    Transfers  Transfer: No    Outcome Measures:  [facility] Basic Mobility  Turning from your back to your side while in a flat bed without using bedrails: A lot  Moving from lying on your back to sitting on the side of a flat bed without using bedrails: Total  Moving to and from bed to chair (including a wheelchair): Total  Standing up from a chair using your arms (e.g. wheelchair or bedside chair): Total  To walk in hospital room: Total  Climbing 3-5 steps with railing: Total  Basic Mobility - Total Score: 7    Education Documentation  Precautions, taught by Aida Mars, PT at 7/29/2024   2:40 PM.  Learner: Patient  Readiness: Acceptance  Method: Explanation  Response: Verbalizes Understanding    Body Mechanics, taught by Aida Mars, PT at 7/29/2024  2:40 PM.  Learner: Patient  Readiness: Acceptance  Method: Explanation  Response: Verbalizes Understanding    Mobility Training, taught by Aida Mars, PT at 7/29/2024  2:40 PM.  Learner: Patient  Readiness: Acceptance  Method: Explanation  Response: Verbalizes Understanding    Education Comments  No comments found.        OP EDUCATION:       Encounter Problems       Encounter Problems (Active)       Balance       STG - Maintains static standing balance with upper extremity support >5 minutes Elliott  (Progressing)       Start:  07/24/24            STG - Maintains static sitting balance without upper extremity support >10 minutes SBA for functional activities and endurance  (Progressing)       Start:  07/24/24               Mobility       STG - Patient will ambulate >10ft using LRAD modA  (Progressing)       Start:  07/24/24               PT Transfers       STG - Patient to transfer to and from sit to supine modA  (Progressing)       Start:  07/24/24            STG - Patient will transfer sit to and from stand modA (Progressing)       Start:  07/24/24               Pain - Adult

## 2024-07-29 NOTE — CONSULTS
"NEPHROLOGY NEW CONSULT NOTE   Artem Suarez   39 y.o.    190 kg (419 lnb 1.5 oz)  MRN/Room: [id number]    Reason for consult: Worsening JOSE RAMON s/p wound debridement for NSTI    HPI:  Artem Suarez is a 39 y.o. 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 He was transferred to [facility] 07/19/24 due to ongoing septic shock and was intubated and on pressors at that time. The patient was on broad spectrum antibiotics (cefepime, vanc, clindamycin) at the time of transfer. At [facility] the patient underwent three repeat soft tissue debridements 07/20/24, 07/21/2024, and 07/23/2024. The patient came off pressors 07/22/2024, though he did require intermittent use afterwards. He was extubated 07/24/2024. Creatinine began trending up from baseline 0.6 on 07/22/2024 and has slowly risen since then and is now 5.46.    Nephrology was consulted for worsening JOSE RAMON.    When seen, patient reports feeling well other than some continued pain in his perineum. He reports something like this had never happened before. He said he was feeling sick with fevers for a few days, and then his wound \"popped\" and all of the sudden he realized there was a wound there and began experiencing severe pain and presented to the hospital. He does not remember much from the subsequent days. He reports he is currently drinking a lot of fluids and is peeing lots of clear, non-foamy fluid (much better than the dark urine he had earlier this hospital visit) with no pain or retention. His mouth feels a bit dry. He denies any nausea, vomiting, fever, rashes, shortness of breath, chest pain, diarrhea, joint pain, recent viral illness, or lightheadedness. He denies any recent ACE/ARB/NSAID use, and says he has no history of kidney stones, cancer, BPH, or vascular " disease.    Past Medical History  T2DM (A1c 7.2 07/202/24), not on medication  Chronic lymphedema  Takes no home medications    Surgical History:  None.    Social History:  25 pack year history smoking  No alcohol or other drug use    Social History     Socioeconomic History    Marital status: Single     Spouse name: Not on file    Number of children: Not on file    Years of education: Not on file    Highest education level: Not on file   Occupational History    Not on file   Tobacco Use    Smoking status: Unknown    Smokeless tobacco: Not on file   Substance and Sexual Activity    Alcohol use: Not on file    Drug use: Not on file    Sexual activity: Not on file   Other Topics Concern    Not on file   Social History Narrative    Not on file     Social Determinants of Health     Financial Resource Strain: Patient Unable To Answer (7/22/2024)    Overall Financial Resource Strain (CARDIA)     Difficulty of Paying Living Expenses: Patient unable to answer   Food Insecurity: Not on file   Transportation Needs: Patient Unable To Answer (7/22/2024)    PRAPARE - Transportation     Lack of Transportation (Medical): Patient unable to answer     Lack of Transportation (Non-Medical): Patient unable to answer   Physical Activity: Not on file   Stress: Not on file   Social Connections: Not on file   Intimate Partner Violence: Not on file   Housing Stability: Patient Unable To Answer (7/22/2024)    Housing Stability Vital Sign     Unable to Pay for Housing in the Last Year: Patient unable to answer     Number of Times Moved in the Last Year: 1     Homeless in the Last Year: Patient unable to answer       No Known Allergies     No medications prior to admission.        Current Medications:   heparin, 7,500 Units, q8h  insulin lispro, 0-10 Units, q4h  sevelamer carbonate, 800 mg, TID      sodium chloride 0.9%, Last Rate: 125 mL/hr (07/29/24 1257)      acetaminophen, 650 mg, q6h PRN  dextrose, 12.5 g, q15 min PRN  dextrose, 25 g, q15  min PRN  glucagon, 1 mg, q15 min PRN  glucagon, 1 mg, q15 min PRN  oxyCODONE, 10 mg, q6h PRN  oxyCODONE, 5 mg, q6h PRN        Objective:    Vitals:    07/29/24 1227   BP: 108/66   Pulse: 81   Resp: 16   Temp: 36 °C (96.8 °F)   SpO2: 90%        07/27 1900 - 07/29 0659  In: 180 [P.O.:180]  Out: 8350 [Urine:8350]   Weight change:      General appearance: AAOx3. No distress  Eyes: non-icteric  Mouth: Dry mucous membranes  Skin: no apparent rash  Heart: RRR. No MRG. Normal S1/S2.  Lungs: CTA bilat.  No wheezing/crackles  Abdomen: soft, nt/nd  Extremities: Nonpitting edema bilat to knees.  :  Wolfe in place  Neuro: No FND, no asterixis   ACCESS: PIV forearm. Wound drains.      Blood Labs:  Results for orders placed or performed during the hospital encounter of 07/19/24 (from the past 24 hour(s))   POCT GLUCOSE   Result Value Ref Range    POCT Glucose 115 (H) 74 - 99 mg/dL   POCT GLUCOSE   Result Value Ref Range    POCT Glucose 133 (H) 74 - 99 mg/dL   POCT GLUCOSE   Result Value Ref Range    POCT Glucose 116 (H) 74 - 99 mg/dL   POCT GLUCOSE   Result Value Ref Range    POCT Glucose 104 (H) 74 - 99 mg/dL   POCT GLUCOSE   Result Value Ref Range    POCT Glucose 110 (H) 74 - 99 mg/dL   Renal function panel   Result Value Ref Range    Glucose 97 74 - 99 mg/dL    Sodium 141 136 - 145 mmol/L    Potassium 5.4 (H) 3.5 - 5.3 mmol/L    Chloride 106 98 - 107 mmol/L    Bicarbonate 24 21 - 32 mmol/L    Anion Gap 16 10 - 20 mmol/L    Urea Nitrogen 49 (H) 6 - 23 mg/dL    Creatinine 5.46 (H) 0.50 - 1.30 mg/dL    eGFR 13 (L) >60 mL/min/1.73m*2    Calcium 7.2 (L) 8.6 - 10.6 mg/dL    Phosphorus 8.2 (H) 2.5 - 4.9 mg/dL    Albumin 1.9 (L) 3.4 - 5.0 g/dL   Magnesium   Result Value Ref Range    Magnesium 2.27 1.60 - 2.40 mg/dL   POCT GLUCOSE   Result Value Ref Range    POCT Glucose 125 (H) 74 - 99 mg/dL   CBC   Result Value Ref Range    WBC 7.6 4.4 - 11.3 x10*3/uL    nRBC 0.0 0.0 - 0.0 /100 WBCs    RBC 2.61 (L) 4.50 - 5.90 x10*6/uL    Hemoglobin  7.4 (L) 13.5 - 17.5 g/dL    Hematocrit 23.4 (L) 41.0 - 52.0 %    MCV 90 80 - 100 fL    MCH 28.4 26.0 - 34.0 pg    MCHC 31.6 (L) 32.0 - 36.0 g/dL    RDW 16.8 (H) 11.5 - 14.5 %    Platelets 228 150 - 450 x10*3/uL   POCT GLUCOSE   Result Value Ref Range    POCT Glucose 100 (H) 74 - 99 mg/dL         ASSESSMENT:  Artem Suarez is a  39 y.o.  year old , with PMHx of DM (A1c 7.2 07/20/2024) and lymphedema who presented with septic shock requiring pressors secondary to Chiquita's gangrene resulting in JOSE RAMON.    #JOSE RAMON, non oliguric - BL 1  -Etiology of JOSE RAMON is most likely hypoperfusion from sepsis and multiple debridement surgeries. Elevated vanc levels (39) could be contributory as well.   Considering persistently rising creatinine despite good urine output (1.3 ml/kg/hr) it is likely the hypoperfusion resulted in acute tubular necrosis, which may now be in a recovery phase.   -Patient has no fever, rash, or eosiniphilia to suggest AIN and no recent contrast studies.   -volume status: euvolemic       Recommendations:  - Check UA, Urine electrolytes, and renal ultrasound  - Repeat RFP considering recent mild K elevated  -Electrolytes are currently stable, acid/base is balanced, and patient is alert and oriented; there is no indication for hemodialysis at this time.   - Ok to resume IVF tonight and will re evaluate tomorrow   - Avoid nephrotoxins, contrast if possible  - Strict Is/Os      Delfino Starr, MS4  24 hour Renal Pager - [phone]    I saw and evaluated the patient on 7/30. I personally obtained the key and critical portions of the history and physical exam or was physically present for key and critical portions performed by the resident/fellow. I reviewed the resident/fellow's documentation and discussed the patient with the resident/fellow. I agree with the resident/fellow's medical decision making as documented in the note.     Nathaly Herndon MD

## 2024-07-29 NOTE — CARE PLAN
"Inpatient diabetes consult declined by this provider. Pt's glucose has remain in the 90-160mg/dL range and required only 4 units of lispro since hydrocortisone tx was stopped on 7/22/24.     I would recommend a outgoing referral to \"[facility]\" be placed by primary team on day of discharge. This service will provide medical oversight and potential start of anti-diabetic tx such  as GLP1a for the first 30-60 days after hospital discharge.     Please re-consult endocrinology if pt's glucose become difficult to control.     Berta Watson PA-C   Endocrinology  Inpatient Diabetes Management Team    "

## 2024-07-30 ENCOUNTER — APPOINTMENT (OUTPATIENT)
Dept: RADIOLOGY | Facility: HOSPITAL | Age: 39
End: 2024-07-30
Payer: COMMERCIAL

## 2024-07-30 LAB
ALBUMIN SERPL BCP-MCNC: 2 G/DL (ref 3.4–5)
ANION GAP SERPL CALC-SCNC: 16 MMOL/L (ref 10–20)
BUN SERPL-MCNC: 47 MG/DL (ref 6–23)
CALCIUM SERPL-MCNC: 7.7 MG/DL (ref 8.6–10.6)
CHLORIDE SERPL-SCNC: 105 MMOL/L (ref 98–107)
CO2 SERPL-SCNC: 20 MMOL/L (ref 21–32)
CREAT SERPL-MCNC: 5.54 MG/DL (ref 0.5–1.3)
EGFRCR SERPLBLD CKD-EPI 2021: 12 ML/MIN/1.73M*2
ERYTHROCYTE [DISTWIDTH] IN BLOOD BY AUTOMATED COUNT: 16.7 % (ref 11.5–14.5)
GLUCOSE BLD MANUAL STRIP-MCNC: 76 MG/DL (ref 74–99)
GLUCOSE BLD MANUAL STRIP-MCNC: 76 MG/DL (ref 74–99)
GLUCOSE BLD MANUAL STRIP-MCNC: 89 MG/DL (ref 74–99)
GLUCOSE BLD MANUAL STRIP-MCNC: 96 MG/DL (ref 74–99)
GLUCOSE SERPL-MCNC: 77 MG/DL (ref 74–99)
HCT VFR BLD AUTO: 27.4 % (ref 41–52)
HGB BLD-MCNC: 8 G/DL (ref 13.5–17.5)
HOLD SPECIMEN: NORMAL
MAGNESIUM SERPL-MCNC: 2.23 MG/DL (ref 1.6–2.4)
MCH RBC QN AUTO: 28.4 PG (ref 26–34)
MCHC RBC AUTO-ENTMCNC: 29.2 G/DL (ref 32–36)
MCV RBC AUTO: 97 FL (ref 80–100)
NRBC BLD-RTO: 0 /100 WBCS (ref 0–0)
PHOSPHATE SERPL-MCNC: 8.3 MG/DL (ref 2.5–4.9)
PLATELET # BLD AUTO: 233 X10*3/UL (ref 150–450)
POTASSIUM SERPL-SCNC: 5.2 MMOL/L (ref 3.5–5.3)
RBC # BLD AUTO: 2.82 X10*6/UL (ref 4.5–5.9)
SODIUM SERPL-SCNC: 136 MMOL/L (ref 136–145)
WBC # BLD AUTO: 6.5 X10*3/UL (ref 4.4–11.3)

## 2024-07-30 PROCEDURE — 85027 COMPLETE CBC AUTOMATED: CPT | Performed by: NURSE PRACTITIONER

## 2024-07-30 PROCEDURE — 2500000004 HC RX 250 GENERAL PHARMACY W/ HCPCS (ALT 636 FOR OP/ED)

## 2024-07-30 PROCEDURE — 99254 IP/OBS CNSLTJ NEW/EST MOD 60: CPT | Performed by: INTERNAL MEDICINE

## 2024-07-30 PROCEDURE — 83735 ASSAY OF MAGNESIUM: CPT | Performed by: STUDENT IN AN ORGANIZED HEALTH CARE EDUCATION/TRAINING PROGRAM

## 2024-07-30 PROCEDURE — 2500000001 HC RX 250 WO HCPCS SELF ADMINISTERED DRUGS (ALT 637 FOR MEDICARE OP): Performed by: STUDENT IN AN ORGANIZED HEALTH CARE EDUCATION/TRAINING PROGRAM

## 2024-07-30 PROCEDURE — 82947 ASSAY GLUCOSE BLOOD QUANT: CPT

## 2024-07-30 PROCEDURE — 1100000001 HC PRIVATE ROOM DAILY

## 2024-07-30 PROCEDURE — 2500000002 HC RX 250 W HCPCS SELF ADMINISTERED DRUGS (ALT 637 FOR MEDICARE OP, ALT 636 FOR OP/ED): Performed by: STUDENT IN AN ORGANIZED HEALTH CARE EDUCATION/TRAINING PROGRAM

## 2024-07-30 PROCEDURE — 76770 US EXAM ABDO BACK WALL COMP: CPT | Performed by: RADIOLOGY

## 2024-07-30 PROCEDURE — 80069 RENAL FUNCTION PANEL: CPT | Performed by: STUDENT IN AN ORGANIZED HEALTH CARE EDUCATION/TRAINING PROGRAM

## 2024-07-30 PROCEDURE — 36415 COLL VENOUS BLD VENIPUNCTURE: CPT | Performed by: NURSE PRACTITIONER

## 2024-07-30 PROCEDURE — 76770 US EXAM ABDO BACK WALL COMP: CPT

## 2024-07-30 ASSESSMENT — COGNITIVE AND FUNCTIONAL STATUS - GENERAL
WALKING IN HOSPITAL ROOM: TOTAL
MOVING FROM LYING ON BACK TO SITTING ON SIDE OF FLAT BED WITH BEDRAILS: A LOT
TOILETING: TOTAL
DAILY ACTIVITIY SCORE: 11
STANDING UP FROM CHAIR USING ARMS: TOTAL
MOVING TO AND FROM BED TO CHAIR: TOTAL
DRESSING REGULAR UPPER BODY CLOTHING: A LOT
PERSONAL GROOMING: A LITTLE
CLIMB 3 TO 5 STEPS WITH RAILING: TOTAL
DRESSING REGULAR LOWER BODY CLOTHING: TOTAL
MOBILITY SCORE: 7
EATING MEALS: A LITTLE
HELP NEEDED FOR BATHING: TOTAL
TURNING FROM BACK TO SIDE WHILE IN FLAT BAD: TOTAL

## 2024-07-30 ASSESSMENT — PAIN DESCRIPTION - LOCATION: LOCATION: BUTTOCKS

## 2024-07-30 ASSESSMENT — PAIN SCALES - GENERAL
PAINLEVEL_OUTOF10: 0 - NO PAIN
PAINLEVEL_OUTOF10: 2
PAINLEVEL_OUTOF10: 7

## 2024-07-30 ASSESSMENT — PAIN - FUNCTIONAL ASSESSMENT
PAIN_FUNCTIONAL_ASSESSMENT: 0-10
PAIN_FUNCTIONAL_ASSESSMENT: 0-10

## 2024-07-30 NOTE — PROGRESS NOTES
LTACH referrals were went to [facility] [place] and [facility]. Both locations are interested in accepting. SW updated daughter and significant other. [facility] is [facility] [place]. [facility] sent updates and requested [facility] to initiate precert. EDOD listed is 7/31. [facility] will continue to follow.      RAHEEL Amaya

## 2024-07-30 NOTE — NURSING NOTE
Pt returned from renal US, moved into new wound bed. Pt bathed, skin assessed with assistance from Zoie UAC and SENDY Muniz. Rectal tube noted to be leaking around rectum, incontinence care completed. W2D dressing to right buttock changed as they were soiled with stool. Wounds cleansed with saline, moist guaze applied to wound bed and packed in tunneled areas, covered with dry ABD, border taped. Wedges applied to left side to assist pt with repositioning and offloading from wounds and rectal tube.  Pt refusing meal at this time, requests ice and diet pepsi. SCDs applied. Call light in reach.

## 2024-07-30 NOTE — PROGRESS NOTES
[facility]  ACUTE CARE SURGERY - PROGRESS NOTE    Patient Name: Artem Suarez  MRN: [id number]  Admit Date: 719  : 1985  AGE: 39 y.o.   GENDER: male  ==============================================================================  TODAY'S ASSESSMENT AND PLAN OF CARE:  39M presented as a transfer for NSTI of perineum and thighs in septic shock.     OR  at OSH: wide debridement of perineum and thighs  OR : debridement  OR : debridement  OR : wound vac placed  OR : wound vac     PLAN  Neuro   - pain control: PRN tylenol and oxycodone     Respiratory  - saturating well on room air   - supplemental O2 available as needed     Cardiovascular:    - will continue to monitor vitals q4h     GI  - Carb controlled diet, potassium restricted 2gm, CCD 90gm/meal   - oral supplementation with glucerna shakes and magic cups with meals     /FEN  #JOSE RAMON   - Cr 5.5 (5.6)   - nephrology consulted for worsening JOSE RAMON s/p wound debridement for NSTI    - follow up UA, urine lytes and renal ultrasound: all negative    - nutrition recommendations appreciated    - continue NS 125cc/hr     - keith exchanged   - continue to monitor I/Os     Hematologic  - daily CBC    Endocrine  #History of diabetes   - POCT glucose checks   - SSI #2 available    - endocrine consulted for long term insulin plan, recommendations appreciated, outpatient follow up        MSK/SKIN  #NSTI, perineum and thighs   - transition to WTD dressing changes to be performed by ACS team daily   - s/p debridements (OSH: , UH: , ) and wound vac placements ( and )     #Right great toe wound, chronic    - known on admission   - wound care previously consulted, and recommendations appreciated    - continue daily dressing changes per wound care: cleanse the toe wound with NS and pat toe dry, paint toe wound with betadine and cover with a dry dressing     Infectious disease  - no current indications for  antibiotics  - will continue to monitor WBC with daily CBC  - remains afebrile     GI Ppx: not indicated  DVT Ppx: SCDs and heparin     Disposition: remain on RNF     Patient discussed with Attending, Dr. Dorothy Askew MD  PGY-1   Acute Care Surgery  Service Pager: [phone]    ==============================================================================  CHIEF COMPLAINT / EVENTS LAST 24HRS / HPI:  No acute events overnight. Today he endorses pain with dressing changes, but otherwise no concerns. He denies nausea, vomiting, chest pain, shortness of breath.     MEDICAL HISTORY / ROS:   Admission history and ROS reviewed. Pertinent changes as follows:  None     PHYSICAL EXAM:  Heart Rate:  [75-86]   Temp:  [35.3 °C (95.5 °F)-36.5 °C (97.7 °F)]   Resp:  [16]   BP: (102-129)/(62-80)   SpO2:  [89 %-95 %]     Physical exam  General: NAD  Respiratory: on RA, nonlabored breathing   CV: not tachycardic   GI: abdomen is soft, non-tender, non distended  : keith in place and external fecal tube in place   Skin/MSK: wet to dry dressing with ABD pads on top in place covering wounds, R great toe wound present     IMAGING SUMMARY:  (summary of new imaging findings, not a copy of dictation)  None     LABS:  Results from last 7 days   Lab Units 07/30/24  0634 07/29/24  1216 07/26/24  0636   WBC AUTO x10*3/uL 6.5 7.6 9.7   HEMOGLOBIN g/dL 8.0* 7.4* 7.4*   HEMATOCRIT % 27.4* 23.4* 23.7*   PLATELETS AUTO x10*3/uL 233 228 229         Results from last 7 days   Lab Units 07/30/24  0634 07/29/24  1612 07/29/24  0714   SODIUM mmol/L 136 141 141   POTASSIUM mmol/L 5.2 5.3 5.4*   CHLORIDE mmol/L 105 108* 106   CO2 mmol/L 20* 21 24   BUN mg/dL 47* 49* 49*   CREATININE mg/dL 5.54* 5.67* 5.46*   CALCIUM mg/dL 7.7* 7.0* 7.2*   GLUCOSE mg/dL 77 96 97         Results from last 7 days   Lab Units 07/24/24  0555   POCT PH, ARTERIAL pH 7.37*   POCT PCO2, ARTERIAL mm Hg 45*   POCT PO2, ARTERIAL mm Hg 100*   POCT HCO3 CALCULATED,  ARTERIAL mmol/L 26.0   POCT BASE EXCESS, ARTERIAL mmol/L 0.6       I have reviewed all medications, laboratory results, and imaging pertinent for today's encounter.

## 2024-07-30 NOTE — CARE PLAN
The clinical goals for the shift include pt will remain safe and use call light      Problem: Skin  Goal: Decreased wound size/increased tissue granulation at next dressing change  Outcome: Progressing  Flowsheets (Taken 7/29/2024 2207)  Decreased wound size/increased tissue granulation at next dressing change: Promote sleep for wound healing  Goal: Participates in plan/prevention/treatment measures  Outcome: Progressing  Flowsheets (Taken 7/29/2024 2207)  Participates in plan/prevention/treatment measures: Elevate heels  Goal: Prevent/manage excess moisture  Outcome: Progressing  Flowsheets (Taken 7/29/2024 2207)  Prevent/manage excess moisture: Cleanse incontinence/protect with barrier cream  Goal: Prevent/minimize sheer/friction injuries  Outcome: Progressing  Flowsheets (Taken 7/29/2024 2207)  Prevent/minimize sheer/friction injuries: Turn/reposition every 2 hours/use positioning/transfer devices  Goal: Promote/optimize nutrition  Outcome: Progressing  Flowsheets (Taken 7/29/2024 2207)  Promote/optimize nutrition: Monitor/record intake including meals  Goal: Promote skin healing  Outcome: Progressing  Flowsheets (Taken 7/29/2024 2207)  Promote skin healing: Turn/reposition every 2 hours/use positioning/transfer devices     Problem: Pain  Goal: Takes deep breaths with improved pain control throughout the shift  Outcome: Progressing  Goal: Turns in bed with improved pain control throughout the shift  Outcome: Progressing  Goal: Walks with improved pain control throughout the shift  Outcome: Progressing  Goal: Performs ADL's with improved pain control throughout shift  Outcome: Progressing  Goal: Participates in PT with improved pain control throughout the shift  Outcome: Progressing  Goal: Free from opioid side effects throughout the shift  Outcome: Progressing  Goal: Free from acute confusion related to pain meds throughout the shift  Outcome: Progressing     Problem: Pain - Adult  Goal: Verbalizes/displays  adequate comfort level or baseline comfort level  Outcome: Progressing     Problem: Safety - Adult  Goal: Free from fall injury  Outcome: Progressing     Problem: Discharge Planning  Goal: Discharge to home or other facility with appropriate resources  Outcome: Progressing     Problem: Chronic Conditions and Co-morbidities  Goal: Patient's chronic conditions and co-morbidity symptoms are monitored and maintained or improved  Outcome: Progressing     Problem: Fall/Injury  Goal: Not fall by end of shift  Outcome: Progressing  Goal: Be free from injury by end of the shift  Outcome: Progressing  Goal: Verbalize understanding of personal risk factors for fall in the hospital  Outcome: Progressing

## 2024-07-30 NOTE — PROGRESS NOTES
"Nutrition Follow Up Assessment:   Nutrition Assessment    Reason for Assessment: Dietitian discretion (follow up eval)    Pt transferred from [facility] to [facility] on 7/27.   Worsening JOSE RAMON s/p wound debridement for NSTI -- no indication for hemodialysis per nephrology.     Nutrition History:  Food and Nutrient History: Pt reports poor oral intakes assocaited with overall frustration with medical events as well as dislike for food quality. States this is the first time he has been told he was diabetic. States at home he would drink milk or water, ate when he was hungry. Only really drinking liquids currently (milk or diet pepsi). Did not know he was allowed to have fruit -- suspect this is less likely to do with amout of carbs but rather low potassium restrictions = 1000mg potassium in fresh fruit plate. Does not like the Glucerna Shakes. Encouraged him to trial items like cc & fruit or PB&J which would included minimal prep.       Date/Time Diet Type Percent Meals Eaten (%) Appetite Fluid Restrictions Nutritional Supplements Nutritional Supplement Intake   07/28/24 2015 -- 0 -- -- -- --   07/28/24 1200 Carb controlled -- -- -- -- --   07/28/24 1030 Carb controlled -- -- -- -- --   07/28/24 0823 Carb controlled -- -- -- -- --   07/28/24 0456 Carb controlled -- -- -- -- --   07/28/24 0221 Carb controlled -- -- -- -- --   07/27/24 1024 Carb controlled -- Poor -- -- --       Anthropometrics:  Height: 167 cm (5' 5.75\")   Weight: (!) 190 kg (419 lb 1.5 oz)   BMI (Calculated): 68.16  IBW/kg (Dietitian Calculated): 64.5 kg  Percent of IBW: 295 %  Adjusted Body Weight (kg): 96 kg    Weight History:   Date/Time Weight   07/25/24 0500 190 kg (419 lb 1.5 oz) Abnormal    07/21/24 0100 186 kg (409 lb 6.3 oz) Abnormal      Weight Change %:       Nutrition Focused Physical Exam Findings:  Physical Findings:  Mouth: Positive (poor repair)  Skin: Positive (NSTI to groin/thighs/buttock & diabetic toe ulcer)    Nutrition Significant Labs:  CBC " "Trend:   Results from last 7 days   Lab Units 07/30/24  0634 07/29/24  1216 07/26/24  0636 07/26/24  0210   WBC AUTO x10*3/uL 6.5 7.6 9.7 9.3   RBC AUTO x10*6/uL 2.82* 2.61* 2.56* 2.30*   HEMOGLOBIN g/dL 8.0* 7.4* 7.4* 6.6*   HEMATOCRIT % 27.4* 23.4* 23.7* 21.0*   MCV fL 97 90 93 91   PLATELETS AUTO x10*3/uL 233 228 229 202    , BMP Trend:   Results from last 7 days   Lab Units 07/30/24  0634 07/29/24  1612 07/29/24  0714 07/28/24  0817   GLUCOSE mg/dL 77 96 97 80   CALCIUM mg/dL 7.7* 7.0* 7.2* 7.1*   SODIUM mmol/L 136 141 141 138   POTASSIUM mmol/L 5.2 5.3 5.4* 5.3   CO2 mmol/L 20* 21 24 23   CHLORIDE mmol/L 105 108* 106 104   BUN mg/dL 47* 49* 49* 47*   CREATININE mg/dL 5.54* 5.67* 5.46* 4.98*    , A1C:  Lab Results   Component Value Date    HGBA1C 7.2 (H) 07/20/2024   , BG POCT trend:   Results from last 7 days   Lab Units 07/30/24  1208 07/30/24  0831 07/29/24  2100 07/29/24  1731 07/29/24  1216   POCT GLUCOSE mg/dL 89 76 96 93 100*    , Liver Function Trend:    , Renal Lab Trend:   Results from last 7 days   Lab Units 07/30/24  0634 07/29/24  1612 07/29/24  0714 07/28/24  0817   POTASSIUM mmol/L 5.2 5.3 5.4* 5.3   PHOSPHORUS mg/dL 8.3* 8.4* 8.2* 8.5*   SODIUM mmol/L 136 141 141 138   MAGNESIUM mg/dL 2.23  --  2.27 2.32   EGFR mL/min/1.73m*2 12* 12* 13* 14*   BUN mg/dL 47* 49* 49* 47*   CREATININE mg/dL 5.54* 5.67* 5.46* 4.98*    , Lipid Panel:   Lab Results   Component Value Date    CHOL 96 07/23/2024    HDL 13.3 07/23/2024    CHHDL 7.2 07/23/2024    VLDL 45 (H) 07/23/2024    TRIG 223 (H) 07/23/2024    , Vit D: No results found for: \"VITD25\"     Nutrition Specific Medications:  Scheduled medications  heparin, 7,500 Units, subcutaneous, q8h  insulin lispro, 0-10 Units, subcutaneous, q4h  sevelamer carbonate, 800 mg, oral, TID      Continuous medications  sodium chloride 0.9%, 125 mL/hr, Last Rate: 125 mL/hr (07/29/24 1257)      PRN medications  PRN medications: acetaminophen, dextrose, dextrose, glucagon, " glucagon, oxyCODONE, oxyCODONE, oxygen      I/O:   Last BM Date: 07/30/24; Stool Appearance: Loose, Soft (07/30/24 0910)  FMS in place since 7/28    Dietary Orders (From admission, onward)       Start     Ordered    07/30/24 1347  Adult diet Consistent Carb, Potassium restricted 2 gm (50mEq); CCD 90 gm/meal  Diet effective now        Question Answer Comment   Diet type Consistent Carb    Diet type Potassium restricted 2 gm (50mEq)    Carb diet selection: CCD 90 gm/meal        07/30/24 1347    07/30/24 1335  Oral nutritional supplements  Until discontinued        Question Answer Comment   Deliver with All meals    Select supplement: Ensure High Protein        07/30/24 1334                     Estimated Needs:   Total Energy Estimated Needs (kCal): 2400 kCal  Method for Estimating Needs: ~25 kcals/kg using adjusted BW ; REE using MSJ = 2745  Total Protein Estimated Needs (g):  (125-145)  Method for Estimating Needs: ~1.3-1.5 gm/kg using adjusted BW  Total Fluid Estimated Needs (mL):  (1ml/kcal or per team)           Nutrition Diagnosis   Malnutrition Diagnosis  Patient has Malnutrition Diagnosis: No    Nutrition Diagnosis  Patient has Nutrition Diagnosis: Yes  Diagnosis Status (1): Ongoing  Nutrition Diagnosis 1: Increased nutrient needs  Related to (1): increased metabolic demand  As Evidenced by (1): NSTI requiring multiple trips to OR.  Additional Nutrition Diagnosis: Diagnosis 2  Diagnosis Status (2): New  Nutrition Diagnosis 2: Inadequate oral intake  Related to (2): acute events  As Evidenced by (2): pt only asking for milk or diet pepsi with ice       Nutrition Interventions/Recommendations         Nutrition Prescription:  Individualized Nutrition Prescription Provided for : diet + ONS        Nutrition Interventions:   Interventions: Meals and snacks  Meals and Snacks: Carbohydrate-modified diet, Mineral-modified diet (however consider diet liberalization to regular given poor intakes over hospital  course)  Goal: consume > 50% of 3 meals per day  Goal: Daily MVI with minerals  Additional Interventions: Ensure High Protein for 160 kcals,16gm protein, 19gm CHO, 470mg potassium         Nutrition Education:  Encouraged diabetic diet compliance as well as adequate calorie and protein intake to assist with wound healing. To benefit from add teaching at a later date - possibly outpatient if pt agreeable.          Nutrition Monitoring and Evaluation   Food/Nutrient Related History Monitoring  Monitoring and Evaluation Plan: Energy intake  Energy Intake: Estimated energy intake  Criteria: >75% est calorie & protein needs met         Biochemical Data, Medical Tests and Procedures  Monitoring and Evaluation Plan: Electrolyte/renal panel, Glucose/endocrine profile  Electrolyte and Renal Panel: Potassium  Criteria: lytes WNL  Criteria:  mg/dL WNL    Nutrition Focused Physical Findings  Other: interventions to promote wound healing         Time Spent (min): 60 minutes

## 2024-07-30 NOTE — CARE PLAN
Problem: Skin  Goal: Participates in plan/prevention/treatment measures  7/29/2024 2050 by Jacki Lewis RN  Outcome: Not Progressing  7/29/2024 2049 by Jacki Lewis RN  Outcome: Not Progressing     Problem: Skin  Goal: Decreased wound size/increased tissue granulation at next dressing change  7/29/2024 2050 by Jacki Lewis RN  Outcome: Progressing  7/29/2024 2049 by Jacki Lewis RN  Outcome: Progressing  Goal: Prevent/manage excess moisture  7/29/2024 2050 by Jacki Lewis RN  Outcome: Progressing  7/29/2024 2049 by Jacki Lewis RN  Outcome: Progressing  Goal: Prevent/minimize sheer/friction injuries  7/29/2024 2050 by Jacki Lewis RN  Outcome: Progressing  7/29/2024 2049 by Jacki Lewis RN  Outcome: Progressing  Goal: Promote/optimize nutrition  7/29/2024 2050 by Jacki Lewis RN  Outcome: Progressing  7/29/2024 2049 by Jacki Lewis RN  Outcome: Progressing  Goal: Promote skin healing  7/29/2024 2050 by Jacki Lewis RN  Outcome: Progressing  7/29/2024 2049 by Jacki Lewis RN  Outcome: Progressing     Problem: Pain - Adult  Goal: Verbalizes/displays adequate comfort level or baseline comfort level  7/29/2024 2050 by Jacki Lewis RN  Outcome: Progressing  7/29/2024 2049 by Jacki Lewis RN  Outcome: Progressing     Problem: Fall/Injury  Goal: Not fall by end of shift  Outcome: Progressing  Goal: Be free from injury by end of the shift  Outcome: Progressing  Goal: Verbalize understanding of personal risk factors for fall in the hospital  Outcome: Progressing    The clinical goals for the shift include Patient's pain controlled to tolerable level. Patient compliant with DVT prophylaxis. Patient remains safe and free from falls. Patient compliant with skin prevention measures.

## 2024-07-30 NOTE — NURSING NOTE
Attempted to turn the patient to inspect skin, inspect the rectal tube, and clean potential incontinence multiple times throughout the shift and patient declined each time. Patient educated on risks of skin breakdown without doing Q2H turns and cleaning incontinence. Patient verbalized understanding.

## 2024-07-30 NOTE — PROGRESS NOTES
NEPHROLOGY FOLLOW UP NOTE    Artem Suarez   39 y.o.    190 kg (419 lnb 1.5 oz)   MRN/Room: [id number]/[id number]    Subjective: Artem is feeling well today; about the same as yesterday. He is drinking lots of fluids and peeing quite a bit. He denies any fever, chills, shortness of breath, chest pain, nausea, vomiting, diarrhea, headache, or dysuria.    Meds:   heparin, 7,500 Units, q8h  insulin lispro, 0-10 Units, q4h  sevelamer carbonate, 800 mg, TID      sodium chloride 0.9%, Last Rate: 125 mL/hr (07/29/24 1257)      acetaminophen, 650 mg, q6h PRN  dextrose, 12.5 g, q15 min PRN  dextrose, 25 g, q15 min PRN  glucagon, 1 mg, q15 min PRN  glucagon, 1 mg, q15 min PRN  oxyCODONE, 10 mg, q6h PRN  oxyCODONE, 5 mg, q6h PRN  oxygen, , Continuous PRN - O2/gases      Objective:     Vitals:    07/30/24 1154   BP: 113/70   Pulse: 77   Resp: 16   Temp: 36.3 °C (97.3 °F)   SpO2: 92%          Intake/Output Summary (Last 24 hours) at 7/30/2024 1249  Last data filed at 7/30/2024 0900  Gross per 24 hour   Intake 2720 ml   Output 3500 ml   Net -780 ml       General appearance: AAOx3. No distress  Eyes: non-icteric  Mouth: Dry mucous membranes  Skin: no apparent rash  Heart: RRR. No MRG. Normal S1/S2.  Lungs: CTA bilat.  No wheezing/crackles  Abdomen: soft, nt/nd  Extremities: Nonpitting edema bilat to knees with dry skin and brown discoloration of lower calves.  :  Wolfe in place  Neuro: No FND, no asterixis   ACCESS: PIV forearm. Wound drains.    Blood Labs:  Results for orders placed or performed during the hospital encounter of 07/19/24 (from the past 24 hour(s))   Renal function panel   Result Value Ref Range    Glucose 96 74 - 99 mg/dL    Sodium 141 136 - 145 mmol/L    Potassium 5.3 3.5 - 5.3 mmol/L    Chloride 108 (H) 98 - 107 mmol/L    Bicarbonate 21 21 - 32 mmol/L    Anion Gap 17 10 - 20 mmol/L    Urea Nitrogen 49 (H) 6 - 23 mg/dL    Creatinine 5.67 (H) 0.50 - 1.30 mg/dL    eGFR 12 (L) >60 mL/min/1.73m*2    Calcium 7.0 (L)  8.6 - 10.6 mg/dL    Phosphorus 8.4 (H) 2.5 - 4.9 mg/dL    Albumin 1.8 (L) 3.4 - 5.0 g/dL   Urine electrolytes   Result Value Ref Range    Sodium, Urine Random 54 mmol/L    Sodium/Creatinine Ratio 323 Not established. mmol/g Creat    Potassium, Urine Random 13 mmol/L    Potassium/Creatinine Ratio 78 Not established mmol/g Creat    Chloride, Urine Random 53 mmol/L    Chloride/Creatinine Ratio 317 (H) 23 - 275 mmol/g creat    Creatinine, Urine Random 16.7 (L) 20.0 - 370.0 mg/dL   Urinalysis with Reflex Culture and Microscopic   Result Value Ref Range    Color, Urine Colorless (N) Light-Yellow, Yellow, Dark-Yellow    Appearance, Urine Clear Clear    Specific Gravity, Urine 1.005 1.005 - 1.035    pH, Urine 6.5 5.0, 5.5, 6.0, 6.5, 7.0, 7.5, 8.0    Protein, Urine NEGATIVE NEGATIVE, 10 (TRACE), 20 (TRACE) mg/dL    Glucose, Urine Normal Normal mg/dL    Blood, Urine 0.06 (1+) (A) NEGATIVE    Ketones, Urine NEGATIVE NEGATIVE mg/dL    Bilirubin, Urine NEGATIVE NEGATIVE    Urobilinogen, Urine Normal Normal mg/dL    Nitrite, Urine NEGATIVE NEGATIVE    Leukocyte Esterase, Urine NEGATIVE NEGATIVE   Extra Urine Gray Tube   Result Value Ref Range    Extra Tube Hold for add-ons.    Urinalysis Microscopic   Result Value Ref Range    WBC, Urine 1-5 1-5, NONE /HPF    RBC, Urine 1-2 NONE, 1-2, 3-5 /HPF   POCT GLUCOSE   Result Value Ref Range    POCT Glucose 93 74 - 99 mg/dL   POCT GLUCOSE   Result Value Ref Range    POCT Glucose 96 74 - 99 mg/dL   Renal function panel   Result Value Ref Range    Glucose 77 74 - 99 mg/dL    Sodium 136 136 - 145 mmol/L    Potassium 5.2 3.5 - 5.3 mmol/L    Chloride 105 98 - 107 mmol/L    Bicarbonate 20 (L) 21 - 32 mmol/L    Anion Gap 16 10 - 20 mmol/L    Urea Nitrogen 47 (H) 6 - 23 mg/dL    Creatinine 5.54 (H) 0.50 - 1.30 mg/dL    eGFR 12 (L) >60 mL/min/1.73m*2    Calcium 7.7 (L) 8.6 - 10.6 mg/dL    Phosphorus 8.3 (H) 2.5 - 4.9 mg/dL    Albumin 2.0 (L) 3.4 - 5.0 g/dL   Magnesium   Result Value Ref Range     Magnesium 2.23 1.60 - 2.40 mg/dL   CBC   Result Value Ref Range    WBC 6.5 4.4 - 11.3 x10*3/uL    nRBC 0.0 0.0 - 0.0 /100 WBCs    RBC 2.82 (L) 4.50 - 5.90 x10*6/uL    Hemoglobin 8.0 (L) 13.5 - 17.5 g/dL    Hematocrit 27.4 (L) 41.0 - 52.0 %    MCV 97 80 - 100 fL    MCH 28.4 26.0 - 34.0 pg    MCHC 29.2 (L) 32.0 - 36.0 g/dL    RDW 16.7 (H) 11.5 - 14.5 %    Platelets 233 150 - 450 x10*3/uL   POCT GLUCOSE   Result Value Ref Range    POCT Glucose 76 74 - 99 mg/dL   POCT GLUCOSE   Result Value Ref Range    POCT Glucose 89 74 - 99 mg/dL          ASSESSMENT:  Artem Suarez is a  39 y.o.  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 Creatinine at 5.54 today, stable over the past three days.     #JOSE RAMON, non-oliguric - BL 1.0  - Etiology of JOSE RAMON is most likely hypoperfusion from sepsis and multiple debridement surgeries, now transitioned to ATN. Elevated vanc levels (39) around time of creatinine rise suggest it could be contributory as well.  - Considering persistently rising creatinine despite good urine output (1.3 ml/kg/hr) it is likely the hypoperfusion resulted in acute tubular necrosis, which may now be in a recovery phase.   - UA significant for 1+ blood, no protein or WBC  - FENa 11% suggests tubular injury  - Large urine output suggest against post-renal; renal US pending  - Patient has no fever, rash, urinary WBC, or eosiniphilia to suggest AIN and no recent contrast studies.   - Volume status: euvolemic       Recommendations:  - Follow up renal ultrasound  -Electrolytes are currently stable, acid/base is balanced, and patient is alert and oriented; there is no indication for hemodialysis at this time.   - Ok to continue IVF and will re-evaluate tomorrow   - Avoid nephrotoxins, contrast if possible  - Strict Is/Os    Delfino Starr, MS4  24 hour Renal Pager - [phone]    Discussed with attending  nephrologist, Dr. Nathaly Herndon.

## 2024-07-31 ENCOUNTER — APPOINTMENT (OUTPATIENT)
Dept: RADIOLOGY | Facility: HOSPITAL | Age: 39
End: 2024-07-31
Payer: COMMERCIAL

## 2024-07-31 LAB
ALBUMIN SERPL BCP-MCNC: 1.7 G/DL (ref 3.4–5)
ALBUMIN SERPL BCP-MCNC: 1.8 G/DL (ref 3.4–5)
ANION GAP BLDV CALCULATED.4IONS-SCNC: 13 MMOL/L (ref 10–25)
ANION GAP SERPL CALC-SCNC: 18 MMOL/L (ref 10–20)
ANION GAP SERPL CALC-SCNC: 18 MMOL/L (ref 10–20)
APPEARANCE UR: CLEAR
BASE EXCESS BLDV CALC-SCNC: -4 MMOL/L (ref -2–3)
BILIRUB UR STRIP.AUTO-MCNC: NEGATIVE MG/DL
BODY TEMPERATURE: 37 DEGREES CELSIUS
BUN SERPL-MCNC: 46 MG/DL (ref 6–23)
BUN SERPL-MCNC: 47 MG/DL (ref 6–23)
CA-I BLDV-SCNC: 1.08 MMOL/L (ref 1.1–1.33)
CALCIUM SERPL-MCNC: 7.1 MG/DL (ref 8.6–10.6)
CALCIUM SERPL-MCNC: 7.4 MG/DL (ref 8.6–10.6)
CHLORIDE BLDV-SCNC: 108 MMOL/L (ref 98–107)
CHLORIDE SERPL-SCNC: 106 MMOL/L (ref 98–107)
CHLORIDE SERPL-SCNC: 107 MMOL/L (ref 98–107)
CHLORIDE UR-SCNC: 46 MMOL/L
CHLORIDE/CREATININE (MMOL/G) IN URINE: 200 MMOL/G CREAT (ref 23–275)
CO2 SERPL-SCNC: 18 MMOL/L (ref 21–32)
CO2 SERPL-SCNC: 20 MMOL/L (ref 21–32)
COLOR UR: COLORLESS
CREAT SERPL-MCNC: 5.66 MG/DL (ref 0.5–1.3)
CREAT SERPL-MCNC: 6.25 MG/DL (ref 0.5–1.3)
CREAT UR-MCNC: 23 MG/DL (ref 20–370)
EGFRCR SERPLBLD CKD-EPI 2021: 11 ML/MIN/1.73M*2
EGFRCR SERPLBLD CKD-EPI 2021: 12 ML/MIN/1.73M*2
ERYTHROCYTE [DISTWIDTH] IN BLOOD BY AUTOMATED COUNT: 16 % (ref 11.5–14.5)
ERYTHROCYTE [DISTWIDTH] IN BLOOD BY AUTOMATED COUNT: 16.4 % (ref 11.5–14.5)
GLUCOSE BLD MANUAL STRIP-MCNC: 121 MG/DL (ref 74–99)
GLUCOSE BLD MANUAL STRIP-MCNC: 152 MG/DL (ref 74–99)
GLUCOSE BLD MANUAL STRIP-MCNC: 85 MG/DL (ref 74–99)
GLUCOSE BLD MANUAL STRIP-MCNC: 86 MG/DL (ref 74–99)
GLUCOSE BLD MANUAL STRIP-MCNC: 93 MG/DL (ref 74–99)
GLUCOSE BLD MANUAL STRIP-MCNC: 96 MG/DL (ref 74–99)
GLUCOSE BLDV-MCNC: 97 MG/DL (ref 74–99)
GLUCOSE SERPL-MCNC: 76 MG/DL (ref 74–99)
GLUCOSE SERPL-MCNC: 88 MG/DL (ref 74–99)
GLUCOSE UR STRIP.AUTO-MCNC: NORMAL MG/DL
HCO3 BLDV-SCNC: 20.8 MMOL/L (ref 22–26)
HCT VFR BLD AUTO: 25.6 % (ref 41–52)
HCT VFR BLD AUTO: 26.8 % (ref 41–52)
HCT VFR BLD EST: 30 % (ref 41–52)
HGB BLD-MCNC: 7.9 G/DL (ref 13.5–17.5)
HGB BLD-MCNC: 8 G/DL (ref 13.5–17.5)
HGB BLDV-MCNC: 10 G/DL (ref 13.5–17.5)
INHALED O2 CONCENTRATION: 28 %
KETONES UR STRIP.AUTO-MCNC: NEGATIVE MG/DL
LACTATE BLDV-SCNC: 1.8 MMOL/L (ref 0.4–2)
LEUKOCYTE ESTERASE UR QL STRIP.AUTO: NEGATIVE
MAGNESIUM SERPL-MCNC: 1.91 MG/DL (ref 1.6–2.4)
MAGNESIUM SERPL-MCNC: 2.13 MG/DL (ref 1.6–2.4)
MCH RBC QN AUTO: 27.9 PG (ref 26–34)
MCH RBC QN AUTO: 28.7 PG (ref 26–34)
MCHC RBC AUTO-ENTMCNC: 29.5 G/DL (ref 32–36)
MCHC RBC AUTO-ENTMCNC: 31.3 G/DL (ref 32–36)
MCV RBC AUTO: 92 FL (ref 80–100)
MCV RBC AUTO: 95 FL (ref 80–100)
NITRITE UR QL STRIP.AUTO: NEGATIVE
NRBC BLD-RTO: 0 /100 WBCS (ref 0–0)
NRBC BLD-RTO: 0 /100 WBCS (ref 0–0)
OSMOLALITY UR: 161 MOSM/KG (ref 200–1200)
OXYHGB MFR BLDV: 68.7 % (ref 45–75)
PCO2 BLDV: 36 MM HG (ref 41–51)
PH BLDV: 7.37 PH (ref 7.33–7.43)
PH UR STRIP.AUTO: 6.5 [PH]
PHOSPHATE SERPL-MCNC: 8.2 MG/DL (ref 2.5–4.9)
PHOSPHATE SERPL-MCNC: 8.2 MG/DL (ref 2.5–4.9)
PLATELET # BLD AUTO: 211 X10*3/UL (ref 150–450)
PLATELET # BLD AUTO: 212 X10*3/UL (ref 150–450)
PO2 BLDV: 43 MM HG (ref 35–45)
POTASSIUM BLDV-SCNC: 5.2 MMOL/L (ref 3.5–5.3)
POTASSIUM SERPL-SCNC: 5 MMOL/L (ref 3.5–5.3)
POTASSIUM SERPL-SCNC: 5.1 MMOL/L (ref 3.5–5.3)
POTASSIUM UR-SCNC: 17 MMOL/L
POTASSIUM/CREAT UR-RTO: 74 MMOL/G CREAT
PROT UR STRIP.AUTO-MCNC: ABNORMAL MG/DL
RBC # BLD AUTO: 2.79 X10*6/UL (ref 4.5–5.9)
RBC # BLD AUTO: 2.83 X10*6/UL (ref 4.5–5.9)
RBC # UR STRIP.AUTO: ABNORMAL /UL
RBC #/AREA URNS AUTO: NORMAL /HPF
SAO2 % BLDV: 71 % (ref 45–75)
SODIUM BLDV-SCNC: 137 MMOL/L (ref 136–145)
SODIUM SERPL-SCNC: 137 MMOL/L (ref 136–145)
SODIUM SERPL-SCNC: 140 MMOL/L (ref 136–145)
SODIUM UR-SCNC: 42 MMOL/L
SODIUM/CREAT UR-RTO: 183 MMOL/G CREAT
SP GR UR STRIP.AUTO: 1
UROBILINOGEN UR STRIP.AUTO-MCNC: NORMAL MG/DL
WBC # BLD AUTO: 10.6 X10*3/UL (ref 4.4–11.3)
WBC # BLD AUTO: 7.2 X10*3/UL (ref 4.4–11.3)
WBC #/AREA URNS AUTO: NORMAL /HPF

## 2024-07-31 PROCEDURE — 85027 COMPLETE CBC AUTOMATED: CPT

## 2024-07-31 PROCEDURE — 87040 BLOOD CULTURE FOR BACTERIA: CPT

## 2024-07-31 PROCEDURE — 36415 COLL VENOUS BLD VENIPUNCTURE: CPT | Performed by: STUDENT IN AN ORGANIZED HEALTH CARE EDUCATION/TRAINING PROGRAM

## 2024-07-31 PROCEDURE — 81001 URINALYSIS AUTO W/SCOPE: CPT | Performed by: INTERNAL MEDICINE

## 2024-07-31 PROCEDURE — 80069 RENAL FUNCTION PANEL: CPT

## 2024-07-31 PROCEDURE — 71045 X-RAY EXAM CHEST 1 VIEW: CPT

## 2024-07-31 PROCEDURE — 82436 ASSAY OF URINE CHLORIDE: CPT | Performed by: INTERNAL MEDICINE

## 2024-07-31 PROCEDURE — 2500000004 HC RX 250 GENERAL PHARMACY W/ HCPCS (ALT 636 FOR OP/ED)

## 2024-07-31 PROCEDURE — 2500000002 HC RX 250 W HCPCS SELF ADMINISTERED DRUGS (ALT 637 FOR MEDICARE OP, ALT 636 FOR OP/ED): Performed by: STUDENT IN AN ORGANIZED HEALTH CARE EDUCATION/TRAINING PROGRAM

## 2024-07-31 PROCEDURE — 80069 RENAL FUNCTION PANEL: CPT | Performed by: STUDENT IN AN ORGANIZED HEALTH CARE EDUCATION/TRAINING PROGRAM

## 2024-07-31 PROCEDURE — 83735 ASSAY OF MAGNESIUM: CPT

## 2024-07-31 PROCEDURE — 71045 X-RAY EXAM CHEST 1 VIEW: CPT | Performed by: RADIOLOGY

## 2024-07-31 PROCEDURE — P9045 ALBUMIN (HUMAN), 5%, 250 ML: HCPCS | Mod: JZ | Performed by: STUDENT IN AN ORGANIZED HEALTH CARE EDUCATION/TRAINING PROGRAM

## 2024-07-31 PROCEDURE — 82947 ASSAY GLUCOSE BLOOD QUANT: CPT

## 2024-07-31 PROCEDURE — 84132 ASSAY OF SERUM POTASSIUM: CPT

## 2024-07-31 PROCEDURE — 2500000004 HC RX 250 GENERAL PHARMACY W/ HCPCS (ALT 636 FOR OP/ED): Mod: JZ | Performed by: STUDENT IN AN ORGANIZED HEALTH CARE EDUCATION/TRAINING PROGRAM

## 2024-07-31 PROCEDURE — 99232 SBSQ HOSP IP/OBS MODERATE 35: CPT | Performed by: INTERNAL MEDICINE

## 2024-07-31 PROCEDURE — 83935 ASSAY OF URINE OSMOLALITY: CPT | Performed by: INTERNAL MEDICINE

## 2024-07-31 PROCEDURE — 2020000001 HC ICU ROOM DAILY

## 2024-07-31 PROCEDURE — 36415 COLL VENOUS BLD VENIPUNCTURE: CPT

## 2024-07-31 PROCEDURE — 83735 ASSAY OF MAGNESIUM: CPT | Performed by: STUDENT IN AN ORGANIZED HEALTH CARE EDUCATION/TRAINING PROGRAM

## 2024-07-31 RX ORDER — VANCOMYCIN HYDROCHLORIDE 1 G/200ML
1000 INJECTION, SOLUTION INTRAVENOUS ONCE
Status: COMPLETED | OUTPATIENT
Start: 2024-07-31 | End: 2024-07-31

## 2024-07-31 RX ORDER — VANCOMYCIN HYDROCHLORIDE 1 G/20ML
INJECTION, POWDER, LYOPHILIZED, FOR SOLUTION INTRAVENOUS DAILY PRN
Status: DISCONTINUED | OUTPATIENT
Start: 2024-07-31 | End: 2024-08-01

## 2024-07-31 RX ORDER — ALBUMIN HUMAN 50 G/1000ML
25 SOLUTION INTRAVENOUS ONCE
Status: COMPLETED | OUTPATIENT
Start: 2024-07-31 | End: 2024-07-31

## 2024-07-31 ASSESSMENT — PAIN SCALES - GENERAL
PAINLEVEL_OUTOF10: 0 - NO PAIN
PAINLEVEL_OUTOF10: 0 - NO PAIN

## 2024-07-31 NOTE — CARE PLAN
The patient's goals for the shift include      The clinical goals for the shift include pt will remain safe and use call light

## 2024-07-31 NOTE — SIGNIFICANT EVENT
Rapid Response RN NOTE:       07/31/24 1612   Onset Documentation   Rapid Response Initiated By Radar auto page   Location/Room [facility]  ([place] [address])   Pager Time [phone]   Arrival Time 1635   Event End Time 1715   Level II Called No   Primary Reason for Call Radar auto page  (RADAR score = 6)     RADAR auto page received triggered by vitals - Temp 36.6,, RR 15, BP 90/55, pulse ox 92%.  On my arrival, vitals rechecked - , BP  87/56 (bilat upper arms), pulse ox 89-90% on RA.  Placed on 2LNC with increase to 94%. Pt denies shortness of breath, dizziness, lightheadedness. Primary team MD Askew notified and ACS team to bedside for eval.  IVF bolus initiated and labs sent for CBC/renal.  No further rapid response internventions requested at this time per team.  Please page rapid response for any assistance needed or concerns for deterioration.

## 2024-07-31 NOTE — CARE PLAN
The clinical goals for the shift include pt will remain safe and use call light      Problem: Skin  Goal: Decreased wound size/increased tissue granulation at next dressing change  7/30/2024 2048 by Mere Marcus RN  Flowsheets (Taken 7/30/2024 2048)  Decreased wound size/increased tissue granulation at next dressing change: Promote sleep for wound healing  7/30/2024 2048 by Mere Marcus RN  Outcome: Progressing  Flowsheets (Taken 7/30/2024 2048)  Decreased wound size/increased tissue granulation at next dressing change: Promote sleep for wound healing  Goal: Participates in plan/prevention/treatment measures  7/30/2024 2048 by Mere Marcus RN  Flowsheets (Taken 7/30/2024 2048)  Participates in plan/prevention/treatment measures: Elevate heels  7/30/2024 2048 by Mere Marcus RN  Outcome: Progressing  Flowsheets (Taken 7/30/2024 2048)  Participates in plan/prevention/treatment measures: Elevate heels  Goal: Prevent/manage excess moisture  7/30/2024 2048 by Mere Marcus RN  Flowsheets (Taken 7/30/2024 2048)  Prevent/manage excess moisture: Cleanse incontinence/protect with barrier cream  7/30/2024 2048 by Mere Marcus RN  Outcome: Progressing  Flowsheets (Taken 7/30/2024 2048)  Prevent/manage excess moisture: Cleanse incontinence/protect with barrier cream  Goal: Prevent/minimize sheer/friction injuries  7/30/2024 2048 by Mere Marcus RN  Flowsheets (Taken 7/30/2024 2048)  Prevent/minimize sheer/friction injuries: Turn/reposition every 2 hours/use positioning/transfer devices  7/30/2024 2048 by Mere Marcus RN  Outcome: Progressing  Flowsheets (Taken 7/30/2024 2048)  Prevent/minimize sheer/friction injuries: Turn/reposition every 2 hours/use positioning/transfer devices  Goal: Promote/optimize nutrition  7/30/2024 2048 by Mere Marcus RN  Flowsheets (Taken 7/30/2024 2048)  Promote/optimize nutrition: Offer water/supplements/favorite foods  7/30/2024 2048 by Mere  LYNDA Marcus  Outcome: Progressing  Flowsheets (Taken 7/30/2024 2048)  Promote/optimize nutrition: Offer water/supplements/favorite foods  Goal: Promote skin healing  7/30/2024 2048 by Mere Marcus RN  Flowsheets (Taken 7/30/2024 2048)  Promote skin healing: Turn/reposition every 2 hours/use positioning/transfer devices  7/30/2024 2048 by Mere Marcus RN  Outcome: Progressing  Flowsheets (Taken 7/30/2024 2048)  Promote skin healing: Turn/reposition every 2 hours/use positioning/transfer devices     Problem: Pain  Goal: Takes deep breaths with improved pain control throughout the shift  Outcome: Progressing  Goal: Turns in bed with improved pain control throughout the shift  Outcome: Progressing  Goal: Walks with improved pain control throughout the shift  Outcome: Progressing  Goal: Performs ADL's with improved pain control throughout shift  Outcome: Progressing  Goal: Participates in PT with improved pain control throughout the shift  Outcome: Progressing  Goal: Free from opioid side effects throughout the shift  Outcome: Progressing  Goal: Free from acute confusion related to pain meds throughout the shift  Outcome: Progressing     Problem: Pain - Adult  Goal: Verbalizes/displays adequate comfort level or baseline comfort level  Outcome: Progressing     Problem: Safety - Adult  Goal: Free from fall injury  Outcome: Progressing     Problem: Discharge Planning  Goal: Discharge to home or other facility with appropriate resources  Outcome: Progressing     Problem: Chronic Conditions and Co-morbidities  Goal: Patient's chronic conditions and co-morbidity symptoms are monitored and maintained or improved  Outcome: Progressing     Problem: Fall/Injury  Goal: Not fall by end of shift  Outcome: Progressing  Goal: Be free from injury by end of the shift  Outcome: Progressing  Goal: Verbalize understanding of personal risk factors for fall in the hospital  Outcome: Progressing

## 2024-07-31 NOTE — SIGNIFICANT EVENT
Significant Event    Called to bedside by Diandra Orozco RN around 4:40pm for a RADAR alert for hypotensive episode with pressures 87/56, , O2 89% on room air. ACS team immediately got to bedside, he was placed on 2L NC and saturations improved 94%. Repeat pressures were still in the low 90s/60s. He was more lethargic this afternoon, and we encouraged PO intake as he has not been eating well per nursing. At bedside he was asymptomatic and comfortable in bed - denied chest pain, shortness of breath, abdominal pain, nausea, vomiting, lightheadedness, dizziness. We gave him a 1L bolus and ordered repeat labs (CBC, RFP, Mg), and were to repeat pressures half way through the bolus and at the end of the bolus. After discussions with Dr. De La Cruz, a VBG and CXR were also ordered. A UA with reflex to culture was already ordered today on the patient.     On repeat examination with chief resident, Dr. Brennan Corcoran, around half way through the 1L bolus, his pressures remained soft with SBP at 85. We repacked his groin wound and ordered blood cultures (pending). The lactate on the VBG came back at 1.8. Pressures were confirmed in both arms. A repeat blood glucose was checked at 88.     Dr. Marie was called and came to bedside. After discussions with Dr. Marie, we decided to start the patient on empiric vancomycin and zosyn, as well as give an albumin bolus. We plan to send the patient to the ICU for further care.     Patient discussed with Dr. Frank Askew MD  PGY-1   Acute Care Surgery  Service Pager: [phone]

## 2024-07-31 NOTE — PROGRESS NOTES
Physical Therapy                 Therapy Communication Note    Patient Name: Artem Suarez  MRN: [id number]  Today's Date: 7/31/2024     Discipline: Physical Therapy     Missed Visit Reason:  (1143: Pt refusing participation in therapy. States that he is tired and in pain from having to roll with the RN staff. Maximal encouragement and education provided regarding importance of mobility however pt continued to refuse.)    Missed Time: Attempt

## 2024-07-31 NOTE — PROGRESS NOTES
[facility]  ACUTE CARE SURGERY - PROGRESS NOTE    Patient Name: Artem Suarez  MRN: [id number]  Admit Date: 719  : 1985  AGE: 39 y.o.   GENDER: male  ==============================================================================  TODAY'S ASSESSMENT AND PLAN OF CARE:  39M presented as a transfer for NSTI of perineum and thighs in septic shock.     OR  at OSH: wide debridement of perineum and thighs  OR : debridement  OR : debridement  OR : wound vac placed  OR : wound vac     Pt with an episode of hypotension (86/58), , and lethargy this afternoon. Evaluated bedside, remains asymptomatic and comfortable. Given 1L LR bolus and ordered repeat labs. Will consider sepsis workup if patient does not respond to fluids.     PLAN  Neuro   - pain control: PRN tylenol and oxycodone     Respiratory  - saturating well on room air   - supplemental O2 available as needed     Cardiovascular:    - will continue to monitor vitals q4h   - Follow up CBC    GI  - Carb controlled diet, potassium restricted 2gm, CCD 90gm/meal   - oral supplementation with glucerna shakes and magic cups with meals     /FEN  #JOSE RAMON   - Cr 5.6 (5.5)   - nephrology consulted for worsening JOSE RAMON s/p wound debridement for NSTI   - Follow up RFP  - Decrease mIVF to 100cc/hr    - follow urine lytes and osm   - nutrition recommendations appreciated       - keith exchanged   - continue to monitor I/Os     Hematologic  - daily CBC    Endocrine  #History of diabetes   - POCT glucose checks   - SSI #2 available    - endocrine consulted for long term insulin plan, recommendations appreciated, outpatient follow up        MSK/SKIN  #NSTI, perineum and thighs   - transition to WTD dressing changes to be performed by ACS team daily   - s/p debridements (OSH: , UH: , ) and wound vac placements ( and )     #Right great toe wound, chronic    - known on admission   - wound care previously  consulted, and recommendations appreciated    - continue daily dressing changes per wound care: cleanse the toe wound with NS and pat toe dry, paint toe wound with betadine and cover with a dry dressing     Infectious disease  - no current indications for antibiotics  - will continue to monitor WBC with daily CBC  - remains afebrile     GI Ppx: not indicated  DVT Ppx: SCDs and heparin     Disposition: remain on RNF     Patient discussed with Attending, Dr. Dorothy Meyer MD  PGY-1   Acute Care Surgery  Service Pager: [phone]    ==============================================================================  CHIEF COMPLAINT / EVENTS LAST 24HRS / HPI:  No acute events overnight. Today he endorses pain with dressing changes, but otherwise no concerns. He denies nausea, vomiting, chest pain, shortness of breath.     MEDICAL HISTORY / ROS:   Admission history and ROS reviewed. Pertinent changes as follows:  None     PHYSICAL EXAM:  Heart Rate:  []   Temp:  [36 °C (96.8 °F)-37.1 °C (98.8 °F)]   Resp:  [15-16]   BP: ()/(55-66)   SpO2:  [90 %-94 %]     Physical exam  General: NAD  Respiratory: on RA, nonlabored breathing   CV: not tachycardic   GI: abdomen is soft, non-tender, non distended  : keith in place and external fecal tube in place   Skin/MSK: wet to dry dressing with ABD pads on top in place covering wounds, R great toe wound present     IMAGING SUMMARY:  (summary of new imaging findings, not a copy of dictation)  None     LABS:  Results from last 7 days   Lab Units 07/31/24  0730 07/30/24  0634 07/29/24  1216   WBC AUTO x10*3/uL 7.2 6.5 7.6   HEMOGLOBIN g/dL 7.9* 8.0* 7.4*   HEMATOCRIT % 26.8* 27.4* 23.4*   PLATELETS AUTO x10*3/uL 211 233 228         Results from last 7 days   Lab Units 07/31/24  0730 07/30/24  0634 07/29/24  1612   SODIUM mmol/L 137 136 141   POTASSIUM mmol/L 5.1 5.2 5.3   CHLORIDE mmol/L 106 105 108*   CO2 mmol/L 18* 20* 21   BUN mg/dL 47* 47* 49*   CREATININE mg/dL  5.66* 5.54* 5.67*   CALCIUM mg/dL 7.4* 7.7* 7.0*   GLUCOSE mg/dL 76 77 96                 I have reviewed all medications, laboratory results, and imaging pertinent for today's encounter.

## 2024-07-31 NOTE — CARE PLAN
The clinical goals for the shift include pt will remain safe and use call light    Problem: Skin  Goal: Decreased wound size/increased tissue granulation at next dressing change  Outcome: Progressing  Goal: Participates in plan/prevention/treatment measures  Outcome: Progressing  Goal: Prevent/manage excess moisture  Outcome: Progressing  Goal: Prevent/minimize sheer/friction injuries  Outcome: Progressing  Goal: Promote/optimize nutrition  Outcome: Progressing  Goal: Promote skin healing  Outcome: Progressing     Problem: Pain  Goal: Takes deep breaths with improved pain control throughout the shift  Outcome: Progressing  Goal: Turns in bed with improved pain control throughout the shift  Outcome: Progressing  Goal: Walks with improved pain control throughout the shift  Outcome: Progressing  Goal: Performs ADL's with improved pain control throughout shift  Outcome: Progressing  Goal: Participates in PT with improved pain control throughout the shift  Outcome: Progressing  Goal: Free from opioid side effects throughout the shift  Outcome: Progressing  Goal: Free from acute confusion related to pain meds throughout the shift  Outcome: Progressing     Problem: Pain - Adult  Goal: Verbalizes/displays adequate comfort level or baseline comfort level  Outcome: Progressing     Problem: Safety - Adult  Goal: Free from fall injury  Outcome: Progressing     Problem: Discharge Planning  Goal: Discharge to home or other facility with appropriate resources  Outcome: Progressing     Problem: Chronic Conditions and Co-morbidities  Goal: Patient's chronic conditions and co-morbidity symptoms are monitored and maintained or improved  Outcome: Progressing

## 2024-07-31 NOTE — PROGRESS NOTES
[facility] confirmed they reached out today for precert to be initiated. SW sent updates. Will continue to follow.    RAHEEL Amaya

## 2024-07-31 NOTE — NURSING NOTE
This RN and SENDY King enter room to reposition pt with wedges, FMS noted to be leaking with great amount of stool noted in bed. Wound dressings saturated with moist stool. This RN removed W2D dressings, irrigated wounds with saline, replaced new moist guaze and covered with dry ABDs. Bathing care completed at this time, including keith care. Pt repositioned onto left side with wedges for buttock/FMS offloading to assist with wound healing and attempt to maintain patency of FMS to avoid stool contact with wounds. Bed in low position, call light in reach. Pt refusing to eat meal, reports only wants juice and milk.

## 2024-07-31 NOTE — CARE PLAN
The patient's goals for the shift include      The clinical goals for the shift include pt will turn/reposition Q2 hrs      Problem: Skin  Goal: Decreased wound size/increased tissue granulation at next dressing change  7/31/2024 1256 by Lis Bourgeois RN  Flowsheets (Taken 7/31/2024 1256)  Decreased wound size/increased tissue granulation at next dressing change: Utilize specialty bed per algorithm  7/31/2024 1241 by Lis Bourgeois RN  Outcome: Progressing     Problem: Skin  Goal: Participates in plan/prevention/treatment measures  7/31/2024 1256 by Lis Bourgeois RN  Flowsheets (Taken 7/31/2024 1256)  Participates in plan/prevention/treatment measures: Elevate heels  7/31/2024 1241 by Lis Bourgeois RN  Outcome: Progressing     Problem: Skin  Goal: Prevent/manage excess moisture  7/31/2024 1256 by Lis Bourgeois RN  Flowsheets (Taken 7/31/2024 1256)  Prevent/manage excess moisture: Monitor for/manage infection if present  7/31/2024 1241 by Lis Bourgeois RN  Outcome: Progressing     Problem: Skin  Goal: Promote skin healing  7/31/2024 1256 by Lis Bourgeois RN  Flowsheets (Taken 7/30/2024 2048 by Mere Marcus RN)  Promote skin healing: Turn/reposition every 2 hours/use positioning/transfer devices  7/31/2024 1241 by Lis Bourgeois RN  Outcome: Progressing

## 2024-07-31 NOTE — CONSULTS
Vancomycin Dosing by Pharmacy- INITIAL    Artem Suarez is a 39 y.o. year old male who Pharmacy has been consulted for vancomycin dosing for cellulitis, skin and soft tissue. Based on the patient's indication and renal status this patient will be dosed based on a goal trough/random level of 15-20.     Renal function is currently declining.    Visit Vitals  BP (!) 75/44 (BP Location: Left arm, Patient Position: Lying) Comment: NURSE NOTFIED   Pulse 106   Temp 36.6 °C (97.9 °F) (Temporal)   Resp 16        Lab Results   Component Value Date    CREATININE 6.25 (H) 2024    CREATININE 5.66 (H) 2024    CREATININE 5.54 (H) 2024    CREATININE 5.67 (H) 2024        Patient weight is as follows:   Vitals:    24 0500   Weight: (!) 190 kg (419 lb 1.5 oz)       Cultures:  No results found for the encounter in last 14 days.        I/O last 3 completed shifts:  In: 5240 (27.6 mL/kg) [P.O.:2240; I.V.:3000 (15.8 mL/kg)]  Out: 9050 (47.6 mL/kg) [Urine:9050 (1.3 mL/kg/hr)]  Weight: 190.1 kg   I/O during current shift:  No intake/output data recorded.    Temp (24hrs), Av.7 °C (98 °F), Min:36 °C (96.8 °F), Max:37.1 °C (98.8 °F)         Assessment/Plan     Patient will be given a loading dose of 1000 mg.  Follow-up level will be ordered on  at 1900 unless clinically indicated sooner.  Will continue to monitor renal function daily while on vancomycin and order serum creatinine at least every 48 hours if not already ordered.  Follow for continued vancomycin needs, clinical response, and signs/symptoms of toxicity.       ERIC MEDEIROS, PharmD

## 2024-07-31 NOTE — PROGRESS NOTES
NEPHROLOGY FOLLOW UP NOTE    Artem Suarez   39 y.o.    190 kg (419 lnb 1.5 oz)   MRN/Room: [id number]    Subjective: Artem is feeling well today; about the same as yesterday. He reports his mood is a bit down. He is drinking lots of fluids and peeing quite a bit. He denies any fever, chills, shortness of breath, chest pain, nausea, vomiting, diarrhea, headache, or dysuria.    Meds:   heparin, 7,500 Units, q8h  insulin lispro, 0-10 Units, q4h  sevelamer carbonate, 800 mg, TID      sodium chloride 0.9%, Last Rate: 125 mL/hr (07/30/24 1420)      acetaminophen, 650 mg, q6h PRN  dextrose, 12.5 g, q15 min PRN  dextrose, 25 g, q15 min PRN  glucagon, 1 mg, q15 min PRN  glucagon, 1 mg, q15 min PRN  oxyCODONE, 10 mg, q6h PRN  oxyCODONE, 5 mg, q6h PRN  oxygen, , Continuous PRN - O2/gases      Objective:     Vitals:    07/31/24 1200   BP: 113/64   Pulse: 95   Resp: 16   Temp: 36.2 °C (97.2 °F)   SpO2: 94%          Intake/Output Summary (Last 24 hours) at 7/31/2024 1318  Last data filed at 7/31/2024 1200  Gross per 24 hour   Intake 4000 ml   Output 7350 ml   Net -3350 ml       General appearance: AAOx3. No distress  Eyes: non-icteric  Mouth: Moist mucus membranes.  Skin: no apparent rash  Heart: RRR. No MRG. Normal S1/S2.  Lungs: CTA bilat.  No wheezing/crackles  Abdomen: soft, nt/nd  Extremities: Nonpitting edema bilat to knees with dry skin and brown discoloration of lower calves.  :  Wolfe in place  Neuro: No FND, no asterixis   ACCESS: PIV forearm. Wound drains.    Blood Labs:  Results for orders placed or performed during the hospital encounter of 07/19/24 (from the past 24 hour(s))   POCT GLUCOSE   Result Value Ref Range    POCT Glucose 76 74 - 99 mg/dL   POCT GLUCOSE   Result Value Ref Range    POCT Glucose 96 74 - 99 mg/dL   POCT GLUCOSE   Result Value Ref Range    POCT Glucose 85 74 - 99 mg/dL   Renal function panel   Result Value Ref Range    Glucose 76 74 - 99 mg/dL    Sodium 137 136 - 145 mmol/L     Potassium 5.1 3.5 - 5.3 mmol/L    Chloride 106 98 - 107 mmol/L    Bicarbonate 18 (L) 21 - 32 mmol/L    Anion Gap 18 10 - 20 mmol/L    Urea Nitrogen 47 (H) 6 - 23 mg/dL    Creatinine 5.66 (H) 0.50 - 1.30 mg/dL    eGFR 12 (L) >60 mL/min/1.73m*2    Calcium 7.4 (L) 8.6 - 10.6 mg/dL    Phosphorus 8.2 (H) 2.5 - 4.9 mg/dL    Albumin 1.8 (L) 3.4 - 5.0 g/dL   Magnesium   Result Value Ref Range    Magnesium 2.13 1.60 - 2.40 mg/dL   CBC   Result Value Ref Range    WBC 7.2 4.4 - 11.3 x10*3/uL    nRBC 0.0 0.0 - 0.0 /100 WBCs    RBC 2.83 (L) 4.50 - 5.90 x10*6/uL    Hemoglobin 7.9 (L) 13.5 - 17.5 g/dL    Hematocrit 26.8 (L) 41.0 - 52.0 %    MCV 95 80 - 100 fL    MCH 27.9 26.0 - 34.0 pg    MCHC 29.5 (L) 32.0 - 36.0 g/dL    RDW 16.4 (H) 11.5 - 14.5 %    Platelets 211 150 - 450 x10*3/uL   POCT GLUCOSE   Result Value Ref Range    POCT Glucose 96 74 - 99 mg/dL          ASSESSMENT:  Artem Suarez is a  39 y.o.  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 Creatinine at 5.54 today, stable over the past three days. Patient is outputting lots of urine; unsure if patient is fluid overloaded or kidney is failing to concentrate at this time. We can assess this with urine osmolality and electrolytes.     #JOSE RAMON, non-oliguric - BL 1.0  - Etiology of JOSE RAMON is most likely hypoperfusion from sepsis and multiple debridement surgeries, now transitioned to ATN. Elevated vanc levels (39) around time of creatinine rise suggest it could be contributory as well.  - Considering persistently rising creatinine despite good urine output (1.7 ml/kg/hr) it is likely the hypoperfusion resulted in acute tubular necrosis, which may now be in a recovery phase.   - UA significant for 1+ blood, no protein or WBC  - FENa 11% suggests tubular injury  - Renal US no signs of hydronephrosis; rules out post-renal causes  - Patient has no fever, rash,  urinary WBC, or eosiniphilia to suggest AIN and no recent contrast studies.   - Volume status: euvolemic       Recommendations:  -Electrolytes are currently stable, acid/base is balanced, and patient is alert and oriented; there is no indication for hemodialysis at this time.   - Can decrease fluids to 100 ml/hr and continue to monitor fluid status  - Obtain urine electrolytes and urine osmolarity  - Avoid nephrotoxins, contrast if possible  - Strict Is/Os    Delfino Starr, MS4  24 hour Renal Pager - [phone]    Discussed with attending nephrologist, Dr. Nathaly Herndon.      Renal function may be stabilizing, will follow.

## 2024-08-01 ENCOUNTER — ANESTHESIA (OUTPATIENT)
Dept: OPERATING ROOM | Facility: HOSPITAL | Age: 39
End: 2024-08-01
Payer: COMMERCIAL

## 2024-08-01 ENCOUNTER — ANESTHESIA EVENT (OUTPATIENT)
Dept: OPERATING ROOM | Facility: HOSPITAL | Age: 39
End: 2024-08-01
Payer: COMMERCIAL

## 2024-08-01 ENCOUNTER — APPOINTMENT (OUTPATIENT)
Dept: RADIOLOGY | Facility: HOSPITAL | Age: 39
End: 2024-08-01
Payer: COMMERCIAL

## 2024-08-01 LAB
ABO GROUP (TYPE) IN BLOOD: NORMAL
ALBUMIN SERPL BCP-MCNC: 2 G/DL (ref 3.4–5)
ANION GAP SERPL CALC-SCNC: 18 MMOL/L (ref 10–20)
ANTIBODY SCREEN: NORMAL
APTT PPP: 41 SECONDS (ref 27–38)
BUN SERPL-MCNC: 45 MG/DL (ref 6–23)
CALCIUM SERPL-MCNC: 6.9 MG/DL (ref 8.6–10.6)
CHLORIDE SERPL-SCNC: 108 MMOL/L (ref 98–107)
CO2 SERPL-SCNC: 19 MMOL/L (ref 21–32)
CREAT SERPL-MCNC: 6.07 MG/DL (ref 0.5–1.3)
EGFRCR SERPLBLD CKD-EPI 2021: 11 ML/MIN/1.73M*2
ERYTHROCYTE [DISTWIDTH] IN BLOOD BY AUTOMATED COUNT: 16.5 % (ref 11.5–14.5)
ERYTHROCYTE [DISTWIDTH] IN BLOOD BY AUTOMATED COUNT: 16.6 % (ref 11.5–14.5)
GLUCOSE BLD MANUAL STRIP-MCNC: 104 MG/DL (ref 74–99)
GLUCOSE BLD MANUAL STRIP-MCNC: 106 MG/DL (ref 74–99)
GLUCOSE BLD MANUAL STRIP-MCNC: 127 MG/DL (ref 74–99)
GLUCOSE BLD MANUAL STRIP-MCNC: 131 MG/DL (ref 74–99)
GLUCOSE BLD MANUAL STRIP-MCNC: 153 MG/DL (ref 74–99)
GLUCOSE BLD MANUAL STRIP-MCNC: 92 MG/DL (ref 74–99)
GLUCOSE SERPL-MCNC: 103 MG/DL (ref 74–99)
HCT VFR BLD AUTO: 22.4 % (ref 41–52)
HCT VFR BLD AUTO: 24.8 % (ref 41–52)
HGB BLD-MCNC: 6.8 G/DL (ref 13.5–17.5)
HGB BLD-MCNC: 7.5 G/DL (ref 13.5–17.5)
HOLD SPECIMEN: NORMAL
INR PPP: 1.6 (ref 0.9–1.1)
MAGNESIUM SERPL-MCNC: 1.85 MG/DL (ref 1.6–2.4)
MCH RBC QN AUTO: 27.8 PG (ref 26–34)
MCH RBC QN AUTO: 28.2 PG (ref 26–34)
MCHC RBC AUTO-ENTMCNC: 30.2 G/DL (ref 32–36)
MCHC RBC AUTO-ENTMCNC: 30.4 G/DL (ref 32–36)
MCV RBC AUTO: 92 FL (ref 80–100)
MCV RBC AUTO: 93 FL (ref 80–100)
NRBC BLD-RTO: 0 /100 WBCS (ref 0–0)
NRBC BLD-RTO: 0 /100 WBCS (ref 0–0)
PHOSPHATE SERPL-MCNC: 8.2 MG/DL (ref 2.5–4.9)
PLATELET # BLD AUTO: 171 X10*3/UL (ref 150–450)
PLATELET # BLD AUTO: 184 X10*3/UL (ref 150–450)
POTASSIUM SERPL-SCNC: 5.2 MMOL/L (ref 3.5–5.3)
PROTHROMBIN TIME: 18.1 SECONDS (ref 9.8–12.8)
RBC # BLD AUTO: 2.41 X10*6/UL (ref 4.5–5.9)
RBC # BLD AUTO: 2.7 X10*6/UL (ref 4.5–5.9)
RH FACTOR (ANTIGEN D): NORMAL
SODIUM SERPL-SCNC: 140 MMOL/L (ref 136–145)
WBC # BLD AUTO: 11.1 X10*3/UL (ref 4.4–11.3)
WBC # BLD AUTO: 9.6 X10*3/UL (ref 4.4–11.3)

## 2024-08-01 PROCEDURE — 80069 RENAL FUNCTION PANEL: CPT | Performed by: STUDENT IN AN ORGANIZED HEALTH CARE EDUCATION/TRAINING PROGRAM

## 2024-08-01 PROCEDURE — 86923 COMPATIBILITY TEST ELECTRIC: CPT

## 2024-08-01 PROCEDURE — 36415 COLL VENOUS BLD VENIPUNCTURE: CPT

## 2024-08-01 PROCEDURE — 86901 BLOOD TYPING SEROLOGIC RH(D): CPT

## 2024-08-01 PROCEDURE — 36430 TRANSFUSION BLD/BLD COMPNT: CPT

## 2024-08-01 PROCEDURE — 2500000004 HC RX 250 GENERAL PHARMACY W/ HCPCS (ALT 636 FOR OP/ED): Performed by: STUDENT IN AN ORGANIZED HEALTH CARE EDUCATION/TRAINING PROGRAM

## 2024-08-01 PROCEDURE — 2500000004 HC RX 250 GENERAL PHARMACY W/ HCPCS (ALT 636 FOR OP/ED)

## 2024-08-01 PROCEDURE — P9045 ALBUMIN (HUMAN), 5%, 250 ML: HCPCS | Mod: JZ | Performed by: STUDENT IN AN ORGANIZED HEALTH CARE EDUCATION/TRAINING PROGRAM

## 2024-08-01 PROCEDURE — 82947 ASSAY GLUCOSE BLOOD QUANT: CPT

## 2024-08-01 PROCEDURE — 99233 SBSQ HOSP IP/OBS HIGH 50: CPT | Performed by: INTERNAL MEDICINE

## 2024-08-01 PROCEDURE — 2500000002 HC RX 250 W HCPCS SELF ADMINISTERED DRUGS (ALT 637 FOR MEDICARE OP, ALT 636 FOR OP/ED): Performed by: STUDENT IN AN ORGANIZED HEALTH CARE EDUCATION/TRAINING PROGRAM

## 2024-08-01 PROCEDURE — 85027 COMPLETE CBC AUTOMATED: CPT

## 2024-08-01 PROCEDURE — 73701 CT LOWER EXTREMITY W/DYE: CPT | Mod: LT

## 2024-08-01 PROCEDURE — 83735 ASSAY OF MAGNESIUM: CPT | Performed by: STUDENT IN AN ORGANIZED HEALTH CARE EDUCATION/TRAINING PROGRAM

## 2024-08-01 PROCEDURE — 2500000002 HC RX 250 W HCPCS SELF ADMINISTERED DRUGS (ALT 637 FOR MEDICARE OP, ALT 636 FOR OP/ED)

## 2024-08-01 PROCEDURE — 1100000001 HC PRIVATE ROOM DAILY

## 2024-08-01 PROCEDURE — 85610 PROTHROMBIN TIME: CPT

## 2024-08-01 PROCEDURE — 2550000001 HC RX 255 CONTRASTS: Performed by: SURGERY

## 2024-08-01 PROCEDURE — 73701 CT LOWER EXTREMITY W/DYE: CPT | Mod: RT

## 2024-08-01 PROCEDURE — 2500000001 HC RX 250 WO HCPCS SELF ADMINISTERED DRUGS (ALT 637 FOR MEDICARE OP): Performed by: STUDENT IN AN ORGANIZED HEALTH CARE EDUCATION/TRAINING PROGRAM

## 2024-08-01 PROCEDURE — P9016 RBC LEUKOCYTES REDUCED: HCPCS

## 2024-08-01 RX ORDER — FAMOTIDINE 20 MG/1
20 TABLET, FILM COATED ORAL DAILY
Status: CANCELLED | OUTPATIENT
Start: 2024-08-01

## 2024-08-01 RX ORDER — HYDROMORPHONE HYDROCHLORIDE 1 MG/ML
0.5 INJECTION, SOLUTION INTRAMUSCULAR; INTRAVENOUS; SUBCUTANEOUS ONCE
Status: COMPLETED | OUTPATIENT
Start: 2024-08-01 | End: 2024-08-01

## 2024-08-01 RX ORDER — ALBUMIN HUMAN 50 G/1000ML
25 SOLUTION INTRAVENOUS ONCE
Status: COMPLETED | OUTPATIENT
Start: 2024-08-01 | End: 2024-08-01

## 2024-08-01 RX ORDER — SODIUM CHLORIDE, SODIUM LACTATE, POTASSIUM CHLORIDE, CALCIUM CHLORIDE 600; 310; 30; 20 MG/100ML; MG/100ML; MG/100ML; MG/100ML
125 INJECTION, SOLUTION INTRAVENOUS CONTINUOUS
Status: ACTIVE | OUTPATIENT
Start: 2024-08-01

## 2024-08-01 ASSESSMENT — COGNITIVE AND FUNCTIONAL STATUS - GENERAL
TURNING FROM BACK TO SIDE WHILE IN FLAT BAD: TOTAL
STANDING UP FROM CHAIR USING ARMS: TOTAL
MOVING TO AND FROM BED TO CHAIR: TOTAL
DRESSING REGULAR UPPER BODY CLOTHING: A LITTLE
MOVING FROM LYING ON BACK TO SITTING ON SIDE OF FLAT BED WITH BEDRAILS: A LOT
MOBILITY SCORE: 7
HELP NEEDED FOR BATHING: A LOT
CLIMB 3 TO 5 STEPS WITH RAILING: TOTAL
DAILY ACTIVITIY SCORE: 15
TOILETING: A LOT
DRESSING REGULAR LOWER BODY CLOTHING: TOTAL
PERSONAL GROOMING: A LITTLE
WALKING IN HOSPITAL ROOM: TOTAL

## 2024-08-01 ASSESSMENT — PAIN SCALES - GENERAL: PAINLEVEL_OUTOF10: 2

## 2024-08-01 NOTE — SIGNIFICANT EVENT
RAPID RESPONSE TEAM    Called to bedside for concern regarding low bp.  Upon arrival to floor, pt morbidly obese laying on side.  He was alert and oriented x 4.  Nasal cannula on face but not in nares.  Pox 96 % on room air.  Bp rechecked.  Dr. Gonsales ([facility]) came to bedside to assess.  Pt asymptomatic.  Great urine output.  No distress.  IVF infusing at 100 ml per hour.  Plan to transfuse unit of PRBC when available.      Vitals:    08/01/24 0409 08/01/24 0756 08/01/24 0807 08/01/24 0808   BP: 88/52 89/50 97/56 112/61   BP Location: Right arm Right arm Left arm Left arm   Patient Position: Lying Lying Lying    Pulse: 98 87 87 88   Resp: 15 16 17    Temp: 36.5 °C (97.7 °F) 36.1 °C (97 °F)     TempSrc: Temporal Temporal     SpO2: 96% 93%  96%   Weight:       Height:          Spoke to RN, no concerns at this time.  Plan of care discussed with team.  Staff advised to call for any concerns or vital signs.     Hans Welsh RN

## 2024-08-01 NOTE — PROGRESS NOTES
[facility] sent updates to [facility]. [facility] states patient prefers their [place] location as it is more likely for patient to have a private room at the [place] location. SW asked for the [place] location address for transport destination. Pending precert. KERA will continue to follow.      RAHEEL Amaya

## 2024-08-01 NOTE — SIGNIFICANT EVENT
Rapid Response RN responding for RADAR score of 6 due to the following VS: T 37.1 °Celsius; HR 99 ; RR 15; BP 92/52; SPO2 94% .      Reviewed abnormal VS with bedside RN who expressed no concerns regarding the patient's current condition based upon these.   No interventions by Rapid Response team at this time.

## 2024-08-01 NOTE — PROGRESS NOTES
NEPHROLOGY FOLLOW UP NOTE    Artem Suarez   39 y.o.    190 kg (419 lnb 1.5 oz)   MRN/Room: [id number]    Subjective: Artem is feeling well today; about the same as yesterday. He is drinking lots of fluids and peeing quite a bit and is ready to starting eating more. He denies any fever, chills, shortness of breath, chest pain, nausea, vomiting, diarrhea, headache, or dysuria.    Events overnight noted - patient was hypotensive, required fluid bolus and broadening of antibiotic therapy.    Meds:   heparin, 7,500 Units, q8h  insulin lispro, 0-10 Units, q4h  piperacillin-tazobactam, 2.25 g, q6h  sevelamer carbonate, 800 mg, TID      sodium chloride 0.9%, Last Rate: Stopped (07/31/24 1650)      acetaminophen, 650 mg, q6h PRN  dextrose, 12.5 g, q15 min PRN  dextrose, 25 g, q15 min PRN  glucagon, 1 mg, q15 min PRN  glucagon, 1 mg, q15 min PRN  oxyCODONE, 10 mg, q6h PRN  oxyCODONE, 5 mg, q6h PRN  oxygen, , Continuous PRN - O2/gases  vancomycin, , Daily PRN      Objective:     Vitals:    08/01/24 1200   BP: 96/54   Pulse: 82   Resp: 17   Temp: 36.1 °C (97 °F)   SpO2: 92%          Intake/Output Summary (Last 24 hours) at 8/1/2024 1311  Last data filed at 8/1/2024 0813  Gross per 24 hour   Intake 1700 ml   Output 1850 ml   Net -150 ml       General appearance: AAOx3. No distress  Eyes: non-icteric  Mouth: Moist mucus membranes.  Skin: no apparent rash  Heart: RRR. No MRG. Normal S1/S2.  Lungs: CTA bilat.  No wheezing/crackles  Abdomen: soft, nt/nd  Extremities: Nonpitting edema bilat to knees with dry skin and brown discoloration of lower calves. Wound with bandage over buttock/perineum.  :  Wolfe in place  Neuro: No FND, no asterixis   ACCESS: PIV forearm. Wound drains.    Blood Labs:  Results for orders placed or performed during the hospital encounter of 07/19/24 (from the past 24 hour(s))   Urinalysis with Reflex Culture and Microscopic   Result Value Ref Range    Color, Urine Colorless (N) Light-Yellow,  Yellow, Dark-Yellow    Appearance, Urine Clear Clear    Specific Gravity, Urine 1.005 1.005 - 1.035    pH, Urine 6.5 5.0, 5.5, 6.0, 6.5, 7.0, 7.5, 8.0    Protein, Urine 20 (TRACE) NEGATIVE, 10 (TRACE), 20 (TRACE) mg/dL    Glucose, Urine Normal Normal mg/dL    Blood, Urine 0.1 (1+) (A) NEGATIVE    Ketones, Urine NEGATIVE NEGATIVE mg/dL    Bilirubin, Urine NEGATIVE NEGATIVE    Urobilinogen, Urine Normal Normal mg/dL    Nitrite, Urine NEGATIVE NEGATIVE    Leukocyte Esterase, Urine NEGATIVE NEGATIVE   Urinalysis Microscopic   Result Value Ref Range    WBC, Urine 1-5 1-5, NONE /HPF    RBC, Urine 1-2 NONE, 1-2, 3-5 /HPF   POCT GLUCOSE   Result Value Ref Range    POCT Glucose 93 74 - 99 mg/dL   Osmolality, urine   Result Value Ref Range    Osmolality, Urine Random 161 (L) 200 - 1,200 mOsm/kg   Urine electrolytes   Result Value Ref Range    Sodium, Urine Random 42 mmol/L    Sodium/Creatinine Ratio 183 Not established. mmol/g Creat    Potassium, Urine Random 17 mmol/L    Potassium/Creatinine Ratio 74 Not established mmol/g Creat    Chloride, Urine Random 46 mmol/L    Chloride/Creatinine Ratio 200 23 - 275 mmol/g creat    Creatinine, Urine Random 23.0 20.0 - 370.0 mg/dL   CBC   Result Value Ref Range    WBC 10.6 4.4 - 11.3 x10*3/uL    nRBC 0.0 0.0 - 0.0 /100 WBCs    RBC 2.79 (L) 4.50 - 5.90 x10*6/uL    Hemoglobin 8.0 (L) 13.5 - 17.5 g/dL    Hematocrit 25.6 (L) 41.0 - 52.0 %    MCV 92 80 - 100 fL    MCH 28.7 26.0 - 34.0 pg    MCHC 31.3 (L) 32.0 - 36.0 g/dL    RDW 16.0 (H) 11.5 - 14.5 %    Platelets 212 150 - 450 x10*3/uL   Renal function panel   Result Value Ref Range    Glucose 88 74 - 99 mg/dL    Sodium 140 136 - 145 mmol/L    Potassium 5.0 3.5 - 5.3 mmol/L    Chloride 107 98 - 107 mmol/L    Bicarbonate 20 (L) 21 - 32 mmol/L    Anion Gap 18 10 - 20 mmol/L    Urea Nitrogen 46 (H) 6 - 23 mg/dL    Creatinine 6.25 (H) 0.50 - 1.30 mg/dL    eGFR 11 (L) >60 mL/min/1.73m*2    Calcium 7.1 (L) 8.6 - 10.6 mg/dL    Phosphorus 8.2 (H)  2.5 - 4.9 mg/dL    Albumin 1.7 (L) 3.4 - 5.0 g/dL   Magnesium   Result Value Ref Range    Magnesium 1.91 1.60 - 2.40 mg/dL   Blood Gas Venous Full Panel   Result Value Ref Range    POCT pH, Venous 7.37 7.33 - 7.43 pH    POCT pCO2, Venous 36 (L) 41 - 51 mm Hg    POCT pO2, Venous 43 35 - 45 mm Hg    POCT SO2, Venous 71 45 - 75 %    POCT Oxy Hemoglobin, Venous 68.7 45.0 - 75.0 %    POCT Hematocrit Calculated, Venous 30.0 (L) 41.0 - 52.0 %    POCT Sodium, Venous 137 136 - 145 mmol/L    POCT Potassium, Venous 5.2 3.5 - 5.3 mmol/L    POCT Chloride, Venous 108 (H) 98 - 107 mmol/L    POCT Ionized Calicum, Venous 1.08 (L) 1.10 - 1.33 mmol/L    POCT Glucose, Venous 97 74 - 99 mg/dL    POCT Lactate, Venous 1.8 0.4 - 2.0 mmol/L    POCT Base Excess, Venous -4.0 (L) -2.0 - 3.0 mmol/L    POCT HCO3 Calculated, Venous 20.8 (L) 22.0 - 26.0 mmol/L    POCT Hemoglobin, Venous 10.0 (L) 13.5 - 17.5 g/dL    POCT Anion Gap, Venous 13.0 10.0 - 25.0 mmol/L    Patient Temperature 37.0 degrees Celsius    FiO2 28 %   Blood Culture    Specimen: Peripheral Venipuncture; Blood culture   Result Value Ref Range    Blood Culture Loaded on Instrument - Culture in progress    Blood Culture    Specimen: Peripheral Venipuncture; Blood culture   Result Value Ref Range    Blood Culture Loaded on Instrument - Culture in progress    POCT GLUCOSE   Result Value Ref Range    POCT Glucose 152 (H) 74 - 99 mg/dL   POCT GLUCOSE   Result Value Ref Range    POCT Glucose 121 (H) 74 - 99 mg/dL   Extra Urine Gray Tube   Result Value Ref Range    Extra Tube Hold for add-ons.    POCT GLUCOSE   Result Value Ref Range    POCT Glucose 106 (H) 74 - 99 mg/dL   Renal function panel   Result Value Ref Range    Glucose 103 (H) 74 - 99 mg/dL    Sodium 140 136 - 145 mmol/L    Potassium 5.2 3.5 - 5.3 mmol/L    Chloride 108 (H) 98 - 107 mmol/L    Bicarbonate 19 (L) 21 - 32 mmol/L    Anion Gap 18 10 - 20 mmol/L    Urea Nitrogen 45 (H) 6 - 23 mg/dL    Creatinine 6.07 (H) 0.50 - 1.30  mg/dL    eGFR 11 (L) >60 mL/min/1.73m*2    Calcium 6.9 (L) 8.6 - 10.6 mg/dL    Phosphorus 8.2 (H) 2.5 - 4.9 mg/dL    Albumin 2.0 (L) 3.4 - 5.0 g/dL   Magnesium   Result Value Ref Range    Magnesium 1.85 1.60 - 2.40 mg/dL   CBC   Result Value Ref Range    WBC 9.6 4.4 - 11.3 x10*3/uL    nRBC 0.0 0.0 - 0.0 /100 WBCs    RBC 2.41 (L) 4.50 - 5.90 x10*6/uL    Hemoglobin 6.8 (L) 13.5 - 17.5 g/dL    Hematocrit 22.4 (L) 41.0 - 52.0 %    MCV 93 80 - 100 fL    MCH 28.2 26.0 - 34.0 pg    MCHC 30.4 (L) 32.0 - 36.0 g/dL    RDW 16.5 (H) 11.5 - 14.5 %    Platelets 171 150 - 450 x10*3/uL   Type and screen   Result Value Ref Range    ABO TYPE O     Rh TYPE POS     ANTIBODY SCREEN NEG    Coagulation Screen   Result Value Ref Range    Protime 18.1 (H) 9.8 - 12.8 seconds    INR 1.6 (H) 0.9 - 1.1    aPTT 41 (H) 27 - 38 seconds   POCT GLUCOSE   Result Value Ref Range    POCT Glucose 104 (H) 74 - 99 mg/dL   Prepare RBC: 1 Units   Result Value Ref Range    PRODUCT CODE S1551L73     Unit Number [id number]     Unit ABO O     Unit RH POS     XM INTEP COMP     Dispense Status IS     Blood Expiration Date 8/28/2024 11:59:00 PM EDT     PRODUCT BLOOD TYPE 5100     UNIT VOLUME 350    POCT GLUCOSE   Result Value Ref Range    POCT Glucose 92 74 - 99 mg/dL          ASSESSMENT:  Artem Suarez is a  39 y.o.  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 Creatinine at 6.07 today, stable over the past three days indicating stabilizing kidney function. Patient is outputting less urine today; appears euvolemic on exam. Patient has had persistent episode of hypotension despite IV fluids and 1L bolus, concerning for sepsis or cardiogenic cause. Hypovolemia less likely given fluid repletion, but we will continue to monitor.     #JOSE RAMON, non-oliguric - BL 1.0  - Etiology of JOSE RAMON is most likely hypoperfusion from sepsis and multiple  debridement surgeries, now transitioned to ATN. Elevated vanc levels (39) around time of creatinine rise suggest it might have been contributory as well.  - UA significant for 1+ blood, no protein or WBC  - Urine osmolality 161  - Renal US no signs of hydronephrosis; rules out post-renal causes  - Patient has no fever, rash, urinary WBC, or eosiniphilia to suggest AIN and no recent contrast studies.       Recommendations:  -Electrolytes are currently stable, acid/base is balanced, and patient is alert and oriented; there is no indication for hemodialysis at this time. Will reassess daily for need of dialysis  - Switch 100 ml/hr NS to 100 ml/hr lactated ringer's due to rising chloride and dropping bicarb and concern for renal vasoconstriction  - Avoid nephrotoxins, contrast if possible  - Ensure renal dosing for medications based on latest GFR  - Strict Is/Os    Delfino Starr, MS4  24 hour Renal Pager - [phone]    Agree with supportive care, and maintaining fluid balance, will follow.    I saw and evaluated the patient. I personally obtained the key and critical portions of the history and physical exam or was physically present for key and critical portions performed by the resident/fellow. I reviewed the resident/fellow's documentation and discussed the patient with the resident/fellow. I agree with the resident/fellow's medical decision making as documented in the note.

## 2024-08-01 NOTE — PROGRESS NOTES
Vancomycin Dosing by Pharmacy- Cessation of Therapy    Consult to pharmacy for vancomycin dosing has been discontinued by the prescriber, pharmacy will sign off at this time.    Please call pharmacy if there are further questions or re-enter a consult if vancomycin is resumed.     Dang Perez, KamalaD

## 2024-08-01 NOTE — PROGRESS NOTES
"[facility]  ACUTE CARE SURGERY - PROGRESS NOTE    Patient Name: Artem Suarez  MRN: [id number]  Admit Date: 719  : 1985  AGE: 39 y.o.   GENDER: male  ==============================================================================  TODAY'S ASSESSMENT AND PLAN OF CARE:  39M presented as a transfer for NSTI of perineum and thighs in septic shock.      OR  at OSH: wide debridement of perineum and thighs  OR : debridement  OR : debridement  OR : wound vac placed  OR : wound vac     Plan  Neuro   - pain control: PRN Tylenol, PRN oxycodone     Respiratory    - placed on 2L NC yesterday for O2 sat of 89%    - this AM, saturating well on RA    - continue to monitor O2 sat   - supplemental O2 available as needed     Cardiovascular   - continue to monitor vitals q4h and as needed    - given two boluses of albumin overnight    - continue to hemodynamic status     GI   - nutrition previously consulted, recommendations appreciated    - continue carb controlled diet with potassium restriction    - continue oral supplementation with Ensure high protein     /FEN   - keith exchanged on    - continue to monitor I/Os   - continues to make adequate urine output   #JOSE RAMON   - Cr continues to rise, 6.07 this AM    - nephrology previously consulted and following patient, recommendations appreciated   - mIVF continue NS at 100cc/hr    - Uosm 161   - Urine lytes WNL    - UA without evidence of infection     Heme   - transfuse for symptomatic anemia   - this AM, Hgb 6.8    - transfuse 1u pRBCs this AM    - recheck CBC after transfusion to ensure adequate response following transfusion    - daily CBC to monitor Hgb     Endocrine: history of diabetes    - continue POCT glucose checks   - continue SSI #2    - Endocrinology was consulted and recommended outgoing referral to \"[facility]\"     MSK/Skin  #NSTI, perineum and posterior thighs   - s/p multiple " debridements (OSH on 7/18,  on 7/20, 7/21) and wound vac placements (7/23 and 7/26)   - transitioned to WTD dressing changes due to inability of wound vacs to remain sealed and in place    - continue to change dressings daily and as needed    - CT angio lower extremities without contrast ordered, will follow up results when performed     #Right great toe wound, chronic    - wound care previously consulted and recommendations appreciated: daily dressing changes via cleanse toe with NS and pat toe dry then pain toe wound with betadine and cover with dry dressing     Infectious disease    - empiric antibiotics started yesterday evening    - continue Vancomycin and Zosyn, end date TBD    - remains afebrile without leukocytosis    - will continue to trend daily CBC to monitor WBC    GI ppx: not indicated   DVT ppx: SCDs and heparin     Patient discussed and seen at bedside with Dr. Dorothy Askew MD  PGY-1  Acute Care Surgery  Service Pager: [phone]    ==============================================================================  CHIEF COMPLAINT / EVENTS LAST 24HRS / HPI:  Please see significant event note from 7:08pm yesterday for yesterday evening events. Overnight, his blood pressures remained soft and he received a total of two albumin boluses. He remained asymptomatic during these episodes. Blood cultures yesterday are pending, CXR showed no changes compared to previous CXR on 7/24.     This AM, the team was called bedside around 8AM for BP 89/50. ACS team went to bedside to assess the patient. Dr. De La Cruz came to bedside as well. He was alert and oriented x 4 on arrival, saturating at 96%. On BP recheck, the BP was 97/56 and then 112/61 once the patient was leveled and off the wedges. He was asymptomatic - denied chest pain, shortness of breath, lightheadedness, dizziness, nausea, vomiting. At the time of evaluation, his fluids were infusing at 100cc/hr. His Hgb came back this morning at 6.8,  and the decision was made with Dr. De La Cruz to order 1u pRBC for transfusion. His dressings were changed at bedside this AM by the ACS team. Decision was made that the patient was stable enough to remain on LT6 and there were no indications for further washouts or debridement of the wounds.     Patient remained asymptomatic this AM, AAOx4. He said that he is thirsty and is looking forward to drinking and eating more today. Endorses pain during dressing changes. Otherwise denies chest pain, shortness of breath, fevers, chills, lightheadedness.     MEDICAL HISTORY / ROS:   Admission history and ROS reviewed. Pertinent changes as follows:  N/A    PHYSICAL EXAM:  Heart Rate:  []   Temp:  [36.1 °C (97 °F)-37.1 °C (98.8 °F)]   Resp:  [15-17]   BP: ()/(44-61)   SpO2:  [90 %-96 %]     Physical Exam  Constitutional:       General: He is not in acute distress.     Appearance: He is not ill-appearing.   HENT:      Head: Atraumatic.      Mouth/Throat:      Mouth: Mucous membranes are dry.   Cardiovascular:      Rate and Rhythm: Normal rate.   Pulmonary:      Effort: Pulmonary effort is normal. No respiratory distress.      Breath sounds: No wheezing.      Comments: Breathing comfortably on room air   Abdominal:      Palpations: Abdomen is soft.      Tenderness: There is no abdominal tenderness. There is no guarding or rebound.   Genitourinary:     Comments: Wolfe in place, draining clear yellow urine   Rectal instrument in place, draining stool.   Musculoskeletal:      Comments: Able to move bilateral upper extremities   Limited independent movement of bilateral lower extremities    Skin:     Comments: Dry skin on bilateral lower extremities  R great toe wound covered   Bilateral perineal wounds and R medial groin wound present - wet to dry dressings were changed at bedside this AM (wet kerlix with dry ABD pads on top).    Neurological:      Mental Status: He is alert and oriented to person, place, and time.        IMAGING SUMMARY:  (summary of new imaging findings, not a copy of dictation)  None     LABS:  Results from last 7 days   Lab Units 08/01/24  0501 07/31/24  1708 07/31/24  0730   WBC AUTO x10*3/uL 9.6 10.6 7.2   HEMOGLOBIN g/dL 6.8* 8.0* 7.9*   HEMATOCRIT % 22.4* 25.6* 26.8*   PLATELETS AUTO x10*3/uL 171 212 211     Results from last 7 days   Lab Units 08/01/24  0501   APTT seconds 41*   INR  1.6*     Results from last 7 days   Lab Units 08/01/24  0501 07/31/24  1708 07/31/24  0730   SODIUM mmol/L 140 140 137   POTASSIUM mmol/L 5.2 5.0 5.1   CHLORIDE mmol/L 108* 107 106   CO2 mmol/L 19* 20* 18*   BUN mg/dL 45* 46* 47*   CREATININE mg/dL 6.07* 6.25* 5.66*   CALCIUM mg/dL 6.9* 7.1* 7.4*   GLUCOSE mg/dL 103* 88 76               I have reviewed all medications, laboratory results, and imaging pertinent for today's encounter.

## 2024-08-01 NOTE — PROGRESS NOTES
Physical Therapy                 Therapy Communication Note    Patient Name: Artem Suarez  MRN: [id number]  Today's Date: 8/1/2024     Discipline: Physical Therapy    Missed Visit Reason: Missed Visit Reason:  (pt pending blood transfusion. Will re-attempt as schedule permits and pt medically appropriate.)    Missed Time: Attempt    Bárbara Jarrett, PT

## 2024-08-02 LAB
ALBUMIN SERPL BCP-MCNC: 2 G/DL (ref 3.4–5)
ALP SERPL-CCNC: 71 U/L (ref 33–120)
ALT SERPL W P-5'-P-CCNC: 7 U/L (ref 10–52)
ANION GAP SERPL CALC-SCNC: 16 MMOL/L (ref 10–20)
AST SERPL W P-5'-P-CCNC: 15 U/L (ref 9–39)
BASOPHILS # BLD AUTO: 0.04 X10*3/UL (ref 0–0.1)
BASOPHILS NFR BLD AUTO: 0.5 %
BILIRUB SERPL-MCNC: 0.5 MG/DL (ref 0–1.2)
BLOOD EXPIRATION DATE: NORMAL
BUN SERPL-MCNC: 42 MG/DL (ref 6–23)
CALCIUM SERPL-MCNC: 7.1 MG/DL (ref 8.6–10.6)
CHLORIDE SERPL-SCNC: 106 MMOL/L (ref 98–107)
CO2 SERPL-SCNC: 21 MMOL/L (ref 21–32)
CREAT SERPL-MCNC: 6.36 MG/DL (ref 0.5–1.3)
DISPENSE STATUS: NORMAL
EGFRCR SERPLBLD CKD-EPI 2021: 11 ML/MIN/1.73M*2
EOSINOPHIL # BLD AUTO: 0.18 X10*3/UL (ref 0–0.7)
EOSINOPHIL NFR BLD AUTO: 2.1 %
ERYTHROCYTE [DISTWIDTH] IN BLOOD BY AUTOMATED COUNT: 16.8 % (ref 11.5–14.5)
GLUCOSE BLD MANUAL STRIP-MCNC: 100 MG/DL (ref 74–99)
GLUCOSE BLD MANUAL STRIP-MCNC: 102 MG/DL (ref 74–99)
GLUCOSE BLD MANUAL STRIP-MCNC: 110 MG/DL (ref 74–99)
GLUCOSE BLD MANUAL STRIP-MCNC: 133 MG/DL (ref 74–99)
GLUCOSE BLD MANUAL STRIP-MCNC: 93 MG/DL (ref 74–99)
GLUCOSE BLD MANUAL STRIP-MCNC: 98 MG/DL (ref 74–99)
GLUCOSE SERPL-MCNC: 108 MG/DL (ref 74–99)
HCT VFR BLD AUTO: 22.5 % (ref 41–52)
HGB BLD-MCNC: 7 G/DL (ref 13.5–17.5)
IMM GRANULOCYTES # BLD AUTO: 0.07 X10*3/UL (ref 0–0.7)
IMM GRANULOCYTES NFR BLD AUTO: 0.8 % (ref 0–0.9)
LYMPHOCYTES # BLD AUTO: 1.19 X10*3/UL (ref 1.2–4.8)
LYMPHOCYTES NFR BLD AUTO: 13.6 %
MAGNESIUM SERPL-MCNC: 1.93 MG/DL (ref 1.6–2.4)
MCH RBC QN AUTO: 27.9 PG (ref 26–34)
MCHC RBC AUTO-ENTMCNC: 31.1 G/DL (ref 32–36)
MCV RBC AUTO: 90 FL (ref 80–100)
MONOCYTES # BLD AUTO: 0.54 X10*3/UL (ref 0.1–1)
MONOCYTES NFR BLD AUTO: 6.2 %
NEUTROPHILS # BLD AUTO: 6.7 X10*3/UL (ref 1.2–7.7)
NEUTROPHILS NFR BLD AUTO: 76.8 %
NRBC BLD-RTO: 0 /100 WBCS (ref 0–0)
PHOSPHATE SERPL-MCNC: 8.3 MG/DL (ref 2.5–4.9)
PLATELET # BLD AUTO: 152 X10*3/UL (ref 150–450)
POTASSIUM SERPL-SCNC: 4.5 MMOL/L (ref 3.5–5.3)
PRODUCT BLOOD TYPE: 5100
PRODUCT CODE: NORMAL
PROT SERPL-MCNC: 6.2 G/DL (ref 6.4–8.2)
RBC # BLD AUTO: 2.51 X10*6/UL (ref 4.5–5.9)
SODIUM SERPL-SCNC: 138 MMOL/L (ref 136–145)
UNIT ABO: NORMAL
UNIT NUMBER: NORMAL
UNIT RH: NORMAL
UNIT VOLUME: 350
VANCOMYCIN SERPL-MCNC: 21.4 UG/ML (ref 5–20)
WBC # BLD AUTO: 8.7 X10*3/UL (ref 4.4–11.3)
XM INTEP: NORMAL

## 2024-08-02 PROCEDURE — 80202 ASSAY OF VANCOMYCIN: CPT

## 2024-08-02 PROCEDURE — 83735 ASSAY OF MAGNESIUM: CPT | Performed by: PHYSICIAN ASSISTANT

## 2024-08-02 PROCEDURE — 36415 COLL VENOUS BLD VENIPUNCTURE: CPT | Performed by: PHYSICIAN ASSISTANT

## 2024-08-02 PROCEDURE — 2500000001 HC RX 250 WO HCPCS SELF ADMINISTERED DRUGS (ALT 637 FOR MEDICARE OP): Performed by: STUDENT IN AN ORGANIZED HEALTH CARE EDUCATION/TRAINING PROGRAM

## 2024-08-02 PROCEDURE — 82947 ASSAY GLUCOSE BLOOD QUANT: CPT

## 2024-08-02 PROCEDURE — 2500000001 HC RX 250 WO HCPCS SELF ADMINISTERED DRUGS (ALT 637 FOR MEDICARE OP)

## 2024-08-02 PROCEDURE — 80053 COMPREHEN METABOLIC PANEL: CPT | Performed by: PHYSICIAN ASSISTANT

## 2024-08-02 PROCEDURE — 99238 HOSP IP/OBS DSCHRG MGMT 30/<: CPT | Performed by: PHYSICIAN ASSISTANT

## 2024-08-02 PROCEDURE — 99232 SBSQ HOSP IP/OBS MODERATE 35: CPT | Performed by: INTERNAL MEDICINE

## 2024-08-02 PROCEDURE — 84100 ASSAY OF PHOSPHORUS: CPT | Performed by: STUDENT IN AN ORGANIZED HEALTH CARE EDUCATION/TRAINING PROGRAM

## 2024-08-02 PROCEDURE — 97530 THERAPEUTIC ACTIVITIES: CPT | Mod: GO

## 2024-08-02 PROCEDURE — 85025 COMPLETE CBC W/AUTO DIFF WBC: CPT | Performed by: PHYSICIAN ASSISTANT

## 2024-08-02 PROCEDURE — 97530 THERAPEUTIC ACTIVITIES: CPT | Mod: GP

## 2024-08-02 PROCEDURE — 1100000001 HC PRIVATE ROOM DAILY

## 2024-08-02 PROCEDURE — 2500000002 HC RX 250 W HCPCS SELF ADMINISTERED DRUGS (ALT 637 FOR MEDICARE OP, ALT 636 FOR OP/ED): Performed by: STUDENT IN AN ORGANIZED HEALTH CARE EDUCATION/TRAINING PROGRAM

## 2024-08-02 PROCEDURE — 2500000004 HC RX 250 GENERAL PHARMACY W/ HCPCS (ALT 636 FOR OP/ED)

## 2024-08-02 ASSESSMENT — PAIN SCALES - GENERAL
PAINLEVEL_OUTOF10: 0 - NO PAIN
PAINLEVEL_OUTOF10: 6
PAINLEVEL_OUTOF10: 0 - NO PAIN
PAINLEVEL_OUTOF10: 3
PAINLEVEL_OUTOF10: 6
PAINLEVEL_OUTOF10: 7

## 2024-08-02 ASSESSMENT — COGNITIVE AND FUNCTIONAL STATUS - GENERAL
STANDING UP FROM CHAIR USING ARMS: A LOT
DRESSING REGULAR UPPER BODY CLOTHING: A LOT
CLIMB 3 TO 5 STEPS WITH RAILING: A LOT
MOVING FROM LYING ON BACK TO SITTING ON SIDE OF FLAT BED WITH BEDRAILS: A LOT
TOILETING: TOTAL
WALKING IN HOSPITAL ROOM: A LOT
DRESSING REGULAR LOWER BODY CLOTHING: TOTAL
HELP NEEDED FOR BATHING: A LOT
DAILY ACTIVITIY SCORE: 14
CLIMB 3 TO 5 STEPS WITH RAILING: A LOT
WALKING IN HOSPITAL ROOM: A LOT
PERSONAL GROOMING: A LOT
DRESSING REGULAR UPPER BODY CLOTHING: A LOT
DRESSING REGULAR UPPER BODY CLOTHING: A LOT
DAILY ACTIVITIY SCORE: 12
WALKING IN HOSPITAL ROOM: TOTAL
MOVING FROM LYING ON BACK TO SITTING ON SIDE OF FLAT BED WITH BEDRAILS: TOTAL
DRESSING REGULAR LOWER BODY CLOTHING: A LOT
EATING MEALS: A LITTLE
MOBILITY SCORE: 12
MOVING TO AND FROM BED TO CHAIR: A LOT
PERSONAL GROOMING: A LOT
TURNING FROM BACK TO SIDE WHILE IN FLAT BAD: TOTAL
TURNING FROM BACK TO SIDE WHILE IN FLAT BAD: A LOT
STANDING UP FROM CHAIR USING ARMS: TOTAL
MOBILITY SCORE: 6
MOVING TO AND FROM BED TO CHAIR: TOTAL
HELP NEEDED FOR BATHING: A LOT
PERSONAL GROOMING: A LITTLE
TURNING FROM BACK TO SIDE WHILE IN FLAT BAD: A LOT
HELP NEEDED FOR BATHING: A LOT
MOVING FROM LYING ON BACK TO SITTING ON SIDE OF FLAT BED WITH BEDRAILS: A LOT
TOILETING: A LOT
MOBILITY SCORE: 12
STANDING UP FROM CHAIR USING ARMS: A LOT
TOILETING: A LOT
DAILY ACTIVITIY SCORE: 14
CLIMB 3 TO 5 STEPS WITH RAILING: TOTAL
MOVING TO AND FROM BED TO CHAIR: A LOT
DRESSING REGULAR LOWER BODY CLOTHING: A LOT

## 2024-08-02 ASSESSMENT — PAIN - FUNCTIONAL ASSESSMENT
PAIN_FUNCTIONAL_ASSESSMENT: 0-10
PAIN_FUNCTIONAL_ASSESSMENT: 0-10

## 2024-08-02 NOTE — CARE PLAN
Problem: Skin  Goal: Decreased wound size/increased tissue granulation at next dressing change  Outcome: Progressing  Goal: Participates in plan/prevention/treatment measures  Outcome: Progressing  Goal: Prevent/manage excess moisture  Outcome: Progressing  Goal: Prevent/minimize sheer/friction injuries  Outcome: Progressing  Goal: Promote/optimize nutrition  Outcome: Progressing  Goal: Promote skin healing  Outcome: Progressing     Problem: Pain  Goal: Takes deep breaths with improved pain control throughout the shift  Outcome: Progressing  Goal: Turns in bed with improved pain control throughout the shift  Outcome: Progressing  Goal: Walks with improved pain control throughout the shift  Outcome: Progressing  Goal: Performs ADL's with improved pain control throughout shift  Outcome: Progressing  Goal: Participates in PT with improved pain control throughout the shift  Outcome: Progressing     Problem: Discharge Planning  Goal: Discharge to home or other facility with appropriate resources  Outcome: Progressing     Problem: Chronic Conditions and Co-morbidities  Goal: Patient's chronic conditions and co-morbidity symptoms are monitored and maintained or improved  Outcome: Progressing     Problem: Fall/Injury  Goal: Not fall by end of shift  Outcome: Progressing  Goal: Be free from injury by end of the shift  Outcome: Progressing  Goal: Verbalize understanding of personal risk factors for fall in the hospital  Outcome: Progressing     Problem: Infection related to problem list condition  Goal: Infection will resolve through treatment  Outcome: Progressing   The patient's goals for the shift include      The clinical goals for the shift include Patient will remain free of all pain and discomforts until the end of the shift

## 2024-08-02 NOTE — CONSULTS
Wound Care Consult     Visit Date: 8/2/2024      Patient Name: Artem Suarez         MRN: [id number]           YOB: 1985     Reason for Consult: assess groin and thigh wounds        Wound Team Summary Assessment:     Wound location:  Right posterior thigh/ischium   size:24.5cm x 22cm x 5.4  (with additional wound bed extending anteriorly to right groin; unable to measure at this time)  Left posterior thigh/ischium  size: 22.5cm x 11.5cm x 6.1cm                          undermining: yes   tracking: not probed deeply                                        Wound type: s/p surgical debridement  Wound bed: healthy, overall clean  Draining: serosanguinous  Periwound skin: left wound periwound with moisture associated dermatitis (treated with nystatin powder and cavilon)  Therapeutic surface: compella     Wound Team Plan: Wounds cleansed thoroughly with Vashe wash due to stool contamination. All wounds repacked with vashe moistened kerlix and covered with ABD pads and paper tape. Change dressing 1-2x/day unless solied. May also pack wounds with AMD (antimicrobial kerlix) moistened with NS for once a day dressing changes.  Patient must be turned and repositioned every 2 hours while in bed.     Josefina Elaine RN  8/2/2024  1:51 PM

## 2024-08-02 NOTE — CARE PLAN
The patient's goals for the shift include      The clinical goals for the shift include remain hemodynamically stable    Over the shift, the patient did not make progress toward the following goals. Barriers to progression include none. Recommendations to address these barriers include monitor Vital signs

## 2024-08-02 NOTE — PROGRESS NOTES
"[facility]  ACUTE CARE SURGERY - PROGRESS NOTE    Patient Name: Artem Suarez  MRN: [id number]  Admit Date: 719  : 1985  AGE: 39 y.o.   GENDER: male  ==============================================================================  TODAY'S ASSESSMENT AND PLAN OF CARE:  39M presented as a transfer for NSTI of perineum and thighs in septic shock.      OR  at OSH: wide debridement of perineum and thighs  OR : debridement  OR : debridement  OR : wound vac placed  OR : wound vac     Plan  Neuro   - pain control: PRN Tylenol, PRN oxycodone     Respiratory    - placed on 2L NC yesterday for O2 sat of 89%    - this AM, saturating well on RA    - continue to monitor O2 sat   - supplemental O2 available as needed     Cardiovascular   - continue to monitor vitals q4h and as needed    - given two boluses of albumin overnight    - continue to hemodynamic status     GI   - nutrition previously consulted, recommendations appreciated    - continue carb controlled diet with potassium restriction    - continue oral supplementation with Ensure high protein     /FEN   - keith exchanged on  - keith will remain at this time.    - continue to monitor I/Os   - continues to make adequate urine output   #JOSE RAMON   - Cr continues to rise, 6.07 this AM    - nephrology previously consulted and following patient, recommendations appreciated   - mIVF continue NS at 100cc/hr    - Uosm 161   - Urine lytes WNL    - UA without evidence of infection     Heme   - transfuse for symptomatic anemia   - this AM, Hgb 6.8    - transfuse 1u pRBCs this AM    - recheck CBC after transfusion to ensure adequate response following transfusion    - daily CBC to monitor Hgb     Endocrine: history of diabetes    - continue POCT glucose checks   - continue SSI #2    - Endocrinology was consulted and recommended outgoing referral [facility]     MSK/Skin  #NSTI, perineum and posterior " thighs   - s/p multiple debridements (OSH on 7/18, [facility] on 7/20, 7/21) and wound vac placements (7/23 and 7/26)   - Wound vac placed today per wound care team    - CT angio lower extremities without contrast - without evidence of retained infectious tissue     #Right great toe wound, chronic    - wound care previously consulted and recommendations appreciated: daily dressing changes via cleanse toe with NS and pat toe dry then pain toe wound with betadine and cover with dry dressing     Infectious disease    - empiric antibiotics started yesterday evening    - continue Vancomycin and Zosyn, end date TBD    - remains afebrile without leukocytosis    - will continue to trend daily CBC to monitor WBC    GI ppx: not indicated   DVT ppx: SCDs and heparin     Patient discussed and seen at bedside with Dr. Dorothy Fox PA-C      ==============================================================================  CHIEF COMPLAINT / EVENTS LAST 24HRS / HPI:  No acute changes overnight. Patient states that he is doing well overall.     MEDICAL HISTORY / ROS:   Admission history and ROS reviewed. Pertinent changes as follows:  N/A    PHYSICAL EXAM:  Heart Rate:  [76-84]   Temp:  [35.2 °C (95.4 °F)-36.9 °C (98.4 °F)]   Resp:  [14-18]   BP: ()/(56-61)   SpO2:  [92 %-96 %]     Physical Exam  Constitutional:       General: He is not in acute distress.     Appearance: He is not ill-appearing.   HENT:      Head: Atraumatic.      Mouth/Throat:      Mouth: Mucous membranes are dry.   Cardiovascular:      Rate and Rhythm: Normal rate.   Pulmonary:      Effort: Pulmonary effort is normal. No respiratory distress.      Breath sounds: No wheezing.      Comments: Breathing comfortably on room air   Abdominal:      Palpations: Abdomen is soft.      Tenderness: There is no abdominal tenderness. There is no guarding or rebound.   Genitourinary:     Comments: Wolfe in place, draining clear yellow urine   Rectal instrument in place,  draining stool.   Musculoskeletal:      Comments: Able to move bilateral upper extremities   Limited independent movement of bilateral lower extremities due to body habitus    Skin:     Comments: Dry skin on bilateral lower extremities  R great toe wound covered   Bilateral perineal wounds and R medial groin wound present - wet to dry dressings were changed at bedside this AM (wet kerlix with dry ABD pads on top).    Neurological:      Mental Status: He is alert and oriented to person, place, and time.       IMAGING SUMMARY:  (summary of new imaging findings, not a copy of dictation)  None     LABS:  Results from last 7 days   Lab Units 08/02/24  0636 08/01/24  1504 08/01/24  0501   WBC AUTO x10*3/uL 8.7 11.1 9.6   HEMOGLOBIN g/dL 7.0* 7.5* 6.8*   HEMATOCRIT % 22.5* 24.8* 22.4*   PLATELETS AUTO x10*3/uL 152 184 171   NEUTROS PCT AUTO % 76.8  --   --    LYMPHS PCT AUTO % 13.6  --   --    MONOS PCT AUTO % 6.2  --   --    EOS PCT AUTO % 2.1  --   --      Results from last 7 days   Lab Units 08/01/24  0501   APTT seconds 41*   INR  1.6*     Results from last 7 days   Lab Units 08/02/24  0636 08/01/24  0501 07/31/24  1708   SODIUM mmol/L 138 140 140   POTASSIUM mmol/L 4.5 5.2 5.0   CHLORIDE mmol/L 106 108* 107   CO2 mmol/L 21 19* 20*   BUN mg/dL 42* 45* 46*   CREATININE mg/dL 6.36* 6.07* 6.25*   CALCIUM mg/dL 7.1* 6.9* 7.1*   PROTEIN TOTAL g/dL 6.2*  --   --    BILIRUBIN TOTAL mg/dL 0.5  --   --    ALK PHOS U/L 71  --   --    ALT U/L 7*  --   --    AST U/L 15  --   --    GLUCOSE mg/dL 108* 103* 88     Results from last 7 days   Lab Units 08/02/24  0636   BILIRUBIN TOTAL mg/dL 0.5           I have reviewed all medications, laboratory results, and imaging pertinent for today's encounter.

## 2024-08-02 NOTE — CARE PLAN
The patient's goals for the shift include      The clinical goals for the shift include pt maintains comfort and safety        Problem: Skin  Goal: Decreased wound size/increased tissue granulation at next dressing change  8/2/2024 0319 by Blanche Garcia RN  Outcome: Progressing  8/2/2024 0316 by Blanche Garcia RN  Outcome: Progressing     Problem: Skin  Goal: Participates in plan/prevention/treatment measures  8/2/2024 0319 by Blanche Garcia RN  Outcome: Progressing  8/2/2024 0316 by Blanche Garcia RN  Outcome: Progressing     Problem: Skin  Goal: Prevent/manage excess moisture  8/2/2024 0319 by Blanche Garcia RN  Outcome: Progressing  8/2/2024 0316 by Blanche Garcia RN  Outcome: Progressing     Problem: Skin  Goal: Prevent/minimize sheer/friction injuries  8/2/2024 0319 by Blanche Garcia RN  Outcome: Progressing  8/2/2024 0316 by Blanche Garcia RN  Outcome: Progressing

## 2024-08-02 NOTE — PROGRESS NOTES
Physical Therapy    Physical Therapy Treatment    Patient Name: Artem Suarez  MRN: [id number]  Today's Date: 8/2/2024  Time Calculation  Start Time: 0824  Stop Time: 0906  Time Calculation (min): 42 min    Assessment/Plan   PT Assessment  PT Assessment Results: Decreased skin integrity, Obesity, Decreased strength, Decreased endurance, Decreased range of motion, Impaired balance, Decreased mobility  Rehab Prognosis: Good  Barriers to Discharge: comorbidities  End of Session Communication: Bedside nurse  Assessment Comment: Pt continues to demonstrate impaired strength, balance, and function. Remains appropriate for continued PT in house and after discharge at MOD intensity.  End of Session Patient Position: Bed, 3 rail up, Alarm on  PT Plan  Inpatient/Swing Bed or Outpatient: Inpatient  PT Plan  Treatment/Interventions: Bed mobility, Transfer training, Gait training, Balance training, Neuromuscular re-education, Strengthening, Endurance training, Therapeutic exercise, Therapeutic activity, Home exercise program, Positioning, Postural re-education  PT Plan: Ongoing PT  PT Frequency: 3 times per week  PT Discharge Recommendations: Moderate intensity level of continued care  Equipment Recommended upon Discharge: Wheeled walker  PT Recommended Transfer Status: Total assist  PT - OK to Discharge: Yes      General Visit Information:   PT  Visit  PT Received On: 08/02/24  General  Family/Caregiver Present: No  Co-Treatment: OT  Co-Treatment Reason: Co-visit to maximize therapies in patient with max assist x 2 ampac >10.  Prior to Session Communication: Bedside nurse  Patient Position Received: Bed, 3 rail up, Alarm off, not on at start of session  Preferred Learning Style: verbal, auditory  General Comment: Pt receptive to therapies on this date. Required increased time and frequent rest periods between multiple rolling/transfers, static sit and cleaning.    Subjective   Precautions:  Precautions  Medical Precautions: Fall  precautions    Objective   Pain:  Pain Assessment  Pain Assessment: 0-10  0-10 (Numeric) Pain Score: 6  Pain Type: Acute pain  Pain Location: Buttocks  Pain Orientation:  (wound)  Pain Interventions:  (Rolled on L side (wedge under R buttocks) after therapy visit.)  Cognition:  Cognition  Overall Cognitive Status: Within Functional Limits  Arousal/Alertness: Appropriate responses to stimuli  Orientation Level: Oriented X4  Following Commands: Follows all commands and directions without difficulty    Postural Control:  Postural Control  Postural Control: Impaired  Posture Comment: Flexed posture in sitting due to impaired trunk strength as well as pain in buttocks from wound.  Static Sitting Balance  Static Sitting-Balance Support: Bilateral upper extremity supported  Static Sitting-Level of Assistance: Moderate assistance (x2)  Static Sitting-Comment/Number of Minutes: 1st trial approximately 60 seconds, rest period, then 3 minutes on second trial. Pt unable to completly dangle at EOB. Partial EOB/partial long sit position with RLE off of EOB and LLE in external rotation/flexed position.  Static Standing Balance  Static Standing-Comment/Number of Minutes: Pt not able to safely tolerate attempts on this date.    Activity Tolerance:  Activity Tolerance  Endurance: Decreased tolerance for upright activites  Treatments:    Therapeutic Activity  Therapeutic Activity Performed: Yes  Therapeutic Activity 1: bed mobility/rolling, supine to to sit, static sit.    Bed Mobility  Bed Mobility: Yes  Bed Mobility 1  Bed Mobility 1: Supine to sitting, Sitting to supine, Rolling right, Rolling left  Level of Assistance 1: Maximum assistance, Minimal verbal cues, Moderate tactile cues (x2)    Ambulation/Gait Training  Ambulation/Gait Training Performed: No (Not able to safely perform/tolerate.)  Transfers  Transfer:  (Pt not able to tolerate sit-stand transfer. Anticipate would require max assist x 2 with bariatric  walker.)    Outcome Measures:  [facility] Basic Mobility  Turning from your back to your side while in a flat bed without using bedrails: Total  Moving from lying on your back to sitting on the side of a flat bed without using bedrails: Total  Moving to and from bed to chair (including a wheelchair): Total  Standing up from a chair using your arms (e.g. wheelchair or bedside chair): Total  To walk in hospital room: Total  Climbing 3-5 steps with railing: Total  Basic Mobility - Total Score: 6    Education Documentation  Precautions, taught by David Lebron, PT at 8/2/2024  9:25 AM.  Learner: Patient  Readiness: Acceptance  Method: Explanation  Response: Verbalizes Understanding    Mobility Training, taught by David Lebron PT at 8/2/2024  9:25 AM.  Learner: Patient  Readiness: Acceptance  Method: Explanation  Response: Verbalizes Understanding    Education Comments  No comments found.      Encounter Problems       Encounter Problems (Active)       Balance       STG - Maintains static standing balance with upper extremity support >5 minutes Elliott  (Progressing)       Start:  07/24/24            STG - Maintains static sitting balance without upper extremity support >10 minutes SBA for functional activities and endurance  (Progressing)       Start:  07/24/24               Mobility       STG - Patient will ambulate >10ft using LRAD modA  (Progressing)       Start:  07/24/24               PT Transfers       STG - Patient to transfer to and from sit to supine modA  (Progressing)       Start:  07/24/24            STG - Patient will transfer sit to and from stand modA (Progressing)       Start:  07/24/24               Pain - Adult

## 2024-08-02 NOTE — PROGRESS NOTES
08/02/24 1327   Current Planned Discharge Disposition   Current Planned Discharge Disposition Long Term  ([facility]  [facility]

## 2024-08-02 NOTE — PROGRESS NOTES
8/2/24 @1256 Transitional Care Coordinator Note  Pt auth was received for [facility], servando and Zaira COTE aware. Per team pt is not medically ready for discharge, Zaira COTE made aware as well. Will continue to follow.

## 2024-08-02 NOTE — PROGRESS NOTES
[facility] confirmed auth is received. Per [facility], patient is not medically ready for discharge today. SW sent updates to [facility] and asked how long auth is good for. Bariatric stretcher with wings in [facility] for 8/3. KERA will continue to follow.    RAHEEL Amaya

## 2024-08-02 NOTE — CARE PLAN
The patient's goals for the shift include      The clinical goals for the shift include pt will remain safe and free from falls/injury      Problem: Skin  Goal: Decreased wound size/increased tissue granulation at next dressing change  Outcome: Progressing  Goal: Participates in plan/prevention/treatment measures  Outcome: Progressing  Goal: Prevent/manage excess moisture  Outcome: Progressing  Goal: Prevent/minimize sheer/friction injuries  Outcome: Progressing  Goal: Promote/optimize nutrition  Outcome: Progressing  Goal: Promote skin healing  Outcome: Progressing     Problem: Pain  Goal: Takes deep breaths with improved pain control throughout the shift  Outcome: Progressing  Goal: Turns in bed with improved pain control throughout the shift  Outcome: Progressing  Goal: Walks with improved pain control throughout the shift  Outcome: Progressing  Goal: Performs ADL's with improved pain control throughout shift  Outcome: Progressing  Goal: Participates in PT with improved pain control throughout the shift  Outcome: Progressing  Goal: Free from opioid side effects throughout the shift  Outcome: Progressing  Goal: Free from acute confusion related to pain meds throughout the shift  Outcome: Progressing     Problem: Pain - Adult  Goal: Verbalizes/displays adequate comfort level or baseline comfort level  Outcome: Progressing     Problem: Safety - Adult  Goal: Free from fall injury  Outcome: Progressing     Problem: Discharge Planning  Goal: Discharge to home or other facility with appropriate resources  Outcome: Progressing     Problem: Chronic Conditions and Co-morbidities  Goal: Patient's chronic conditions and co-morbidity symptoms are monitored and maintained or improved  Outcome: Progressing     Problem: Fall/Injury  Goal: Not fall by end of shift  Outcome: Progressing  Goal: Be free from injury by end of the shift  Outcome: Progressing  Goal: Verbalize understanding of personal risk factors for fall in the  hospital  Outcome: Progressing

## 2024-08-02 NOTE — PROGRESS NOTES
NEPHROLOGY FOLLOW UP NOTE    Artem Suarez   39 y.o.    190 kg (419 lnb 1.5 oz)   MRN/Room: [id number]    Subjective: Artem is feeling well today; about the same as yesterday. He is drinking lots of fluids and peeing quite a bit and has begun eating more. He denies any fever, chills, shortness of breath, chest pain, nausea, vomiting, diarrhea, headache, or dysuria.    Patient continues to have soft Bps, but no episodes of hypotension overnight.    Meds:   heparin, 7,500 Units, q8h  insulin lispro, 0-10 Units, q4h  psyllium, 1 packet, TID  sevelamer carbonate, 800 mg, TID      lactated Ringer's, Last Rate: 100 mL/hr (08/01/24 1625)      acetaminophen, 650 mg, q6h PRN  dextrose, 12.5 g, q15 min PRN  dextrose, 25 g, q15 min PRN  glucagon, 1 mg, q15 min PRN  glucagon, 1 mg, q15 min PRN  oxyCODONE, 10 mg, q6h PRN  oxyCODONE, 5 mg, q6h PRN  oxygen, , Continuous PRN - O2/gases      Objective:     Vitals:    08/02/24 0824   BP:    Pulse: 76   Resp:    Temp:    SpO2: 95%          Intake/Output Summary (Last 24 hours) at 8/2/2024 1314  Last data filed at 8/1/2024 2334  Gross per 24 hour   Intake --   Output 2575 ml   Net -2575 ml       General appearance: AAOx3. No distress  Eyes: non-icteric  Mouth: Moist mucus membranes.  Skin: no apparent rash  Heart: RRR. No MRG. Normal S1/S2.  Lungs: CTA bilat.  No wheezing/crackles  Abdomen: soft, nt/nd  Extremities: Nonpitting edema bilat to knees with dry skin and brown discoloration of lower calves. Wound with bandage over buttock/perineum. Cap refill <2 seconds.  :  Wolfe in place  Neuro: No FND, no asterixis   ACCESS: PIV forearm. Wound drains.    Blood Labs:  Results for orders placed or performed during the hospital encounter of 07/19/24 (from the past 24 hour(s))   CBC   Result Value Ref Range    WBC 11.1 4.4 - 11.3 x10*3/uL    nRBC 0.0 0.0 - 0.0 /100 WBCs    RBC 2.70 (L) 4.50 - 5.90 x10*6/uL    Hemoglobin 7.5 (L) 13.5 - 17.5 g/dL    Hematocrit 24.8 (L) 41.0 - 52.0 %     MCV 92 80 - 100 fL    MCH 27.8 26.0 - 34.0 pg    MCHC 30.2 (L) 32.0 - 36.0 g/dL    RDW 16.6 (H) 11.5 - 14.5 %    Platelets 184 150 - 450 x10*3/uL   POCT GLUCOSE   Result Value Ref Range    POCT Glucose 127 (H) 74 - 99 mg/dL   POCT GLUCOSE   Result Value Ref Range    POCT Glucose 153 (H) 74 - 99 mg/dL   POCT GLUCOSE   Result Value Ref Range    POCT Glucose 131 (H) 74 - 99 mg/dL   POCT GLUCOSE   Result Value Ref Range    POCT Glucose 133 (H) 74 - 99 mg/dL   Vancomycin   Result Value Ref Range    Vancomycin 21.4 (H) 5.0 - 20.0 ug/mL   CBC and Auto Differential   Result Value Ref Range    WBC 8.7 4.4 - 11.3 x10*3/uL    nRBC 0.0 0.0 - 0.0 /100 WBCs    RBC 2.51 (L) 4.50 - 5.90 x10*6/uL    Hemoglobin 7.0 (L) 13.5 - 17.5 g/dL    Hematocrit 22.5 (L) 41.0 - 52.0 %    MCV 90 80 - 100 fL    MCH 27.9 26.0 - 34.0 pg    MCHC 31.1 (L) 32.0 - 36.0 g/dL    RDW 16.8 (H) 11.5 - 14.5 %    Platelets 152 150 - 450 x10*3/uL    Neutrophils % 76.8 40.0 - 80.0 %    Immature Granulocytes %, Automated 0.8 0.0 - 0.9 %    Lymphocytes % 13.6 13.0 - 44.0 %    Monocytes % 6.2 2.0 - 10.0 %    Eosinophils % 2.1 0.0 - 6.0 %    Basophils % 0.5 0.0 - 2.0 %    Neutrophils Absolute 6.70 1.20 - 7.70 x10*3/uL    Immature Granulocytes Absolute, Automated 0.07 0.00 - 0.70 x10*3/uL    Lymphocytes Absolute 1.19 (L) 1.20 - 4.80 x10*3/uL    Monocytes Absolute 0.54 0.10 - 1.00 x10*3/uL    Eosinophils Absolute 0.18 0.00 - 0.70 x10*3/uL    Basophils Absolute 0.04 0.00 - 0.10 x10*3/uL   Magnesium   Result Value Ref Range    Magnesium 1.93 1.60 - 2.40 mg/dL   Comprehensive metabolic panel   Result Value Ref Range    Glucose 108 (H) 74 - 99 mg/dL    Sodium 138 136 - 145 mmol/L    Potassium 4.5 3.5 - 5.3 mmol/L    Chloride 106 98 - 107 mmol/L    Bicarbonate 21 21 - 32 mmol/L    Anion Gap 16 10 - 20 mmol/L    Urea Nitrogen 42 (H) 6 - 23 mg/dL    Creatinine 6.36 (H) 0.50 - 1.30 mg/dL    eGFR 11 (L) >60 mL/min/1.73m*2    Calcium 7.1 (L) 8.6 - 10.6 mg/dL    Albumin 2.0  (L) 3.4 - 5.0 g/dL    Alkaline Phosphatase 71 33 - 120 U/L    Total Protein 6.2 (L) 6.4 - 8.2 g/dL    AST 15 9 - 39 U/L    Bilirubin, Total 0.5 0.0 - 1.2 mg/dL    ALT 7 (L) 10 - 52 U/L   Phosphorus   Result Value Ref Range    Phosphorus 8.3 (H) 2.5 - 4.9 mg/dL   POCT GLUCOSE   Result Value Ref Range    POCT Glucose 102 (H) 74 - 99 mg/dL   POCT GLUCOSE   Result Value Ref Range    POCT Glucose 110 (H) 74 - 99 mg/dL          ASSESSMENT:  Artem Suarez is a  39 y.o.  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 Creatinine at 6.36 today, relatively stable over the past three days indicating stabilizing kidney function. Patient is still outputting plenty of urine but is receiving fluids and appears euvolemic on exam. Patient has had persistent episodes of hypotension despite fluids but was hemodynamically stable overnight. We will continue to monitor.     #JOSE ARMON, non-oliguric - BL 1.0  - Etiology of JOSE RAMON is most likely ischemia resulting from sepsis and multiple debridement surgeries, now progressed to ATN. Elevated vanc levels (39) around time of creatinine rise suggest it might have been contributory as well.  - In ATN, we expect creatinine to rise, plateau, and then begin trending down over the course of days to weeks. Right now, creatinine seems to be leveling off, and as there is no indication for dialysis, treatment is supportive as kidney function returns on its own.  - UA significant for 1+ blood, no protein or WBC  - Urine osmolality 161  - Renal US no signs of hydronephrosis; rules out post-renal causes  - Patient has no fever, rash, urinary WBC, or eosiniphilia to suggest AIN and no recent contrast studies.       Recommendations:  -Electrolytes are currently stable, acid/base is balanced, and patient is alert and oriented; there is no indication for hemodialysis at this time. Will  reassess daily for need for dialysis.  - Continue with 100 ml/hr lactated ringer's  - Avoid nephrotoxins, contrast if possible  - Ensure renal dosing for medications based on latest GFR  - Strict Is/Os    Delfino Starr, MS4  24 hour Renal Pager - [phone]

## 2024-08-02 NOTE — PROGRESS NOTES
Occupational Therapy    OT Treatment    Patient Name: Artem Suarez  MRN: [id number]  Today's Date: 8/2/2024  Time Calculation  Start Time: 0824  Stop Time: 0906  Time Calculation (min): 42 min        Assessment:  Prognosis: Good  Barriers to Discharge: Inaccessible home environment, Decreased caregiver support  Evaluation/Treatment Tolerance: Patient limited by pain, Patient limited by fatigue  Medical Staff Made Aware: Yes  End of Session Communication: Bedside nurse  End of Session Patient Position: Bed, 3 rail up, Alarm off, not on at start of session  OT Assessment Results: Decreased ADL status, Decreased upper extremity strength, Decreased cognition, Decreased safe judgment during ADL, Decreased endurance, Decreased functional mobility, Decreased gross motor control, Decreased IADLs, Decreased fine motor control  Prognosis: Good  Barriers to Discharge: Inaccessible home environment, Decreased caregiver support  Evaluation/Treatment Tolerance: Patient limited by pain, Patient limited by fatigue  Medical Staff Made Aware: Yes  Strengths: Ability to acquire knowledge  Barriers to Participation: Insight into problems, Comorbidities, Support of Caregivers  Plan:  Treatment Interventions: ADL retraining, Functional transfer training, Endurance training, Patient/family training, Equipment evaluation/education, Compensatory technique education  OT Frequency: 3 times per week  OT Discharge Recommendations: Moderate intensity level of continued care  Equipment Recommended upon Discharge:  (TBD)  OT Recommended Transfer Status:  (mechanical lift)  OT - OK to Discharge: Yes  Treatment Interventions: ADL retraining, Functional transfer training, Endurance training, Patient/family training, Equipment evaluation/education, Compensatory technique education    Subjective   Previous Visit Info:  OT Last Visit  OT Received On: 08/02/24  General:  General  Reason for Referral: presented from OSH with NSTI of perineum and thighs in  septic shock; s/p OR 7/18, 7/20, 7/21, and 7/23 for debridement/washout.  Past Medical History Relevant to Rehab: DM and lymphedema  Family/Caregiver Present: No  Co-Treatment: PT  Co-Treatment Reason: Co-treatment with PT for pt safety and to optimize pt's therapeutic potential  Prior to Session Communication: Bedside nurse  Patient Position Received: Bed, 3 rail up, Alarm off, not on at start of session  Preferred Learning Style: visual, verbal  General Comment: Pt agreeable to therapy, requires increased time with mobility 2/2 pain  Precautions:  Medical Precautions: Fall precautions  Vital Signs:  Vital Signs  Heart Rate: 76  SpO2: 95 %  Pain:  Pain Assessment  Pain Assessment: 0-10  0-10 (Numeric) Pain Score: 6  Pain Type: Acute pain  Pain Location: Buttocks  Pain Interventions: Repositioned    Objective    Cognition:  Cognition  Overall Cognitive Status: Within Functional Limits  Arousal/Alertness: Appropriate responses to stimuli  Orientation Level: Oriented X4  Following Commands: Follows one step commands without difficulty  Insight: Moderate (pt has not been OOB in some time but stated he wanted to walk into the bathroom and take shower today)    Activities of Daily Living: Feeding  Feeding Level of Assistance: Setup  Feeding Where Assessed: Bed level  Feeding Comments: HOB elevated, set-up for opening containers and arranging tray  LE Dressing  LE Dressing: Yes  Sock Level of Assistance: Dependent  LE Dressing Where Assessed: Bed level  LE Dressing Comments: Total assist due to limited functional reach and weakness  Toileting  Toileting Adaptive Equipment:  (FMS and keith)  Toileting Level of Assistance: Dependent  Toileting Comments: Pt incontinent, requires total A for bed level [place] [facility]    Bed Mobility/Transfers: Bed Mobility  Bed Mobility: Yes  Bed Mobility 1  Bed Mobility 1: Rolling right, Rolling left  Level of Assistance 1: Maximum assistance, +2  Bed Mobility Comments 1: Facilitated rolling x3 at  bed level with verbal/tactile cueing for mechanics. Increased physical assist to position onto side  Bed Mobility 2  Bed Mobility  2: Supine to sitting, Sitting to supine  Level of Assistance 2: Maximum assistance, +2  Bed Mobility Comments 2: HOB elevated, facilitated partial sup to sit x2 (LEs not completely off of EOB, semi-long sitting position). Tactile cueing for use of bed rails to assist at trunk. Increased rest time required between attempts 2/2 pain  Bed Mobility 3  Bed Mobility 3: Scooting  Level of Assistance 3: Maximum assistance, +2  Bed Mobility Comments 3: To scoot toward HOB in supin with bed in trendelenburg position. Verbal cueing for use of overhead rails to assist with scooting using BUEs.    Transfers  Transfer: No (unable to safely tolerate)    Therapy/Activity: Therapeutic Activity  Therapeutic Activity Performed: Yes  Therapeutic Activity 1: Facilitated static sitting balance in semi-long sitting position x2. Tactile cueing for use of bilateral bed rails as well as verbal cueing for upright head/posturing. Max Ax1, tolerated ~1 min 1st attempt, ~3 min 2nd attempt.    Outcome Measures:[facility] Daily Activity  Putting on and taking off regular lower body clothing: Total  Bathing (including washing, rinsing, drying): A lot  Putting on and taking off regular upper body clothing: A lot  Toileting, which includes using toilet, bedpan or urinal: Total  Taking care of personal grooming such as brushing teeth: A little  Eating Meals: A little  Daily Activity - Total Score: 12    Education Documentation  Body Mechanics, taught by Saundra Roy OT at 8/2/2024  9:35 AM.  Learner: Patient  Readiness: Acceptance  Method: Explanation, Demonstration  Response: Verbalizes Understanding, Needs Reinforcement, Demonstrated Understanding    ADL Training, taught by Saundra Roy OT at 8/2/2024  9:35 AM.  Learner: Patient  Readiness: Acceptance  Method: Explanation, Demonstration  Response: Verbalizes  Understanding, Needs Reinforcement, Demonstrated Understanding    EDUCATION:  Education  Individual(s) Educated: Patient  Education Provided: Diagnosis & Precautions, Fall precautons, Risk and benefits of OT discussed with patient or other, POC discussed and agreed upon  Patient Response to Education: Patient/Caregiver Verbalized Understanding of Information    Goals:  Encounter Problems       Encounter Problems (Active)       ADLs       Patient with complete upper body dressing with modified independent level of assistance donning and doffing all UE clothes (Progressing)       Start:  07/24/24    Expected End:  08/07/24            Patient with complete lower body dressing with minimal assist  level of assistance donning and doffing all LE clothes  with PRN adaptive equipment (Not Progressing)       Start:  07/24/24    Expected End:  08/07/24            Patient will complete daily grooming tasks with modified independent level of assistance and PRN adaptive equipment. (Progressing)       Start:  07/24/24    Expected End:  08/07/24               COGNITION/SAFETY       Patient will participate in cognitive activities to demonstrate WFL score on further cognitive assessments, including Medi-Cog, MoCA and remain A&O x3, CAM (-).  (Progressing)       Start:  07/24/24    Expected End:  08/07/24               TRANSFERS       Patient will perform bed mobility moderate assist level of assistance in order to improve safety and independence with mobility (Progressing)       Start:  07/24/24    Expected End:  08/07/24            Patient will complete sit to stand transfer with moderate assist level of assistance and least restrictive device in order to improve safety and prepare for out of bed mobility. (Not Progressing)       Start:  07/24/24    Expected End:  08/07/24

## 2024-08-03 ENCOUNTER — APPOINTMENT (OUTPATIENT)
Dept: RADIOLOGY | Facility: HOSPITAL | Age: 39
End: 2024-08-03
Payer: COMMERCIAL

## 2024-08-03 LAB
ABO GROUP (TYPE) IN BLOOD: NORMAL
ALBUMIN SERPL BCP-MCNC: 1.9 G/DL (ref 3.4–5)
ALP SERPL-CCNC: 82 U/L (ref 33–120)
ALT SERPL W P-5'-P-CCNC: 6 U/L (ref 10–52)
ANION GAP SERPL CALC-SCNC: 18 MMOL/L (ref 10–20)
ANTIBODY SCREEN: NORMAL
APPEARANCE UR: CLEAR
AST SERPL W P-5'-P-CCNC: 13 U/L (ref 9–39)
BACTERIA #/AREA URNS AUTO: ABNORMAL /HPF
BACTERIA BLD AEROBE CULT: ABNORMAL
BACTERIA BLD CULT: ABNORMAL
BASOPHILS # BLD AUTO: 0.04 X10*3/UL (ref 0–0.1)
BASOPHILS # BLD AUTO: 0.06 X10*3/UL (ref 0–0.1)
BASOPHILS NFR BLD AUTO: 0.5 %
BASOPHILS NFR BLD AUTO: 0.6 %
BILIRUB SERPL-MCNC: 0.4 MG/DL (ref 0–1.2)
BILIRUB UR STRIP.AUTO-MCNC: NEGATIVE MG/DL
BUN SERPL-MCNC: 40 MG/DL (ref 6–23)
CALCIUM SERPL-MCNC: 7.1 MG/DL (ref 8.6–10.6)
CHLORIDE SERPL-SCNC: 107 MMOL/L (ref 98–107)
CO2 SERPL-SCNC: 18 MMOL/L (ref 21–32)
COLOR UR: COLORLESS
CREAT SERPL-MCNC: 6.68 MG/DL (ref 0.5–1.3)
EGFRCR SERPLBLD CKD-EPI 2021: 10 ML/MIN/1.73M*2
EOSINOPHIL # BLD AUTO: 0.17 X10*3/UL (ref 0–0.7)
EOSINOPHIL # BLD AUTO: 0.18 X10*3/UL (ref 0–0.7)
EOSINOPHIL NFR BLD AUTO: 1.8 %
EOSINOPHIL NFR BLD AUTO: 2.2 %
ERYTHROCYTE [DISTWIDTH] IN BLOOD BY AUTOMATED COUNT: 16.5 % (ref 11.5–14.5)
ERYTHROCYTE [DISTWIDTH] IN BLOOD BY AUTOMATED COUNT: 16.7 % (ref 11.5–14.5)
GLUCOSE BLD MANUAL STRIP-MCNC: 87 MG/DL (ref 74–99)
GLUCOSE BLD MANUAL STRIP-MCNC: 90 MG/DL (ref 74–99)
GLUCOSE SERPL-MCNC: 92 MG/DL (ref 74–99)
GLUCOSE UR STRIP.AUTO-MCNC: NORMAL MG/DL
GRAM STN SPEC: ABNORMAL
HCT VFR BLD AUTO: 22.3 % (ref 41–52)
HCT VFR BLD AUTO: 25 % (ref 41–52)
HGB BLD-MCNC: 6.9 G/DL (ref 13.5–17.5)
HGB BLD-MCNC: 7.8 G/DL (ref 13.5–17.5)
IMM GRANULOCYTES # BLD AUTO: 0.06 X10*3/UL (ref 0–0.7)
IMM GRANULOCYTES # BLD AUTO: 0.08 X10*3/UL (ref 0–0.7)
IMM GRANULOCYTES NFR BLD AUTO: 0.8 % (ref 0–0.9)
IMM GRANULOCYTES NFR BLD AUTO: 0.8 % (ref 0–0.9)
KETONES UR STRIP.AUTO-MCNC: NEGATIVE MG/DL
LEUKOCYTE ESTERASE UR QL STRIP.AUTO: ABNORMAL
LYMPHOCYTES # BLD AUTO: 1.13 X10*3/UL (ref 1.2–4.8)
LYMPHOCYTES # BLD AUTO: 1.24 X10*3/UL (ref 1.2–4.8)
LYMPHOCYTES NFR BLD AUTO: 11.2 %
LYMPHOCYTES NFR BLD AUTO: 16.2 %
MAGNESIUM SERPL-MCNC: 1.88 MG/DL (ref 1.6–2.4)
MCH RBC QN AUTO: 27.9 PG (ref 26–34)
MCH RBC QN AUTO: 28.6 PG (ref 26–34)
MCHC RBC AUTO-ENTMCNC: 30.9 G/DL (ref 32–36)
MCHC RBC AUTO-ENTMCNC: 31.2 G/DL (ref 32–36)
MCV RBC AUTO: 90 FL (ref 80–100)
MCV RBC AUTO: 92 FL (ref 80–100)
MONOCYTES # BLD AUTO: 0.46 X10*3/UL (ref 0.1–1)
MONOCYTES # BLD AUTO: 0.56 X10*3/UL (ref 0.1–1)
MONOCYTES NFR BLD AUTO: 5.6 %
MONOCYTES NFR BLD AUTO: 6 %
MUCOUS THREADS #/AREA URNS AUTO: ABNORMAL /LPF
NEUTROPHILS # BLD AUTO: 5.7 X10*3/UL (ref 1.2–7.7)
NEUTROPHILS # BLD AUTO: 8.06 X10*3/UL (ref 1.2–7.7)
NEUTROPHILS NFR BLD AUTO: 74.3 %
NEUTROPHILS NFR BLD AUTO: 80 %
NITRITE UR QL STRIP.AUTO: NEGATIVE
NRBC BLD-RTO: 0 /100 WBCS (ref 0–0)
NRBC BLD-RTO: 0 /100 WBCS (ref 0–0)
PH UR STRIP.AUTO: 6.5 [PH]
PLATELET # BLD AUTO: 127 X10*3/UL (ref 150–450)
PLATELET # BLD AUTO: 139 X10*3/UL (ref 150–450)
POTASSIUM SERPL-SCNC: 4.3 MMOL/L (ref 3.5–5.3)
PROT SERPL-MCNC: 6.3 G/DL (ref 6.4–8.2)
PROT UR STRIP.AUTO-MCNC: ABNORMAL MG/DL
RBC # BLD AUTO: 2.47 X10*6/UL (ref 4.5–5.9)
RBC # BLD AUTO: 2.73 X10*6/UL (ref 4.5–5.9)
RBC # UR STRIP.AUTO: ABNORMAL /UL
RBC #/AREA URNS AUTO: ABNORMAL /HPF
RH FACTOR (ANTIGEN D): NORMAL
SODIUM SERPL-SCNC: 139 MMOL/L (ref 136–145)
SP GR UR STRIP.AUTO: 1.01
TRANS CELLS #/AREA UR COMP ASSIST: ABNORMAL /HPF
UROBILINOGEN UR STRIP.AUTO-MCNC: NORMAL MG/DL
WBC # BLD AUTO: 10.1 X10*3/UL (ref 4.4–11.3)
WBC # BLD AUTO: 7.7 X10*3/UL (ref 4.4–11.3)
WBC #/AREA URNS AUTO: ABNORMAL /HPF

## 2024-08-03 PROCEDURE — 83735 ASSAY OF MAGNESIUM: CPT

## 2024-08-03 PROCEDURE — 2500000004 HC RX 250 GENERAL PHARMACY W/ HCPCS (ALT 636 FOR OP/ED)

## 2024-08-03 PROCEDURE — 71045 X-RAY EXAM CHEST 1 VIEW: CPT | Performed by: STUDENT IN AN ORGANIZED HEALTH CARE EDUCATION/TRAINING PROGRAM

## 2024-08-03 PROCEDURE — 36415 COLL VENOUS BLD VENIPUNCTURE: CPT

## 2024-08-03 PROCEDURE — 71045 X-RAY EXAM CHEST 1 VIEW: CPT

## 2024-08-03 PROCEDURE — 80053 COMPREHEN METABOLIC PANEL: CPT

## 2024-08-03 PROCEDURE — 82947 ASSAY GLUCOSE BLOOD QUANT: CPT

## 2024-08-03 PROCEDURE — P9016 RBC LEUKOCYTES REDUCED: HCPCS

## 2024-08-03 PROCEDURE — 85025 COMPLETE CBC W/AUTO DIFF WBC: CPT

## 2024-08-03 PROCEDURE — 86901 BLOOD TYPING SEROLOGIC RH(D): CPT

## 2024-08-03 PROCEDURE — 87040 BLOOD CULTURE FOR BACTERIA: CPT

## 2024-08-03 PROCEDURE — 81001 URINALYSIS AUTO W/SCOPE: CPT

## 2024-08-03 PROCEDURE — 2500000002 HC RX 250 W HCPCS SELF ADMINISTERED DRUGS (ALT 637 FOR MEDICARE OP, ALT 636 FOR OP/ED)

## 2024-08-03 PROCEDURE — 87086 URINE CULTURE/COLONY COUNT: CPT

## 2024-08-03 PROCEDURE — 1100000001 HC PRIVATE ROOM DAILY

## 2024-08-03 PROCEDURE — 36430 TRANSFUSION BLD/BLD COMPNT: CPT

## 2024-08-03 PROCEDURE — 2500000001 HC RX 250 WO HCPCS SELF ADMINISTERED DRUGS (ALT 637 FOR MEDICARE OP)

## 2024-08-03 PROCEDURE — 99232 SBSQ HOSP IP/OBS MODERATE 35: CPT | Performed by: INTERNAL MEDICINE

## 2024-08-03 RX ORDER — ACETAMINOPHEN 325 MG/1
650 TABLET ORAL ONCE
Status: DISCONTINUED | OUTPATIENT
Start: 2024-08-03 | End: 2024-08-03

## 2024-08-03 ASSESSMENT — COGNITIVE AND FUNCTIONAL STATUS - GENERAL
MOVING FROM LYING ON BACK TO SITTING ON SIDE OF FLAT BED WITH BEDRAILS: TOTAL
DRESSING REGULAR UPPER BODY CLOTHING: A LOT
MOBILITY SCORE: 6
MOBILITY SCORE: 12
MOVING TO AND FROM BED TO CHAIR: TOTAL
HELP NEEDED FOR BATHING: A LOT
TOILETING: TOTAL
CLIMB 3 TO 5 STEPS WITH RAILING: TOTAL
DRESSING REGULAR LOWER BODY CLOTHING: TOTAL
TURNING FROM BACK TO SIDE WHILE IN FLAT BAD: TOTAL
WALKING IN HOSPITAL ROOM: TOTAL
CLIMB 3 TO 5 STEPS WITH RAILING: A LOT
WALKING IN HOSPITAL ROOM: A LOT
DRESSING REGULAR UPPER BODY CLOTHING: TOTAL
STANDING UP FROM CHAIR USING ARMS: TOTAL
DAILY ACTIVITIY SCORE: 12
DRESSING REGULAR LOWER BODY CLOTHING: A LOT
TOILETING: A LOT
TURNING FROM BACK TO SIDE WHILE IN FLAT BAD: A LOT
STANDING UP FROM CHAIR USING ARMS: A LOT
MOVING FROM LYING ON BACK TO SITTING ON SIDE OF FLAT BED WITH BEDRAILS: A LOT
PERSONAL GROOMING: A LOT
MOVING TO AND FROM BED TO CHAIR: A LOT
DAILY ACTIVITIY SCORE: 14
HELP NEEDED FOR BATHING: TOTAL

## 2024-08-03 ASSESSMENT — PAIN SCALES - GENERAL
PAINLEVEL_OUTOF10: 2
PAINLEVEL_OUTOF10: 0 - NO PAIN

## 2024-08-03 NOTE — SIGNIFICANT EVENT
RADAR Note     08/03/24 1224   Onset Documentation   Rapid Response Initiated By Radar auto page   Location/Room [facility]   Pager Time 1224   Arrival Time 1231   Event End Time 1238   Level II Called No   Primary Reason for Call Radar auto page     RADAR of 6 auto generated page received by Rapid Response Team.  Communicated with bedside RN via [employer] Chat.  No acute changes in patient condition.  RN encouraged to page Rapid Response if further concern in patient status arises.  Patient remains on LT 9.

## 2024-08-03 NOTE — PROGRESS NOTES
NEPHROLOGY FOLLOW UP NOTE    Artem Suarez   39 y.o.    190 kg (419 lnb 1.5 oz)   MRN/Room: [id number]    Subjective: He is feeling well with no new complaints. No pain or SOB. Asking for more fluids.     Objectively his KFTs are stable with good urine output. Continues to have hypotensive episodes.      Meds:   heparin, 7,500 Units, q8h  insulin lispro, 0-10 Units, q4h  psyllium, 1 packet, TID  sevelamer carbonate, 800 mg, TID      lactated Ringer's, Last Rate: 100 mL/hr (08/03/24 1524)      acetaminophen, 650 mg, q6h PRN  dextrose, 12.5 g, q15 min PRN  dextrose, 25 g, q15 min PRN  glucagon, 1 mg, q15 min PRN  glucagon, 1 mg, q15 min PRN  oxyCODONE, 10 mg, q6h PRN  oxyCODONE, 5 mg, q6h PRN  oxygen, , Continuous PRN - O2/gases      Objective:     Vitals:    08/03/24 1557   BP: (!) 90/39   Pulse: 103   Resp: 18   Temp: 37.9 °C (100.2 °F)   SpO2:           Intake/Output Summary (Last 24 hours) at 8/3/2024 1600  Last data filed at 8/3/2024 1544  Gross per 24 hour   Intake 1350 ml   Output 3550 ml   Net -2200 ml       General appearance: AAOx3. No distress  Eyes: non-icteric  Mouth: Moist mucus membranes.  Skin: no apparent rash  Heart: Normal S1/S2.  Lungs: CTA bilat.  No wheezing/crackles  Abdomen: soft, nt/nd  Extremities: Nonpitting edema bilat to knees Cap refill <2 seconds.  :  Wolfe in place    Blood Labs:  Results for orders placed or performed during the hospital encounter of 07/19/24 (from the past 24 hour(s))   POCT GLUCOSE   Result Value Ref Range    POCT Glucose 98 74 - 99 mg/dL   POCT GLUCOSE   Result Value Ref Range    POCT Glucose 93 74 - 99 mg/dL   POCT GLUCOSE   Result Value Ref Range    POCT Glucose 100 (H) 74 - 99 mg/dL   Blood Culture    Specimen: Peripheral Venipuncture; Blood culture   Result Value Ref Range    Blood Culture Loaded on Instrument - Culture in progress    Blood Culture    Specimen: Peripheral Venipuncture; Blood culture   Result Value Ref Range    Blood Culture Loaded on  Instrument - Culture in progress    CBC and Auto Differential   Result Value Ref Range    WBC 7.7 4.4 - 11.3 x10*3/uL    nRBC 0.0 0.0 - 0.0 /100 WBCs    RBC 2.47 (L) 4.50 - 5.90 x10*6/uL    Hemoglobin 6.9 (L) 13.5 - 17.5 g/dL    Hematocrit 22.3 (L) 41.0 - 52.0 %    MCV 90 80 - 100 fL    MCH 27.9 26.0 - 34.0 pg    MCHC 30.9 (L) 32.0 - 36.0 g/dL    RDW 16.7 (H) 11.5 - 14.5 %    Platelets 139 (L) 150 - 450 x10*3/uL    Neutrophils % 74.3 40.0 - 80.0 %    Immature Granulocytes %, Automated 0.8 0.0 - 0.9 %    Lymphocytes % 16.2 13.0 - 44.0 %    Monocytes % 6.0 2.0 - 10.0 %    Eosinophils % 2.2 0.0 - 6.0 %    Basophils % 0.5 0.0 - 2.0 %    Neutrophils Absolute 5.70 1.20 - 7.70 x10*3/uL    Immature Granulocytes Absolute, Automated 0.06 0.00 - 0.70 x10*3/uL    Lymphocytes Absolute 1.24 1.20 - 4.80 x10*3/uL    Monocytes Absolute 0.46 0.10 - 1.00 x10*3/uL    Eosinophils Absolute 0.17 0.00 - 0.70 x10*3/uL    Basophils Absolute 0.04 0.00 - 0.10 x10*3/uL   Magnesium   Result Value Ref Range    Magnesium 1.88 1.60 - 2.40 mg/dL   Comprehensive metabolic panel   Result Value Ref Range    Glucose 92 74 - 99 mg/dL    Sodium 139 136 - 145 mmol/L    Potassium 4.3 3.5 - 5.3 mmol/L    Chloride 107 98 - 107 mmol/L    Bicarbonate 18 (L) 21 - 32 mmol/L    Anion Gap 18 10 - 20 mmol/L    Urea Nitrogen 40 (H) 6 - 23 mg/dL    Creatinine 6.68 (H) 0.50 - 1.30 mg/dL    eGFR 10 (L) >60 mL/min/1.73m*2    Calcium 7.1 (L) 8.6 - 10.6 mg/dL    Albumin 1.9 (L) 3.4 - 5.0 g/dL    Alkaline Phosphatase 82 33 - 120 U/L    Total Protein 6.3 (L) 6.4 - 8.2 g/dL    AST 13 9 - 39 U/L    Bilirubin, Total 0.4 0.0 - 1.2 mg/dL    ALT 6 (L) 10 - 52 U/L   POCT GLUCOSE   Result Value Ref Range    POCT Glucose 87 74 - 99 mg/dL   Prepare RBC: 1 Units   Result Value Ref Range    PRODUCT CODE A9960F61     Unit Number [id number]     Unit ABO O     Unit RH POS     XM INTEP COMP     Dispense Status IS     Blood Expiration Date 9/4/2024 11:59:00 PM EDT     PRODUCT BLOOD TYPE  5100     UNIT VOLUME 287    Type and screen   Result Value Ref Range    ABO TYPE O     Rh TYPE POS     ANTIBODY SCREEN NEG    POCT GLUCOSE   Result Value Ref Range    POCT Glucose 90 74 - 99 mg/dL   POCT GLUCOSE   Result Value Ref Range    POCT Glucose 87 74 - 99 mg/dL          ASSESSMENT:  Artem Suarez is a  39 y.o.  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 His course of JOSE RAMON has has been improving in terms of urine output but stable/plateu in terms of KFTs. He likely has large insensible/lesion losses.      #JOSE RAMON, non-oliguric - BL 1.0  - Etiology of JOSE RAMON is most likely ischemia resulting from sepsis and multiple debridement surgeries + Hypotensive episodes, now progressed to ATN. Elevated vanc levels (39) around time of creatinine rise suggest it might have been contributory as well.  - As expected, Cr is leveling off, expected to improve over the next days.  - UA significant for 1+ blood, no protein or WBC  - Urine osmolality 161  - Renal US no signs of hydronephrosis; rules out post-renal causes  - Patient has no fever, rash, urinary WBC, or eosiniphilia to suggest AIN and no recent contrast studies.       Recommendations:  - Avoid Hypotensive episodes and avoid fluctuations in BP  - Consider IV Fluid boluses (500 ml LR) for Hypotensive episodes with SBP <120.  - Avoid nephrotoxins, contrast if possible  - Ensure renal dosing for medications based on latest GFR  - Strict Is/Os    Kaitlynn Rodriguez MD  Nephrology fellow.  24 hour Renal Pager - [phone]

## 2024-08-03 NOTE — NURSING NOTE
8/3/24 1511 RN sent secure chat to Bryanna Mcqueen MD regarding patient's temp  102, heart rate 102 respirations 18, blood pressure 100/56 SpO2 92% on 2L. Resident and Attending Physician to come and evaluate patient.  Desiree Corcoran, RN

## 2024-08-03 NOTE — CARE PLAN
Problem: Skin  Goal: Decreased wound size/increased tissue granulation at next dressing change  8/3/2024 1830 by Desiree Ruggiero RN  Outcome: Progressing  8/3/2024 1702 by Desiree Ruggiero RN  Outcome: Progressing  8/3/2024 1702 by Desiree Ruggiero RN  Outcome: Progressing  8/3/2024 1702 by Desiree Ruggiero RN  Outcome: Progressing  Goal: Participates in plan/prevention/treatment measures  8/3/2024 1830 by Desiree Ruggiero RN  Outcome: Progressing  8/3/2024 1702 by Desiree Ruggiero RN  Outcome: Progressing  8/3/2024 1702 by Desiree Ruggiero RN  Outcome: Progressing  8/3/2024 1702 by Desriee Ruggiero RN  Outcome: Progressing  Goal: Prevent/manage excess moisture  8/3/2024 1830 by Desiree Ruggiero RN  Outcome: Progressing  8/3/2024 1702 by Desiree Ruggiero RN  Outcome: Progressing  8/3/2024 1702 by Desiree Ruggiero RN  Outcome: Progressing  8/3/2024 1702 by Desiree Ruggiero RN  Outcome: Progressing  Goal: Prevent/minimize sheer/friction injuries  8/3/2024 1830 by Desiree Ruggiero RN  Outcome: Progressing  8/3/2024 1702 by Desiree Ruggiero RN  Outcome: Progressing  8/3/2024 1702 by Desiree Ruggiero, RN  Outcome: Progressing  8/3/2024 1702 by Desiree Ruggieor RN  Outcome: Progressing  Goal: Promote/optimize nutrition  8/3/2024 1830 by Desiree Ruggiero RN  Outcome: Progressing  8/3/2024 1702 by Desiree Ruggiero RN  Outcome: Progressing  8/3/2024 1702 by Desiree Ruggiero RN  Outcome: Progressing  8/3/2024 1702 by Desiree Ruggiero RN  Outcome: Progressing  Goal: Promote skin healing  8/3/2024 1830 by Desiree Ruggiero RN  Outcome: Progressing  8/3/2024 1702 by Desiree Ruggiero RN  Outcome: Progressing  8/3/2024 1702 by Desiree Ruggiero RN  Outcome: Progressing  8/3/2024 1702 by Desiree Ruggiero RN  Outcome: Progressing     Problem: Pain  Goal: Takes deep breaths with improved pain control throughout the shift  8/3/2024 1830 by Desiree Ruggiero RN  Outcome: Progressing  8/3/2024 1702 by Desiere Ruggiero RN  Outcome: Progressing  8/3/2024 1702 by  Desiree Ruggiero RN  Outcome: Progressing  8/3/2024 1702 by Desiree Ruggiero RN  Outcome: Progressing  Goal: Turns in bed with improved pain control throughout the shift  8/3/2024 1830 by Desiree Ruggiero RN  Outcome: Progressing  8/3/2024 1702 by Desiree Ruggiero RN  Outcome: Progressing  8/3/2024 1702 by Desiree Ruggiero RN  Outcome: Progressing  8/3/2024 1702 by Desiree Ruggiero RN  Outcome: Progressing  Goal: Walks with improved pain control throughout the shift  8/3/2024 1830 by Desiree Ruggiero RN  Outcome: Progressing  8/3/2024 1702 by Desiree Ruggiero RN  Outcome: Progressing  8/3/2024 1702 by Desiree Ruggiero RN  Outcome: Progressing  8/3/2024 1702 by Desiree Ruggiero RN  Outcome: Progressing  Goal: Performs ADL's with improved pain control throughout shift  8/3/2024 1830 by Desiree Ruggiero RN  Outcome: Progressing  8/3/2024 1702 by Desiree Ruggiero RN  Outcome: Progressing  8/3/2024 1702 by Desiree Ruggiero RN  Outcome: Progressing  8/3/2024 1702 by Desiree Ruggiero RN  Outcome: Progressing  Goal: Participates in PT with improved pain control throughout the shift  8/3/2024 1830 by Desiree Ruggiero RN  Outcome: Progressing  8/3/2024 1702 by Desiree Ruggiero RN  Outcome: Progressing  8/3/2024 1702 by Desiree Ruggiero RN  Outcome: Progressing  8/3/2024 1702 by Desiree Ruggiero RN  Outcome: Progressing     Problem: Discharge Planning  Goal: Discharge to home or other facility with appropriate resources  8/3/2024 1830 by Desiree Ruggiero RN  Outcome: Progressing  8/3/2024 1702 by Desiree Ruggiero RN  Outcome: Progressing  8/3/2024 1702 by Desiree Ruggiero RN  Outcome: Progressing  8/3/2024 1702 by Desiree Ruggiero RN  Outcome: Progressing     Problem: Chronic Conditions and Co-morbidities  Goal: Patient's chronic conditions and co-morbidity symptoms are monitored and maintained or improved  8/3/2024 1830 by Desiree Ruggiero RN  Outcome: Progressing  8/3/2024 1702 by Desiree Ruggiero RN  Outcome: Progressing  8/3/2024 1702 by Desiree  LYNDA Ruggiero  Outcome: Progressing  8/3/2024 1702 by Desiree Ruggiero RN  Outcome: Progressing     Problem: Fall/Injury  Goal: Not fall by end of shift  8/3/2024 1830 by Desiree Ruggiero RN  Outcome: Progressing  8/3/2024 1702 by Desiree Ruggiero RN  Outcome: Progressing  8/3/2024 1702 by Desiree Ruggiero RN  Outcome: Progressing  8/3/2024 1702 by Desiree Ruggiero RN  Outcome: Progressing  Goal: Be free from injury by end of the shift  8/3/2024 1830 by Desiree Ruggiero RN  Outcome: Progressing  8/3/2024 1702 by Desiree Ruggiero RN  Outcome: Progressing  8/3/2024 1702 by Desiree Ruggiero RN  Outcome: Progressing  8/3/2024 1702 by Desiree Ruggiero RN  Outcome: Progressing  Goal: Verbalize understanding of personal risk factors for fall in the hospital  8/3/2024 1830 by Desiree Ruggiero RN  Outcome: Progressing  8/3/2024 1702 by Desiree Ruggiero RN  Outcome: Progressing  8/3/2024 1702 by Desiree Ruggiero RN  Outcome: Progressing  8/3/2024 1702 by Desiree Ruggiero RN  Outcome: Progressing     Problem: Infection related to problem list condition  Goal: Infection will resolve through treatment  8/3/2024 1830 by Desiree Ruggiero RN  Outcome: Progressing  8/3/2024 1702 by Desiree Ruggiero RN  Outcome: Progressing  8/3/2024 1702 by Desiree Ruggiero RN  Outcome: Progressing  8/3/2024 1702 by Desiree Ruggiero RN  Outcome: Progressing   The patient's goals for the shift include  controlled pain with dressing changes    The clinical goals for the shift include Patient will remain safe and free from injury throughout shift.

## 2024-08-03 NOTE — CARE PLAN
Problem: Skin  Goal: Decreased wound size/increased tissue granulation at next dressing change  8/3/2024 1702 by Desiree Ruggiero RN  Outcome: Progressing  8/3/2024 1702 by Desiree Ruggiero RN  Outcome: Progressing  Goal: Participates in plan/prevention/treatment measures  8/3/2024 1702 by Desiree Ruggiero RN  Outcome: Progressing  8/3/2024 1702 by Desiree Ruggiero RN  Outcome: Progressing  Goal: Prevent/manage excess moisture  8/3/2024 1702 by Desiree Ruggiero RN  Outcome: Progressing  8/3/2024 1702 by Desiree Ruggiero RN  Outcome: Progressing  Goal: Prevent/minimize sheer/friction injuries  8/3/2024 1702 by Desiree Ruggiero RN  Outcome: Progressing  8/3/2024 1702 by Desiree Ruggiero RN  Outcome: Progressing  Goal: Promote/optimize nutrition  8/3/2024 1702 by Desiree Ruggiero RN  Outcome: Progressing  8/3/2024 1702 by Desiree Ruggiero RN  Outcome: Progressing  Goal: Promote skin healing  8/3/2024 1702 by Desiree Ruggiero RN  Outcome: Progressing  8/3/2024 1702 by Desiree Ruggiero RN  Outcome: Progressing     Problem: Pain  Goal: Takes deep breaths with improved pain control throughout the shift  8/3/2024 1702 by Desiree Ruggiero RN  Outcome: Progressing  8/3/2024 1702 by Desiree Ruggiero RN  Outcome: Progressing  Goal: Turns in bed with improved pain control throughout the shift  8/3/2024 1702 by Desiree Ruggiero RN  Outcome: Progressing  8/3/2024 1702 by Desiree Ruggiero RN  Outcome: Progressing  Goal: Walks with improved pain control throughout the shift  8/3/2024 1702 by Desiree Ruggiero RN  Outcome: Progressing  8/3/2024 1702 by Desiree Ruggiero RN  Outcome: Progressing  Goal: Performs ADL's with improved pain control throughout shift  8/3/2024 1702 by Desiree Ruggiero RN  Outcome: Progressing  8/3/2024 1702 by Desiree Ruggiero RN  Outcome: Progressing  Goal: Participates in PT with improved pain control throughout the shift  8/3/2024 1702 by Desiree Ruggiero RN  Outcome: Progressing  8/3/2024 1702 by Desiree Ruggiero RN  Outcome:  Progressing     Problem: Discharge Planning  Goal: Discharge to home or other facility with appropriate resources  8/3/2024 1702 by Desiree Ruggiero RN  Outcome: Progressing  8/3/2024 1702 by Desiree Ruggiero RN  Outcome: Progressing     Problem: Chronic Conditions and Co-morbidities  Goal: Patient's chronic conditions and co-morbidity symptoms are monitored and maintained or improved  8/3/2024 1702 by Desiree Ruggiero RN  Outcome: Progressing  8/3/2024 1702 by Desiree Ruggiero RN  Outcome: Progressing     Problem: Fall/Injury  Goal: Not fall by end of shift  8/3/2024 1702 by Desiree Ruggiero RN  Outcome: Progressing  8/3/2024 1702 by Desiree Ruggiero RN  Outcome: Progressing  Goal: Be free from injury by end of the shift  8/3/2024 1702 by Desiree Ruggiero RN  Outcome: Progressing  8/3/2024 1702 by Desiree Ruggiero RN  Outcome: Progressing  Goal: Verbalize understanding of personal risk factors for fall in the hospital  8/3/2024 1702 by Desiree Ruggiero RN  Outcome: Progressing  8/3/2024 1702 by Desiree Ruggiero RN  Outcome: Progressing     Problem: Infection related to problem list condition  Goal: Infection will resolve through treatment  8/3/2024 1702 by Desiree Ruggiero RN  Outcome: Progressing  8/3/2024 1702 by Desiree Ruggiero RN  Outcome: Progressing   The patient's goals for the shift include  will allow nursing staff to turn patient per order.     The clinical goals for the shift include Patient will remain safe and free from injury throughout shift.

## 2024-08-03 NOTE — PROGRESS NOTES
"[facility]  ACUTE CARE SURGERY - PROGRESS NOTE    Patient Name: Artem Suarez  MRN: [id number]  Admit Date: 719  : 1985  AGE: 39 y.o.   GENDER: male  ==============================================================================  TODAY'S ASSESSMENT AND PLAN OF CARE:  39M presented as a transfer for NSTI of perineum and thighs in septic shock.      OR  at OSH: wide debridement of perineum and thighs  OR : debridement  OR : debridement  OR : wound vac placed  OR : wound vac     Plan  Neuro   - pain control: PRN Tylenol and PRN oxycodone      Respiratory   - intermittent 2L NC usage  - continue to monitor O2 sat  - continue supplemental O2 as needed     Cardiovascular   - continue to monitor q4h vitals and as needed vitals   - continue to monitor hemodynamics      GI   - continue carb controlled diet with potassium restriction    - continue oral supplementation with Ensure high protein      /FEN   - keith to remain in, exchanged on    - continue to monitor I/Os   #JOSE RAMON   - nephrology following, recommendations appreciated   - Cr 6.68 today     Heme   - 1u pRBCs ordered today, Hgb 6.9   - will re-check CBC after transfusion    - continue to monitor with daily CBC     Endocrine: history of diabetes    - continue POCT glucose checks   - SSI #2 available    - Endocrinology was consulted and recommended outgoing [facility]      MSK/Skin  #NSTI, perineum and posterior thighs   - s/p multiple debridements ([facility] on , [facility] on , ) and wound vac placements ( and )    - CT angio lower extremities without contrast showed no evidence of retained infectious tissue    - WTD dressing changes daily and as needed      #Right great toe wound, chronic    - wound care previously consulted and recommendations appreciated: daily dressing changes via cleanse toe with NS and pat toe dry then pain toe wound with betadine and " cover with dry dressing      Infectious disease    - discontinued antibiotics    - afebrile without leukocytosis    - continue to monitor WBC with daily CBC      GI ppx: not indicated  DVT ppx: SCDs and heparin     Patient discussed with Dr. Dorothy Askew MD  PGY-1   Acute Care Surgery  Service Pager: [phone]    ==============================================================================  CHIEF COMPLAINT / EVENTS LAST 24HRS / HPI:  No acute events overnight. He reports feeling well this morning without acute concerns. Still endorses pain with dressing changes. Denies chest pain, shortness of breath, fevers, chills, nausea, vomiting, abdominal pain.     MEDICAL HISTORY / ROS:   Admission history and ROS reviewed. Pertinent changes as follows:  N/A    PHYSICAL EXAM:  Heart Rate:  []   Temp:  [35.8 °C (96.4 °F)-36.9 °C (98.4 °F)]   Resp:  [16-18]   BP: ()/(49-62)   SpO2:  [91 %-94 %]   Physical Exam  Constitutional:       Appearance: He is obese.   HENT:      Head: Atraumatic.   Cardiovascular:      Rate and Rhythm: Normal rate.   Pulmonary:      Effort: Pulmonary effort is normal. No respiratory distress.   Abdominal:      General: There is no distension.      Palpations: Abdomen is soft.      Tenderness: There is no abdominal tenderness.   Genitourinary:     Comments: Wolfe and rectal tubes in place   Musculoskeletal:      Comments: Able to move bilateral upper extremities  Bilateral lower extremity movement limited by body habitus   Skin:     General: Skin is warm and dry.      Comments: Dry skin bilateral lower extremities  Bilateral perineal and groin wounds dressed with wet kerlix and dry ABD on top, changed at bedside   Neurological:      Mental Status: He is alert and oriented to person, place, and time.       IMAGING SUMMARY:  (summary of new imaging findings, not a copy of dictation)  None    LABS:  Results from last 7 days   Lab Units 08/03/24  0527 08/02/24  0636  08/01/24  1504   WBC AUTO x10*3/uL 7.7 8.7 11.1   HEMOGLOBIN g/dL 6.9* 7.0* 7.5*   HEMATOCRIT % 22.3* 22.5* 24.8*   PLATELETS AUTO x10*3/uL 139* 152 184   NEUTROS PCT AUTO % 74.3 76.8  --    LYMPHS PCT AUTO % 16.2 13.6  --    MONOS PCT AUTO % 6.0 6.2  --    EOS PCT AUTO % 2.2 2.1  --      Results from last 7 days   Lab Units 08/01/24  0501   APTT seconds 41*   INR  1.6*     Results from last 7 days   Lab Units 08/03/24  0527 08/02/24  0636 08/01/24  0501   SODIUM mmol/L 139 138 140   POTASSIUM mmol/L 4.3 4.5 5.2   CHLORIDE mmol/L 107 106 108*   CO2 mmol/L 18* 21 19*   BUN mg/dL 40* 42* 45*   CREATININE mg/dL 6.68* 6.36* 6.07*   CALCIUM mg/dL 7.1* 7.1* 6.9*   PROTEIN TOTAL g/dL 6.3* 6.2*  --    BILIRUBIN TOTAL mg/dL 0.4 0.5  --    ALK PHOS U/L 82 71  --    ALT U/L 6* 7*  --    AST U/L 13 15  --    GLUCOSE mg/dL 92 108* 103*     Results from last 7 days   Lab Units 08/03/24 0527 08/02/24  0636   BILIRUBIN TOTAL mg/dL 0.4 0.5           I have reviewed all medications, laboratory results, and imaging pertinent for today's encounter.

## 2024-08-04 VITALS
HEIGHT: 66 IN | HEART RATE: 73 BPM | OXYGEN SATURATION: 95 % | SYSTOLIC BLOOD PRESSURE: 107 MMHG | RESPIRATION RATE: 18 BRPM | TEMPERATURE: 98.1 F | WEIGHT: 315 LBS | DIASTOLIC BLOOD PRESSURE: 63 MMHG | BODY MASS INDEX: 50.62 KG/M2

## 2024-08-04 LAB
ALBUMIN SERPL BCP-MCNC: 1.8 G/DL (ref 3.4–5)
ANION GAP SERPL CALC-SCNC: 15 MMOL/L (ref 10–20)
BACTERIA BLD CULT: NORMAL
BACTERIA UR CULT: NO GROWTH
BASOPHILS # BLD AUTO: 0.05 X10*3/UL (ref 0–0.1)
BASOPHILS NFR BLD AUTO: 0.6 %
BLOOD EXPIRATION DATE: NORMAL
BUN SERPL-MCNC: 40 MG/DL (ref 6–23)
CALCIUM SERPL-MCNC: 6.9 MG/DL (ref 8.6–10.6)
CHLORIDE SERPL-SCNC: 107 MMOL/L (ref 98–107)
CO2 SERPL-SCNC: 21 MMOL/L (ref 21–32)
CREAT SERPL-MCNC: 6.93 MG/DL (ref 0.5–1.3)
DISPENSE STATUS: NORMAL
EGFRCR SERPLBLD CKD-EPI 2021: 10 ML/MIN/1.73M*2
EOSINOPHIL # BLD AUTO: 0.18 X10*3/UL (ref 0–0.7)
EOSINOPHIL NFR BLD AUTO: 2.2 %
ERYTHROCYTE [DISTWIDTH] IN BLOOD BY AUTOMATED COUNT: 16.5 % (ref 11.5–14.5)
GLUCOSE BLD MANUAL STRIP-MCNC: 107 MG/DL (ref 74–99)
GLUCOSE BLD MANUAL STRIP-MCNC: 110 MG/DL (ref 74–99)
GLUCOSE BLD MANUAL STRIP-MCNC: 111 MG/DL (ref 74–99)
GLUCOSE BLD MANUAL STRIP-MCNC: 116 MG/DL (ref 74–99)
GLUCOSE BLD MANUAL STRIP-MCNC: 89 MG/DL (ref 74–99)
GLUCOSE BLD MANUAL STRIP-MCNC: 93 MG/DL (ref 74–99)
GLUCOSE SERPL-MCNC: 86 MG/DL (ref 74–99)
HCT VFR BLD AUTO: 26.3 % (ref 41–52)
HGB BLD-MCNC: 8 G/DL (ref 13.5–17.5)
HOLD SPECIMEN: NORMAL
IMM GRANULOCYTES # BLD AUTO: 0.08 X10*3/UL (ref 0–0.7)
IMM GRANULOCYTES NFR BLD AUTO: 1 % (ref 0–0.9)
LYMPHOCYTES # BLD AUTO: 1.24 X10*3/UL (ref 1.2–4.8)
LYMPHOCYTES NFR BLD AUTO: 15.4 %
MAGNESIUM SERPL-MCNC: 1.92 MG/DL (ref 1.6–2.4)
MCH RBC QN AUTO: 28.6 PG (ref 26–34)
MCHC RBC AUTO-ENTMCNC: 30.4 G/DL (ref 32–36)
MCV RBC AUTO: 94 FL (ref 80–100)
MONOCYTES # BLD AUTO: 0.5 X10*3/UL (ref 0.1–1)
MONOCYTES NFR BLD AUTO: 6.2 %
NEUTROPHILS # BLD AUTO: 6 X10*3/UL (ref 1.2–7.7)
NEUTROPHILS NFR BLD AUTO: 74.6 %
NRBC BLD-RTO: 0 /100 WBCS (ref 0–0)
PHOSPHATE SERPL-MCNC: 7.7 MG/DL (ref 2.5–4.9)
PLATELET # BLD AUTO: 121 X10*3/UL (ref 150–450)
POTASSIUM SERPL-SCNC: 4.2 MMOL/L (ref 3.5–5.3)
PRODUCT BLOOD TYPE: 5100
PRODUCT CODE: NORMAL
RBC # BLD AUTO: 2.8 X10*6/UL (ref 4.5–5.9)
SODIUM SERPL-SCNC: 139 MMOL/L (ref 136–145)
UNIT ABO: NORMAL
UNIT NUMBER: NORMAL
UNIT RH: NORMAL
UNIT VOLUME: 287
WBC # BLD AUTO: 8.1 X10*3/UL (ref 4.4–11.3)
XM INTEP: NORMAL

## 2024-08-04 PROCEDURE — 2500000004 HC RX 250 GENERAL PHARMACY W/ HCPCS (ALT 636 FOR OP/ED)

## 2024-08-04 PROCEDURE — 83735 ASSAY OF MAGNESIUM: CPT

## 2024-08-04 PROCEDURE — 2500000002 HC RX 250 W HCPCS SELF ADMINISTERED DRUGS (ALT 637 FOR MEDICARE OP, ALT 636 FOR OP/ED)

## 2024-08-04 PROCEDURE — 2500000001 HC RX 250 WO HCPCS SELF ADMINISTERED DRUGS (ALT 637 FOR MEDICARE OP)

## 2024-08-04 PROCEDURE — 80069 RENAL FUNCTION PANEL: CPT

## 2024-08-04 PROCEDURE — 36415 COLL VENOUS BLD VENIPUNCTURE: CPT

## 2024-08-04 PROCEDURE — 1100000001 HC PRIVATE ROOM DAILY

## 2024-08-04 PROCEDURE — 85025 COMPLETE CBC W/AUTO DIFF WBC: CPT

## 2024-08-04 PROCEDURE — 82947 ASSAY GLUCOSE BLOOD QUANT: CPT

## 2024-08-04 ASSESSMENT — COGNITIVE AND FUNCTIONAL STATUS - GENERAL
DRESSING REGULAR LOWER BODY CLOTHING: TOTAL
STANDING UP FROM CHAIR USING ARMS: TOTAL
DRESSING REGULAR UPPER BODY CLOTHING: TOTAL
TURNING FROM BACK TO SIDE WHILE IN FLAT BAD: TOTAL
MOVING TO AND FROM BED TO CHAIR: TOTAL
MOBILITY SCORE: 6
TOILETING: TOTAL
CLIMB 3 TO 5 STEPS WITH RAILING: TOTAL
WALKING IN HOSPITAL ROOM: TOTAL
MOVING FROM LYING ON BACK TO SITTING ON SIDE OF FLAT BED WITH BEDRAILS: TOTAL
HELP NEEDED FOR BATHING: TOTAL
DAILY ACTIVITIY SCORE: 12

## 2024-08-04 ASSESSMENT — PAIN SCALES - GENERAL
PAINLEVEL_OUTOF10: 0 - NO PAIN
PAINLEVEL_OUTOF10: 0 - NO PAIN

## 2024-08-04 ASSESSMENT — PAIN SCALES - PAIN ASSESSMENT IN ADVANCED DEMENTIA (PAINAD): TOTALSCORE: MEDICATION (SEE MAR)

## 2024-08-04 ASSESSMENT — PAIN - FUNCTIONAL ASSESSMENT: PAIN_FUNCTIONAL_ASSESSMENT: 0-10

## 2024-08-04 NOTE — CARE PLAN
Problem: Skin  Goal: Prevent/manage excess moisture  Outcome: Progressing  Goal: Promote/optimize nutrition  Outcome: Progressing  Goal: Promote skin healing  Outcome: Progressing     The patient's goals for the shift include  pain control.    The clinical goals for the shift include Pain control.    Over the shift, the patient did make progress toward the following goals.

## 2024-08-04 NOTE — CARE PLAN
The patient's goals for the shift include      The clinical goals for the shift include Patient will remain free from pain throughout the night.    Over the shift, the patient did not make progress toward the following goals. Barriers to progression include patient has multiple large wounds on sacrum and thigh area. Recommendations to address these barriers include frequent two hours turns overnight.

## 2024-08-04 NOTE — PROGRESS NOTES
"[facility] [place]  ACUTE CARE SURGERY - PROGRESS NOTE    Patient Name: Artem Suarez  MRN: [id number]  Admit Date: 719  : 1985  AGE: 39 y.o.   GENDER: male  ==============================================================================  TODAY'S ASSESSMENT AND PLAN OF CARE:  39M presented as a transfer for NSTI of perineum and thighs in septic shock.      OR  at OSH: wide debridement of perineum and thighs  OR : debridement  OR : debridement  OR : wound vac placed  OR : wound vac      Plan  Neuro   - pain control: PRN tylenol and PRN oxycodone     Respiratory    - supplemental O2 as needed   - continue to monitor O2 saturation q4h and as needed     Cardiovascular   - monitor q4h vitals and as needed    - monitor hemodynamics     GI   - continue consistent carb, potassium restricted diet with ensure high proteins with meals    - metamucil 1 packet TID     /FEN   - keith in place, will remain    - continue to monitor I/Os  #JOSE RAMON   - nephrology following, recommendations appreciated    - Cr 6.93, continues to increase      Heme   - received 1u pRBCs yesterday, incremented appropriately (6.9 yesterday AM -> 7.8 at 8pm yesterday evening -> 8.0 this AM)    - will continue to monitor with daily CBC   - transfuse for symptomatic anemia     Endocrine: history of diabetes    - SSI #2 available   - continue POCT glucose checks    - Endocrinology was consulted and recommended outgoing referral to [facility]\"      MSK/Skin  #NSTI, perineum and posterior thighs   - s/p multiple debridements ([facility] on , [facility] on , ) and wound vac placements ( and )    - will continue daily and as needed WTD dressing changes    - dressing to be changed this afternoon      #Right great toe wound, chronic    - wound care previously consulted and recommendations appreciated: daily dressing changes via cleanse toe with NS and pat toe dry then pain toe wound with " betadine and cover with dry dressing      Infectious disease    - not currently on antibiotics   - continue to monitor for signs of infection    - currently afebrile, had one fever Tmax 102, but repeat an hour later 100.2    - denies symptoms (fevers, chest pain, shortness of breath, chills)   - no leukocytosis    - will continue to monitor WBC on CBC    - UA (+) 75 leukocyte esterase and 6-10 WBC     GI ppx: not indicated  DVT ppx: SCDs and Heparin     Patient discussed with Dr. Dorothy Askew MD  PGY-1   Acute Care Surgery  Service Pager: [phone]    ==============================================================================  CHIEF COMPLAINT / EVENTS LAST 24HRS / HPI:  He received 1u pRBCs yesterday evening for Hgb of 6.9 yesterday, but otherwise no acute events overnight. He had one fever yesterday afternoon but none since and he reports that he does not feel feverish or have chills. He endorses feeling better and has no acute concerns. Pain and discomfort with dressing changes. Denies chest pain, shortness of breath, fevers, chills.    MEDICAL HISTORY / ROS:   Admission history and ROS reviewed. Pertinent changes as follows:  N/A    PHYSICAL EXAM:  Heart Rate:  []   Temp:  [35.6 °C (96.1 °F)-38.9 °C (102 °F)]   Resp:  [18-24]   BP: ()/(39-61)   SpO2:  [91 %-97 %]   Physical Exam  Constitutional:       General: He is not in acute distress.     Appearance: He is obese.   HENT:      Head: Atraumatic.      Nose:      Comments: 2L NC in place   Pulmonary:      Effort: Pulmonary effort is normal. No respiratory distress.   Abdominal:      General: There is no distension.      Palpations: Abdomen is soft.      Tenderness: There is no abdominal tenderness.   Genitourinary:     Comments: Wolfe and rectal tubes in place   Musculoskeletal:      Comments: Able to move bilateral upper extremities  Minimal bilateral lower extremity movement 2/2 body habitus   Skin:     General: Skin is warm and  dry.      Comments: Dry skin bilateral lower extremities  Bilateral perineal wounds and R groin wounds dressed with wet kerlix and dry ABD pads on top    Neurological:      Mental Status: He is alert and oriented to person, place, and time.       IMAGING SUMMARY:  (summary of new imaging findings, not a copy of dictation)  None    LABS:  Results from last 7 days   Lab Units 08/04/24  0553 08/03/24 2043 08/03/24  0527   WBC AUTO x10*3/uL 8.1 10.1 7.7   HEMOGLOBIN g/dL 8.0* 7.8* 6.9*   HEMATOCRIT % 26.3* 25.0* 22.3*   PLATELETS AUTO x10*3/uL 121* 127* 139*   NEUTROS PCT AUTO % 74.6 80.0 74.3   LYMPHS PCT AUTO % 15.4 11.2 16.2   MONOS PCT AUTO % 6.2 5.6 6.0   EOS PCT AUTO % 2.2 1.8 2.2     Results from last 7 days   Lab Units 08/01/24  0501   APTT seconds 41*   INR  1.6*     Results from last 7 days   Lab Units 08/04/24  0553 08/03/24  0527 08/02/24  0636   SODIUM mmol/L 139 139 138   POTASSIUM mmol/L 4.2 4.3 4.5   CHLORIDE mmol/L 107 107 106   CO2 mmol/L 21 18* 21   BUN mg/dL 40* 40* 42*   CREATININE mg/dL 6.93* 6.68* 6.36*   CALCIUM mg/dL 6.9* 7.1* 7.1*   PROTEIN TOTAL g/dL  --  6.3* 6.2*   BILIRUBIN TOTAL mg/dL  --  0.4 0.5   ALK PHOS U/L  --  82 71   ALT U/L  --  6* 7*   AST U/L  --  13 15   GLUCOSE mg/dL 86 92 108*     Results from last 7 days   Lab Units 08/03/24  0527 08/02/24  0636   BILIRUBIN TOTAL mg/dL 0.4 0.5           I have reviewed all medications, laboratory results, and imaging pertinent for today's encounter.

## 2024-08-04 NOTE — CARE PLAN
Problem: Skin  Goal: Decreased wound size/increased tissue granulation at next dressing change  8/4/2024 0321 by Karen Melgar, RN  Outcome: Progressing  8/4/2024 0320 by Karen Melgar RN  Outcome: Progressing   The patient's goals for the shift include      The clinical goals for the shift include Patient will remain free from pain throughout the night.    Over the shift, the patient did not make progress toward the following goals. Barriers to progression include patient has been refusing q 2 turns overnight. Recommendations to address these barriers include frequent monitoring and communicating the importance of shifting weight to avoid further skin breakdown.

## 2024-08-05 LAB
ALBUMIN SERPL BCP-MCNC: 1.9 G/DL (ref 3.4–5)
ANION GAP SERPL CALC-SCNC: 14 MMOL/L (ref 10–20)
BACTERIA BLD AEROBE CULT: ABNORMAL
BACTERIA BLD CULT: ABNORMAL
BASOPHILS # BLD AUTO: 0.05 X10*3/UL (ref 0–0.1)
BASOPHILS NFR BLD AUTO: 0.9 %
BUN SERPL-MCNC: 37 MG/DL (ref 6–23)
C DIF TOX TCDA+TCDB STL QL NAA+PROBE: NOT DETECTED
C3 SERPL-MCNC: 131 MG/DL (ref 87–200)
C4 SERPL-MCNC: 53 MG/DL (ref 10–50)
CALCIUM SERPL-MCNC: 6.9 MG/DL (ref 8.6–10.6)
CHLORIDE SERPL-SCNC: 108 MMOL/L (ref 98–107)
CO2 SERPL-SCNC: 21 MMOL/L (ref 21–32)
CREAT SERPL-MCNC: 6.57 MG/DL (ref 0.5–1.3)
EGFRCR SERPLBLD CKD-EPI 2021: 10 ML/MIN/1.73M*2
EOSINOPHIL # BLD AUTO: 0.2 X10*3/UL (ref 0–0.7)
EOSINOPHIL NFR BLD AUTO: 3.5 %
ERYTHROCYTE [DISTWIDTH] IN BLOOD BY AUTOMATED COUNT: 16.2 % (ref 11.5–14.5)
GLUCOSE BLD MANUAL STRIP-MCNC: 101 MG/DL (ref 74–99)
GLUCOSE BLD MANUAL STRIP-MCNC: 141 MG/DL (ref 74–99)
GLUCOSE BLD MANUAL STRIP-MCNC: 85 MG/DL (ref 74–99)
GLUCOSE SERPL-MCNC: 88 MG/DL (ref 74–99)
GRAM STN SPEC: ABNORMAL
HBV SURFACE AG SERPL QL IA: NONREACTIVE
HCT VFR BLD AUTO: 25.8 % (ref 41–52)
HCV AB SER QL: REACTIVE
HGB BLD-MCNC: 7.9 G/DL (ref 13.5–17.5)
IMM GRANULOCYTES # BLD AUTO: 0.08 X10*3/UL (ref 0–0.7)
IMM GRANULOCYTES NFR BLD AUTO: 1.4 % (ref 0–0.9)
LYMPHOCYTES # BLD AUTO: 1.22 X10*3/UL (ref 1.2–4.8)
LYMPHOCYTES NFR BLD AUTO: 21.6 %
MAGNESIUM SERPL-MCNC: 1.96 MG/DL (ref 1.6–2.4)
MCH RBC QN AUTO: 28.6 PG (ref 26–34)
MCHC RBC AUTO-ENTMCNC: 30.6 G/DL (ref 32–36)
MCV RBC AUTO: 94 FL (ref 80–100)
MONOCYTES # BLD AUTO: 0.41 X10*3/UL (ref 0.1–1)
MONOCYTES NFR BLD AUTO: 7.2 %
NEUTROPHILS # BLD AUTO: 3.7 X10*3/UL (ref 1.2–7.7)
NEUTROPHILS NFR BLD AUTO: 65.4 %
NRBC BLD-RTO: 0 /100 WBCS (ref 0–0)
PHOSPHATE SERPL-MCNC: 8.1 MG/DL (ref 2.5–4.9)
PLATELET # BLD AUTO: 128 X10*3/UL (ref 150–450)
POTASSIUM SERPL-SCNC: 4.4 MMOL/L (ref 3.5–5.3)
RBC # BLD AUTO: 2.76 X10*6/UL (ref 4.5–5.9)
SODIUM SERPL-SCNC: 139 MMOL/L (ref 136–145)
WBC # BLD AUTO: 5.7 X10*3/UL (ref 4.4–11.3)

## 2024-08-05 PROCEDURE — 2500000004 HC RX 250 GENERAL PHARMACY W/ HCPCS (ALT 636 FOR OP/ED)

## 2024-08-05 PROCEDURE — 86803 HEPATITIS C AB TEST: CPT

## 2024-08-05 PROCEDURE — 82947 ASSAY GLUCOSE BLOOD QUANT: CPT

## 2024-08-05 PROCEDURE — 86162 COMPLEMENT TOTAL (CH50): CPT

## 2024-08-05 PROCEDURE — 86160 COMPLEMENT ANTIGEN: CPT

## 2024-08-05 PROCEDURE — 85025 COMPLETE CBC W/AUTO DIFF WBC: CPT

## 2024-08-05 PROCEDURE — 80069 RENAL FUNCTION PANEL: CPT

## 2024-08-05 PROCEDURE — 2500000002 HC RX 250 W HCPCS SELF ADMINISTERED DRUGS (ALT 637 FOR MEDICARE OP, ALT 636 FOR OP/ED)

## 2024-08-05 PROCEDURE — 2500000001 HC RX 250 WO HCPCS SELF ADMINISTERED DRUGS (ALT 637 FOR MEDICARE OP)

## 2024-08-05 PROCEDURE — 83735 ASSAY OF MAGNESIUM: CPT

## 2024-08-05 PROCEDURE — 36415 COLL VENOUS BLD VENIPUNCTURE: CPT

## 2024-08-05 PROCEDURE — 99024 POSTOP FOLLOW-UP VISIT: CPT | Performed by: STUDENT IN AN ORGANIZED HEALTH CARE EDUCATION/TRAINING PROGRAM

## 2024-08-05 PROCEDURE — 1100000001 HC PRIVATE ROOM DAILY

## 2024-08-05 PROCEDURE — 87340 HEPATITIS B SURFACE AG IA: CPT

## 2024-08-05 PROCEDURE — 83516 IMMUNOASSAY NONANTIBODY: CPT

## 2024-08-05 PROCEDURE — 86038 ANTINUCLEAR ANTIBODIES: CPT

## 2024-08-05 PROCEDURE — 99233 SBSQ HOSP IP/OBS HIGH 50: CPT | Performed by: INTERNAL MEDICINE

## 2024-08-05 PROCEDURE — 87522 HEPATITIS C REVRS TRNSCRPJ: CPT

## 2024-08-05 PROCEDURE — 2500000001 HC RX 250 WO HCPCS SELF ADMINISTERED DRUGS (ALT 637 FOR MEDICARE OP): Performed by: NURSE PRACTITIONER

## 2024-08-05 PROCEDURE — 87493 C DIFF AMPLIFIED PROBE: CPT

## 2024-08-05 RX ORDER — ASCORBIC ACID 500 MG
500 TABLET ORAL DAILY
Status: DISCONTINUED | OUTPATIENT
Start: 2024-08-05 | End: 2024-08-09 | Stop reason: HOSPADM

## 2024-08-05 RX ORDER — ZINC SULFATE 50(220)MG
50 CAPSULE ORAL DAILY
Status: DISCONTINUED | OUTPATIENT
Start: 2024-08-05 | End: 2024-08-09 | Stop reason: HOSPADM

## 2024-08-05 ASSESSMENT — COGNITIVE AND FUNCTIONAL STATUS - GENERAL
MOBILITY SCORE: 6
MOVING FROM LYING ON BACK TO SITTING ON SIDE OF FLAT BED WITH BEDRAILS: TOTAL
DRESSING REGULAR LOWER BODY CLOTHING: TOTAL
DAILY ACTIVITIY SCORE: 12
TURNING FROM BACK TO SIDE WHILE IN FLAT BAD: TOTAL
DRESSING REGULAR UPPER BODY CLOTHING: TOTAL
MOVING FROM LYING ON BACK TO SITTING ON SIDE OF FLAT BED WITH BEDRAILS: TOTAL
CLIMB 3 TO 5 STEPS WITH RAILING: TOTAL
HELP NEEDED FOR BATHING: TOTAL
DAILY ACTIVITIY SCORE: 11
DRESSING REGULAR LOWER BODY CLOTHING: TOTAL
DRESSING REGULAR UPPER BODY CLOTHING: TOTAL
MOVING TO AND FROM BED TO CHAIR: TOTAL
CLIMB 3 TO 5 STEPS WITH RAILING: TOTAL
TOILETING: TOTAL
HELP NEEDED FOR BATHING: TOTAL
MOVING TO AND FROM BED TO CHAIR: TOTAL
STANDING UP FROM CHAIR USING ARMS: TOTAL
WALKING IN HOSPITAL ROOM: TOTAL
WALKING IN HOSPITAL ROOM: TOTAL
STANDING UP FROM CHAIR USING ARMS: TOTAL
MOBILITY SCORE: 6
TURNING FROM BACK TO SIDE WHILE IN FLAT BAD: TOTAL
TOILETING: TOTAL
PERSONAL GROOMING: A LITTLE

## 2024-08-05 ASSESSMENT — PAIN SCALES - PAIN ASSESSMENT IN ADVANCED DEMENTIA (PAINAD)
BODYLANGUAGE: RELAXED
TOTALSCORE: 0
BREATHING: NORMAL
BODYLANGUAGE: RELAXED
BREATHING: NORMAL
FACIALEXPRESSION: SMILING OR INEXPRESSIVE
TOTALSCORE: MEDICATION (SEE MAR);REPOSITIONED
FACIALEXPRESSION: SMILING OR INEXPRESSIVE
TOTALSCORE: MEDICATION (SEE MAR);REPOSITIONED
CONSOLABILITY: NO NEED TO CONSOLE
TOTALSCORE: 0
CONSOLABILITY: NO NEED TO CONSOLE

## 2024-08-05 ASSESSMENT — PAIN DESCRIPTION - ORIENTATION
ORIENTATION: POSTERIOR
ORIENTATION: POSTERIOR

## 2024-08-05 ASSESSMENT — PAIN DESCRIPTION - LOCATION
LOCATION: BUTTOCKS
LOCATION: BUTTOCKS

## 2024-08-05 ASSESSMENT — PAIN SCALES - GENERAL
PAINLEVEL_OUTOF10: 7
PAINLEVEL_OUTOF10: 7
PAINLEVEL_OUTOF10: 0 - NO PAIN
PAINLEVEL_OUTOF10: 0 - NO PAIN

## 2024-08-05 NOTE — PROGRESS NOTES
"[facility]  ACUTE CARE SURGERY - PROGRESS NOTE    Patient Name: Artem Suarez  MRN: [id number]  Admit Date: 719  : 1985  AGE: 39 y.o.   GENDER: male  ==============================================================================  TODAY'S ASSESSMENT AND PLAN OF CARE:  39M presented as a transfer for NSTI of perineum and thighs in septic shock.      OR  at OSH: wide debridement of perineum and thighs  OR : debridement  OR : debridement  OR : wound vac placed  OR : wound vac      Plan  Neuro   - pain control: PRN tylenol and PRN oxycodone     Respiratory    - supplemental O2 as needed   - continue to monitor O2 saturation q4h and as needed     Cardiovascular   - monitor q4h vitals and as needed    - monitor hemodynamics     GI   - continue consistent carb, potassium restricted diet with ensure high proteins with meals    - metamucil 1 packet TID  - Obtain c. Diff culture  - Add supplementation: zinc, selenium, and vitamin C for wound healing     /FEN   - Wolfe in place   - continue to monitor I/Os  #JOSE RAMON   - nephrology following, recommendations appreciated    - Cr 6.57, slightly downtrending from 6.93 from        Heme   - Continue to monitor H/H  - Hgb stable today at 7.9 from 8 on      Endocrine: history of diabetes   - Glucose stable, discontinue glucose check & insulin    - Endocrinology was consulted and recommended outgoing referral to [facility]\"      MSK/Skin  #NSTI, perineum and posterior thighs   - s/p multiple debridements ([facility]H on , [facility] on , ) and wound vac placements ( and )    - will continue daily and as needed WTD dressing changes by nursing      #Right great toe wound, chronic    - wound care previously consulted and recommendations appreciated: daily dressing changes via cleanse toe with NS and pat toe dry then pain toe wound with betadine and cover with dry dressing      Infectious disease    - " not currently on antibiotics   - continue to monitor for signs of infection    - currently afebrile, had one fever Tmax 102, but repeat an hour later 100.2    - denies symptoms (fevers, chest pain, shortness of breath, chills)   - no leukocytosis    - will continue to monitor WBC on CBC    - UA (+) 75 leukocyte esterase and 6-10 WBC     GI ppx: not indicated  DVT ppx: SCDs and Heparin     Patient discussed with Dr. Maco Medina MD  General Surgery, PGY-3  Please page service pager with questions  Acute Care Surgery  Service Pager: [phone]    ==============================================================================  CHIEF COMPLAINT / EVENTS LAST 24HRS / HPI:  NAEO.  Has multiple episodes of bowel movement.  Wound looks clean    MEDICAL HISTORY / ROS:   Admission history and ROS reviewed. Pertinent changes as follows:  N/A    PHYSICAL EXAM:  Heart Rate:  [69-78]   Temp:  [35.8 °C (96.4 °F)-36.7 °C (98.1 °F)]   Resp:  [18]   BP: ()/(54-65)   SpO2:  [95 %-98 %]   Physical Exam  Constitutional:       General: He is not in acute distress.     Appearance: He is obese.   HENT:      Head: Atraumatic.      Nose:      Comments: 2L NC in place   Pulmonary:      Effort: Pulmonary effort is normal. No respiratory distress.   Abdominal:      General: There is no distension.      Palpations: Abdomen is soft.      Tenderness: There is no abdominal tenderness.   Genitourinary:     Comments: Wolfe and rectal tubes in place   Musculoskeletal:      Comments: Able to move bilateral upper extremities  Minimal bilateral lower extremity movement 2/2 body habitus   Skin:     General: Skin is warm and dry.      Comments: Dry skin bilateral lower extremities  Bilateral perineal wounds and R groin wounds dressed with wet kerlix and dry ABD pads on top    Neurological:      Mental Status: He is alert and oriented to person, place, and time.       IMAGING SUMMARY:  (summary of new imaging findings, not a copy of  dictation)  None    LABS:  Results from last 7 days   Lab Units 08/05/24  0557 08/04/24  0553 08/03/24  2043   WBC AUTO x10*3/uL 5.7 8.1 10.1   HEMOGLOBIN g/dL 7.9* 8.0* 7.8*   HEMATOCRIT % 25.8* 26.3* 25.0*   PLATELETS AUTO x10*3/uL 128* 121* 127*   NEUTROS PCT AUTO % 65.4 74.6 80.0   LYMPHS PCT AUTO % 21.6 15.4 11.2   MONOS PCT AUTO % 7.2 6.2 5.6   EOS PCT AUTO % 3.5 2.2 1.8     Results from last 7 days   Lab Units 08/01/24  0501   APTT seconds 41*   INR  1.6*     Results from last 7 days   Lab Units 08/05/24  0557 08/04/24  0553 08/03/24  0527 08/02/24  0636   SODIUM mmol/L 139 139 139 138   POTASSIUM mmol/L 4.4 4.2 4.3 4.5   CHLORIDE mmol/L 108* 107 107 106   CO2 mmol/L 21 21 18* 21   BUN mg/dL 37* 40* 40* 42*   CREATININE mg/dL 6.57* 6.93* 6.68* 6.36*   CALCIUM mg/dL 6.9* 6.9* 7.1* 7.1*   PROTEIN TOTAL g/dL  --   --  6.3* 6.2*   BILIRUBIN TOTAL mg/dL  --   --  0.4 0.5   ALK PHOS U/L  --   --  82 71   ALT U/L  --   --  6* 7*   AST U/L  --   --  13 15   GLUCOSE mg/dL 88 86 92 108*     Results from last 7 days   Lab Units 08/03/24  0527 08/02/24  0636   BILIRUBIN TOTAL mg/dL 0.4 0.5           I have reviewed all medications, laboratory results, and imaging pertinent for today's encounter.

## 2024-08-05 NOTE — CARE PLAN
The patient's goals for the shift include      The clinical goals for the shift include Patient will report any pain or discomfort throughout the nightshift.    Over the shift, the patient did not make progress toward the following goals. Barriers to progression include Patient has extensive wound on bottom and groin areas. Recommendations to address these barriers include frequent monitoring of pain and discomfort. Patient is also a required every two hour turn but has been refusing ongoing throughout the night, education given without an change at this time.

## 2024-08-05 NOTE — PROGRESS NOTES
Physical Therapy                 Therapy Communication Note    Patient Name: Artem Suarez  MRN: [id number]  Today's Date: 8/5/2024     Discipline: Physical Therapy    Missed Visit Reason: Missed Visit Reason: Patient refused (Pt states he just had his wounds changed and wanted to rest. Will re attempt as available.)    Missed Time: Attempt    Comment:

## 2024-08-05 NOTE — CARE PLAN
Problem: Skin  Goal: Decreased wound size/increased tissue granulation at next dressing change  8/5/2024 1048 by Mery Orozco RN  Outcome: Progressing  8/5/2024 1048 by Mery Orozco RN  Outcome: Progressing  Goal: Participates in plan/prevention/treatment measures  8/5/2024 1048 by Mery Orozco RN  Outcome: Progressing  8/5/2024 1048 by Mery Orozco, RN  Outcome: Progressing  Goal: Prevent/manage excess moisture  8/5/2024 1048 by Mery Orozco RN  Outcome: Progressing  8/5/2024 1048 by Mery Orozco RN  Outcome: Progressing  Goal: Prevent/minimize sheer/friction injuries  8/5/2024 1048 by Mery Orozco RN  Outcome: Progressing  8/5/2024 1048 by Mery Orozco RN  Outcome: Progressing  Goal: Promote/optimize nutrition  8/5/2024 1048 by Mery Orozco, RN  Outcome: Progressing  8/5/2024 1048 by Mery Orozco RN  Outcome: Progressing  Goal: Promote skin healing  8/5/2024 1048 by Mery Orozco RN  Outcome: Progressing  8/5/2024 1048 by Mery Orozco, RN  Outcome: Progressing     Problem: Pain  Goal: Takes deep breaths with improved pain control throughout the shift  8/5/2024 1048 by Mery Orozco, RN  Outcome: Progressing  8/5/2024 1048 by Mery Orozco, RN  Outcome: Progressing  Goal: Turns in bed with improved pain control throughout the shift  8/5/2024 1048 by Mery Orozco, RN  Outcome: Progressing  8/5/2024 1048 by Mery Orozco, RN  Outcome: Progressing  Goal: Walks with improved pain control throughout the shift  8/5/2024 1048 by Mery Orozco, RN  Outcome: Progressing  8/5/2024 1048 by Mery Orozco, RN  Outcome: Progressing  Goal: Performs ADL's with improved pain control throughout shift  8/5/2024 1048 by Mery Orozco, RN  Outcome: Progressing  8/5/2024 1048 by Mery Orozco, RN  Outcome: Progressing  Goal: Participates in PT with improved pain control throughout the shift  8/5/2024 1048 by Mery  RIOS Orozco RN  Outcome: Progressing  8/5/2024 1048 by Mery Orozco RN  Outcome: Progressing     Problem: Discharge Planning  Goal: Discharge to home or other facility with appropriate resources  8/5/2024 1048 by Mery Orozco RN  Outcome: Progressing  8/5/2024 1048 by Mery Orozco RN  Outcome: Progressing     Problem: Chronic Conditions and Co-morbidities  Goal: Patient's chronic conditions and co-morbidity symptoms are monitored and maintained or improved  8/5/2024 1048 by Mery Orozco RN  Outcome: Progressing  8/5/2024 1048 by Mery Orozco RN  Outcome: Progressing     Problem: Fall/Injury  Goal: Not fall by end of shift  8/5/2024 1048 by Mery Orozco RN  Outcome: Progressing  8/5/2024 1048 by Mery Orozco RN  Outcome: Progressing  Goal: Be free from injury by end of the shift  8/5/2024 1048 by Mery Orozco RN  Outcome: Progressing  8/5/2024 1048 by Mery Orozco RN  Outcome: Progressing  Goal: Verbalize understanding of personal risk factors for fall in the hospital  8/5/2024 1048 by Mery Orozco RN  Outcome: Progressing  8/5/2024 1048 by Mery Orozco RN  Outcome: Progressing     Problem: Infection related to problem list condition  Goal: Infection will resolve through treatment  8/5/2024 1048 by Mery Orozco RN  Outcome: Progressing  8/5/2024 1048 by Mery Orozco RN  Outcome: Progressing   The patient's goals for the shift include      The clinical goals for the shift include promte comfort and pain management

## 2024-08-05 NOTE — PROGRESS NOTES
NEPHROLOGY FOLLOW UP NOTE    Artem Suarez   39 y.o.    190 kg (419 lnb 1.5 oz)   MRN/Room: [id number]    Subjective: He is feeling well with no new complaints. No pain, SOB, nausea, vomiting, chills, or fever.     Objectively his KFTs have continued to slowly rise but may have begun stabilizing. Blood pressures have been stable.      Meds:   heparin, 7,500 Units, q8h  psyllium, 1 packet, TID  sevelamer carbonate, 800 mg, TID      lactated Ringer's, Last Rate: 125 mL/hr (08/05/24 0422)      acetaminophen, 650 mg, q6h PRN  dextrose, 12.5 g, q15 min PRN  dextrose, 25 g, q15 min PRN  glucagon, 1 mg, q15 min PRN  glucagon, 1 mg, q15 min PRN  oxyCODONE, 10 mg, q6h PRN  oxyCODONE, 5 mg, q6h PRN  oxygen, , Continuous PRN - O2/gases      Objective:     Vitals:    08/05/24 0748   BP: 103/57   Pulse: 74   Resp: 18   Temp: 36 °C (96.8 °F)   SpO2: 95%          Intake/Output Summary (Last 24 hours) at 8/5/2024 0843  Last data filed at 8/5/2024 0748  Gross per 24 hour   Intake 5410 ml   Output 3725 ml   Net 1685 ml       General appearance: AAOx3. No distress  Eyes: non-icteric  Mouth: Moist mucus membranes.  Skin: no apparent rash  Heart: Normal S1/S2.  Lungs: CTA bilat.  No wheezing/crackles  Abdomen: soft, nt/nd  Extremities: Nonpitting edema bilat to knees with dry skin and dark pigment. Cap refill <2 seconds.  :  Wolfe in place    Blood Labs:  Results for orders placed or performed during the hospital encounter of 07/19/24 (from the past 24 hour(s))   POCT GLUCOSE   Result Value Ref Range    POCT Glucose 89 74 - 99 mg/dL   POCT GLUCOSE   Result Value Ref Range    POCT Glucose 110 (H) 74 - 99 mg/dL   POCT GLUCOSE   Result Value Ref Range    POCT Glucose 116 (H) 74 - 99 mg/dL   POCT GLUCOSE   Result Value Ref Range    POCT Glucose 111 (H) 74 - 99 mg/dL   POCT GLUCOSE   Result Value Ref Range    POCT Glucose 141 (H) 74 - 99 mg/dL   POCT GLUCOSE   Result Value Ref Range    POCT Glucose 101 (H) 74 - 99 mg/dL   CBC and  Auto Differential   Result Value Ref Range    WBC 5.7 4.4 - 11.3 x10*3/uL    nRBC 0.0 0.0 - 0.0 /100 WBCs    RBC 2.76 (L) 4.50 - 5.90 x10*6/uL    Hemoglobin 7.9 (L) 13.5 - 17.5 g/dL    Hematocrit 25.8 (L) 41.0 - 52.0 %    MCV 94 80 - 100 fL    MCH 28.6 26.0 - 34.0 pg    MCHC 30.6 (L) 32.0 - 36.0 g/dL    RDW 16.2 (H) 11.5 - 14.5 %    Platelets 128 (L) 150 - 450 x10*3/uL    Neutrophils % 65.4 40.0 - 80.0 %    Immature Granulocytes %, Automated 1.4 (H) 0.0 - 0.9 %    Lymphocytes % 21.6 13.0 - 44.0 %    Monocytes % 7.2 2.0 - 10.0 %    Eosinophils % 3.5 0.0 - 6.0 %    Basophils % 0.9 0.0 - 2.0 %    Neutrophils Absolute 3.70 1.20 - 7.70 x10*3/uL    Immature Granulocytes Absolute, Automated 0.08 0.00 - 0.70 x10*3/uL    Lymphocytes Absolute 1.22 1.20 - 4.80 x10*3/uL    Monocytes Absolute 0.41 0.10 - 1.00 x10*3/uL    Eosinophils Absolute 0.20 0.00 - 0.70 x10*3/uL    Basophils Absolute 0.05 0.00 - 0.10 x10*3/uL   Magnesium   Result Value Ref Range    Magnesium 1.96 1.60 - 2.40 mg/dL   Renal function panel   Result Value Ref Range    Glucose 88 74 - 99 mg/dL    Sodium 139 136 - 145 mmol/L    Potassium 4.4 3.5 - 5.3 mmol/L    Chloride 108 (H) 98 - 107 mmol/L    Bicarbonate 21 21 - 32 mmol/L    Anion Gap 14 10 - 20 mmol/L    Urea Nitrogen 37 (H) 6 - 23 mg/dL    Creatinine 6.57 (H) 0.50 - 1.30 mg/dL    eGFR 10 (L) >60 mL/min/1.73m*2    Calcium 6.9 (L) 8.6 - 10.6 mg/dL    Phosphorus 8.1 (H) 2.5 - 4.9 mg/dL    Albumin 1.9 (L) 3.4 - 5.0 g/dL          ASSESSMENT:  Artem Suarez is a  39 y.o.  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 His course of JOSE RAMON has has been improving in terms of urine output but stable/plateu in terms of KFTs. Considering his KFTs have now remained high almost two weeks, if KFTs do not show improvement and remain high we will consider renal biopsy to look for other etiologies.  For now, we will continue with supportive care. He likely has large insensible/lesion losses. Chart review reveals high/normals BPs in the past indicating that he may still be volume depleted. We will continue to replete fluids and monitor fluid status daily.     #JOSE RAMON, non-oliguric - BL 1.0  - Etiology of JOSE RAMON is most likely ischemia resulting from sepsis and multiple debridement surgeries + Hypotensive episodes, now progressed to ATN. Elevated vanc levels (39) around time of creatinine rise suggest it might have been contributory as well.  - As expected, Cr is leveling off, expected to improve over the next days.  - UA significant for 1+ blood, no protein or WBC  - Urine osmolality 161  - Renal US no signs of hydronephrosis; rules out post-renal causes  - Patient has no fever, rash, urinary WBC, or eosiniphilia to suggest AIN and no recent contrast studies.       Recommendations:  - Obtain JUAN MANUEL, ANCA, C3, C4, CH50, Heb B, Heb C to rule out other intrinsic causes of kidney disease  - If Cr remains elevated, next step would be renal biopsy; for now, we will wait and continue with supportive care  - Avoid Hypotensive episodes and avoid fluctuations in BP  - C/w  ml/hr  - Consider IV Fluid boluses (500 ml LR) for Hypotensive episodes with SBP <120.  - Avoid nephrotoxins, contrast if possible  - Ensure renal dosing for medications based on latest GFR  - Strict Is/Os    Delfino Starr, MS4    24 hour Renal Pager - [phone]

## 2024-08-06 LAB
ALBUMIN SERPL BCP-MCNC: 1.8 G/DL (ref 3.4–5)
ANION GAP SERPL CALC-SCNC: 15 MMOL/L (ref 10–20)
BASOPHILS # BLD AUTO: 0.04 X10*3/UL (ref 0–0.1)
BASOPHILS NFR BLD AUTO: 0.8 %
BUN SERPL-MCNC: 35 MG/DL (ref 6–23)
CALCIUM SERPL-MCNC: 6.7 MG/DL (ref 8.6–10.6)
CHLORIDE SERPL-SCNC: 110 MMOL/L (ref 98–107)
CO2 SERPL-SCNC: 20 MMOL/L (ref 21–32)
CREAT SERPL-MCNC: 6.32 MG/DL (ref 0.5–1.3)
EGFRCR SERPLBLD CKD-EPI 2021: 11 ML/MIN/1.73M*2
EOSINOPHIL # BLD AUTO: 0.17 X10*3/UL (ref 0–0.7)
EOSINOPHIL NFR BLD AUTO: 3.4 %
ERYTHROCYTE [DISTWIDTH] IN BLOOD BY AUTOMATED COUNT: 16.1 % (ref 11.5–14.5)
GLUCOSE BLD MANUAL STRIP-MCNC: 66 MG/DL (ref 74–99)
GLUCOSE BLD MANUAL STRIP-MCNC: 74 MG/DL (ref 74–99)
GLUCOSE SERPL-MCNC: 69 MG/DL (ref 74–99)
HCT VFR BLD AUTO: 25.5 % (ref 41–52)
HCV RNA SERPL NAA+PROBE-ACNC: NOT DETECTED K[IU]/ML
HCV RNA SERPL NAA+PROBE-LOG IU: NORMAL {LOG_IU}/ML
HGB BLD-MCNC: 8 G/DL (ref 13.5–17.5)
IMM GRANULOCYTES # BLD AUTO: 0.13 X10*3/UL (ref 0–0.7)
IMM GRANULOCYTES NFR BLD AUTO: 2.6 % (ref 0–0.9)
LYMPHOCYTES # BLD AUTO: 1.38 X10*3/UL (ref 1.2–4.8)
LYMPHOCYTES NFR BLD AUTO: 27.5 %
MAGNESIUM SERPL-MCNC: 1.86 MG/DL (ref 1.6–2.4)
MCH RBC QN AUTO: 28.4 PG (ref 26–34)
MCHC RBC AUTO-ENTMCNC: 31.4 G/DL (ref 32–36)
MCV RBC AUTO: 90 FL (ref 80–100)
MONOCYTES # BLD AUTO: 0.36 X10*3/UL (ref 0.1–1)
MONOCYTES NFR BLD AUTO: 7.2 %
NEUTROPHILS # BLD AUTO: 2.93 X10*3/UL (ref 1.2–7.7)
NEUTROPHILS NFR BLD AUTO: 58.5 %
NRBC BLD-RTO: 0 /100 WBCS (ref 0–0)
PHOSPHATE SERPL-MCNC: 7.8 MG/DL (ref 2.5–4.9)
PLATELET # BLD AUTO: 130 X10*3/UL (ref 150–450)
POTASSIUM SERPL-SCNC: 4.3 MMOL/L (ref 3.5–5.3)
RBC # BLD AUTO: 2.82 X10*6/UL (ref 4.5–5.9)
SODIUM SERPL-SCNC: 141 MMOL/L (ref 136–145)
WBC # BLD AUTO: 5 X10*3/UL (ref 4.4–11.3)

## 2024-08-06 PROCEDURE — 99232 SBSQ HOSP IP/OBS MODERATE 35: CPT | Performed by: STUDENT IN AN ORGANIZED HEALTH CARE EDUCATION/TRAINING PROGRAM

## 2024-08-06 PROCEDURE — 83735 ASSAY OF MAGNESIUM: CPT

## 2024-08-06 PROCEDURE — 2500000002 HC RX 250 W HCPCS SELF ADMINISTERED DRUGS (ALT 637 FOR MEDICARE OP, ALT 636 FOR OP/ED)

## 2024-08-06 PROCEDURE — 2500000004 HC RX 250 GENERAL PHARMACY W/ HCPCS (ALT 636 FOR OP/ED)

## 2024-08-06 PROCEDURE — 80069 RENAL FUNCTION PANEL: CPT

## 2024-08-06 PROCEDURE — 1100000001 HC PRIVATE ROOM DAILY

## 2024-08-06 PROCEDURE — 2500000001 HC RX 250 WO HCPCS SELF ADMINISTERED DRUGS (ALT 637 FOR MEDICARE OP)

## 2024-08-06 PROCEDURE — 36415 COLL VENOUS BLD VENIPUNCTURE: CPT

## 2024-08-06 PROCEDURE — 99232 SBSQ HOSP IP/OBS MODERATE 35: CPT | Performed by: INTERNAL MEDICINE

## 2024-08-06 PROCEDURE — 85025 COMPLETE CBC W/AUTO DIFF WBC: CPT

## 2024-08-06 PROCEDURE — 82947 ASSAY GLUCOSE BLOOD QUANT: CPT

## 2024-08-06 PROCEDURE — 2500000001 HC RX 250 WO HCPCS SELF ADMINISTERED DRUGS (ALT 637 FOR MEDICARE OP): Performed by: NURSE PRACTITIONER

## 2024-08-06 ASSESSMENT — COGNITIVE AND FUNCTIONAL STATUS - GENERAL
MOBILITY SCORE: 6
MOVING TO AND FROM BED TO CHAIR: TOTAL
WALKING IN HOSPITAL ROOM: TOTAL
MOVING FROM LYING ON BACK TO SITTING ON SIDE OF FLAT BED WITH BEDRAILS: TOTAL
TURNING FROM BACK TO SIDE WHILE IN FLAT BAD: TOTAL
STANDING UP FROM CHAIR USING ARMS: TOTAL
CLIMB 3 TO 5 STEPS WITH RAILING: TOTAL

## 2024-08-06 ASSESSMENT — PAIN - FUNCTIONAL ASSESSMENT
PAIN_FUNCTIONAL_ASSESSMENT: 0-10

## 2024-08-06 ASSESSMENT — PAIN SCALES - GENERAL
PAINLEVEL_OUTOF10: 0 - NO PAIN

## 2024-08-06 NOTE — PROGRESS NOTES
NEPHROLOGY FOLLOW UP NOTE    Artem Suarez   39 y.o.    190 kg (419 lnb 1.5 oz)   MRN/Room: [id number]    Subjective: He is feeling well today with no new complaints. No pain, SOB, nausea, vomiting, chills, or fever.     Objectively his KFTs have continued to slowly rise but may have begun stabilizing. Blood pressures have been soft but stable.      Meds:   ascorbic acid, 500 mg, Daily  heparin, 7,500 Units, q8h  psyllium, 1 packet, TID  sevelamer carbonate, 800 mg, TID  zinc sulfate, 50 mg of elemental zinc, Daily      lactated Ringer's, Last Rate: 125 mL/hr (08/06/24 1229)      acetaminophen, 650 mg, q6h PRN  dextrose, 12.5 g, q15 min PRN  dextrose, 25 g, q15 min PRN  glucagon, 1 mg, q15 min PRN  glucagon, 1 mg, q15 min PRN  oxyCODONE, 10 mg, q6h PRN  oxyCODONE, 5 mg, q6h PRN  oxygen, , Continuous PRN - O2/gases      Objective:     Vitals:    08/06/24 1200   BP: 105/61   Pulse: 83   Resp: 18   Temp: 36.8 °C (98.2 °F)   SpO2: 95%          Intake/Output Summary (Last 24 hours) at 8/6/2024 1343  Last data filed at 8/6/2024 1233  Gross per 24 hour   Intake 1005.83 ml   Output 3600 ml   Net -2594.17 ml       General appearance: AAOx3. No distress  Eyes: non-icteric  Mouth: Moist mucus membranes.  Skin: no apparent rash  Heart: Normal S1/S2.  Lungs: CTA bilat.  No wheezing/crackles  Abdomen: soft, nt/nd  Extremities: Nonpitting edema bilat to knees with dry skin and dark pigment. Wound with bandage over buttock/perineum. Cap refill <2 seconds.  :  Wolfe in place    Blood Labs:  Results for orders placed or performed during the hospital encounter of 07/19/24 (from the past 24 hour(s))   C3 complement   Result Value Ref Range    C3 Complement 131 87 - 200 mg/dL   C4 complement   Result Value Ref Range    C4 Complement 53 (H) 10 - 50 mg/dL   Hepatitis B surface antigen   Result Value Ref Range    Hepatitis B Surface AG Nonreactive Nonreactive   Hepatitis C Antibody   Result Value Ref Range    Hepatitis C AB  Reactive (A) Nonreactive   Renal function panel   Result Value Ref Range    Glucose 69 (L) 74 - 99 mg/dL    Sodium 141 136 - 145 mmol/L    Potassium 4.3 3.5 - 5.3 mmol/L    Chloride 110 (H) 98 - 107 mmol/L    Bicarbonate 20 (L) 21 - 32 mmol/L    Anion Gap 15 10 - 20 mmol/L    Urea Nitrogen 35 (H) 6 - 23 mg/dL    Creatinine 6.32 (H) 0.50 - 1.30 mg/dL    eGFR 11 (L) >60 mL/min/1.73m*2    Calcium 6.7 (L) 8.6 - 10.6 mg/dL    Phosphorus 7.8 (H) 2.5 - 4.9 mg/dL    Albumin 1.8 (L) 3.4 - 5.0 g/dL   CBC and Auto Differential   Result Value Ref Range    WBC 5.0 4.4 - 11.3 x10*3/uL    nRBC 0.0 0.0 - 0.0 /100 WBCs    RBC 2.82 (L) 4.50 - 5.90 x10*6/uL    Hemoglobin 8.0 (L) 13.5 - 17.5 g/dL    Hematocrit 25.5 (L) 41.0 - 52.0 %    MCV 90 80 - 100 fL    MCH 28.4 26.0 - 34.0 pg    MCHC 31.4 (L) 32.0 - 36.0 g/dL    RDW 16.1 (H) 11.5 - 14.5 %    Platelets 130 (L) 150 - 450 x10*3/uL    Neutrophils % 58.5 40.0 - 80.0 %    Immature Granulocytes %, Automated 2.6 (H) 0.0 - 0.9 %    Lymphocytes % 27.5 13.0 - 44.0 %    Monocytes % 7.2 2.0 - 10.0 %    Eosinophils % 3.4 0.0 - 6.0 %    Basophils % 0.8 0.0 - 2.0 %    Neutrophils Absolute 2.93 1.20 - 7.70 x10*3/uL    Immature Granulocytes Absolute, Automated 0.13 0.00 - 0.70 x10*3/uL    Lymphocytes Absolute 1.38 1.20 - 4.80 x10*3/uL    Monocytes Absolute 0.36 0.10 - 1.00 x10*3/uL    Eosinophils Absolute 0.17 0.00 - 0.70 x10*3/uL    Basophils Absolute 0.04 0.00 - 0.10 x10*3/uL   Magnesium   Result Value Ref Range    Magnesium 1.86 1.60 - 2.40 mg/dL   POCT GLUCOSE   Result Value Ref Range    POCT Glucose 66 (L) 74 - 99 mg/dL   POCT GLUCOSE   Result Value Ref Range    POCT Glucose 74 74 - 99 mg/dL          ASSESSMENT:  Artem Suarez is a  39 y.o.  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 His course of JOSE RAMON has has been improving in terms of urine output but  stable/plateau in terms of KFTs. Considering his KFTs have now remained high almost two weeks, if KFTs do not show improvement and remain high we will consider renal biopsy to look for other etiologies. For now, we will continue with supportive care. He likely has large insensible/lesion losses. Chart review reveals high/normals BPs in the past indicating that he may still be volume depleted. We will continue to replete fluids and monitor fluid status daily.     #JOSE RAMON, non-oliguric - BL 1.0  - Etiology of JOSE RAMON is most likely ischemia resulting from sepsis and multiple debridement surgeries + Hypotensive episodes, now progressed to ATN. Elevated vanc levels (39) around time of creatinine rise suggest it might have been contributory as well.  - As expected, Cr is leveling off, expected to improve over the next days.  - UA significant for 1+ blood, no protein or WBC  - Urine osmolality 161  - Renal US no signs of hydronephrosis; rules out post-renal causes  - Patient has no fever, rash, urinary WBC, or eosiniphilia to suggest AIN and no recent contrast studies.       Recommendations:  - Trial stopping of IVF; if patient kidney function remains stable without fluids we will consider him stable from a kidney standpoint for discharge to [facility]  - If Cr remains elevated, next step would be renal biopsy; for now, we will wait and continue with supportive care  - Avoid Hypotensive episodes and avoid fluctuations in BP  - Consider IV Fluid boluses (500 ml LR) for Hypotensive episodes with SBP <120.  - Avoid nephrotoxins, contrast if possible  - Ensure renal dosing for medications based on latest GFR  - Strict Is/Os    Delfino Starr, MS4    24 hour Renal Pager - [phone]    I saw and evaluated the patient. I personally obtained the key and critical portions of the history and physical exam or was physically present for key and critical portions performed by the medical student. I reviewed the medical student documentation and  discussed the patient with the medical student. I agree with the medical student's medical decision making as documented in the note.

## 2024-08-06 NOTE — PROGRESS NOTES
"[facility]  ACUTE CARE SURGERY - PROGRESS NOTE    Patient Name: Artem Suarez  MRN: [id number]  Admit Date: 719  : 1985  AGE: 39 y.o.   GENDER: male  ==============================================================================  TODAY'S ASSESSMENT AND PLAN OF CARE:  39M presented as a transfer for NSTI of perineum and thighs in septic shock.      OR  at OSH: wide debridement of perineum and thighs  OR : debridement  OR : debridement  OR : wound vac placed  OR : wound vac      Plan  Neuro   - pain control: PRN tylenol and PRN oxycodone     Respiratory    - supplemental O2 as needed   - continue to monitor O2 saturation q4h and as needed     Cardiovascular   - monitor q4h vitals and as needed    - monitor hemodynamics     GI   - continue consistent carb, potassium restricted diet with ensure high proteins with meals    - metamucil 1 packet TID  - C. Diff PCR obtained on  Not detected  - Add supplementation: zinc, selenium, and vitamin C for wound healing     /FEN   - Wolfe in place   - continue to monitor I/Os  #JOSE RAMON   - nephrology following, recommendations appreciated    - Cr 6.32 today 6.57, making good urine   - Will follow up with nephrology regarding IV fluid   - Hep C antibody reactive: pending hep C RNA       Heme   - No signs of bleeding, H/H stable  - CBC PRN     Endocrine: history of diabetes   - Glucose stable, discontinue glucose check & insulin    - Endocrinology was consulted and recommended outgoing referral to [facility]\"      MSK/Skin  #NSTI, perineum and posterior thighs   - s/p multiple debridements (OSH on , [facility] on , ) and wound vac placements ( and )    - will continue daily and as needed WTD dressing changes by nursing      #Right great toe wound, chronic    - wound care previously consulted and recommendations appreciated: daily dressing changes via cleanse toe with NS and pat toe dry then " pain toe wound with betadine and cover with dry dressing      Infectious disease    - not currently on antibiotics   - continue to monitor for signs of infection    - currently afebrile, had one fever Tmax 102, but repeat an hour later 100.2    - denies symptoms (fevers, chest pain, shortness of breath, chills)   - no leukocytosis    - UA (+) 75 leukocyte esterase and 6-10 WBC     GI ppx: not indicated  DVT ppx: SCDs and Heparin     Patient discussed with Dr. Maco Medina MD  General Surgery, PGY-3  Please page service pager with questions  Acute Care Surgery  Service Pager: [phone]    ==============================================================================  CHIEF COMPLAINT / EVENTS LAST 24HRS / HPI:  NAEO.  Wound looks clean    MEDICAL HISTORY / ROS:   Admission history and ROS reviewed. Pertinent changes as follows:  N/A    PHYSICAL EXAM:  Heart Rate:  [69-81]   Temp:  [36 °C (96.8 °F)-36.8 °C (98.2 °F)]   Resp:  [18]   BP: (101-107)/(54-65)   SpO2:  [93 %-97 %]   Physical Exam  Constitutional:       General: He is not in acute distress.     Appearance: He is obese.   HENT:      Head: Atraumatic.      Nose:      Comments: 2L NC in place   Pulmonary:      Effort: Pulmonary effort is normal. No respiratory distress.   Abdominal:      General: There is no distension.      Palpations: Abdomen is soft.      Tenderness: There is no abdominal tenderness.   Genitourinary:     Comments: Wolfe and rectal tubes in place   Musculoskeletal:      Comments: Able to move bilateral upper extremities  Minimal bilateral lower extremity movement 2/2 body habitus   Skin:     General: Skin is warm and dry.      Comments: Dry skin bilateral lower extremities  Bilateral perineal wounds and R groin wounds dressed with wet kerlix and dry ABD pads on top    Neurological:      Mental Status: He is alert and oriented to person, place, and time.       IMAGING SUMMARY:  (summary of new imaging findings, not a copy of  dictation)  None    LABS:  Results from last 7 days   Lab Units 08/06/24  0610 08/05/24  0557 08/04/24  0553   WBC AUTO x10*3/uL 5.0 5.7 8.1   HEMOGLOBIN g/dL 8.0* 7.9* 8.0*   HEMATOCRIT % 25.5* 25.8* 26.3*   PLATELETS AUTO x10*3/uL 130* 128* 121*   NEUTROS PCT AUTO % 58.5 65.4 74.6   LYMPHS PCT AUTO % 27.5 21.6 15.4   MONOS PCT AUTO % 7.2 7.2 6.2   EOS PCT AUTO % 3.4 3.5 2.2     Results from last 7 days   Lab Units 08/01/24  0501   APTT seconds 41*   INR  1.6*     Results from last 7 days   Lab Units 08/06/24  0610 08/05/24  0557 08/04/24  0553 08/03/24  0527 08/02/24  0636   SODIUM mmol/L 141 139 139 139 138   POTASSIUM mmol/L 4.3 4.4 4.2 4.3 4.5   CHLORIDE mmol/L 110* 108* 107 107 106   CO2 mmol/L 20* 21 21 18* 21   BUN mg/dL 35* 37* 40* 40* 42*   CREATININE mg/dL 6.32* 6.57* 6.93* 6.68* 6.36*   CALCIUM mg/dL 6.7* 6.9* 6.9* 7.1* 7.1*   PROTEIN TOTAL g/dL  --   --   --  6.3* 6.2*   BILIRUBIN TOTAL mg/dL  --   --   --  0.4 0.5   ALK PHOS U/L  --   --   --  82 71   ALT U/L  --   --   --  6* 7*   AST U/L  --   --   --  13 15   GLUCOSE mg/dL 69* 88 86 92 108*     Results from last 7 days   Lab Units 08/03/24  0527 08/02/24  0636   BILIRUBIN TOTAL mg/dL 0.4 0.5           I have reviewed all medications, laboratory results, and imaging pertinent for today's encounter.

## 2024-08-06 NOTE — CARE PLAN
Problem: Skin  Goal: Participates in plan/prevention/treatment measures  Outcome: Progressing  Goal: Prevent/manage excess moisture  Outcome: Progressing  Goal: Promote/optimize nutrition  Outcome: Progressing  Goal: Promote skin healing  Outcome: Progressing     The patient's goals for the shift include  safety.    The clinical goals for the shift include Safety.    Over the shift, the patient did make progress toward the following goals.

## 2024-08-06 NOTE — CARE PLAN
The patient's goals for the shift include      The clinical goals for the shift include Patient will report any signs or symptoms of discomfort and pain overnight.    Over the shift, the patient did not make progress toward the following goals. Barriers to progression include frequent pain management and every two hour rounding. Recommendations to address these barriers include every two hour rounding and turns to avoid any further skin break down.

## 2024-08-06 NOTE — PROGRESS NOTES
"Transitional Care Coordination Progress Note    Team members: TCC, ACS     Discharge disposition: [facility] - need to follow up with facility and find out how long the precert was approved    ** Transportation is considered \"long haul\" due to distance (almost 80 miles away) and \"bariatric\" due to weight (> 400 lbs) ---> plan to arrange transport the day before discharge to allow for special accommodations. **    Status: IP    [facility]     Potential Barriers: JOSE RAMON    ADOD: 2 days    This TCC will continue to follow for home going needs and safe DC plan.    Anju Ríos RN  "

## 2024-08-06 NOTE — NURSING NOTE
8/6/24 Patient refused to have wounds assessed at this time.  Patient wants it to be coordinated with his dressing change.  Charge RN Clarita Parker RN will assess, measure and take pictures.  Desiree Corcoran RN

## 2024-08-07 LAB
ALBUMIN SERPL BCP-MCNC: 1.7 G/DL (ref 3.4–5)
ANION GAP SERPL CALC-SCNC: 14 MMOL/L (ref 10–20)
BACTERIA BLD CULT: NORMAL
BACTERIA BLD CULT: NORMAL
BUN SERPL-MCNC: 32 MG/DL (ref 6–23)
CALCIUM SERPL-MCNC: 6.5 MG/DL (ref 8.6–10.6)
CH50 SERPL-ACNC: >95 U/ML (ref 38.7–89.9)
CHLORIDE SERPL-SCNC: 107 MMOL/L (ref 98–107)
CO2 SERPL-SCNC: 22 MMOL/L (ref 21–32)
CREAT SERPL-MCNC: 6.76 MG/DL (ref 0.5–1.3)
EGFRCR SERPLBLD CKD-EPI 2021: 10 ML/MIN/1.73M*2
GLUCOSE SERPL-MCNC: 70 MG/DL (ref 74–99)
MAGNESIUM SERPL-MCNC: 1.76 MG/DL (ref 1.6–2.4)
PHOSPHATE SERPL-MCNC: 7.5 MG/DL (ref 2.5–4.9)
POTASSIUM SERPL-SCNC: 4.3 MMOL/L (ref 3.5–5.3)
SODIUM SERPL-SCNC: 139 MMOL/L (ref 136–145)

## 2024-08-07 PROCEDURE — 1100000001 HC PRIVATE ROOM DAILY

## 2024-08-07 PROCEDURE — 2500000001 HC RX 250 WO HCPCS SELF ADMINISTERED DRUGS (ALT 637 FOR MEDICARE OP)

## 2024-08-07 PROCEDURE — 80069 RENAL FUNCTION PANEL: CPT

## 2024-08-07 PROCEDURE — 2500000002 HC RX 250 W HCPCS SELF ADMINISTERED DRUGS (ALT 637 FOR MEDICARE OP, ALT 636 FOR OP/ED)

## 2024-08-07 PROCEDURE — 83735 ASSAY OF MAGNESIUM: CPT

## 2024-08-07 PROCEDURE — 99024 POSTOP FOLLOW-UP VISIT: CPT | Performed by: STUDENT IN AN ORGANIZED HEALTH CARE EDUCATION/TRAINING PROGRAM

## 2024-08-07 PROCEDURE — 36415 COLL VENOUS BLD VENIPUNCTURE: CPT

## 2024-08-07 PROCEDURE — 2500000004 HC RX 250 GENERAL PHARMACY W/ HCPCS (ALT 636 FOR OP/ED)

## 2024-08-07 PROCEDURE — 99232 SBSQ HOSP IP/OBS MODERATE 35: CPT | Performed by: INTERNAL MEDICINE

## 2024-08-07 PROCEDURE — 2500000001 HC RX 250 WO HCPCS SELF ADMINISTERED DRUGS (ALT 637 FOR MEDICARE OP): Performed by: NURSE PRACTITIONER

## 2024-08-07 PROCEDURE — 97530 THERAPEUTIC ACTIVITIES: CPT | Mod: GP

## 2024-08-07 ASSESSMENT — COGNITIVE AND FUNCTIONAL STATUS - GENERAL
MOBILITY SCORE: 7
CLIMB 3 TO 5 STEPS WITH RAILING: TOTAL
MOVING TO AND FROM BED TO CHAIR: A LOT
TOILETING: A LOT
DRESSING REGULAR LOWER BODY CLOTHING: A LOT
HELP NEEDED FOR BATHING: A LOT
STANDING UP FROM CHAIR USING ARMS: TOTAL
MOVING FROM LYING ON BACK TO SITTING ON SIDE OF FLAT BED WITH BEDRAILS: A LOT
WALKING IN HOSPITAL ROOM: TOTAL
TURNING FROM BACK TO SIDE WHILE IN FLAT BAD: A LOT
DRESSING REGULAR UPPER BODY CLOTHING: A LOT
DAILY ACTIVITIY SCORE: 14
WALKING IN HOSPITAL ROOM: TOTAL
TURNING FROM BACK TO SIDE WHILE IN FLAT BAD: TOTAL
PERSONAL GROOMING: A LOT
CLIMB 3 TO 5 STEPS WITH RAILING: TOTAL
STANDING UP FROM CHAIR USING ARMS: A LOT
MOVING FROM LYING ON BACK TO SITTING ON SIDE OF FLAT BED WITH BEDRAILS: A LOT
MOBILITY SCORE: 10
MOVING TO AND FROM BED TO CHAIR: TOTAL

## 2024-08-07 ASSESSMENT — PAIN DESCRIPTION - LOCATION: LOCATION: ABDOMEN

## 2024-08-07 ASSESSMENT — PAIN - FUNCTIONAL ASSESSMENT
PAIN_FUNCTIONAL_ASSESSMENT: 0-10

## 2024-08-07 ASSESSMENT — PAIN SCALES - GENERAL
PAINLEVEL_OUTOF10: 0 - NO PAIN
PAINLEVEL_OUTOF10: 6
PAINLEVEL_OUTOF10: 0 - NO PAIN

## 2024-08-07 ASSESSMENT — PAIN DESCRIPTION - ORIENTATION: ORIENTATION: MID

## 2024-08-07 ASSESSMENT — PAIN SCALES - PAIN ASSESSMENT IN ADVANCED DEMENTIA (PAINAD): TOTALSCORE: MEDICATION (SEE MAR)

## 2024-08-07 ASSESSMENT — PAIN DESCRIPTION - DESCRIPTORS: DESCRIPTORS: ACHING

## 2024-08-07 NOTE — CARE PLAN
The patient's goals for the shift include      The clinical goals for the shift include Safety measures maintained    Over the shift, the patient did nprogress toward the following goals. Bar  Problem: Pain  Goal: Takes deep breaths with improved pain control throughout the shift  Outcome: Progressing  Goal: Turns in bed with improved pain control throughout the shift  Outcome: Progressing  Goal: Walks with improved pain control throughout the shift  Outcome: Progressing  Goal: Performs ADL's with improved pain control throughout shift  Outcome: Progressing  Goal: Participates in PT with improved pain control throughout the shift  Outcome: Progressing

## 2024-08-07 NOTE — PROGRESS NOTES
Physical Therapy    Physical Therapy Treatment    Patient Name: Artem Suarez  MRN: [id number]  Today's Date: 8/7/2024  Time Calculation  Start Time: 1240  Stop Time: 1318  Time Calculation (min): 38 min    Assessment/Plan   PT Assessment  Evaluation/Treatment Tolerance: Patient limited by pain, Patient limited by fatigue  Medical Staff Made Aware: Yes  End of Session Communication: Bedside nurse, PCT/NA/CTA  Assessment Comment: Session limited this date 2/2 to pain and fatigue after brooklyn care and dressing changes. Pt requiring mod assist x2 for bed mobility. Continue to recommend MOD intensity PT after DC.  End of Session Patient Position: Bed, 3 rail up, Alarm on  PT Plan  Inpatient/Swing Bed or Outpatient: Inpatient  PT Plan  Treatment/Interventions: Bed mobility, Transfer training, Gait training, Balance training, Neuromuscular re-education, Strengthening, Endurance training, Therapeutic exercise, Therapeutic activity, Home exercise program, Positioning, Postural re-education  PT Plan: Ongoing PT  PT Frequency: 3 times per week  PT Discharge Recommendations: Moderate intensity level of continued care  Equipment Recommended upon Discharge: Wheeled walker  PT Recommended Transfer Status: Total assist  PT - OK to Discharge: Yes    General Visit Information:   PT  Visit  PT Received On: 08/07/24  Response to Previous Treatment: Patient with no complaints from previous session.  General  Prior to Session Communication: Bedside nurse  Patient Position Received: Bed, 3 rail up, Alarm on  General Comment: Pt supine in bed upon entry to room. Pt pleasant, cooperative and willing to work with PT. Pt found to be soiled and PT assisting in brooklyn care.    Subjective   Precautions:  Precautions  Medical Precautions: Fall precautions    Objective   Pain:  Pain Assessment  Pain Assessment: 0-10  0-10 (Numeric) Pain Score:  (unrated buttocks/groin pain at wound sites)  Cognition:  Cognition  Overall Cognitive Status: Within  Functional Limits  Orientation Level: Oriented X4    Treatments:  Bed Mobility  Bed Mobility: Yes  Bed Mobility 1  Bed Mobility 1: Rolling right, Rolling left  Level of Assistance 1: Moderate assistance, Moderate verbal cues, Moderate tactile cues  Bed Mobility Comments 1: x2 assist; extended time in sidelying to assist with brooklyn care and wound changes    Ambulation/Gait Training  Ambulation/Gait Training Performed: No  Transfers  Transfer: No (pt declining further OOB mobility 2/2 to increased fatigue and pain after brooklyn care and wound changes)    Outcome Measures:  [facility] Basic Mobility  Turning from your back to your side while in a flat bed without using bedrails: A lot  Moving from lying on your back to sitting on the side of a flat bed without using bedrails: Total  Moving to and from bed to chair (including a wheelchair): Total  Standing up from a chair using your arms (e.g. wheelchair or bedside chair): Total  To walk in hospital room: Total  Climbing 3-5 steps with railing: Total  Basic Mobility - Total Score: 7    Education Documentation  Precautions, taught by Bárbara Jarrett, PT at 8/7/2024  1:55 PM.  Learner: Patient  Readiness: Acceptance  Method: Explanation  Response: Verbalizes Understanding, Needs Reinforcement    Mobility Training, taught by Bárbara Jarrett, PT at 8/7/2024  1:55 PM.  Learner: Patient  Readiness: Acceptance  Method: Explanation  Response: Verbalizes Understanding, Needs Reinforcement    Education Comments  No comments found.      OP EDUCATION:       Encounter Problems       Encounter Problems (Active)       Balance       STG - Maintains static standing balance with upper extremity support >5 minutes Elliott  (Progressing)       Start:  07/24/24            STG - Maintains static sitting balance without upper extremity support >10 minutes SBA for functional activities and endurance  (Progressing)       Start:  07/24/24               Mobility       STG - Patient will ambulate >10ft  using LRAD modA  (Progressing)       Start:  07/24/24               PT Transfers       STG - Patient to transfer to and from sit to supine modA  (Progressing)       Start:  07/24/24            STG - Patient will transfer sit to and from stand modA (Progressing)       Start:  07/24/24                     Bárbara Jarrett, PT

## 2024-08-07 NOTE — CARE PLAN
Problem: Skin  Goal: Prevent/manage excess moisture  Outcome: Progressing  Goal: Promote/optimize nutrition  Outcome: Progressing  Goal: Promote skin healing  Outcome: Progressing     The patient's goals for the shift include  safety.    The clinical goals for the shift include Safety.    Over the shift, the patient did make progress toward the following goals.

## 2024-08-07 NOTE — PROGRESS NOTES
"[facility]  ACUTE CARE SURGERY - PROGRESS NOTE    Patient Name: Artem Suarez  MRN: [id number]  Admit Date: 719  : 1985  AGE: 39 y.o.   GENDER: male  ==============================================================================  TODAY'S ASSESSMENT AND PLAN OF CARE:  39M presented as a transfer for NSTI of perineum and thighs in septic shock.      OR  at OSH: wide debridement of perineum and thighs  OR : debridement  OR : debridement  OR : wound vac placed  OR : wound vac      Plan  Neuro   - pain control: PRN tylenol and PRN oxycodone     Respiratory    - supplemental O2 as needed   - continue to monitor O2 saturation q4h and as needed     Cardiovascular   - monitor q4h vitals and as needed    - monitor hemodynamics     GI   - continue consistent carb, potassium restricted diet with ensure high proteins with meals    - metamucil 1 packet TID  - C. Diff PCR obtained on  Not detected  - supplementation: zinc, selenium, and vitamin C for wound healing     /FEN   - Wolfe in place   - continue to monitor I/Os  #JOSE RAMON   - nephrology following, recommendations appreciated    - Cr increased to 6.76 today from 6.32       Heme   - No signs of bleeding, H/H stable  - CBC PRN     Endocrine: history of diabetes   - Glucose stable, discontinue glucose check & insulin    - Endocrinology was consulted and recommended outgoing referral to [facility]      MSK/Skin  #NSTI, perineum and posterior thighs   - s/p multiple debridements (OSH on , [facility] on , ) and wound vac placements ( and )    - will continue daily and as needed WTD dressing changes by nursing      #Right great toe wound, chronic    - wound care previously consulted and recommendations appreciated: daily dressing changes via cleanse toe with NS and pat toe dry then pain toe wound with betadine and cover with dry dressing      Infectious disease    - not currently on " antibiotics   - continue to monitor for signs of infection    - no leukocytosis        GI ppx: not indicated  DVT ppx: SCDs and Heparin     Patient discussed with Dr. Maco Medina MD  General Surgery, PGY-3  Please page service pager with questions  Acute Care Surgery  Service Pager: [phone]    ==============================================================================  CHIEF COMPLAINT / EVENTS LAST 24HRS / HPI:  NAEO.  Wound looks clean    MEDICAL HISTORY / ROS:   Admission history and ROS reviewed. Pertinent changes as follows:  N/A    PHYSICAL EXAM:  Heart Rate:  [75-83]   Temp:  [35.8 °C (96.4 °F)-36.8 °C (98.2 °F)]   Resp:  [18]   BP: ()/(56-66)   SpO2:  [94 %-98 %]   Physical Exam  Constitutional:       General: He is not in acute distress.     Appearance: He is obese.   HENT:      Head: Atraumatic.      Nose:      Comments: 2L NC in place   Pulmonary:      Effort: Pulmonary effort is normal. No respiratory distress.   Abdominal:      General: There is no distension.      Palpations: Abdomen is soft.      Tenderness: There is no abdominal tenderness.   Genitourinary:     Comments: Wolfe and rectal tubes in place   Musculoskeletal:      Comments: Able to move bilateral upper extremities  Minimal bilateral lower extremity movement 2/2 body habitus   Skin:     General: Skin is warm and dry.      Comments: Dry skin bilateral lower extremities  Bilateral perineal wounds and R groin wounds dressed with wet kerlix and dry ABD pads on top    Neurological:      Mental Status: He is alert and oriented to person, place, and time.       IMAGING SUMMARY:  (summary of new imaging findings, not a copy of dictation)  None    LABS:  Results from last 7 days   Lab Units 08/06/24  0610 08/05/24  0557 08/04/24  0553   WBC AUTO x10*3/uL 5.0 5.7 8.1   HEMOGLOBIN g/dL 8.0* 7.9* 8.0*   HEMATOCRIT % 25.5* 25.8* 26.3*   PLATELETS AUTO x10*3/uL 130* 128* 121*   NEUTROS PCT AUTO % 58.5 65.4 74.6   LYMPHS PCT  AUTO % 27.5 21.6 15.4   MONOS PCT AUTO % 7.2 7.2 6.2   EOS PCT AUTO % 3.4 3.5 2.2     Results from last 7 days   Lab Units 08/01/24  0501   APTT seconds 41*   INR  1.6*     Results from last 7 days   Lab Units 08/07/24  0719 08/06/24  0610 08/05/24  0557 08/04/24  0553 08/03/24  0527 08/02/24  0636   SODIUM mmol/L 139 141 139   < > 139 138   POTASSIUM mmol/L 4.3 4.3 4.4   < > 4.3 4.5   CHLORIDE mmol/L 107 110* 108*   < > 107 106   CO2 mmol/L 22 20* 21   < > 18* 21   BUN mg/dL 32* 35* 37*   < > 40* 42*   CREATININE mg/dL 6.76* 6.32* 6.57*   < > 6.68* 6.36*   CALCIUM mg/dL 6.5* 6.7* 6.9*   < > 7.1* 7.1*   PROTEIN TOTAL g/dL  --   --   --   --  6.3* 6.2*   BILIRUBIN TOTAL mg/dL  --   --   --   --  0.4 0.5   ALK PHOS U/L  --   --   --   --  82 71   ALT U/L  --   --   --   --  6* 7*   AST U/L  --   --   --   --  13 15   GLUCOSE mg/dL 70* 69* 88   < > 92 108*    < > = values in this interval not displayed.     Results from last 7 days   Lab Units 08/03/24 0527 08/02/24  0636   BILIRUBIN TOTAL mg/dL 0.4 0.5           I have reviewed all medications, laboratory results, and imaging pertinent for today's encounter.

## 2024-08-07 NOTE — PROGRESS NOTES
"Nutrition Follow Up Assessment  Nutrition Assessment         Patient is a 39 y.o. male who is day 19 of admission for Necrotizing soft tissue infection.       Nutrition History:  Food and Nutrient History: Pt reports appetite continues to be decreased. Reports is consuming 1 meal per day. States he does not like the Ensure High Protein. Is agreeable to trying Ensure Clear and Gelatein supplements. Denies nausea, vomiting, diarrhea, constipation, abdominal pain.      Anthropometrics:  Height: 167 cm (5' 5.75\")   Weight: (!) 190 kg (419 lb 1.5 oz)   BMI (Calculated): 68.16  IBW/kg (Dietitian Calculated): 64.5 kg  Percent of IBW: 295 %  Adjusted Body Weight (kg): 96 kg    Admission Weight Trend:  Date/Time Weight   07/25/24 0500 190 kg (419 lb 1.5 oz) Abnormal      Weight Change %:  Weight History / % Weight Change: No new wt to assess since last nutrition assessment.    Nutrition Focused Physical Exam Findings:  defer: full NFPE completed 7/22.  Edema:  Edema: +1 trace  Edema Location: generalized  Physical Findings:  Skin: Positive (Soft tissue necrosis to groin/leg R/leg L/ischium, diabetic ulcer toe, buttocks wound)    Nutrition Significant Labs:  CBC Trend:   Results from last 7 days   Lab Units 08/06/24  0610 08/05/24  0557 08/04/24  0553 08/03/24  2043   WBC AUTO x10*3/uL 5.0 5.7 8.1 10.1   RBC AUTO x10*6/uL 2.82* 2.76* 2.80* 2.73*   HEMOGLOBIN g/dL 8.0* 7.9* 8.0* 7.8*   HEMATOCRIT % 25.5* 25.8* 26.3* 25.0*   MCV fL 90 94 94 92   PLATELETS AUTO x10*3/uL 130* 128* 121* 127*    , BMP Trend:   Results from last 7 days   Lab Units 08/07/24  0719 08/06/24  0610 08/05/24  0557 08/04/24  0553   GLUCOSE mg/dL 70* 69* 88 86   CALCIUM mg/dL 6.5* 6.7* 6.9* 6.9*   SODIUM mmol/L 139 141 139 139   POTASSIUM mmol/L 4.3 4.3 4.4 4.2   CO2 mmol/L 22 20* 21 21   CHLORIDE mmol/L 107 110* 108* 107   BUN mg/dL 32* 35* 37* 40*   CREATININE mg/dL 6.76* 6.32* 6.57* 6.93*    , BG POCT trend:   Results from last 7 days   Lab Units " 08/06/24  1154 08/06/24  0827 08/05/24  0751 08/05/24  0419 08/05/24  0041   POCT GLUCOSE mg/dL 74 66* 85 101* 141*    , Renal Lab Trend:   Results from last 7 days   Lab Units 08/07/24  0719 08/06/24  0610 08/05/24  0557 08/04/24  0553   POTASSIUM mmol/L 4.3 4.3 4.4 4.2   PHOSPHORUS mg/dL 7.5* 7.8* 8.1* 7.7*   SODIUM mmol/L 139 141 139 139   MAGNESIUM mg/dL 1.76 1.86 1.96 1.92   EGFR mL/min/1.73m*2 10* 11* 10* 10*   BUN mg/dL 32* 35* 37* 40*   CREATININE mg/dL 6.76* 6.32* 6.57* 6.93*        Nutrition Specific Medications:  Scheduled medications  ascorbic acid, 500 mg, oral, Daily  heparin, 7,500 Units, subcutaneous, q8h  psyllium, 1 packet, oral, TID  sevelamer carbonate, 800 mg, oral, TID  zinc sulfate, 50 mg of elemental zinc, oral, Daily        I/O:   Last BM Date: 08/06/24; Stool Appearance: Loose, Mucous, Watery (08/07/24 1316)    Dietary Orders (From admission, onward)       Start     Ordered    08/06/24 0840  Adult diet Potassium restricted 2 gm (50mEq)  Diet effective now        Question:  Diet type  Answer:  Potassium restricted 2 gm (50mEq)    08/06/24 0839    07/30/24 1335  Oral nutritional supplements  Until discontinued        Question Answer Comment   Deliver with All meals    Select supplement: Ensure High Protein        07/30/24 1334                     Estimated Needs:   Total Energy Estimated Needs (kCal): 2400 kCal  Method for Estimating Needs: ~25 kcals/kg using adjusted BW ; REE using MSJ = 2748  Total Protein Estimated Needs (g):  (125-145)  Method for Estimating Needs: ~1.3-1.5 gm/kg using adjusted BW  Total Fluid Estimated Needs (mL):  (1ml/kcal or per team)           Nutrition Diagnosis   Malnutrition Diagnosis  Patient has Malnutrition Diagnosis: No    Nutrition Diagnosis  Patient has Nutrition Diagnosis: Yes  Diagnosis Status (1): Ongoing  Nutrition Diagnosis 1: Increased nutrient needs  Related to (1): increased metabolic demand  As Evidenced by (1): NSTI requiring multiple trips to  OR.  Additional Nutrition Diagnosis: Diagnosis 2  Diagnosis Status (2): Ongoing  Nutrition Diagnosis 2: Inadequate oral intake  Related to (2): acute events  As Evidenced by (2): pt only asking for milk or diet pepsi with ice       Nutrition Interventions/Recommendations         Nutrition Prescription:  Ensure Clear TID (240kcal, 8g protein each).  Gelatein BID (80kcal, 20g protein each).  Consider liberalizing diet to regular to promote PO intake.        Nutrition Interventions:   Food and/or Nutrient Delivery Interventions  Interventions: Meals and snacks, Medical food supplement  Goal: consume >50% of meals  Medical Food Supplement: Commercial beverage  Goal: consume 100% ONS       Nutrition Education:   Encouraged PO intake of oral nutrition supplements.       Nutrition Monitoring and Evaluation   Food/Nutrient Related History Monitoring  Monitoring and Evaluation Plan: Energy intake, Amount of food  Criteria: meet >75% of estimated energy needs  Amount of Food: Medical food intake  Criteria: consume 100% ONS    Body Composition/Growth/Weight History  Monitoring and Evaluation Plan: Weight  Criteria: stable weight    Biochemical Data, Medical Tests and Procedures  Monitoring and Evaluation Plan: Electrolyte/renal panel, Glucose/endocrine profile  Criteria: WNL              Time Spent (min): 45 minutes

## 2024-08-07 NOTE — PROGRESS NOTES
NEPHROLOGY FOLLOW UP NOTE    Artem Suarez   39 y.o.    190 kg (419 lnb 1.5 oz)   MRN/Room: [id number]/9083/9083-A    Subjective: He is feeling well today with no new complaints. No pain, SOB, nausea, vomiting, chills, or fever.     Objectively his KFTs have leveled off. Blood pressures have been soft but stable.      Meds:   ascorbic acid, 500 mg, Daily  heparin, 7,500 Units, q8h  psyllium, 1 packet, TID  sevelamer carbonate, 800 mg, TID  zinc sulfate, 50 mg of elemental zinc, Daily           acetaminophen, 650 mg, q6h PRN  dextrose, 12.5 g, q15 min PRN  dextrose, 25 g, q15 min PRN  glucagon, 1 mg, q15 min PRN  glucagon, 1 mg, q15 min PRN  oxyCODONE, 10 mg, q6h PRN  oxyCODONE, 5 mg, q6h PRN  oxygen, , Continuous PRN - O2/gases      Objective:     Vitals:    08/07/24 1153   BP: 106/64   Pulse: 75   Resp: 18   Temp: 35.8 °C (96.4 °F)   SpO2: 98%          Intake/Output Summary (Last 24 hours) at 8/7/2024 1557  Last data filed at 8/7/2024 1316  Gross per 24 hour   Intake 2000 ml   Output 4050 ml   Net -2050 ml       General appearance: AAOx3. No distress  Eyes: non-icteric  Mouth: Moist mucus membranes.  Skin: no apparent rash  Heart: Normal S1/S2.  Lungs: CTA bilat.  No wheezing/crackles  Abdomen: soft, nt/nd  Extremities: Nonpitting edema bilat to knees with dry skin and dark pigment. Wound with bandage over buttock/perineum. Cap refill <2 seconds.  :  Wolfe in place    Blood Labs:  Results for orders placed or performed during the hospital encounter of 07/19/24 (from the past 24 hour(s))   Magnesium   Result Value Ref Range    Magnesium 1.76 1.60 - 2.40 mg/dL   Renal function panel   Result Value Ref Range    Glucose 70 (L) 74 - 99 mg/dL    Sodium 139 136 - 145 mmol/L    Potassium 4.3 3.5 - 5.3 mmol/L    Chloride 107 98 - 107 mmol/L    Bicarbonate 22 21 - 32 mmol/L    Anion Gap 14 10 - 20 mmol/L    Urea Nitrogen 32 (H) 6 - 23 mg/dL    Creatinine 6.76 (H) 0.50 - 1.30 mg/dL    eGFR 10 (L) >60 mL/min/1.73m*2    Calcium  6.5 (L) 8.6 - 10.6 mg/dL    Phosphorus 7.5 (H) 2.5 - 4.9 mg/dL    Albumin 1.7 (L) 3.4 - 5.0 g/dL          ASSESSMENT:  Artem Suarez is a  39 y.o.  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 His course of JOSE RAMON has has been improving in terms of urine output but stable/plateau in terms of KFTs. KFTs have been elevated now for around 2 weeks; we would have expected improvement by now. However, we will continue to monitor for improvement now that fluids are discontinued. Chart review reveals high/normals BPs in the past indicating that he may still be volume depleted. We will continue to monitor KFTs and fluid status daily. If patient's KFTs are stable or improved without fluids, we can consider discharge to LTACH.     #JOSE RAMON, non-oliguric - BL 1.0  - Etiology of JOSE RAMON is most likely ischemia resulting from sepsis and multiple debridement surgeries + Hypotensive episodes, now progressed to ATN. Elevated vanc levels (39) around time of creatinine rise suggest it might have been contributory as well.  - As expected, Cr is leveling off, expected to improve over the next days.  - UA significant for 1+ blood, no protein or WBC  - Urine osmolality 161  - Renal US no signs of hydronephrosis; rules out post-renal causes  - Patient has no fever, rash, urinary WBC, or eosiniphilia to suggest AIN and no recent contrast studies.       Recommendations:  - Trial stopping of IVF; if patient kidney function remains stable or improved without fluids we will consider him stable from a kidney standpoint for discharge to LTACH  - Please reach out once discharge plan is finalized; we will prepare recommendations for the receiving facility  - If Cr remains elevated, next step would be renal biopsy; for now, we will wait and continue with supportive care  - Avoid Hypotensive episodes and avoid fluctuations in BP  -  Consider IV Fluid boluses (500 ml LR) for Hypotensive episodes with SBP <120.  - Avoid nephrotoxins, contrast if possible  - Ensure renal dosing for medications based on latest GFR  - Strict Is/Os    Delfino Martinezgovind, MS4    24 hour Renal Pager - [phone]    I saw and evaluated the patient. I personally obtained the key and critical portions of the history and physical exam or was physically present for key and critical portions performed by the medical student. I reviewed the medical student documentation and discussed the patient with the medical student. I agree with the medical student's medical decision making as documented in the note.     Nathaly Herndon MD

## 2024-08-07 NOTE — PROGRESS NOTES
Transitional Care Coordination Progress Note     Team members: TCC, ACS      Discharge disposition: [facility] - precert       ** when ready pt will need need special transportation accommodation: bariatric and long   distance travel **     ** requesting updated PT/OT notes **    Status: IP     Payer: [facility]      Potential Barriers: rising Cr level     ADOD: 8/10 or      This TCC will continue to follow for home going needs and safe DC plan.     Anju Ríos RN

## 2024-08-08 VITALS
SYSTOLIC BLOOD PRESSURE: 102 MMHG | DIASTOLIC BLOOD PRESSURE: 62 MMHG | RESPIRATION RATE: 18 BRPM | HEART RATE: 85 BPM | TEMPERATURE: 97.3 F | BODY MASS INDEX: 50.62 KG/M2 | HEIGHT: 66 IN | OXYGEN SATURATION: 95 % | WEIGHT: 315 LBS

## 2024-08-08 LAB
ALBUMIN SERPL BCP-MCNC: 1.7 G/DL (ref 3.4–5)
ANCA AB PATTERN SER IF-IMP: NORMAL
ANCA IGG TITR SER IF: NORMAL {TITER}
ANION GAP SERPL CALC-SCNC: 17 MMOL/L (ref 10–20)
BUN SERPL-MCNC: 32 MG/DL (ref 6–23)
CALCIUM SERPL-MCNC: 6.5 MG/DL (ref 8.6–10.6)
CHLORIDE SERPL-SCNC: 107 MMOL/L (ref 98–107)
CO2 SERPL-SCNC: 20 MMOL/L (ref 21–32)
CREAT SERPL-MCNC: 6.55 MG/DL (ref 0.5–1.3)
EGFRCR SERPLBLD CKD-EPI 2021: 10 ML/MIN/1.73M*2
GLUCOSE SERPL-MCNC: 70 MG/DL (ref 74–99)
MAGNESIUM SERPL-MCNC: 1.73 MG/DL (ref 1.6–2.4)
MYELOPEROXIDASE AB SER-ACNC: 2 AU/ML (ref 0–19)
PHOSPHATE SERPL-MCNC: 8 MG/DL (ref 2.5–4.9)
POTASSIUM SERPL-SCNC: 4.5 MMOL/L (ref 3.5–5.3)
PROTEINASE3 AB SER-ACNC: 2 AU/ML (ref 0–19)
SODIUM SERPL-SCNC: 139 MMOL/L (ref 136–145)

## 2024-08-08 PROCEDURE — 83735 ASSAY OF MAGNESIUM: CPT

## 2024-08-08 PROCEDURE — 99233 SBSQ HOSP IP/OBS HIGH 50: CPT | Performed by: INTERNAL MEDICINE

## 2024-08-08 PROCEDURE — 97110 THERAPEUTIC EXERCISES: CPT | Mod: GO

## 2024-08-08 PROCEDURE — 2500000004 HC RX 250 GENERAL PHARMACY W/ HCPCS (ALT 636 FOR OP/ED)

## 2024-08-08 PROCEDURE — 2500000001 HC RX 250 WO HCPCS SELF ADMINISTERED DRUGS (ALT 637 FOR MEDICARE OP): Performed by: NURSE PRACTITIONER

## 2024-08-08 PROCEDURE — 1100000001 HC PRIVATE ROOM DAILY

## 2024-08-08 PROCEDURE — 51702 INSERT TEMP BLADDER CATH: CPT

## 2024-08-08 PROCEDURE — 36415 COLL VENOUS BLD VENIPUNCTURE: CPT

## 2024-08-08 PROCEDURE — 80069 RENAL FUNCTION PANEL: CPT

## 2024-08-08 PROCEDURE — 2500000001 HC RX 250 WO HCPCS SELF ADMINISTERED DRUGS (ALT 637 FOR MEDICARE OP)

## 2024-08-08 PROCEDURE — 2500000002 HC RX 250 W HCPCS SELF ADMINISTERED DRUGS (ALT 637 FOR MEDICARE OP, ALT 636 FOR OP/ED)

## 2024-08-08 RX ORDER — ASCORBIC ACID 500 MG
500 TABLET ORAL DAILY
Start: 2024-08-09 | End: 2024-08-16

## 2024-08-08 RX ORDER — ZINC SULFATE 50(220)MG
50 CAPSULE ORAL DAILY
Start: 2024-08-09 | End: 2024-09-08

## 2024-08-08 RX ORDER — SEVELAMER CARBONATE 800 MG/1
800 TABLET, FILM COATED ORAL
Start: 2024-08-08 | End: 2024-09-07

## 2024-08-08 RX ORDER — OXYCODONE HYDROCHLORIDE 5 MG/1
5 TABLET ORAL EVERY 6 HOURS PRN
Start: 2024-08-08

## 2024-08-08 RX ORDER — ACETAMINOPHEN 325 MG/1
650 TABLET ORAL EVERY 8 HOURS PRN
Start: 2024-08-08

## 2024-08-08 ASSESSMENT — COGNITIVE AND FUNCTIONAL STATUS - GENERAL
PERSONAL GROOMING: A LITTLE
DAILY ACTIVITIY SCORE: 14
DRESSING REGULAR UPPER BODY CLOTHING: A LOT
MOVING FROM LYING ON BACK TO SITTING ON SIDE OF FLAT BED WITH BEDRAILS: A LOT
DRESSING REGULAR LOWER BODY CLOTHING: A LOT
DRESSING REGULAR UPPER BODY CLOTHING: A LOT
CLIMB 3 TO 5 STEPS WITH RAILING: TOTAL
TOILETING: TOTAL
STANDING UP FROM CHAIR USING ARMS: A LOT
MOBILITY SCORE: 10
TOILETING: TOTAL
HELP NEEDED FOR BATHING: A LOT
TURNING FROM BACK TO SIDE WHILE IN FLAT BAD: A LOT
DRESSING REGULAR LOWER BODY CLOTHING: A LOT
MOVING TO AND FROM BED TO CHAIR: A LOT
HELP NEEDED FOR BATHING: A LOT
WALKING IN HOSPITAL ROOM: TOTAL
PERSONAL GROOMING: A LITTLE
DAILY ACTIVITIY SCORE: 14

## 2024-08-08 ASSESSMENT — PAIN SCALES - GENERAL: PAINLEVEL_OUTOF10: 0 - NO PAIN

## 2024-08-08 ASSESSMENT — PAIN - FUNCTIONAL ASSESSMENT: PAIN_FUNCTIONAL_ASSESSMENT: 0-10

## 2024-08-08 NOTE — NURSING NOTE
Called sister Qian  to let her know pt was discharged and sent to [facility].

## 2024-08-08 NOTE — DISCHARGE INSTRUCTIONS
Please follow up with your primary care provider at next available appointment following your hospital stay.     Nephrology recommendin) RFP every other day, 2) monitor I and Os, 3) schedule follow up with Dr. Herndon  and , 4) Dr. Herndon will call receiving physician to give direct report, 5) fax the RFP labs to Dr. Herndon at      To schedule a follow up appointment with the Acute Care Surgery/ Trauma clinic, please call [phone] to schedule follow up appointment. This is not an emergency number, so if you have an emergency, please go to the Emergency Room. However, if you have questions regarding your care, upcoming appointments, etc, please do not hesitate to call the above number. Thank you!     Please go to the nearest hospital emergency room if you are experiencing: worsening abdominal pain, increasing abdominal distention, fevers, inability to tolerate food and drink (vomit every time you eat), nausea/vomiting, loss of bowel function (unable to pass gas or have bowel movements).     If you notice increased redness, tenderness or drainage around your surgical sites/wounds, or if you notice foul smelling drainage from your wounds please call the trauma clinic or go to the nearest to the emergency room.

## 2024-08-08 NOTE — PROGRESS NOTES
Occupational Therapy    Occupational Therapy Treatment    Name: Artem Suarez  MRN: [id number]  : 1985  Date: 24  Time Calculation  Start Time: 821  Stop Time: 831  Time Calculation (min): 10 min    Assessment:  OT Assessment: difficulty I/ADLs, safety, fxnl mob  Prognosis: Good  Barriers to Discharge: Inaccessible home environment, Decreased caregiver support  Evaluation/Treatment Tolerance: Patient limited by fatigue  Medical Staff Made Aware: Yes  End of Session Communication: Bedside nurse, PCT/NA/CTA  End of Session Patient Position: Bed, 3 rail up, Alarm off, not on at start of session  Plan:  Treatment Interventions: ADL retraining, Functional transfer training, UE strengthening/ROM, Endurance training, Patient/family training, Equipment evaluation/education, Compensatory technique education  OT Frequency: 3 times per week  OT Discharge Recommendations: Moderate intensity level of continued care  Equipment Recommended upon Discharge: Wheeled walker  OT Recommended Transfer Status: Assist of 2  OT - OK to Discharge: Yes    Subjective   Previous Visit Info:  OT Last Visit  OT Received On: 24  General:  General  Reason for Referral: presented from OSH with NSTI of perineum and thighs in septic shock; s/p OR , , , and  for debridement/washout.  Past Medical History Relevant to Rehab: DM and lymphedema  Family/Caregiver Present: No  Prior to Session Communication: Bedside nurse  Patient Position Received: Bed, 3 rail up, Alarm off, not on at start of session  General Comment: agreeable bed level declined ADLs and mob  Precautions:  Medical Precautions: Fall precautions  Vitals:     Pain Assessment:  Pain Assessment  Pain Assessment: 0-10  0-10 (Numeric) Pain Score: 0 - No pain     Objective   Cognition:  Overall Cognitive Status: Within Functional Limits  Arousal/Alertness: Appropriate responses to stimuli  Orientation Level: Oriented X4  Following Commands: Follows all commands  and directions without difficulty     Therapy/Activity: Therapeutic Exercise  Therapeutic Exercise Performed:  (supine 2x10 reps BUE shoulder flex/ext, elbow flex/ext, chest press with scap retraction, shoulder int/ext rotation, extended rest required, modeling required to complete)  Outcome Measures:  [facility] Daily Activity  Putting on and taking off regular lower body clothing: A lot  Bathing (including washing, rinsing, drying): A lot  Putting on and taking off regular upper body clothing: A lot  Toileting, which includes using toilet, bedpan or urinal: Total  Taking care of personal grooming such as brushing teeth: A little  Eating Meals: None  Daily Activity - Total Score: 14        Education Documentation  Handouts, taught by Audrey Bone OT at 8/8/2024  8:38 AM.  Learner: Patient  Readiness: Acceptance  Method: Explanation, Demonstration  Response: Needs Reinforcement    Body Mechanics, taught by Audrey Bone OT at 8/8/2024  8:38 AM.  Learner: Patient  Readiness: Acceptance  Method: Explanation, Demonstration  Response: Needs Reinforcement    Precautions, taught by Audrey Bone OT at 8/8/2024  8:38 AM.  Learner: Patient  Readiness: Acceptance  Method: Explanation, Demonstration  Response: Needs Reinforcement    Home Exercise Program, taught by Audrey Bone OT at 8/8/2024  8:38 AM.  Learner: Patient  Readiness: Acceptance  Method: Explanation, Demonstration  Response: Needs Reinforcement    ADL Training, taught by Audrey Bone OT at 8/8/2024  8:38 AM.  Learner: Patient  Readiness: Acceptance  Method: Explanation, Demonstration  Response: Needs Reinforcement    Education Comments  No comments found.      Goals:  Encounter Problems       Encounter Problems (Active)       ADLs       Patient with complete upper body dressing with modified independent level of assistance donning and doffing all UE clothes (Progressing)       Start:  07/24/24    Expected End:  08/29/24            Patient with  complete lower body dressing with minimal assist  level of assistance donning and doffing all LE clothes  with PRN adaptive equipment (Not Progressing)       Start:  07/24/24    Expected End:  08/29/24            Patient will complete daily grooming tasks with modified independent level of assistance and PRN adaptive equipment. (Progressing)       Start:  07/24/24    Expected End:  08/29/24               COGNITION/SAFETY       Patient will participate in cognitive activities to demonstrate WFL score on further cognitive assessments, including Medi-Cog, MoCA and remain A&O x3, CAM (-).  (Progressing)       Start:  07/24/24    Expected End:  08/29/24               OT Goals       Pt will improve BUE >1/2 grade MMT       Start:  08/08/24    Expected End:  08/29/24               TRANSFERS       Patient will perform bed mobility moderate assist level of assistance in order to improve safety and independence with mobility (Not Progressing)       Start:  07/24/24    Expected End:  08/29/24            Patient will complete sit to stand transfer with moderate assist level of assistance and least restrictive device in order to improve safety and prepare for out of bed mobility. (Not Progressing)       Start:  07/24/24    Expected End:  08/29/24

## 2024-08-08 NOTE — DISCHARGE SUMMARY
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 At the OSH, he was taken to the OR overnight 7/18 for initial debridement. During his hospitalization at the OSH, he was requiring variable pressors intraoperatively and postoperatively, as well as an insulin drip for hyperglycemia. Additionally, he was started on vancomycin, clindamycin, and zosyn at the OSH. He was transferred to  for septic shock management in the setting of Chiquita's gangrene/NSTI on 7/19.     Upon arrival to the TICU, he was intubated and sedated requiring pressor support and an insulin drip. He was continued on vancomycin, zosyn, and clindamycin on admission. He went to the OR for a debridement of the bilateral groins and thighs with ACS on 7/20. On 7/21, he returned to the OR for additional debridement. He stopped requiring pressors on 7/22. He returned to the OR on 7/23 and 7/26 for additional debridement of the perineal and thigh wounds with wound vac placement.     He was treated with broad spectrum antibiotics perioperatively    Patient failed multiple attempts with wound vac based on location, stool, body habitus. He was continued on wet to dry dressing changes. A FMS was placed.     Wound care team was consulted and assisted with this patients care.    He was supplemented on zinc, selenium, and vitamin C for wound healing.     Nephrology consulted for JOSE RAMON.   Recs per nephrology:   1) RFP every other day, 2) monitor I and Os, 3) schedule follow up with Dr. Herndon August 22 and 1030, 4) Dr. Herndon will call  "receiving physician to give direct report, 5) fax the RFP labs to Dr. Herndon at .       Additionally Endocrinology was consulted and recommended outgoing referral to [facility]\" for patients history of diabetes.    Patient with R great toe chronic wound,  daily dressing changes via cleanse toe with NS and pat toe dry then pain toe wound with betadine and cover with dry dressing .       DVT prophylaxis achieved with SCDs, heparin.    PT/OT also working with patient while in the hospital.    Stable at time of discharge, on 2L NC o2, has wet -dry dressings on groin along with FMS. He is tolerating PO.       Patient discharged to LTAC.      Pertinent Physical Exam At Time of Discharge  Physical Exam    /61 (BP Location: Right arm, Patient Position: Lying)   Pulse 90   Temp 36.2 °C (97.2 °F) (Temporal)   Resp 19   Ht 1.67 m (5' 5.75\")   Wt (!) 190 kg (419 lb 1.5 oz)   SpO2 96%   BMI 68.16 kg/m²      Physical Exam  Constitutional:       General: He is not in acute distress.     Appearance: He is obese.   HENT:      Head: Atraumatic.      Nose:      Comments: 2L NC in place   Pulmonary:      Effort: Pulmonary effort is normal. No respiratory distress.   Abdominal:      General: There is no distension.      Palpations: Abdomen is soft.      Tenderness: There is no abdominal tenderness.   Genitourinary:     Comments: Wolfe and rectal tubes in place   Musculoskeletal:      Comments: Able to move bilateral upper extremities  Minimal bilateral lower extremity movement 2/2 body habitus   Skin:     General: Skin is warm and dry.      Comments: Dry skin bilateral lower extremities  Bilateral perineal wounds and R groin wounds dressed with wet kerlix and dry ABD pads on top    Neurological:      Mental Status: He is alert and oriented to person, place, and time.        Home Medications     Medication List      START taking these medications     acetaminophen 325 mg tablet; Commonly " known as: Tylenol; Take 2 tablets   (650 mg) by mouth every 8 hours if needed for mild pain (1 - 3) or   moderate pain (4 - 6).   ascorbic acid 500 mg tablet; Commonly known as: Vitamin C; Take 1 tablet   (500 mg) by mouth once daily for 7 days.; Start taking on: August 9, 2024   oxyCODONE 5 mg immediate release tablet; Commonly known as: Roxicodone;   Take 1 tablet (5 mg) by mouth every 6 hours if needed for severe pain (7 -   10).   psyllium 3.4 gram packet; Commonly known as: Metamucil; Take 1 packet by   mouth 3 times a day for 15 days.   sevelamer carbonate 800 mg tablet; Commonly known as: Renvela; Take 1   tablet (800 mg) by mouth 3 times daily (morning, midday, late afternoon).   Swallow tablet whole; do not crush, break, or chew.   zinc sulfate 220 (50 Zn) MG capsule; Commonly known as: Zincate; Take 1   capsule (50 mg of elemental zinc) by mouth once daily.; Start taking on:   August 9, 2024       Outpatient Follow-Up    Follow up with nephrology  Follow up with PCP  Follow up with Trauma/ Surgery clinic        Kamini Mix PA-C

## 2024-08-08 NOTE — PROGRESS NOTES
Pt medically ready for d/c to [facility]. Transport via [facility] ([phone]) with a  time at 2:00 pm. Report can be called to [phone]. Pt updated on all above. Team updated. Nurse aware of all above.      1017   Thrasher sue sent.    1045   Received call from liasion, report # is [phone]. Also requiring provider to provider report be called to: Dr. Lopez, [phone], notified team.      This TCC will continue to follow for home going needs and safe DC plan.     Anju Ríos RN

## 2024-08-08 NOTE — PROGRESS NOTES
NEPHROLOGY FOLLOW UP NOTE    Artem Suarez   39 y.o.    190 kg (419 lnb 1.5 oz)   MRN/Room: [id number]/[id number]    Subjective: He is feeling well today with no new complaints. No pain, SOB, nausea, vomiting, chills, or fever.     Objectively his KFTs have leveled off. Blood pressures remain soft but stable.      Meds:   ascorbic acid, 500 mg, Daily  heparin, 7,500 Units, q8h  psyllium, 1 packet, TID  sevelamer carbonate, 800 mg, TID  zinc sulfate, 50 mg of elemental zinc, Daily           acetaminophen, 650 mg, q6h PRN  dextrose, 12.5 g, q15 min PRN  dextrose, 25 g, q15 min PRN  glucagon, 1 mg, q15 min PRN  glucagon, 1 mg, q15 min PRN  oxyCODONE, 10 mg, q6h PRN  oxyCODONE, 5 mg, q6h PRN  oxygen, , Continuous PRN - O2/gases      Objective:     Vitals:    08/08/24 1107   BP: 101/61   Pulse: 90   Resp: 19   Temp: 36.2 °C (97.2 °F)   SpO2: 96%          Intake/Output Summary (Last 24 hours) at 8/8/2024 1356  Last data filed at 8/8/2024 1107  Gross per 24 hour   Intake 1120 ml   Output 2800 ml   Net -1680 ml       General appearance: AAOx3. No distress  Eyes: non-icteric  Mouth: Moist mucus membranes.  Skin: no apparent rash  Heart: Normal S1/S2.  Lungs: CTA bilat.  No wheezing/crackles  Abdomen: soft, nt/nd  Extremities: Nonpitting edema bilat to knees with dry skin and dark pigment. Wound with bandage over buttock/perineum. Cap refill <2 seconds.  :  Wolfe in place    Blood Labs:  Results for orders placed or performed during the hospital encounter of 07/19/24 (from the past 24 hour(s))   Magnesium   Result Value Ref Range    Magnesium 1.73 1.60 - 2.40 mg/dL   Renal function panel   Result Value Ref Range    Glucose 70 (L) 74 - 99 mg/dL    Sodium 139 136 - 145 mmol/L    Potassium 4.5 3.5 - 5.3 mmol/L    Chloride 107 98 - 107 mmol/L    Bicarbonate 20 (L) 21 - 32 mmol/L    Anion Gap 17 10 - 20 mmol/L    Urea Nitrogen 32 (H) 6 - 23 mg/dL    Creatinine 6.55 (H) 0.50 - 1.30 mg/dL    eGFR 10 (L) >60 mL/min/1.73m*2    Calcium  6.5 (L) 8.6 - 10.6 mg/dL    Phosphorus 8.0 (H) 2.5 - 4.9 mg/dL    Albumin 1.7 (L) 3.4 - 5.0 g/dL          ASSESSMENT:  Artem Suarez is a  39 y.o.  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 His course of JOSE RAMON has has been improving in terms of urine output but stable/plateau in terms of KFTs. KFTs have been elevated now for around 2 weeks; we would have expected improvement by now. Chart review reveals high/normals BPs in the past indicating that he may still be volume depleted. However, KFTs are now stable for the past few days without IVF. The patient is in acceptable condition for transfer to [facility] from renal standpoint.     #JOSE RAMON, non-oliguric - BL 1.0  - Etiology of JOSE RAMON is most likely ischemia resulting from sepsis and multiple debridement surgeries + Hypotensive episodes, now progressed to ATN. Elevated vanc levels (39) around time of creatinine rise suggest it might have been contributory as well.  - As expected, Cr is leveling off, expected to improve over the next days.  - UA significant for 1+ blood, no protein or WBC  - Urine osmolality 161  - Renal US no signs of hydronephrosis; rules out post-renal causes  - Patient has no fever, rash, urinary WBC, or eosiniphilia to suggest AIN and no recent contrast studies.       Recommendations:    Discharge recommendations to receiving [facility]:    1) RFP every other day  2) Monitor I and Os  3) Schedule follow up with Dr. Herndon August 22 at 1030  4) Dr. Herndon will call receiving physician to give direct report  5) Fax the RFP labs to Dr. Herndon at . Patient is cleared for discharge from our perspective.   6) Other general recommendations:      - Avoid Hypotensive episodes and avoid fluctuations in BP      - Consider IV Fluid boluses (500 ml LR) for Hypotensive episodes with SBP <120.      - Avoid nephrotoxins, contrast if  possible      - Ensure renal dosing for medications based on latest GFR    Delfino Starr, MS4    24 hour Renal Pager - [phone]    I saw and evaluated the patient. I personally obtained the key and critical portions of the history and physical exam or was physically present for key and critical portions performed by the medical student. I reviewed the medical student documentation and discussed the patient with the medical student. I agree with the medical student's medical decision making as documented in the note.     Nathaly Herndon MD

## 2024-08-08 NOTE — CARE PLAN
The patient's goals for the shift include      The clinical goals for the shift include maintain patient safety for the entire shift  Problem: Skin  Goal: Decreased wound size/increased tissue granulation at next dressing change  Outcome: Progressing  Goal: Participates in plan/prevention/treatment measures  Outcome: Progressing  Goal: Prevent/manage excess moisture  Outcome: Progressing  Goal: Prevent/minimize sheer/friction injuries  Outcome: Progressing  Goal: Promote/optimize nutrition  Outcome: Progressing  Goal: Promote skin healing  Outcome: Progressing     Problem: Pain  Goal: Takes deep breaths with improved pain control throughout the shift  Outcome: Progressing  Goal: Turns in bed with improved pain control throughout the shift  Outcome: Progressing  Goal: Walks with improved pain control throughout the shift  Outcome: Progressing  Goal: Performs ADL's with improved pain control throughout shift  Outcome: Progressing  Goal: Participates in PT with improved pain control throughout the shift  Outcome: Progressing     Problem: Discharge Planning  Goal: Discharge to home or other facility with appropriate resources  Outcome: Progressing     Problem: Chronic Conditions and Co-morbidities  Goal: Patient's chronic conditions and co-morbidity symptoms are monitored and maintained or improved  Outcome: Progressing     Problem: Fall/Injury  Goal: Not fall by end of shift  Outcome: Progressing  Goal: Be free from injury by end of the shift  Outcome: Progressing  Goal: Verbalize understanding of personal risk factors for fall in the hospital  Outcome: Progressing     Problem: Infection related to problem list condition  Goal: Infection will resolve through treatment  Outcome: Progressing

## 2024-08-09 LAB — ANA SER QL HEP2 SUBST: NEGATIVE

## 2024-08-11 ENCOUNTER — APPOINTMENT (OUTPATIENT)
Dept: GENERAL RADIOLOGY | Age: 39
DRG: 720 | End: 2024-08-11
Payer: COMMERCIAL

## 2024-08-11 ENCOUNTER — APPOINTMENT (OUTPATIENT)
Dept: CT IMAGING | Age: 39
DRG: 720 | End: 2024-08-11
Payer: COMMERCIAL

## 2024-08-11 ENCOUNTER — HOSPITAL ENCOUNTER (INPATIENT)
Age: 39
LOS: 8 days | Discharge: HOME | DRG: 720 | End: 2024-08-19
Attending: EMERGENCY MEDICINE | Admitting: HOSPITALIST
Payer: COMMERCIAL

## 2024-08-11 DIAGNOSIS — E83.42 HYPOMAGNESEMIA: ICD-10-CM

## 2024-08-11 DIAGNOSIS — A41.9 SEPTIC SHOCK (HCC): Primary | ICD-10-CM

## 2024-08-11 DIAGNOSIS — N17.9 ACUTE RENAL FAILURE, UNSPECIFIED ACUTE RENAL FAILURE TYPE (HCC): ICD-10-CM

## 2024-08-11 DIAGNOSIS — R65.21 SEPTIC SHOCK (HCC): Primary | ICD-10-CM

## 2024-08-11 DIAGNOSIS — E88.09 HYPOALBUMINEMIA: ICD-10-CM

## 2024-08-11 DIAGNOSIS — S81.802D WOUND OF LEFT LOWER EXTREMITY, SUBSEQUENT ENCOUNTER: ICD-10-CM

## 2024-08-11 DIAGNOSIS — E83.51 HYPOCALCEMIA: ICD-10-CM

## 2024-08-11 LAB
ALBUMIN SERPL-MCNC: 2 G/DL (ref 3.5–5.2)
ALP SERPL-CCNC: 100 U/L (ref 40–129)
ALT SERPL-CCNC: 18 U/L (ref 0–40)
AMMONIA PLAS-SCNC: 27 UMOL/L (ref 16–60)
ANION GAP SERPL CALCULATED.3IONS-SCNC: 13 MMOL/L (ref 7–16)
AST SERPL-CCNC: 62 U/L (ref 0–39)
BASOPHILS # BLD: 0 K/UL (ref 0–0.2)
BASOPHILS NFR BLD: 0 % (ref 0–2)
BILIRUB DIRECT SERPL-MCNC: 0.2 MG/DL (ref 0–0.3)
BILIRUB INDIRECT SERPL-MCNC: 0.2 MG/DL (ref 0–1)
BILIRUB SERPL-MCNC: 0.4 MG/DL (ref 0–1.2)
BILIRUB UR QL STRIP: ABNORMAL
BNP SERPL-MCNC: 4265 PG/ML (ref 0–125)
BUN SERPL-MCNC: 36 MG/DL (ref 6–20)
CA-I BLD-SCNC: 0.91 MMOL/L (ref 1.15–1.33)
CALCIUM SERPL-MCNC: 5.9 MG/DL (ref 8.6–10.2)
CHLORIDE SERPL-SCNC: 104 MMOL/L (ref 98–107)
CK SERPL-CCNC: 662 U/L (ref 20–200)
CLARITY UR: CLEAR
CO2 SERPL-SCNC: 15 MMOL/L (ref 22–29)
COLOR UR: YELLOW
CREAT SERPL-MCNC: 7.4 MG/DL (ref 0.7–1.2)
EOSINOPHIL # BLD: 0 K/UL (ref 0.05–0.5)
EOSINOPHILS RELATIVE PERCENT: 0 % (ref 0–6)
ERYTHROCYTE [DISTWIDTH] IN BLOOD BY AUTOMATED COUNT: 17.1 % (ref 11.5–15)
GFR, ESTIMATED: 9 ML/MIN/1.73M2
GLUCOSE BLD-MCNC: 107 MG/DL (ref 74–99)
GLUCOSE SERPL-MCNC: 60 MG/DL (ref 74–99)
GLUCOSE UR STRIP-MCNC: NEGATIVE MG/DL
HCT VFR BLD AUTO: 26.1 % (ref 37–54)
HGB BLD-MCNC: 8.1 G/DL (ref 12.5–16.5)
HGB UR QL STRIP.AUTO: ABNORMAL
INR PPP: 2
KETONES UR STRIP-MCNC: NEGATIVE MG/DL
LACTATE BLDV-SCNC: 1.3 MMOL/L (ref 0.5–2.2)
LACTATE BLDV-SCNC: 2.6 MMOL/L (ref 0.5–1.9)
LACTATE BLDV-SCNC: 3 MMOL/L (ref 0.5–1.9)
LEUKOCYTE ESTERASE UR QL STRIP: ABNORMAL
LIPASE SERPL-CCNC: 7 U/L (ref 13–60)
LYMPHOCYTES NFR BLD: 1.66 K/UL (ref 1.5–4)
LYMPHOCYTES RELATIVE PERCENT: 9 % (ref 20–42)
MAGNESIUM SERPL-MCNC: 1.4 MG/DL (ref 1.6–2.6)
MCH RBC QN AUTO: 28.6 PG (ref 26–35)
MCHC RBC AUTO-ENTMCNC: 31 G/DL (ref 32–34.5)
MCV RBC AUTO: 92.2 FL (ref 80–99.9)
METAMYELOCYTES ABSOLUTE COUNT: 0.55 K/UL (ref 0–0.12)
METAMYELOCYTES: 3 % (ref 0–1)
MONOCYTES NFR BLD: 0.18 K/UL (ref 0.1–0.95)
MONOCYTES NFR BLD: 1 % (ref 2–12)
NEUTROPHILS NFR BLD: 87 % (ref 43–80)
NEUTS SEG NFR BLD: 16.01 K/UL (ref 1.8–7.3)
NITRITE UR QL STRIP: NEGATIVE
PH UR STRIP: 7 [PH] (ref 5–9)
PLATELET # BLD AUTO: 167 K/UL (ref 130–450)
PMV BLD AUTO: 9.7 FL (ref 7–12)
POTASSIUM SERPL-SCNC: 4.7 MMOL/L (ref 3.5–5)
PROT SERPL-MCNC: 7.2 G/DL (ref 6.4–8.3)
PROT UR STRIP-MCNC: >=300 MG/DL
PROTHROMBIN TIME: 20.7 SEC (ref 9.3–12.4)
RBC # BLD AUTO: 2.83 M/UL (ref 3.8–5.8)
RBC # BLD: ABNORMAL 10*6/UL
RBC # BLD: ABNORMAL 10*6/UL
RBC #/AREA URNS HPF: ABNORMAL /HPF
SODIUM SERPL-SCNC: 132 MMOL/L (ref 132–146)
SP GR UR STRIP: 1.02 (ref 1–1.03)
TROPONIN I SERPL HS-MCNC: 250 NG/L (ref 0–11)
TROPONIN I SERPL HS-MCNC: 315 NG/L (ref 0–11)
TROPONIN I SERPL HS-MCNC: 363 NG/L (ref 0–11)
UROBILINOGEN UR STRIP-ACNC: 0.2 EU/DL (ref 0–1)
WBC #/AREA URNS HPF: ABNORMAL /HPF
WBC OTHER # BLD: 18.4 K/UL (ref 4.5–11.5)

## 2024-08-11 PROCEDURE — 96368 THER/DIAG CONCURRENT INF: CPT

## 2024-08-11 PROCEDURE — 85610 PROTHROMBIN TIME: CPT

## 2024-08-11 PROCEDURE — 73630 X-RAY EXAM OF FOOT: CPT

## 2024-08-11 PROCEDURE — 2580000003 HC RX 258: Performed by: INTERNAL MEDICINE

## 2024-08-11 PROCEDURE — 2580000003 HC RX 258: Performed by: EMERGENCY MEDICINE

## 2024-08-11 PROCEDURE — 84484 ASSAY OF TROPONIN QUANT: CPT

## 2024-08-11 PROCEDURE — 83880 ASSAY OF NATRIURETIC PEPTIDE: CPT

## 2024-08-11 PROCEDURE — 83605 ASSAY OF LACTIC ACID: CPT

## 2024-08-11 PROCEDURE — 36556 INSERT NON-TUNNEL CV CATH: CPT | Performed by: NURSE PRACTITIONER

## 2024-08-11 PROCEDURE — 74177 CT ABD & PELVIS W/CONTRAST: CPT

## 2024-08-11 PROCEDURE — 99291 CRITICAL CARE FIRST HOUR: CPT | Performed by: NURSE PRACTITIONER

## 2024-08-11 PROCEDURE — 99285 EMERGENCY DEPT VISIT HI MDM: CPT

## 2024-08-11 PROCEDURE — 93005 ELECTROCARDIOGRAM TRACING: CPT | Performed by: EMERGENCY MEDICINE

## 2024-08-11 PROCEDURE — P9047 ALBUMIN (HUMAN), 25%, 50ML: HCPCS | Performed by: NURSE PRACTITIONER

## 2024-08-11 PROCEDURE — 96376 TX/PRO/DX INJ SAME DRUG ADON: CPT

## 2024-08-11 PROCEDURE — 6360000002 HC RX W HCPCS: Performed by: EMERGENCY MEDICINE

## 2024-08-11 PROCEDURE — 82962 GLUCOSE BLOOD TEST: CPT

## 2024-08-11 PROCEDURE — 82330 ASSAY OF CALCIUM: CPT

## 2024-08-11 PROCEDURE — 36556 INSERT NON-TUNNEL CV CATH: CPT

## 2024-08-11 PROCEDURE — 6360000002 HC RX W HCPCS: Performed by: NURSE PRACTITIONER

## 2024-08-11 PROCEDURE — 82248 BILIRUBIN DIRECT: CPT

## 2024-08-11 PROCEDURE — 81001 URINALYSIS AUTO W/SCOPE: CPT

## 2024-08-11 PROCEDURE — 87081 CULTURE SCREEN ONLY: CPT

## 2024-08-11 PROCEDURE — 96367 TX/PROPH/DG ADDL SEQ IV INF: CPT

## 2024-08-11 PROCEDURE — 2500000003 HC RX 250 WO HCPCS: Performed by: NURSE PRACTITIONER

## 2024-08-11 PROCEDURE — 82140 ASSAY OF AMMONIA: CPT

## 2024-08-11 PROCEDURE — 70450 CT HEAD/BRAIN W/O DYE: CPT

## 2024-08-11 PROCEDURE — 83735 ASSAY OF MAGNESIUM: CPT

## 2024-08-11 PROCEDURE — 86140 C-REACTIVE PROTEIN: CPT

## 2024-08-11 PROCEDURE — 2500000003 HC RX 250 WO HCPCS: Performed by: EMERGENCY MEDICINE

## 2024-08-11 PROCEDURE — 71045 X-RAY EXAM CHEST 1 VIEW: CPT

## 2024-08-11 PROCEDURE — 96375 TX/PRO/DX INJ NEW DRUG ADDON: CPT

## 2024-08-11 PROCEDURE — 2500000003 HC RX 250 WO HCPCS: Performed by: HOSPITALIST

## 2024-08-11 PROCEDURE — 6360000004 HC RX CONTRAST MEDICATION: Performed by: RADIOLOGY

## 2024-08-11 PROCEDURE — 2580000003 HC RX 258: Performed by: HOSPITALIST

## 2024-08-11 PROCEDURE — 96365 THER/PROPH/DIAG IV INF INIT: CPT

## 2024-08-11 PROCEDURE — 83690 ASSAY OF LIPASE: CPT

## 2024-08-11 PROCEDURE — 2000000000 HC ICU R&B

## 2024-08-11 PROCEDURE — 82550 ASSAY OF CK (CPK): CPT

## 2024-08-11 PROCEDURE — 96366 THER/PROPH/DIAG IV INF ADDON: CPT

## 2024-08-11 PROCEDURE — 87086 URINE CULTURE/COLONY COUNT: CPT

## 2024-08-11 PROCEDURE — 2500000003 HC RX 250 WO HCPCS: Performed by: INTERNAL MEDICINE

## 2024-08-11 PROCEDURE — 6360000002 HC RX W HCPCS: Performed by: INTERNAL MEDICINE

## 2024-08-11 PROCEDURE — 85652 RBC SED RATE AUTOMATED: CPT

## 2024-08-11 RX ORDER — CALCIUM GLUCONATE 20 MG/ML
1000 INJECTION, SOLUTION INTRAVENOUS ONCE
Status: COMPLETED | OUTPATIENT
Start: 2024-08-11 | End: 2024-08-11

## 2024-08-11 RX ORDER — ENOXAPARIN SODIUM 100 MG/ML
40 INJECTION SUBCUTANEOUS DAILY
Status: DISCONTINUED | OUTPATIENT
Start: 2024-08-11 | End: 2024-08-11 | Stop reason: DRUGHIGH

## 2024-08-11 RX ORDER — ONDANSETRON 4 MG/1
4 TABLET, ORALLY DISINTEGRATING ORAL EVERY 8 HOURS PRN
Status: CANCELLED | OUTPATIENT
Start: 2024-08-11

## 2024-08-11 RX ORDER — SODIUM CHLORIDE 0.9 % (FLUSH) 0.9 %
5-40 SYRINGE (ML) INJECTION PRN
Status: CANCELLED | OUTPATIENT
Start: 2024-08-11

## 2024-08-11 RX ORDER — 0.9 % SODIUM CHLORIDE 0.9 %
1000 INTRAVENOUS SOLUTION INTRAVENOUS ONCE
Status: COMPLETED | OUTPATIENT
Start: 2024-08-11 | End: 2024-08-11

## 2024-08-11 RX ORDER — MAGNESIUM SULFATE IN WATER 40 MG/ML
2000 INJECTION, SOLUTION INTRAVENOUS ONCE
Status: COMPLETED | OUTPATIENT
Start: 2024-08-11 | End: 2024-08-11

## 2024-08-11 RX ORDER — ACETAMINOPHEN 325 MG/1
650 TABLET ORAL EVERY 6 HOURS PRN
Status: DISCONTINUED | OUTPATIENT
Start: 2024-08-11 | End: 2024-08-19 | Stop reason: HOSPADM

## 2024-08-11 RX ORDER — INSULIN LISPRO 100 [IU]/ML
0-4 INJECTION, SOLUTION INTRAVENOUS; SUBCUTANEOUS EVERY 6 HOURS
Status: DISCONTINUED | OUTPATIENT
Start: 2024-08-11 | End: 2024-08-12

## 2024-08-11 RX ORDER — ACETAMINOPHEN 325 MG/1
650 TABLET ORAL EVERY 6 HOURS PRN
Status: CANCELLED | OUTPATIENT
Start: 2024-08-11

## 2024-08-11 RX ORDER — ACETAMINOPHEN 650 MG/1
650 SUPPOSITORY RECTAL EVERY 6 HOURS PRN
Status: DISCONTINUED | OUTPATIENT
Start: 2024-08-11 | End: 2024-08-19 | Stop reason: HOSPADM

## 2024-08-11 RX ORDER — SODIUM CHLORIDE 0.9 % (FLUSH) 0.9 %
5-40 SYRINGE (ML) INJECTION EVERY 12 HOURS SCHEDULED
Status: DISCONTINUED | OUTPATIENT
Start: 2024-08-11 | End: 2024-08-19 | Stop reason: HOSPADM

## 2024-08-11 RX ORDER — ONDANSETRON 2 MG/ML
4 INJECTION INTRAMUSCULAR; INTRAVENOUS EVERY 6 HOURS PRN
Status: DISCONTINUED | OUTPATIENT
Start: 2024-08-11 | End: 2024-08-19 | Stop reason: HOSPADM

## 2024-08-11 RX ORDER — SODIUM CHLORIDE 9 MG/ML
INJECTION, SOLUTION INTRAVENOUS PRN
Status: DISCONTINUED | OUTPATIENT
Start: 2024-08-11 | End: 2024-08-19 | Stop reason: HOSPADM

## 2024-08-11 RX ORDER — ACETAMINOPHEN 650 MG/1
650 SUPPOSITORY RECTAL EVERY 6 HOURS PRN
Status: CANCELLED | OUTPATIENT
Start: 2024-08-11

## 2024-08-11 RX ORDER — ENOXAPARIN SODIUM 100 MG/ML
40 INJECTION SUBCUTANEOUS DAILY
Status: DISCONTINUED | OUTPATIENT
Start: 2024-08-12 | End: 2024-08-11

## 2024-08-11 RX ORDER — SODIUM CHLORIDE 9 MG/ML
INJECTION, SOLUTION INTRAVENOUS CONTINUOUS
Status: DISCONTINUED | OUTPATIENT
Start: 2024-08-11 | End: 2024-08-11

## 2024-08-11 RX ORDER — ONDANSETRON 2 MG/ML
4 INJECTION INTRAMUSCULAR; INTRAVENOUS EVERY 6 HOURS PRN
Status: CANCELLED | OUTPATIENT
Start: 2024-08-11

## 2024-08-11 RX ORDER — CALCIUM GLUCONATE 20 MG/ML
1000 INJECTION, SOLUTION INTRAVENOUS ONCE
Status: COMPLETED | OUTPATIENT
Start: 2024-08-11 | End: 2024-08-12

## 2024-08-11 RX ORDER — SODIUM CHLORIDE 9 MG/ML
INJECTION, SOLUTION INTRAVENOUS ONCE
Status: DISCONTINUED | OUTPATIENT
Start: 2024-08-11 | End: 2024-08-12

## 2024-08-11 RX ORDER — HEPARIN SODIUM 5000 [USP'U]/ML
5000 INJECTION, SOLUTION INTRAVENOUS; SUBCUTANEOUS EVERY 8 HOURS SCHEDULED
Status: DISCONTINUED | OUTPATIENT
Start: 2024-08-11 | End: 2024-08-19 | Stop reason: HOSPADM

## 2024-08-11 RX ORDER — SODIUM CHLORIDE 9 MG/ML
INJECTION, SOLUTION INTRAVENOUS PRN
Status: CANCELLED | OUTPATIENT
Start: 2024-08-11

## 2024-08-11 RX ORDER — ONDANSETRON 2 MG/ML
4 INJECTION INTRAMUSCULAR; INTRAVENOUS ONCE
Status: COMPLETED | OUTPATIENT
Start: 2024-08-11 | End: 2024-08-11

## 2024-08-11 RX ORDER — SODIUM CHLORIDE 0.9 % (FLUSH) 0.9 %
5-40 SYRINGE (ML) INJECTION EVERY 12 HOURS SCHEDULED
Status: CANCELLED | OUTPATIENT
Start: 2024-08-11

## 2024-08-11 RX ORDER — POLYETHYLENE GLYCOL 3350 17 G/17G
17 POWDER, FOR SOLUTION ORAL DAILY PRN
Status: DISCONTINUED | OUTPATIENT
Start: 2024-08-11 | End: 2024-08-19 | Stop reason: HOSPADM

## 2024-08-11 RX ORDER — SODIUM CHLORIDE 0.9 % (FLUSH) 0.9 %
5-40 SYRINGE (ML) INJECTION PRN
Status: DISCONTINUED | OUTPATIENT
Start: 2024-08-11 | End: 2024-08-19 | Stop reason: HOSPADM

## 2024-08-11 RX ORDER — ALBUMIN (HUMAN) 12.5 G/50ML
25 SOLUTION INTRAVENOUS ONCE
Status: COMPLETED | OUTPATIENT
Start: 2024-08-11 | End: 2024-08-12

## 2024-08-11 RX ADMIN — HYDROMORPHONE HYDROCHLORIDE 0.5 MG: 1 INJECTION, SOLUTION INTRAMUSCULAR; INTRAVENOUS; SUBCUTANEOUS at 21:29

## 2024-08-11 RX ADMIN — SODIUM CHLORIDE 5 MCG/MIN: 9 INJECTION, SOLUTION INTRAVENOUS at 13:56

## 2024-08-11 RX ADMIN — ONDANSETRON 4 MG: 2 INJECTION INTRAMUSCULAR; INTRAVENOUS at 15:01

## 2024-08-11 RX ADMIN — VANCOMYCIN HYDROCHLORIDE 2000 MG: 5 INJECTION, POWDER, LYOPHILIZED, FOR SOLUTION INTRAVENOUS at 14:48

## 2024-08-11 RX ADMIN — IOPAMIDOL 75 ML: 755 INJECTION, SOLUTION INTRAVENOUS at 16:25

## 2024-08-11 RX ADMIN — SODIUM BICARBONATE: 84 INJECTION, SOLUTION INTRAVENOUS at 15:15

## 2024-08-11 RX ADMIN — CALCIUM GLUCONATE 1000 MG: 20 INJECTION, SOLUTION INTRAVENOUS at 23:06

## 2024-08-11 RX ADMIN — SODIUM BICARBONATE: 84 INJECTION, SOLUTION INTRAVENOUS at 22:48

## 2024-08-11 RX ADMIN — PIPERACILLIN AND TAZOBACTAM 4500 MG: 4; .5 INJECTION, POWDER, LYOPHILIZED, FOR SOLUTION INTRAVENOUS at 14:03

## 2024-08-11 RX ADMIN — ALBUMIN (HUMAN) 25 G: 0.25 INJECTION, SOLUTION INTRAVENOUS at 23:10

## 2024-08-11 RX ADMIN — SODIUM CHLORIDE 9 MCG/MIN: 9 INJECTION, SOLUTION INTRAVENOUS at 14:33

## 2024-08-11 RX ADMIN — MAGNESIUM SULFATE HEPTAHYDRATE 2000 MG: 40 INJECTION, SOLUTION INTRAVENOUS at 15:16

## 2024-08-11 RX ADMIN — SODIUM CHLORIDE 1000 ML: 9 INJECTION, SOLUTION INTRAVENOUS at 13:48

## 2024-08-11 RX ADMIN — CALCIUM GLUCONATE 1000 MG: 20 INJECTION, SOLUTION INTRAVENOUS at 15:17

## 2024-08-11 RX ADMIN — HEPARIN SODIUM 5000 UNITS: 5000 INJECTION INTRAVENOUS; SUBCUTANEOUS at 23:11

## 2024-08-11 ASSESSMENT — PAIN SCALES - GENERAL
PAINLEVEL_OUTOF10: 4
PAINLEVEL_OUTOF10: 10
PAINLEVEL_OUTOF10: 6

## 2024-08-11 ASSESSMENT — PAIN DESCRIPTION - PAIN TYPE: TYPE: ACUTE PAIN

## 2024-08-11 ASSESSMENT — PAIN DESCRIPTION - LOCATION
LOCATION: GROIN
LOCATION: BUTTOCKS

## 2024-08-11 ASSESSMENT — PAIN DESCRIPTION - ORIENTATION: ORIENTATION: RIGHT

## 2024-08-11 ASSESSMENT — LIFESTYLE VARIABLES
HOW MANY STANDARD DRINKS CONTAINING ALCOHOL DO YOU HAVE ON A TYPICAL DAY: PATIENT DOES NOT DRINK
HOW OFTEN DO YOU HAVE A DRINK CONTAINING ALCOHOL: NEVER

## 2024-08-11 ASSESSMENT — PAIN DESCRIPTION - FREQUENCY: FREQUENCY: CONTINUOUS

## 2024-08-11 ASSESSMENT — PAIN - FUNCTIONAL ASSESSMENT: PAIN_FUNCTIONAL_ASSESSMENT: 0-10

## 2024-08-11 ASSESSMENT — PAIN DESCRIPTION - ONSET: ONSET: ON-GOING

## 2024-08-11 NOTE — CARE COORDINATION
Internal Medicine On-call Care Coordination Note     I was called by the ED physician because they recommended admission for this patient and we cover their PCP.  The history as I understand it after discussion with the ED physician is as follows:     Admit for acute on chronic kidney disease  History of foreign years gangrene status post debridement     I placed admission orders.  Including:     Continue to monitor closely in ICU  Plan to initiate dialysis tomorrow hopefully  Nephrology consulted  [facility] at [facility] for worsening renal function and swelling     Dr. Azar or his coverage will see the patient tomorrow for H&P and review of all orders.     Electronically signed by Leander Monahan MD on 8/11/2024 at 6:56 PM

## 2024-08-11 NOTE — ED NOTES
N2n GIVEN TO Brook AT EXT [id number]. She stated that they will be  going into report, we will have to bring patient up.

## 2024-08-11 NOTE — ED PROVIDER NOTES
[facility] 2W ICU  EMERGENCY DEPARTMENT ENCOUNTER        Pt Name: David Wetzel  MRN: [id number]  Birthdate 1985  Date of evaluation: 8/11/2024  Provider: Trudi Marino DO  PCP: No primary care provider on file.  Note Started: 1:23 PM EDT 8/11/24    CHIEF COMPLAINT       Chief Complaint   Patient presents with    hypotension       HISTORY OF PRESENT ILLNESS: 1 or more Elements   History From: Patient and EMS    Limitations to history : None    David Wetzel is a 39 y.o. male who presents with concern for hypotension and altered mental status.  Patient says that he was admitted to the hospital for debridement of groin wounds, he has been residing in a long-term care facility since then.  The facility notes they have been closely following his kidney function, he received 4 L of fluid prior to his arrival today as he had been persistently hypotensive and they were unable to get his blood pressure up.  He was also febrile to 101.  He is not having chest pain or abdominal pain, nausea, vomiting, diarrhea, he notes that his groin is sore otherwise has not been coughing or having any other associated complaints.  He does have a Hernandez catheter and a fecal unit in place.      EXTERNAL NOTE REVIEW:      On chart review was last admitted to the hospital 8/8/2024 with nec Fasc.  Has not been on antibiotics.    REVIEW OF SYSTEMS :      Positives and Pertinent negatives as per HPI.     SURGICAL HISTORY     Past Surgical History:   Procedure Laterality Date    BRONCHOSCOPY  04/18/2017    OTHER SURGICAL HISTORY Left 04/11/2017    direct excision of medial thigh lymphedema w/Dr Hutchinson, Disection of lymph node w/Dr Henley ,and control of bleeding w/dr DOWD    OTHER SURGICAL HISTORY Bilateral 08/22/2017    split thickness skin graft to l thigh, Right thigh shin graft site       CURRENTMEDICATIONS       Current Discharge Medication List        CONTINUE these medications which have NOT CHANGED    Details   bacitracin 500 UNIT/GM

## 2024-08-12 LAB
25(OH)D3 SERPL-MCNC: <6 NG/ML (ref 30–100)
ALBUMIN SERPL-MCNC: 2 G/DL (ref 3.5–5.2)
ALP SERPL-CCNC: 90 U/L (ref 40–129)
ALT SERPL-CCNC: 17 U/L (ref 0–40)
ANION GAP SERPL CALCULATED.3IONS-SCNC: 10 MMOL/L (ref 7–16)
ANION GAP SERPL CALCULATED.3IONS-SCNC: 11 MMOL/L (ref 7–16)
ANION GAP SERPL CALCULATED.3IONS-SCNC: 12 MMOL/L (ref 7–16)
ANION GAP SERPL CALCULATED.3IONS-SCNC: 13 MMOL/L (ref 7–16)
ANION GAP SERPL CALCULATED.3IONS-SCNC: 13 MMOL/L (ref 7–16)
ANION GAP SERPL CALCULATED.3IONS-SCNC: 14 MMOL/L (ref 7–16)
AST SERPL-CCNC: 51 U/L (ref 0–39)
B.E.: -4.1 MMOL/L (ref -3–3)
BASOPHILS # BLD: 0 K/UL (ref 0–0.2)
BASOPHILS NFR BLD: 0 % (ref 0–2)
BILIRUB DIRECT SERPL-MCNC: 0.3 MG/DL (ref 0–0.3)
BILIRUB INDIRECT SERPL-MCNC: 0.1 MG/DL (ref 0–1)
BILIRUB SERPL-MCNC: 0.4 MG/DL (ref 0–1.2)
BUN SERPL-MCNC: 38 MG/DL (ref 6–20)
BUN SERPL-MCNC: 39 MG/DL (ref 6–20)
BUN SERPL-MCNC: 40 MG/DL (ref 6–20)
CA-I BLD-SCNC: 0.94 MMOL/L (ref 1.15–1.33)
CALCIUM SERPL-MCNC: 5.7 MG/DL (ref 8.6–10.2)
CALCIUM SERPL-MCNC: 6.1 MG/DL (ref 8.6–10.2)
CALCIUM SERPL-MCNC: 6.2 MG/DL (ref 8.6–10.2)
CALCIUM SERPL-MCNC: 6.4 MG/DL (ref 8.6–10.2)
CALCIUM SERPL-MCNC: 6.4 MG/DL (ref 8.6–10.2)
CALCIUM SERPL-MCNC: 6.5 MG/DL (ref 8.6–10.2)
CHLORIDE SERPL-SCNC: 103 MMOL/L (ref 98–107)
CHLORIDE SERPL-SCNC: 105 MMOL/L (ref 98–107)
CHLORIDE SERPL-SCNC: 106 MMOL/L (ref 98–107)
CHLORIDE SERPL-SCNC: 106 MMOL/L (ref 98–107)
CHLORIDE SERPL-SCNC: 108 MMOL/L (ref 98–107)
CHLORIDE SERPL-SCNC: 108 MMOL/L (ref 98–107)
CHLORIDE UR-SCNC: 33 MMOL/L
CO2 SERPL-SCNC: 17 MMOL/L (ref 22–29)
CO2 SERPL-SCNC: 18 MMOL/L (ref 22–29)
CO2 SERPL-SCNC: 19 MMOL/L (ref 22–29)
CO2 SERPL-SCNC: 20 MMOL/L (ref 22–29)
CO2 SERPL-SCNC: 21 MMOL/L (ref 22–29)
CO2 SERPL-SCNC: 22 MMOL/L (ref 22–29)
COHB: 0.5 % (ref 0–1.5)
COMMENT: ABNORMAL
CORTIS SERPL-MCNC: 12.3 UG/DL (ref 2.7–18.4)
CORTISOL COLLECTION INFO: NORMAL
CREAT SERPL-MCNC: 6.9 MG/DL (ref 0.7–1.2)
CREAT SERPL-MCNC: 7 MG/DL (ref 0.7–1.2)
CREAT SERPL-MCNC: 7 MG/DL (ref 0.7–1.2)
CREAT SERPL-MCNC: 7.1 MG/DL (ref 0.7–1.2)
CREAT UR-MCNC: 29.2 MG/DL (ref 40–278)
CRITICAL: ABNORMAL
CRP SERPL HS-MCNC: 341 MG/L (ref 0–5)
DATE ANALYZED: ABNORMAL
DATE OF COLLECTION: ABNORMAL
EKG ATRIAL RATE: 88 BPM
EKG P AXIS: 60 DEGREES
EKG P-R INTERVAL: 148 MS
EKG Q-T INTERVAL: 400 MS
EKG QRS DURATION: 82 MS
EKG QTC CALCULATION (BAZETT): 484 MS
EKG R AXIS: 69 DEGREES
EKG T AXIS: 62 DEGREES
EKG VENTRICULAR RATE: 88 BPM
EOSINOPHIL # BLD: 0 K/UL (ref 0.05–0.5)
EOSINOPHILS RELATIVE PERCENT: 0 % (ref 0–6)
ERYTHROCYTE [DISTWIDTH] IN BLOOD BY AUTOMATED COUNT: 17.4 % (ref 11.5–15)
ERYTHROCYTE [SEDIMENTATION RATE] IN BLOOD BY WESTERGREN METHOD: 54 MM/HR (ref 0–15)
FERRITIN SERPL-MCNC: 1044 NG/ML
FIO2: 30 %
FOLATE SERPL-MCNC: 6.8 NG/ML (ref 4.8–24.2)
GFR, ESTIMATED: 10 ML/MIN/1.73M2
GFR, ESTIMATED: 9 ML/MIN/1.73M2
GLUCOSE BLD-MCNC: 118 MG/DL (ref 74–99)
GLUCOSE BLD-MCNC: 149 MG/DL (ref 74–99)
GLUCOSE BLD-MCNC: 171 MG/DL (ref 74–99)
GLUCOSE SERPL-MCNC: 117 MG/DL (ref 74–99)
GLUCOSE SERPL-MCNC: 144 MG/DL (ref 74–99)
GLUCOSE SERPL-MCNC: 149 MG/DL (ref 74–99)
GLUCOSE SERPL-MCNC: 163 MG/DL (ref 74–99)
GLUCOSE SERPL-MCNC: 163 MG/DL (ref 74–99)
GLUCOSE SERPL-MCNC: 167 MG/DL (ref 74–99)
HCO3: 21.4 MMOL/L (ref 22–26)
HCT VFR BLD AUTO: 23.7 % (ref 37–54)
HGB BLD-MCNC: 7.4 G/DL (ref 12.5–16.5)
HHB: 1.8 % (ref 0–5)
IRON SATN MFR SERPL: 48 % (ref 20–55)
IRON SERPL-MCNC: 36 UG/DL (ref 59–158)
LAB: ABNORMAL
LACTATE BLDV-SCNC: 1.1 MMOL/L (ref 0.5–2.2)
LYMPHOCYTES NFR BLD: 0.59 K/UL (ref 1.5–4)
LYMPHOCYTES RELATIVE PERCENT: 4 % (ref 20–42)
Lab: 436
MAGNESIUM SERPL-MCNC: 1.9 MG/DL (ref 1.6–2.6)
MCH RBC QN AUTO: 28.9 PG (ref 26–35)
MCHC RBC AUTO-ENTMCNC: 31.2 G/DL (ref 32–34.5)
MCV RBC AUTO: 92.6 FL (ref 80–99.9)
METHB: 0.3 % (ref 0–1.5)
MODE: AC
MONOCYTES NFR BLD: 1.19 K/UL (ref 0.1–0.95)
MONOCYTES NFR BLD: 7 % (ref 2–12)
NEUTROPHILS NFR BLD: 90 % (ref 43–80)
NEUTS SEG NFR BLD: 15.32 K/UL (ref 1.8–7.3)
O2 CONTENT: 12.8 ML/DL
O2 SATURATION: 98.2 % (ref 92–98.5)
O2HB: 97.4 % (ref 94–97)
OPERATOR ID: 5133
PATIENT TEMP: 37 C
PCO2: 41 MMHG (ref 35–45)
PEEP/CPAP: 5 CMH2O
PFO2: 4.17 MMHG/%
PH BLOOD GAS: 7.34 (ref 7.35–7.45)
PHOSPHATE SERPL-MCNC: 7.4 MG/DL (ref 2.5–4.5)
PLATELET # BLD AUTO: 197 K/UL (ref 130–450)
PMV BLD AUTO: 9.6 FL (ref 7–12)
PO2: 125.1 MMHG (ref 75–100)
POTASSIUM SERPL-SCNC: 3.4 MMOL/L (ref 3.5–5)
POTASSIUM SERPL-SCNC: 3.5 MMOL/L (ref 3.5–5)
POTASSIUM SERPL-SCNC: 3.6 MMOL/L (ref 3.5–5)
POTASSIUM SERPL-SCNC: 3.8 MMOL/L (ref 3.5–5)
POTASSIUM SERPL-SCNC: 3.9 MMOL/L (ref 3.5–5)
POTASSIUM SERPL-SCNC: 4.3 MMOL/L (ref 3.5–5)
PROT SERPL-MCNC: 6.6 G/DL (ref 6.4–8.3)
PTH-INTACT SERPL-MCNC: 262.2 PG/ML (ref 15–65)
RBC # BLD AUTO: 2.56 M/UL (ref 3.8–5.8)
RBC # BLD: ABNORMAL 10*6/UL
RBC # BLD: ABNORMAL 10*6/UL
RI(T): 0.32
RR MECHANICAL: 16 B/MIN
SODIUM SERPL-SCNC: 136 MMOL/L (ref 132–146)
SODIUM SERPL-SCNC: 137 MMOL/L (ref 132–146)
SODIUM SERPL-SCNC: 138 MMOL/L (ref 132–146)
SODIUM SERPL-SCNC: 139 MMOL/L (ref 132–146)
SODIUM UR-SCNC: 38 MMOL/L
SOURCE, BLOOD GAS: ABNORMAL
THB: 9.2 G/DL (ref 11.5–16.5)
TIBC SERPL-MCNC: 75 UG/DL (ref 250–450)
TIME ANALYZED: 449
UUN UR-MCNC: 99 MG/DL (ref 800–1666)
VANCOMYCIN SERPL-MCNC: 21.8 UG/ML (ref 5–40)
VIT B12 SERPL-MCNC: 1017 PG/ML (ref 211–946)
VT MECHANICAL: 500 ML
WBC OTHER # BLD: 17.1 K/UL (ref 4.5–11.5)

## 2024-08-12 PROCEDURE — 80202 ASSAY OF VANCOMYCIN: CPT

## 2024-08-12 PROCEDURE — 6360000002 HC RX W HCPCS: Performed by: NURSE PRACTITIONER

## 2024-08-12 PROCEDURE — 2500000003 HC RX 250 WO HCPCS: Performed by: STUDENT IN AN ORGANIZED HEALTH CARE EDUCATION/TRAINING PROGRAM

## 2024-08-12 PROCEDURE — 82248 BILIRUBIN DIRECT: CPT

## 2024-08-12 PROCEDURE — 84100 ASSAY OF PHOSPHORUS: CPT

## 2024-08-12 PROCEDURE — 99223 1ST HOSP IP/OBS HIGH 75: CPT | Performed by: INTERNAL MEDICINE

## 2024-08-12 PROCEDURE — 83540 ASSAY OF IRON: CPT

## 2024-08-12 PROCEDURE — 2580000003 HC RX 258: Performed by: STUDENT IN AN ORGANIZED HEALTH CARE EDUCATION/TRAINING PROGRAM

## 2024-08-12 PROCEDURE — 82805 BLOOD GASES W/O2 SATURATION: CPT

## 2024-08-12 PROCEDURE — 82962 GLUCOSE BLOOD TEST: CPT

## 2024-08-12 PROCEDURE — 2500000003 HC RX 250 WO HCPCS: Performed by: INTERNAL MEDICINE

## 2024-08-12 PROCEDURE — 82728 ASSAY OF FERRITIN: CPT

## 2024-08-12 PROCEDURE — 05HY33Z INSERTION OF INFUSION DEVICE INTO UPPER VEIN, PERCUTANEOUS APPROACH: ICD-10-PCS | Performed by: STUDENT IN AN ORGANIZED HEALTH CARE EDUCATION/TRAINING PROGRAM

## 2024-08-12 PROCEDURE — 6360000002 HC RX W HCPCS: Performed by: HOSPITALIST

## 2024-08-12 PROCEDURE — 84165 PROTEIN E-PHORESIS SERUM: CPT

## 2024-08-12 PROCEDURE — 83735 ASSAY OF MAGNESIUM: CPT

## 2024-08-12 PROCEDURE — 82306 VITAMIN D 25 HYDROXY: CPT

## 2024-08-12 PROCEDURE — 2500000003 HC RX 250 WO HCPCS: Performed by: HOSPITALIST

## 2024-08-12 PROCEDURE — 82436 ASSAY OF URINE CHLORIDE: CPT

## 2024-08-12 PROCEDURE — 87449 NOS EACH ORGANISM AG IA: CPT

## 2024-08-12 PROCEDURE — 83970 ASSAY OF PARATHORMONE: CPT

## 2024-08-12 PROCEDURE — 93010 ELECTROCARDIOGRAM REPORT: CPT | Performed by: INTERNAL MEDICINE

## 2024-08-12 PROCEDURE — 85025 COMPLETE CBC W/AUTO DIFF WBC: CPT

## 2024-08-12 PROCEDURE — 82607 VITAMIN B-12: CPT

## 2024-08-12 PROCEDURE — 82746 ASSAY OF FOLIC ACID SERUM: CPT

## 2024-08-12 PROCEDURE — 83550 IRON BINDING TEST: CPT

## 2024-08-12 PROCEDURE — 80053 COMPREHEN METABOLIC PANEL: CPT

## 2024-08-12 PROCEDURE — 83605 ASSAY OF LACTIC ACID: CPT

## 2024-08-12 PROCEDURE — 2580000003 HC RX 258: Performed by: NURSE PRACTITIONER

## 2024-08-12 PROCEDURE — 2580000003 HC RX 258: Performed by: HOSPITALIST

## 2024-08-12 PROCEDURE — 6370000000 HC RX 637 (ALT 250 FOR IP): Performed by: INTERNAL MEDICINE

## 2024-08-12 PROCEDURE — 6360000002 HC RX W HCPCS: Performed by: INTERNAL MEDICINE

## 2024-08-12 PROCEDURE — 6360000002 HC RX W HCPCS: Performed by: STUDENT IN AN ORGANIZED HEALTH CARE EDUCATION/TRAINING PROGRAM

## 2024-08-12 PROCEDURE — 80048 BASIC METABOLIC PNL TOTAL CA: CPT

## 2024-08-12 PROCEDURE — 82330 ASSAY OF CALCIUM: CPT

## 2024-08-12 PROCEDURE — 2000000000 HC ICU R&B

## 2024-08-12 PROCEDURE — 82533 TOTAL CORTISOL: CPT

## 2024-08-12 PROCEDURE — 84300 ASSAY OF URINE SODIUM: CPT

## 2024-08-12 PROCEDURE — 82570 ASSAY OF URINE CREATININE: CPT

## 2024-08-12 PROCEDURE — 84540 ASSAY OF URINE/UREA-N: CPT

## 2024-08-12 PROCEDURE — 84155 ASSAY OF PROTEIN SERUM: CPT

## 2024-08-12 RX ORDER — CALCIUM GLUCONATE 20 MG/ML
2000 INJECTION, SOLUTION INTRAVENOUS ONCE
Status: COMPLETED | OUTPATIENT
Start: 2024-08-12 | End: 2024-08-12

## 2024-08-12 RX ORDER — INSULIN LISPRO 100 [IU]/ML
0-4 INJECTION, SOLUTION INTRAVENOUS; SUBCUTANEOUS
Status: DISCONTINUED | OUTPATIENT
Start: 2024-08-12 | End: 2024-08-19 | Stop reason: HOSPADM

## 2024-08-12 RX ORDER — SEVELAMER CARBONATE 800 MG/1
800 TABLET, FILM COATED ORAL
Status: DISCONTINUED | OUTPATIENT
Start: 2024-08-12 | End: 2024-08-16

## 2024-08-12 RX ORDER — POTASSIUM CHLORIDE 29.8 MG/ML
20 INJECTION INTRAVENOUS ONCE
Status: COMPLETED | OUTPATIENT
Start: 2024-08-12 | End: 2024-08-12

## 2024-08-12 RX ADMIN — ONDANSETRON 4 MG: 2 INJECTION INTRAMUSCULAR; INTRAVENOUS at 17:15

## 2024-08-12 RX ADMIN — CALCIUM GLUCONATE 2000 MG: 20 INJECTION, SOLUTION INTRAVENOUS at 06:24

## 2024-08-12 RX ADMIN — SODIUM BICARBONATE: 84 INJECTION, SOLUTION INTRAVENOUS at 19:34

## 2024-08-12 RX ADMIN — SODIUM CHLORIDE 2 MCG/MIN: 9 INJECTION, SOLUTION INTRAVENOUS at 13:49

## 2024-08-12 RX ADMIN — HEPARIN SODIUM 5000 UNITS: 5000 INJECTION INTRAVENOUS; SUBCUTANEOUS at 14:03

## 2024-08-12 RX ADMIN — PANTOPRAZOLE SODIUM 40 MG: 40 INJECTION, POWDER, FOR SOLUTION INTRAVENOUS at 07:52

## 2024-08-12 RX ADMIN — SEVELAMER CARBONATE 800 MG: 800 TABLET, FILM COATED ORAL at 17:11

## 2024-08-12 RX ADMIN — CALCIUM GLUCONATE 2000 MG: 20 INJECTION, SOLUTION INTRAVENOUS at 02:12

## 2024-08-12 RX ADMIN — CALCIUM GLUCONATE 2000 MG: 20 INJECTION, SOLUTION INTRAVENOUS at 14:54

## 2024-08-12 RX ADMIN — MEROPENEM 1000 MG: 1 INJECTION INTRAVENOUS at 13:09

## 2024-08-12 RX ADMIN — MICAFUNGIN SODIUM 100 MG: 100 INJECTION, POWDER, LYOPHILIZED, FOR SOLUTION INTRAVENOUS at 13:15

## 2024-08-12 RX ADMIN — SODIUM BICARBONATE: 84 INJECTION, SOLUTION INTRAVENOUS at 12:55

## 2024-08-12 RX ADMIN — POTASSIUM CHLORIDE 20 MEQ: 29.8 INJECTION, SOLUTION INTRAVENOUS at 17:40

## 2024-08-12 RX ADMIN — PIPERACILLIN AND TAZOBACTAM 4500 MG: 4; .5 INJECTION, POWDER, LYOPHILIZED, FOR SOLUTION INTRAVENOUS at 00:57

## 2024-08-12 RX ADMIN — SODIUM CHLORIDE, PRESERVATIVE FREE 10 ML: 5 INJECTION INTRAVENOUS at 07:52

## 2024-08-12 ASSESSMENT — PAIN SCALES - GENERAL
PAINLEVEL_OUTOF10: 0
PAINLEVEL_OUTOF10: 0
PAINLEVEL_OUTOF10: 1
PAINLEVEL_OUTOF10: 0
PAINLEVEL_OUTOF10: 0

## 2024-08-12 NOTE — H&P
infection.  Low lung volumes with central and bibasilar subsegmental atelectasis. Cardiomegaly.  Left IJ venous catheter tip near the medial left clavicle, probably in the jugular vein.  No visible pneumothorax.     1. Left IJ venous catheter tip near the medial left clavicle, probably in the jugular vein. No visible pneumothorax. 2. Bronchial wall thickening and interstitial coarsening. Some of this is probably chronic or related to subsegmental atelectasis, though there could be a superimposed component of edema, bronchitis or atypical infection.     CT ABDOMEN PELVIS W IV CONTRAST Additional Contrast? None    Result Date: 8/11/2024  EXAMINATION: CT OF THE ABDOMEN AND PELVIS WITH CONTRAST 8/11/2024 4:22 pm TECHNIQUE: CT of the abdomen and pelvis was performed with the administration of intravenous contrast. Multiplanar reformatted images are provided for review. Automated exposure control, iterative reconstruction, and/or weight based adjustment of the mA/kV was utilized to reduce the radiation dose to as low as reasonably achievable. COMPARISON: None. HISTORY: ORDERING SYSTEM PROVIDED HISTORY: evaluate wounds perineum TECHNOLOGIST PROVIDED HISTORY: Additional Contrast?->None Reason for exam:->evaluate wounds perineum Decision Support Exception - unselect if not a suspected or confirmed emergency medical condition->Emergency Medical Condition (MA) FINDINGS: Nonspecific body wall edema.  Small fat filled inguinal hernias.  Soft tissue defects in the posterior perineal area.  Gynecomastia.  Borderline cardiomegaly.  Bibasilar subsegmental atelectasis.  No pneumoperitoneum. Tiny fat filled umbilical hernia.  Fatty liver.  No radiopaque gallstone. Unremarkable pancreas, adrenal glands, kidneys.  Nonobstructive bowel gas pattern.  Normal appendix.  Rectal tube present.  Hernandez catheter present. Nonaneurysmal aorta.  No significant ascites or bulky lymphadenopathy.  No acute osseous findings or destructive bone lesions.

## 2024-08-12 NOTE — INTERDISCIPLINARY ROUNDS
Intensive Care Daily Quality Rounding Checklist      ICU Team Members: Dr. Wiggins, Resident Dr. Lloyd, Bedside nurse, Charge nurse, Clinical pharmacist, Respiratory therapist.     ICU Day #: 2    SOFA Score: 6    Intubation Date: N/A    Ventilator Day #: N/A    Central Line Insertion Date: 8/11/2024        Day #: 2        Indication: CVCIndication: Hemodynamically unstable requiring volume resuscitation     Arterial Line Insertion Date: N/A      Day #: N/A    Temporary Hemodialysis Catheter Insertion Date: N/A      Day # N/A    DVT Prophylaxis: heparin sub q     GI Prophylaxis:  Protonix    Hernandez Catheter Insertion Date: N/A (chronic suprapubic; present on admission)        Day #:  PTA      Indications: Assist in healing of open sacral or perineal wounds (Stage III, IV or unstageable)      Continued need (if yes, reason documented and discussed with physician): yes, multiple wounds     Skin Issues/ Wounds and ordered treatment discussed on rounds: Yes. Wound care consulted.     Goals/ Plans for the Day:  Monitor labs and replace/treat as indicated. Nephrology to follow BMP's.  Wound care to see patient today.  Wean off pressors as tolerated by patient.  Control pain.  ID to manage antibiotics.  Renal to manage CONSTANTIN.    Reviewed plan and goals for day with patient and/or representative:  Will call

## 2024-08-12 NOTE — PROCEDURES
PROCEDURE NOTE  Date: 8/11/2024   Name: David Wetzel  YOB: 1985    CENTRAL LINE    Date/Time: 8/11/2024 9:02 PM    Performed by: Nick Foy APRN - CNP  Authorized by: Nick Foy APRN - CNP  Consent: Verbal consent obtained.  Risks and benefits: risks, benefits and alternatives were discussed  Consent given by: patient  Patient understanding: patient states understanding of the procedure being performed  Patient consent: the patient's understanding of the procedure matches consent given  Procedure consent: procedure consent matches procedure scheduled  Relevant documents: relevant documents present and verified  Test results: test results available and properly labeled  Site marked: the operative site was marked  Imaging studies: imaging studies available  Required items: required blood products, implants, devices, and special equipment available  Patient identity confirmed: verbally with patient, arm band, provided demographic data and hospital-assigned identification number  Time out: Immediately prior to procedure a \"time out\" was called to verify the correct patient, procedure, equipment, support staff and site/side marked as required.  Indications: vascular access  Anesthesia method: Line exchange - no anesthetic.    Sedation:  Patient sedated: no    Skin prep agent dried: skin prep agent completely dried prior to procedure (Existing CVC thoughly prepped and cleansed with 6 chloraprep sticks then 2 chloraprep wands in package. Sterile gloves exchanged x 2 during exhcange.)  Sterile barriers: all five maximum sterile barriers used - cap, mask, sterile gown, sterile gloves, and large sterile sheet  Hand hygiene: hand hygiene performed prior to central venous catheter insertion  Location details: left internal jugular  Site selection rationale: Line exchange  Patient position: flat  Catheter type: triple lumen  Ultrasound guidance: no  Number of attempts: 1  Successful placement:

## 2024-08-12 NOTE — DISCHARGE INSTR - COC
Continuity of Care Form    Patient Name: David Wetzel   :  1985  MRN:  [id number]    Admit date:  2024  Discharge date:  24    Code Status Order: Full Code   Advance Directives:   Advance Care Flowsheet Documentation             Admitting Physician:  Patricio Burch MD  PCP: No primary care provider on file.    Discharging Nurse: MIKE  Discharging Hospital Unit/Room#: 0210/0210-A  Discharging Unit Phone Number: [phone]    Emergency Contact:   Extended Emergency Contact Information  Primary Emergency Contact: Jeffrey Hatfield  Address: [address] [place] [address] [id number] [place] [address]  Home Phone: [phone]  Relation: Other  Secondary Emergency Contact: Britt Mendoza   [facility]  Home Phone: [phone]  Relation: Other    Past Surgical History:  Past Surgical History:   Procedure Laterality Date    BRONCHOSCOPY  2017    OTHER SURGICAL HISTORY Left 2017    direct excision of medial thigh lymphedema w/Dr Hutchinson, Disection of lymph node w/Dr Henley ,and control of bleeding w/dr DOWD    OTHER SURGICAL HISTORY Bilateral 2017    split thickness skin graft to l thigh, Right thigh shin graft site       Immunization History:   Immunization History   Administered Date(s) Administered    Influenza, FLUARIX, FLULAVAL, FLUZONE (age 6 mo+) AND AFLURIA, (age 3 y+), PF, 0.5mL 10/20/2016    Pneumococcal, PPSV23, PNEUMOVAX 23, (age 2y+), SC/IM, 0.5mL 2015       Active Problems:  Patient Active Problem List   Diagnosis Code    Severe sepsis ([facility]) A41.9, R65.20    Lactic acidosis E87.20    Morbid obesity ([facility]) E66.01    Lymphedema I89.0    Impaired mobility and activities of daily living Z74.09, Z78.9    Bronchitis J40    Bleeding R58    Mass of left thigh R22.42    Leg wound, left S81.802A    Septic shock ([facility]) A41.9, R65.21       Isolation/Infection:   Isolation            No Isolation          Patient Infection Status       None to display

## 2024-08-13 LAB
ALBUMIN SERPL-MCNC: 2.1 G/DL (ref 3.5–5.2)
ALP SERPL-CCNC: 86 U/L (ref 40–129)
ALT SERPL-CCNC: 17 U/L (ref 0–40)
ANION GAP SERPL CALCULATED.3IONS-SCNC: 12 MMOL/L (ref 7–16)
ANION GAP SERPL CALCULATED.3IONS-SCNC: 12 MMOL/L (ref 7–16)
ANION GAP SERPL CALCULATED.3IONS-SCNC: 14 MMOL/L (ref 7–16)
AST SERPL-CCNC: 41 U/L (ref 0–39)
BASOPHILS # BLD: 0.04 K/UL (ref 0–0.2)
BASOPHILS NFR BLD: 0 % (ref 0–2)
BILIRUB DIRECT SERPL-MCNC: <0.2 MG/DL (ref 0–0.3)
BILIRUB SERPL-MCNC: 0.3 MG/DL (ref 0–1.2)
BUN SERPL-MCNC: 37 MG/DL (ref 6–20)
BUN SERPL-MCNC: 38 MG/DL (ref 6–20)
BUN SERPL-MCNC: 39 MG/DL (ref 6–20)
CA-I BLD-SCNC: 0.93 MMOL/L (ref 1.15–1.33)
CA-I BLD-SCNC: 0.93 MMOL/L (ref 1.15–1.33)
CALCIUM SERPL-MCNC: 6.2 MG/DL (ref 8.6–10.2)
CALCIUM SERPL-MCNC: 6.2 MG/DL (ref 8.6–10.2)
CALCIUM SERPL-MCNC: 6.6 MG/DL (ref 8.6–10.2)
CHLORIDE SERPL-SCNC: 100 MMOL/L (ref 98–107)
CHLORIDE SERPL-SCNC: 103 MMOL/L (ref 98–107)
CHLORIDE SERPL-SCNC: 99 MMOL/L (ref 98–107)
CO2 SERPL-SCNC: 23 MMOL/L (ref 22–29)
CO2 SERPL-SCNC: 24 MMOL/L (ref 22–29)
CO2 SERPL-SCNC: 24 MMOL/L (ref 22–29)
CREAT SERPL-MCNC: 6.5 MG/DL (ref 0.7–1.2)
CREAT SERPL-MCNC: 6.6 MG/DL (ref 0.7–1.2)
CREAT SERPL-MCNC: 6.8 MG/DL (ref 0.7–1.2)
EOSINOPHIL # BLD: 0.28 K/UL (ref 0.05–0.5)
EOSINOPHILS RELATIVE PERCENT: 3 % (ref 0–6)
ERYTHROCYTE [DISTWIDTH] IN BLOOD BY AUTOMATED COUNT: 17.4 % (ref 11.5–15)
GFR, ESTIMATED: 10 ML/MIN/1.73M2
GLUCOSE BLD-MCNC: 103 MG/DL (ref 74–99)
GLUCOSE BLD-MCNC: 151 MG/DL (ref 74–99)
GLUCOSE BLD-MCNC: 93 MG/DL (ref 74–99)
GLUCOSE SERPL-MCNC: 141 MG/DL (ref 74–99)
GLUCOSE SERPL-MCNC: 166 MG/DL (ref 74–99)
GLUCOSE SERPL-MCNC: 80 MG/DL (ref 74–99)
HCT VFR BLD AUTO: 21.5 % (ref 37–54)
HCT VFR BLD AUTO: 22 % (ref 37–54)
HCT VFR BLD AUTO: 22.2 % (ref 37–54)
HCT VFR BLD AUTO: 23.2 % (ref 37–54)
HGB BLD-MCNC: 6.8 G/DL (ref 12.5–16.5)
HGB BLD-MCNC: 7 G/DL (ref 12.5–16.5)
HGB BLD-MCNC: 7.1 G/DL (ref 12.5–16.5)
HGB BLD-MCNC: 7.4 G/DL (ref 12.5–16.5)
IMM GRANULOCYTES # BLD AUTO: 0.08 K/UL (ref 0–0.58)
IMM GRANULOCYTES NFR BLD: 1 % (ref 0–5)
LACTATE BLDV-SCNC: 2.2 MMOL/L (ref 0.5–2.2)
LYMPHOCYTES NFR BLD: 1.4 K/UL (ref 1.5–4)
LYMPHOCYTES RELATIVE PERCENT: 14 % (ref 20–42)
MAGNESIUM SERPL-MCNC: 1.8 MG/DL (ref 1.6–2.6)
MAGNESIUM SERPL-MCNC: 1.9 MG/DL (ref 1.6–2.6)
MAGNESIUM SERPL-MCNC: 2 MG/DL (ref 1.6–2.6)
MCH RBC QN AUTO: 29 PG (ref 26–35)
MCHC RBC AUTO-ENTMCNC: 31.8 G/DL (ref 32–34.5)
MCV RBC AUTO: 91.3 FL (ref 80–99.9)
MICROORGANISM SPEC CULT: ABNORMAL
MICROORGANISM SPEC CULT: ABNORMAL
MICROORGANISM SPEC CULT: NORMAL
MONOCYTES NFR BLD: 0.45 K/UL (ref 0.1–0.95)
MONOCYTES NFR BLD: 4 % (ref 2–12)
NEUTROPHILS NFR BLD: 78 % (ref 43–80)
NEUTS SEG NFR BLD: 8.08 K/UL (ref 1.8–7.3)
PHOSPHATE SERPL-MCNC: 5.8 MG/DL (ref 2.5–4.5)
PLATELET # BLD AUTO: 163 K/UL (ref 130–450)
PMV BLD AUTO: 9.4 FL (ref 7–12)
POTASSIUM SERPL-SCNC: 3.3 MMOL/L (ref 3.5–5)
POTASSIUM SERPL-SCNC: 3.3 MMOL/L (ref 3.5–5)
POTASSIUM SERPL-SCNC: 3.7 MMOL/L (ref 3.5–5)
PROT SERPL-MCNC: 6.6 G/DL (ref 6.4–8.3)
RBC # BLD AUTO: 2.41 M/UL (ref 3.8–5.8)
SERVICE CMNT-IMP: NORMAL
SODIUM SERPL-SCNC: 135 MMOL/L (ref 132–146)
SODIUM SERPL-SCNC: 136 MMOL/L (ref 132–146)
SODIUM SERPL-SCNC: 140 MMOL/L (ref 132–146)
SPECIMEN DESCRIPTION: ABNORMAL
SPECIMEN DESCRIPTION: NORMAL
WBC OTHER # BLD: 10.3 K/UL (ref 4.5–11.5)

## 2024-08-13 PROCEDURE — 82248 BILIRUBIN DIRECT: CPT

## 2024-08-13 PROCEDURE — 84100 ASSAY OF PHOSPHORUS: CPT

## 2024-08-13 PROCEDURE — 86850 RBC ANTIBODY SCREEN: CPT

## 2024-08-13 PROCEDURE — 2500000003 HC RX 250 WO HCPCS

## 2024-08-13 PROCEDURE — 2580000003 HC RX 258: Performed by: NURSE PRACTITIONER

## 2024-08-13 PROCEDURE — 86901 BLOOD TYPING SEROLOGIC RH(D): CPT

## 2024-08-13 PROCEDURE — 2500000003 HC RX 250 WO HCPCS: Performed by: HOSPITALIST

## 2024-08-13 PROCEDURE — 2580000003 HC RX 258: Performed by: STUDENT IN AN ORGANIZED HEALTH CARE EDUCATION/TRAINING PROGRAM

## 2024-08-13 PROCEDURE — 6370000000 HC RX 637 (ALT 250 FOR IP): Performed by: INTERNAL MEDICINE

## 2024-08-13 PROCEDURE — 6360000002 HC RX W HCPCS: Performed by: INTERNAL MEDICINE

## 2024-08-13 PROCEDURE — 99233 SBSQ HOSP IP/OBS HIGH 50: CPT | Performed by: INTERNAL MEDICINE

## 2024-08-13 PROCEDURE — 6360000002 HC RX W HCPCS: Performed by: STUDENT IN AN ORGANIZED HEALTH CARE EDUCATION/TRAINING PROGRAM

## 2024-08-13 PROCEDURE — 86900 BLOOD TYPING SEROLOGIC ABO: CPT

## 2024-08-13 PROCEDURE — 2580000003 HC RX 258: Performed by: HOSPITALIST

## 2024-08-13 PROCEDURE — 36430 TRANSFUSION BLD/BLD COMPNT: CPT

## 2024-08-13 PROCEDURE — 2500000003 HC RX 250 WO HCPCS: Performed by: INTERNAL MEDICINE

## 2024-08-13 PROCEDURE — 85014 HEMATOCRIT: CPT

## 2024-08-13 PROCEDURE — 82962 GLUCOSE BLOOD TEST: CPT

## 2024-08-13 PROCEDURE — 83605 ASSAY OF LACTIC ACID: CPT

## 2024-08-13 PROCEDURE — 30233N1 TRANSFUSION OF NONAUTOLOGOUS RED BLOOD CELLS INTO PERIPHERAL VEIN, PERCUTANEOUS APPROACH: ICD-10-PCS | Performed by: STUDENT IN AN ORGANIZED HEALTH CARE EDUCATION/TRAINING PROGRAM

## 2024-08-13 PROCEDURE — 85025 COMPLETE CBC W/AUTO DIFF WBC: CPT

## 2024-08-13 PROCEDURE — 80048 BASIC METABOLIC PNL TOTAL CA: CPT

## 2024-08-13 PROCEDURE — 82330 ASSAY OF CALCIUM: CPT

## 2024-08-13 PROCEDURE — 80053 COMPREHEN METABOLIC PANEL: CPT

## 2024-08-13 PROCEDURE — 6360000002 HC RX W HCPCS: Performed by: NURSE PRACTITIONER

## 2024-08-13 PROCEDURE — P9016 RBC LEUKOCYTES REDUCED: HCPCS

## 2024-08-13 PROCEDURE — 83735 ASSAY OF MAGNESIUM: CPT

## 2024-08-13 PROCEDURE — 2580000003 HC RX 258: Performed by: INTERNAL MEDICINE

## 2024-08-13 PROCEDURE — 85018 HEMOGLOBIN: CPT

## 2024-08-13 PROCEDURE — 2000000000 HC ICU R&B

## 2024-08-13 PROCEDURE — 86923 COMPATIBILITY TEST ELECTRIC: CPT

## 2024-08-13 RX ORDER — MAGNESIUM SULFATE IN WATER 40 MG/ML
2000 INJECTION, SOLUTION INTRAVENOUS ONCE
Status: COMPLETED | OUTPATIENT
Start: 2024-08-13 | End: 2024-08-13

## 2024-08-13 RX ORDER — POTASSIUM CHLORIDE 29.8 MG/ML
20 INJECTION INTRAVENOUS
Status: COMPLETED | OUTPATIENT
Start: 2024-08-13 | End: 2024-08-13

## 2024-08-13 RX ORDER — CALCIUM GLUCONATE 20 MG/ML
2000 INJECTION, SOLUTION INTRAVENOUS
Status: COMPLETED | OUTPATIENT
Start: 2024-08-13 | End: 2024-08-13

## 2024-08-13 RX ORDER — SODIUM CHLORIDE, SODIUM LACTATE, POTASSIUM CHLORIDE, CALCIUM CHLORIDE 600; 310; 30; 20 MG/100ML; MG/100ML; MG/100ML; MG/100ML
INJECTION, SOLUTION INTRAVENOUS CONTINUOUS
Status: DISCONTINUED | OUTPATIENT
Start: 2024-08-13 | End: 2024-08-19 | Stop reason: HOSPADM

## 2024-08-13 RX ORDER — SODIUM CHLORIDE 9 MG/ML
INJECTION, SOLUTION INTRAVENOUS PRN
Status: DISCONTINUED | OUTPATIENT
Start: 2024-08-13 | End: 2024-08-14

## 2024-08-13 RX ORDER — CALCIUM GLUCONATE 20 MG/ML
1000 INJECTION, SOLUTION INTRAVENOUS ONCE
Status: COMPLETED | OUTPATIENT
Start: 2024-08-13 | End: 2024-08-13

## 2024-08-13 RX ORDER — POTASSIUM CHLORIDE 29.8 MG/ML
20 INJECTION INTRAVENOUS ONCE
Status: COMPLETED | OUTPATIENT
Start: 2024-08-13 | End: 2024-08-13

## 2024-08-13 RX ORDER — CALCIUM GLUCONATE 20 MG/ML
2000 INJECTION, SOLUTION INTRAVENOUS ONCE
Status: COMPLETED | OUTPATIENT
Start: 2024-08-13 | End: 2024-08-13

## 2024-08-13 RX ORDER — ERGOCALCIFEROL 1.25 MG/1
50000 CAPSULE ORAL WEEKLY
Status: DISCONTINUED | OUTPATIENT
Start: 2024-08-13 | End: 2024-08-19 | Stop reason: HOSPADM

## 2024-08-13 RX ADMIN — PANTOPRAZOLE SODIUM 40 MG: 40 INJECTION, POWDER, FOR SOLUTION INTRAVENOUS at 08:25

## 2024-08-13 RX ADMIN — SEVELAMER CARBONATE 800 MG: 800 TABLET, FILM COATED ORAL at 17:21

## 2024-08-13 RX ADMIN — CALCIUM GLUCONATE 2000 MG: 20 INJECTION, SOLUTION INTRAVENOUS at 08:29

## 2024-08-13 RX ADMIN — MEROPENEM 1000 MG: 1 INJECTION INTRAVENOUS at 00:29

## 2024-08-13 RX ADMIN — SODIUM BICARBONATE: 84 INJECTION, SOLUTION INTRAVENOUS at 02:21

## 2024-08-13 RX ADMIN — MEROPENEM 1000 MG: 1 INJECTION INTRAVENOUS at 23:28

## 2024-08-13 RX ADMIN — SODIUM BICARBONATE: 84 INJECTION, SOLUTION INTRAVENOUS at 08:37

## 2024-08-13 RX ADMIN — SODIUM CHLORIDE, PRESERVATIVE FREE 10 ML: 5 INJECTION INTRAVENOUS at 21:00

## 2024-08-13 RX ADMIN — CALCIUM GLUCONATE 1000 MG: 20 INJECTION, SOLUTION INTRAVENOUS at 16:16

## 2024-08-13 RX ADMIN — CALCIUM GLUCONATE 2000 MG: 20 INJECTION, SOLUTION INTRAVENOUS at 17:19

## 2024-08-13 RX ADMIN — SEVELAMER CARBONATE 800 MG: 800 TABLET, FILM COATED ORAL at 08:25

## 2024-08-13 RX ADMIN — ERGOCALCIFEROL 50000 UNITS: 1.25 CAPSULE ORAL at 16:19

## 2024-08-13 RX ADMIN — SODIUM CHLORIDE, POTASSIUM CHLORIDE, SODIUM LACTATE AND CALCIUM CHLORIDE: 600; 310; 30; 20 INJECTION, SOLUTION INTRAVENOUS at 13:07

## 2024-08-13 RX ADMIN — MAGNESIUM SULFATE HEPTAHYDRATE 2000 MG: 40 INJECTION, SOLUTION INTRAVENOUS at 08:40

## 2024-08-13 RX ADMIN — MEROPENEM 1000 MG: 1 INJECTION INTRAVENOUS at 13:01

## 2024-08-13 RX ADMIN — SODIUM CHLORIDE, PRESERVATIVE FREE 10 ML: 5 INJECTION INTRAVENOUS at 08:28

## 2024-08-13 RX ADMIN — CALCIUM GLUCONATE 2000 MG: 20 INJECTION, SOLUTION INTRAVENOUS at 19:55

## 2024-08-13 RX ADMIN — MICAFUNGIN SODIUM 100 MG: 100 INJECTION, POWDER, LYOPHILIZED, FOR SOLUTION INTRAVENOUS at 13:02

## 2024-08-13 RX ADMIN — POTASSIUM CHLORIDE 20 MEQ: 29.8 INJECTION, SOLUTION INTRAVENOUS at 17:20

## 2024-08-13 RX ADMIN — POTASSIUM CHLORIDE 20 MEQ: 29.8 INJECTION, SOLUTION INTRAVENOUS at 08:27

## 2024-08-13 RX ADMIN — SEVELAMER CARBONATE 800 MG: 800 TABLET, FILM COATED ORAL at 13:01

## 2024-08-13 RX ADMIN — POTASSIUM CHLORIDE 20 MEQ: 29.8 INJECTION, SOLUTION INTRAVENOUS at 16:18

## 2024-08-13 ASSESSMENT — PAIN SCALES - GENERAL
PAINLEVEL_OUTOF10: 0

## 2024-08-13 NOTE — FLOWSHEET NOTE
Inpatient Wound Care (initial consult) 210    Admit Date: 8/11/2024  1:11 PM    Reason for consult:  necrotizing fasciitis, multiple wounds    Patient laying down in bed, alert, awake and oriented. Assist of three people to roll, dependent.    Significant history:  per H&P    CHIEF COMPLAINT:  Hypotension     History of Present Illness:  39 y.o. male with a history of Change in MS, was at LTAC, s/p debridement for derek gangrene, necrotizing soft tissue infection,,done at  on 7/18,known DM, worsening of Kidney functions, presents with Fever and Hypotension on presentation to ED    Past Medical History:   Diagnosis Date    Blood circulation, collateral     Chronic acquired lymphedema     Keratoconus     Morbid obesity (HCC)     Sepsis (HCC) 11/14/2014    due to cellulitis of LLE       Findings:     08/13/24 1020   Skin Integumentary    Skin Integrity Vascular discoloration or hemochromatosis/staining;Other (comment)  (dry flaky)   Location BLE   Skin Integrity Site 2   Skin Integrity Location 2 Redness;Other (comment)  (intact blanching)   Location 2 both heels, dry flaky   Wound 08/11/24 Groin Right   Date First Assessed/Time First Assessed: 08/11/24 2000   Present on Original Admission: Yes  Primary Wound Type: (c) Other (comment)  Location: Groin  Wound Location Orientation: Right   Wound Image    Wound Etiology Other  (necrotizing fasciitis wound)   Dressing Status New dressing applied   Wound Cleansed Irrigated with saline   Dressing/Treatment Moisten with saline;Roll gauze;ABD   Wound Length (cm) 10 cm   Wound Width (cm) 9 cm   Wound Depth (cm) 4 cm   Wound Surface Area (cm^2) 90 cm^2   Change in Wound Size % (l*w) -97.8   Wound Volume (cm^3) 360 cm^3   Wound Healing % -58   Wound Assessment Pink/red;Exposed structure fascia;Slough   Drainage Amount Moderate (25-50%)   Drainage Description Serosanguinous   Odor None   Domi-wound Assessment Blanchable erythema   Wound Thickness Description not for Pressure

## 2024-08-13 NOTE — CONSENT
Informed Consent for Blood Component Transfusion Note    I have discussed with the patient the rationale for blood component transfusion; its benefits in treating or preventing fatigue, organ damage, or death; and its risk which includes mild transfusion reactions, rare risk of blood borne infection, or more serious but rare reactions. I have discussed the alternatives to transfusion, including the risk and consequences of not receiving transfusion. The patient had an opportunity to ask questions and had agreed to proceed with transfusion of blood components.    Electronically signed by Bakari Lloyd II, DO on 8/13/24 at 10:09 AM EDT

## 2024-08-13 NOTE — CARE COORDINATION
8/13/2024  Social Work Discharge Planning:Groin wounds. Room air. IV ATB.Pt came from [facility] and per liaison will need a precert to return. Pt plans to return if he is able. Awaiting therapy evals when Pt is able.  JULIANN and transport form (started) are in chart. Electronically signed by JUWAN Mcnally on 8/13/2024 at 9:27 AM

## 2024-08-13 NOTE — INTERDISCIPLINARY ROUNDS
Intensive Care Daily Quality Rounding Checklist        ICU Team Members: Dr. Wiggins, Resident Dr. Lloyd, Charge nurse, Bedside nurse, Clinical pharmacist      ICU Day #: 3     SOFA Score: 9     Intubation Date: N/A     Ventilator Day #: N/A     Central Line Insertion Date: 8/11/2024                                                    Day #: 3                                                    Indication: CVCIndication: Hemodynamically unstable requiring volume resuscitation      Arterial Line Insertion Date: N/A                             Day #: N/A     Temporary Hemodialysis Catheter Insertion Date: N/A                             Day # N/A     DVT Prophylaxis: heparin sub q     GI Prophylaxis:  Protonix     Hernandez Catheter Insertion Date: N/A (chronic suprapubic; present on admission)                                         Day #:  PTA                             Indications: Assist in healing of open sacral or perineal wounds (Stage III, IV or unstageable)                             Continued need (if yes, reason documented and discussed with physician): yes, multiple wounds      Skin Issues/ Wounds and ordered treatment discussed on rounds: Yes. Wound care consulted.      Goals/ Plans for the Day:  Monitor labs and replace/treat as indicated. Nephrology to follow BMP's.  Wound care to see patient today.  Wean off pressors as tolerated. Transfuse PRBCs.     Reviewed plan and goals for day with patient and/or representative:  Patient updated.

## 2024-08-14 LAB
ABO/RH: NORMAL
ALBUMIN SERPL-MCNC: 1.8 G/DL (ref 3.5–4.7)
ALBUMIN SERPL-MCNC: 2.1 G/DL (ref 3.5–5.2)
ALP SERPL-CCNC: 95 U/L (ref 40–129)
ALPHA1 GLOB SERPL ELPH-MCNC: 0.4 G/DL (ref 0.2–0.4)
ALPHA2 GLOB SERPL ELPH-MCNC: 0.6 G/DL (ref 0.5–1)
ALT SERPL-CCNC: 18 U/L (ref 0–40)
ANION GAP SERPL CALCULATED.3IONS-SCNC: 11 MMOL/L (ref 7–16)
ANION GAP SERPL CALCULATED.3IONS-SCNC: 12 MMOL/L (ref 7–16)
ANION GAP SERPL CALCULATED.3IONS-SCNC: 12 MMOL/L (ref 7–16)
ANTIBODY SCREEN: NEGATIVE
ARM BAND NUMBER: NORMAL
AST SERPL-CCNC: 45 U/L (ref 0–39)
B-GLOBULIN SERPL ELPH-MCNC: 1.1 G/DL (ref 0.8–1.3)
BASOPHILS # BLD: 0.03 K/UL (ref 0–0.2)
BASOPHILS NFR BLD: 1 % (ref 0–2)
BILIRUB DIRECT SERPL-MCNC: <0.2 MG/DL (ref 0–0.3)
BILIRUB SERPL-MCNC: 0.4 MG/DL (ref 0–1.2)
BLOOD BANK BLOOD PRODUCT EXPIRATION DATE: NORMAL
BLOOD BANK BLOOD PRODUCT EXPIRATION DATE: NORMAL
BLOOD BANK DISPENSE STATUS: NORMAL
BLOOD BANK DISPENSE STATUS: NORMAL
BLOOD BANK ISBT PRODUCT BLOOD TYPE: 5100
BLOOD BANK ISBT PRODUCT BLOOD TYPE: 5100
BLOOD BANK PRODUCT CODE: NORMAL
BLOOD BANK PRODUCT CODE: NORMAL
BLOOD BANK SAMPLE EXPIRATION: NORMAL
BLOOD BANK UNIT TYPE AND RH: NORMAL
BLOOD BANK UNIT TYPE AND RH: NORMAL
BPU ID: NORMAL
BPU ID: NORMAL
BUN SERPL-MCNC: 33 MG/DL (ref 6–20)
BUN SERPL-MCNC: 35 MG/DL (ref 6–20)
BUN SERPL-MCNC: 35 MG/DL (ref 6–20)
CA-I BLD-SCNC: 1 MMOL/L (ref 1.15–1.33)
CALCIUM SERPL-MCNC: 6.7 MG/DL (ref 8.6–10.2)
CALCIUM SERPL-MCNC: 7 MG/DL (ref 8.6–10.2)
CALCIUM SERPL-MCNC: 7.2 MG/DL (ref 8.6–10.2)
CHLORIDE SERPL-SCNC: 101 MMOL/L (ref 98–107)
CHLORIDE SERPL-SCNC: 102 MMOL/L (ref 98–107)
CHLORIDE SERPL-SCNC: 102 MMOL/L (ref 98–107)
CO2 SERPL-SCNC: 23 MMOL/L (ref 22–29)
CO2 SERPL-SCNC: 24 MMOL/L (ref 22–29)
CO2 SERPL-SCNC: 24 MMOL/L (ref 22–29)
COMPONENT: NORMAL
COMPONENT: NORMAL
CREAT SERPL-MCNC: 5.7 MG/DL (ref 0.7–1.2)
CREAT SERPL-MCNC: 6.3 MG/DL (ref 0.7–1.2)
CREAT SERPL-MCNC: 6.4 MG/DL (ref 0.7–1.2)
CROSSMATCH RESULT: NORMAL
CROSSMATCH RESULT: NORMAL
EOSINOPHIL # BLD: 0.2 K/UL (ref 0.05–0.5)
EOSINOPHILS RELATIVE PERCENT: 3 % (ref 0–6)
ERYTHROCYTE [DISTWIDTH] IN BLOOD BY AUTOMATED COUNT: 17.4 % (ref 11.5–15)
GAMMA GLOB SERPL ELPH-MCNC: 2.2 G/DL (ref 0.7–1.6)
GFR, ESTIMATED: 11 ML/MIN/1.73M2
GFR, ESTIMATED: 11 ML/MIN/1.73M2
GFR, ESTIMATED: 12 ML/MIN/1.73M2
GLUCOSE BLD-MCNC: 118 MG/DL (ref 74–99)
GLUCOSE BLD-MCNC: 88 MG/DL (ref 74–99)
GLUCOSE BLD-MCNC: 99 MG/DL (ref 74–99)
GLUCOSE SERPL-MCNC: 82 MG/DL (ref 74–99)
GLUCOSE SERPL-MCNC: 89 MG/DL (ref 74–99)
GLUCOSE SERPL-MCNC: 96 MG/DL (ref 74–99)
HCT VFR BLD AUTO: 23.6 % (ref 37–54)
HGB BLD-MCNC: 7.6 G/DL (ref 12.5–16.5)
IMM GRANULOCYTES # BLD AUTO: 0.07 K/UL (ref 0–0.58)
IMM GRANULOCYTES NFR BLD: 1 % (ref 0–5)
LACTATE BLDV-SCNC: 1.2 MMOL/L (ref 0.5–2.2)
LYMPHOCYTES NFR BLD: 1.26 K/UL (ref 1.5–4)
LYMPHOCYTES RELATIVE PERCENT: 20 % (ref 20–42)
MAGNESIUM SERPL-MCNC: 1.8 MG/DL (ref 1.6–2.6)
MCH RBC QN AUTO: 28.9 PG (ref 26–35)
MCHC RBC AUTO-ENTMCNC: 32.2 G/DL (ref 32–34.5)
MCV RBC AUTO: 89.7 FL (ref 80–99.9)
MONOCYTES NFR BLD: 0.29 K/UL (ref 0.1–0.95)
MONOCYTES NFR BLD: 5 % (ref 2–12)
NEUTROPHILS NFR BLD: 71 % (ref 43–80)
NEUTS SEG NFR BLD: 4.49 K/UL (ref 1.8–7.3)
PATHOLOGIST: ABNORMAL
PHOSPHATE SERPL-MCNC: 4.9 MG/DL (ref 2.5–4.5)
PLATELET # BLD AUTO: 149 K/UL (ref 130–450)
PMV BLD AUTO: 9.3 FL (ref 7–12)
POTASSIUM SERPL-SCNC: 3.6 MMOL/L (ref 3.5–5)
POTASSIUM SERPL-SCNC: 3.9 MMOL/L (ref 3.5–5)
POTASSIUM SERPL-SCNC: 4 MMOL/L (ref 3.5–5)
PROT PATTERN SERPL ELPH-IMP: ABNORMAL
PROT SERPL-MCNC: 6.2 G/DL (ref 6.4–8.3)
PROT SERPL-MCNC: 6.5 G/DL (ref 6.4–8.3)
RBC # BLD AUTO: 2.63 M/UL (ref 3.8–5.8)
SODIUM SERPL-SCNC: 137 MMOL/L (ref 132–146)
TRANSFUSION STATUS: NORMAL
TRANSFUSION STATUS: NORMAL
UNIT DIVISION: 0
UNIT DIVISION: 0
UNIT ISSUE DATE/TIME: NORMAL
UNIT ISSUE DATE/TIME: NORMAL
WBC OTHER # BLD: 6.3 K/UL (ref 4.5–11.5)

## 2024-08-14 PROCEDURE — 6360000002 HC RX W HCPCS: Performed by: INTERNAL MEDICINE

## 2024-08-14 PROCEDURE — 6370000000 HC RX 637 (ALT 250 FOR IP): Performed by: HOSPITALIST

## 2024-08-14 PROCEDURE — 2060000000 HC ICU INTERMEDIATE R&B

## 2024-08-14 PROCEDURE — 82248 BILIRUBIN DIRECT: CPT

## 2024-08-14 PROCEDURE — 82330 ASSAY OF CALCIUM: CPT

## 2024-08-14 PROCEDURE — 2580000003 HC RX 258: Performed by: STUDENT IN AN ORGANIZED HEALTH CARE EDUCATION/TRAINING PROGRAM

## 2024-08-14 PROCEDURE — 6370000000 HC RX 637 (ALT 250 FOR IP): Performed by: INTERNAL MEDICINE

## 2024-08-14 PROCEDURE — 99233 SBSQ HOSP IP/OBS HIGH 50: CPT | Performed by: INTERNAL MEDICINE

## 2024-08-14 PROCEDURE — 6360000002 HC RX W HCPCS: Performed by: STUDENT IN AN ORGANIZED HEALTH CARE EDUCATION/TRAINING PROGRAM

## 2024-08-14 PROCEDURE — 2580000003 HC RX 258: Performed by: INTERNAL MEDICINE

## 2024-08-14 PROCEDURE — 82962 GLUCOSE BLOOD TEST: CPT

## 2024-08-14 PROCEDURE — 83735 ASSAY OF MAGNESIUM: CPT

## 2024-08-14 PROCEDURE — 2580000003 HC RX 258: Performed by: NURSE PRACTITIONER

## 2024-08-14 PROCEDURE — 84100 ASSAY OF PHOSPHORUS: CPT

## 2024-08-14 PROCEDURE — 80053 COMPREHEN METABOLIC PANEL: CPT

## 2024-08-14 PROCEDURE — 85025 COMPLETE CBC W/AUTO DIFF WBC: CPT

## 2024-08-14 PROCEDURE — 2500000003 HC RX 250 WO HCPCS: Performed by: INTERNAL MEDICINE

## 2024-08-14 PROCEDURE — 80048 BASIC METABOLIC PNL TOTAL CA: CPT

## 2024-08-14 PROCEDURE — 83605 ASSAY OF LACTIC ACID: CPT

## 2024-08-14 PROCEDURE — 6360000002 HC RX W HCPCS: Performed by: NURSE PRACTITIONER

## 2024-08-14 RX ORDER — CALCIUM GLUCONATE 20 MG/ML
2000 INJECTION, SOLUTION INTRAVENOUS ONCE
Status: COMPLETED | OUTPATIENT
Start: 2024-08-14 | End: 2024-08-14

## 2024-08-14 RX ORDER — POTASSIUM CHLORIDE 29.8 MG/ML
20 INJECTION INTRAVENOUS
Status: COMPLETED | OUTPATIENT
Start: 2024-08-14 | End: 2024-08-14

## 2024-08-14 RX ORDER — PANTOPRAZOLE SODIUM 40 MG/1
40 TABLET, DELAYED RELEASE ORAL
Status: DISCONTINUED | OUTPATIENT
Start: 2024-08-15 | End: 2024-08-19 | Stop reason: HOSPADM

## 2024-08-14 RX ORDER — MAGNESIUM SULFATE 1 G/100ML
1000 INJECTION INTRAVENOUS ONCE
Status: COMPLETED | OUTPATIENT
Start: 2024-08-14 | End: 2024-08-14

## 2024-08-14 RX ADMIN — MAGNESIUM SULFATE HEPTAHYDRATE 1000 MG: 1 INJECTION, SOLUTION INTRAVENOUS at 05:06

## 2024-08-14 RX ADMIN — PANTOPRAZOLE SODIUM 40 MG: 40 INJECTION, POWDER, FOR SOLUTION INTRAVENOUS at 08:48

## 2024-08-14 RX ADMIN — POTASSIUM CHLORIDE 20 MEQ: 29.8 INJECTION, SOLUTION INTRAVENOUS at 06:59

## 2024-08-14 RX ADMIN — ACETAMINOPHEN 650 MG: 325 TABLET ORAL at 21:39

## 2024-08-14 RX ADMIN — POTASSIUM CHLORIDE 20 MEQ: 29.8 INJECTION, SOLUTION INTRAVENOUS at 06:15

## 2024-08-14 RX ADMIN — SODIUM CHLORIDE, POTASSIUM CHLORIDE, SODIUM LACTATE AND CALCIUM CHLORIDE: 600; 310; 30; 20 INJECTION, SOLUTION INTRAVENOUS at 19:31

## 2024-08-14 RX ADMIN — CALCIUM GLUCONATE 2000 MG: 20 INJECTION, SOLUTION INTRAVENOUS at 05:07

## 2024-08-14 RX ADMIN — SEVELAMER CARBONATE 800 MG: 800 TABLET, FILM COATED ORAL at 08:44

## 2024-08-14 RX ADMIN — MICAFUNGIN SODIUM 100 MG: 100 INJECTION, POWDER, LYOPHILIZED, FOR SOLUTION INTRAVENOUS at 11:14

## 2024-08-14 RX ADMIN — SEVELAMER CARBONATE 800 MG: 800 TABLET, FILM COATED ORAL at 11:10

## 2024-08-14 RX ADMIN — SEVELAMER CARBONATE 800 MG: 800 TABLET, FILM COATED ORAL at 15:58

## 2024-08-14 NOTE — CARE COORDINATION
8/14/2024  Social Work Discharge Planning:Severe sepsis-groin area. IVATB. ID and nephrology are following. Pt came from [facility] and per liaison will need a precert to return. Pt plans to return if he is able. Awaiting therapy evals when Pt is able. JULIANN and transport form (started) are in chart. Electronically signed by JUWAN Mcnally on 8/14/2024 at 9:39 AM

## 2024-08-14 NOTE — INTERDISCIPLINARY ROUNDS
Intensive Care Daily Quality Rounding Checklist        ICU Team Members: Dr. Wiggins, Resident Dr. Lloyd, Bedside nurse, Charge nurse, Clinical pharmacist, Respiratory therapist      ICU Day #: 4     SOFA Score: 9     Intubation Date: N/A     Ventilator Day #: N/A     Central Line Insertion Date: 8/11/2024                                                    Day #: 4                                                    Indication: CVCIndication: Hemodynamically unstable requiring volume resuscitation      Arterial Line Insertion Date: N/A                             Day #: N/A     Temporary Hemodialysis Catheter Insertion Date: N/A                             Day # N/A     DVT Prophylaxis: heparin sub q     GI Prophylaxis:  Protonix     Hernandez Catheter Insertion Date: N/A (chronic suprapubic; present on admission)                                         Day #:  PTA                             Indications: Assist in healing of open sacral or perineal wounds (Stage III, IV or unstageable)                             Continued need (if yes, reason documented and discussed with physician): yes, multiple wounds      Skin Issues/ Wounds and ordered treatment discussed on rounds: Yes. Wound care consulted.      Goals/ Plans for the Day:  Monitor labs and replace/treat as indicated. Nephrology to follow BMP's.  Wound care to see patient.  Transfer to intermediate.  Specialty bed request.     Reviewed plan and goals for day with patient and/or representative:  Patient updated.

## 2024-08-14 NOTE — INTERDISCIPLINARY ROUNDS
Intensive Care Daily Quality Rounding Checklist        ICU Team Members: Dr. Wiggins, Resident Dr. Lloyd, Charge nurse, Bedside nurse, Clinical pharmacist      ICU Day #: 4     SOFA Score: 9     Intubation Date: N/A     Ventilator Day #: N/A     Central Line Insertion Date: 8/11/2024                                                    Day #: 4                                                    Indication: CVCIndication: Hemodynamically unstable requiring volume resuscitation      Arterial Line Insertion Date: N/A                             Day #: N/A     Temporary Hemodialysis Catheter Insertion Date: N/A                             Day # N/A     DVT Prophylaxis: heparin sub q     GI Prophylaxis:  Protonix     Hernandez Catheter Insertion Date: N/A (chronic suprapubic; present on admission)                                         Day #:  PTA                             Indications: Assist in healing of open sacral or perineal wounds (Stage III, IV or unstageable)                             Continued need (if yes, reason documented and discussed with physician): yes, multiple wounds      Skin Issues/ Wounds and ordered treatment discussed on rounds: Yes. Wound care consulted.      Goals/ Plans for the Day:  Monitor labs and replace/treat as indicated. Nephrology to follow BMP's.  Wound care to see patient.     Reviewed plan and goals for day with patient and/or representative:  Patient updated.

## 2024-08-15 ENCOUNTER — APPOINTMENT (OUTPATIENT)
Dept: GENERAL RADIOLOGY | Age: 39
DRG: 720 | End: 2024-08-15
Payer: COMMERCIAL

## 2024-08-15 PROBLEM — D64.9 ANEMIA: Status: ACTIVE | Noted: 2024-08-15

## 2024-08-15 PROBLEM — E83.51 HYPOCALCEMIA: Status: ACTIVE | Noted: 2024-08-15

## 2024-08-15 PROBLEM — E11.9 TYPE 2 DIABETES MELLITUS WITHOUT COMPLICATION, WITHOUT LONG-TERM CURRENT USE OF INSULIN (HCC): Status: ACTIVE | Noted: 2024-08-15

## 2024-08-15 PROBLEM — N17.9 AKI (ACUTE KIDNEY INJURY) (HCC): Status: ACTIVE | Noted: 2024-08-15

## 2024-08-15 LAB
1,3 BETA GLUCAN SER-MCNC: 354 PG/ML
1,3 BETA-D-GLUCAN INTERP: POSITIVE
ALBUMIN SERPL-MCNC: 2 G/DL (ref 3.5–5.2)
ALP SERPL-CCNC: 94 U/L (ref 40–129)
ALT SERPL-CCNC: 20 U/L (ref 0–40)
ANION GAP SERPL CALCULATED.3IONS-SCNC: 10 MMOL/L (ref 7–16)
ANION GAP SERPL CALCULATED.3IONS-SCNC: 12 MMOL/L (ref 7–16)
AST SERPL-CCNC: 49 U/L (ref 0–39)
BASOPHILS # BLD: 0.04 K/UL (ref 0–0.2)
BASOPHILS NFR BLD: 1 % (ref 0–2)
BILIRUB DIRECT SERPL-MCNC: <0.2 MG/DL (ref 0–0.3)
BILIRUB SERPL-MCNC: 0.3 MG/DL (ref 0–1.2)
BUN SERPL-MCNC: 32 MG/DL (ref 6–20)
BUN SERPL-MCNC: 32 MG/DL (ref 6–20)
CA-I BLD-SCNC: 1.03 MMOL/L (ref 1.15–1.33)
CALCIUM SERPL-MCNC: 6.9 MG/DL (ref 8.6–10.2)
CALCIUM SERPL-MCNC: 7.2 MG/DL (ref 8.6–10.2)
CHLORIDE SERPL-SCNC: 103 MMOL/L (ref 98–107)
CHLORIDE SERPL-SCNC: 104 MMOL/L (ref 98–107)
CO2 SERPL-SCNC: 22 MMOL/L (ref 22–29)
CO2 SERPL-SCNC: 23 MMOL/L (ref 22–29)
CREAT SERPL-MCNC: 5.3 MG/DL (ref 0.7–1.2)
CREAT SERPL-MCNC: 5.5 MG/DL (ref 0.7–1.2)
EOSINOPHIL # BLD: 0.17 K/UL (ref 0.05–0.5)
EOSINOPHILS RELATIVE PERCENT: 4 % (ref 0–6)
ERYTHROCYTE [DISTWIDTH] IN BLOOD BY AUTOMATED COUNT: 17.1 % (ref 11.5–15)
GFR, ESTIMATED: 13 ML/MIN/1.73M2
GFR, ESTIMATED: 13 ML/MIN/1.73M2
GLUCOSE BLD-MCNC: 106 MG/DL (ref 74–99)
GLUCOSE BLD-MCNC: 88 MG/DL (ref 74–99)
GLUCOSE BLD-MCNC: 93 MG/DL (ref 74–99)
GLUCOSE BLD-MCNC: 99 MG/DL (ref 74–99)
GLUCOSE SERPL-MCNC: 74 MG/DL (ref 74–99)
GLUCOSE SERPL-MCNC: 75 MG/DL (ref 74–99)
HCT VFR BLD AUTO: 24.7 % (ref 37–54)
HGB BLD-MCNC: 7.6 G/DL (ref 12.5–16.5)
IMM GRANULOCYTES # BLD AUTO: 0.09 K/UL (ref 0–0.58)
IMM GRANULOCYTES NFR BLD: 2 % (ref 0–5)
LACTATE BLDV-SCNC: 1.4 MMOL/L (ref 0.5–2.2)
LYMPHOCYTES NFR BLD: 1.33 K/UL (ref 1.5–4)
LYMPHOCYTES RELATIVE PERCENT: 29 % (ref 20–42)
MAGNESIUM SERPL-MCNC: 1.8 MG/DL (ref 1.6–2.6)
MCH RBC QN AUTO: 28.6 PG (ref 26–35)
MCHC RBC AUTO-ENTMCNC: 30.8 G/DL (ref 32–34.5)
MCV RBC AUTO: 92.9 FL (ref 80–99.9)
MONOCYTES NFR BLD: 0.24 K/UL (ref 0.1–0.95)
MONOCYTES NFR BLD: 5 % (ref 2–12)
NEUTROPHILS NFR BLD: 60 % (ref 43–80)
NEUTS SEG NFR BLD: 2.78 K/UL (ref 1.8–7.3)
PHOSPHATE SERPL-MCNC: 4.3 MG/DL (ref 2.5–4.5)
PLATELET # BLD AUTO: 144 K/UL (ref 130–450)
PMV BLD AUTO: 9.9 FL (ref 7–12)
POTASSIUM SERPL-SCNC: 4 MMOL/L (ref 3.5–5)
POTASSIUM SERPL-SCNC: 4 MMOL/L (ref 3.5–5)
PROT SERPL-MCNC: 5.8 G/DL (ref 6.4–8.3)
RBC # BLD AUTO: 2.66 M/UL (ref 3.8–5.8)
SODIUM SERPL-SCNC: 136 MMOL/L (ref 132–146)
SODIUM SERPL-SCNC: 138 MMOL/L (ref 132–146)
WBC OTHER # BLD: 4.7 K/UL (ref 4.5–11.5)

## 2024-08-15 PROCEDURE — 6360000002 HC RX W HCPCS: Performed by: STUDENT IN AN ORGANIZED HEALTH CARE EDUCATION/TRAINING PROGRAM

## 2024-08-15 PROCEDURE — 99232 SBSQ HOSP IP/OBS MODERATE 35: CPT | Performed by: STUDENT IN AN ORGANIZED HEALTH CARE EDUCATION/TRAINING PROGRAM

## 2024-08-15 PROCEDURE — 71045 X-RAY EXAM CHEST 1 VIEW: CPT

## 2024-08-15 PROCEDURE — 2580000003 HC RX 258: Performed by: INTERNAL MEDICINE

## 2024-08-15 PROCEDURE — 82962 GLUCOSE BLOOD TEST: CPT

## 2024-08-15 PROCEDURE — 82248 BILIRUBIN DIRECT: CPT

## 2024-08-15 PROCEDURE — 97165 OT EVAL LOW COMPLEX 30 MIN: CPT

## 2024-08-15 PROCEDURE — 97161 PT EVAL LOW COMPLEX 20 MIN: CPT

## 2024-08-15 PROCEDURE — 2580000003 HC RX 258: Performed by: STUDENT IN AN ORGANIZED HEALTH CARE EDUCATION/TRAINING PROGRAM

## 2024-08-15 PROCEDURE — 82330 ASSAY OF CALCIUM: CPT

## 2024-08-15 PROCEDURE — 6370000000 HC RX 637 (ALT 250 FOR IP): Performed by: STUDENT IN AN ORGANIZED HEALTH CARE EDUCATION/TRAINING PROGRAM

## 2024-08-15 PROCEDURE — 80053 COMPREHEN METABOLIC PANEL: CPT

## 2024-08-15 PROCEDURE — 36415 COLL VENOUS BLD VENIPUNCTURE: CPT

## 2024-08-15 PROCEDURE — 84100 ASSAY OF PHOSPHORUS: CPT

## 2024-08-15 PROCEDURE — 6360000002 HC RX W HCPCS: Performed by: NURSE PRACTITIONER

## 2024-08-15 PROCEDURE — 85025 COMPLETE CBC W/AUTO DIFF WBC: CPT

## 2024-08-15 PROCEDURE — 2580000003 HC RX 258: Performed by: HOSPITALIST

## 2024-08-15 PROCEDURE — 2060000000 HC ICU INTERMEDIATE R&B

## 2024-08-15 PROCEDURE — 80048 BASIC METABOLIC PNL TOTAL CA: CPT

## 2024-08-15 PROCEDURE — 2500000003 HC RX 250 WO HCPCS: Performed by: STUDENT IN AN ORGANIZED HEALTH CARE EDUCATION/TRAINING PROGRAM

## 2024-08-15 PROCEDURE — 83735 ASSAY OF MAGNESIUM: CPT

## 2024-08-15 PROCEDURE — 83605 ASSAY OF LACTIC ACID: CPT

## 2024-08-15 PROCEDURE — 6370000000 HC RX 637 (ALT 250 FOR IP): Performed by: INTERNAL MEDICINE

## 2024-08-15 RX ORDER — MAGNESIUM SULFATE IN WATER 40 MG/ML
2000 INJECTION, SOLUTION INTRAVENOUS ONCE
Status: DISCONTINUED | OUTPATIENT
Start: 2024-08-15 | End: 2024-08-15

## 2024-08-15 RX ORDER — CALCIUM GLUCONATE 20 MG/ML
1000 INJECTION, SOLUTION INTRAVENOUS ONCE
Status: DISCONTINUED | OUTPATIENT
Start: 2024-08-15 | End: 2024-08-15

## 2024-08-15 RX ORDER — CALCIUM GLUCONATE 20 MG/ML
2000 INJECTION, SOLUTION INTRAVENOUS ONCE
Status: COMPLETED | OUTPATIENT
Start: 2024-08-15 | End: 2024-08-15

## 2024-08-15 RX ORDER — MAGNESIUM SULFATE IN WATER 40 MG/ML
2000 INJECTION, SOLUTION INTRAVENOUS ONCE
Status: COMPLETED | OUTPATIENT
Start: 2024-08-15 | End: 2024-08-15

## 2024-08-15 RX ADMIN — MICAFUNGIN SODIUM 100 MG: 100 INJECTION, POWDER, LYOPHILIZED, FOR SOLUTION INTRAVENOUS at 11:53

## 2024-08-15 RX ADMIN — SODIUM CHLORIDE, POTASSIUM CHLORIDE, SODIUM LACTATE AND CALCIUM CHLORIDE: 600; 310; 30; 20 INJECTION, SOLUTION INTRAVENOUS at 02:48

## 2024-08-15 RX ADMIN — SODIUM CHLORIDE, PRESERVATIVE FREE 10 ML: 5 INJECTION INTRAVENOUS at 08:19

## 2024-08-15 RX ADMIN — SEVELAMER CARBONATE 800 MG: 800 TABLET, FILM COATED ORAL at 08:19

## 2024-08-15 RX ADMIN — MAGNESIUM SULFATE HEPTAHYDRATE 2000 MG: 40 INJECTION, SOLUTION INTRAVENOUS at 08:57

## 2024-08-15 RX ADMIN — HEPARIN SODIUM 5000 UNITS: 5000 INJECTION INTRAVENOUS; SUBCUTANEOUS at 20:24

## 2024-08-15 RX ADMIN — PANTOPRAZOLE SODIUM 40 MG: 40 TABLET, DELAYED RELEASE ORAL at 06:09

## 2024-08-15 RX ADMIN — SEVELAMER CARBONATE 800 MG: 800 TABLET, FILM COATED ORAL at 16:34

## 2024-08-15 RX ADMIN — SEVELAMER CARBONATE 800 MG: 800 TABLET, FILM COATED ORAL at 11:52

## 2024-08-15 RX ADMIN — CALCIUM GLUCONATE 2000 MG: 20 INJECTION, SOLUTION INTRAVENOUS at 13:19

## 2024-08-15 RX ADMIN — HEPARIN SODIUM 5000 UNITS: 5000 INJECTION INTRAVENOUS; SUBCUTANEOUS at 13:17

## 2024-08-15 RX ADMIN — HEPARIN SODIUM 5000 UNITS: 5000 INJECTION INTRAVENOUS; SUBCUTANEOUS at 06:09

## 2024-08-15 RX ADMIN — SODIUM CHLORIDE, POTASSIUM CHLORIDE, SODIUM LACTATE AND CALCIUM CHLORIDE: 600; 310; 30; 20 INJECTION, SOLUTION INTRAVENOUS at 17:38

## 2024-08-15 ASSESSMENT — PAIN SCALES - GENERAL
PAINLEVEL_OUTOF10: 0
PAINLEVEL_OUTOF10: 0

## 2024-08-15 NOTE — CARE COORDINATION
Social Work/Discharge Planning:  Kevin with [facility] will start pre-cert today.  Ambulance form in soft chart.  Will continue to follow and wait for pre-cert.  Electronically signed by JUWAN Amador on 8/15/2024 at 10:25 AM

## 2024-08-16 LAB
ALBUMIN SERPL-MCNC: 1.9 G/DL (ref 3.5–5.2)
ALP SERPL-CCNC: 102 U/L (ref 40–129)
ALT SERPL-CCNC: 21 U/L (ref 0–40)
ANION GAP SERPL CALCULATED.3IONS-SCNC: 10 MMOL/L (ref 7–16)
AST SERPL-CCNC: 50 U/L (ref 0–39)
BASOPHILS # BLD: 0.04 K/UL (ref 0–0.2)
BASOPHILS NFR BLD: 1 % (ref 0–2)
BILIRUB SERPL-MCNC: 0.3 MG/DL (ref 0–1.2)
BUN SERPL-MCNC: 31 MG/DL (ref 6–20)
CA-I BLD-SCNC: 1.03 MMOL/L (ref 1.15–1.33)
CALCIUM SERPL-MCNC: 7.2 MG/DL (ref 8.6–10.2)
CHLORIDE SERPL-SCNC: 106 MMOL/L (ref 98–107)
CO2 SERPL-SCNC: 24 MMOL/L (ref 22–29)
CREAT SERPL-MCNC: 4.6 MG/DL (ref 0.7–1.2)
EOSINOPHIL # BLD: 0.21 K/UL (ref 0.05–0.5)
EOSINOPHILS RELATIVE PERCENT: 3 % (ref 0–6)
ERYTHROCYTE [DISTWIDTH] IN BLOOD BY AUTOMATED COUNT: 16.9 % (ref 11.5–15)
GFR, ESTIMATED: 16 ML/MIN/1.73M2
GLUCOSE BLD-MCNC: 110 MG/DL (ref 74–99)
GLUCOSE BLD-MCNC: 79 MG/DL (ref 74–99)
GLUCOSE BLD-MCNC: 85 MG/DL (ref 74–99)
GLUCOSE BLD-MCNC: 87 MG/DL (ref 74–99)
GLUCOSE SERPL-MCNC: 72 MG/DL (ref 74–99)
HCT VFR BLD AUTO: 27 % (ref 37–54)
HGB BLD-MCNC: 8.5 G/DL (ref 12.5–16.5)
IMM GRANULOCYTES # BLD AUTO: 0.14 K/UL (ref 0–0.58)
IMM GRANULOCYTES NFR BLD: 2 % (ref 0–5)
LACTATE BLDV-SCNC: 1.7 MMOL/L (ref 0.5–2.2)
LYMPHOCYTES NFR BLD: 1.51 K/UL (ref 1.5–4)
LYMPHOCYTES RELATIVE PERCENT: 25 % (ref 20–42)
MAGNESIUM SERPL-MCNC: 1.9 MG/DL (ref 1.6–2.6)
MCH RBC QN AUTO: 28.8 PG (ref 26–35)
MCHC RBC AUTO-ENTMCNC: 31.5 G/DL (ref 32–34.5)
MCV RBC AUTO: 91.5 FL (ref 80–99.9)
MICROORGANISM SPEC CULT: NORMAL
MICROORGANISM SPEC CULT: NORMAL
MONOCYTES NFR BLD: 0.46 K/UL (ref 0.1–0.95)
MONOCYTES NFR BLD: 8 % (ref 2–12)
NEUTROPHILS NFR BLD: 61 % (ref 43–80)
NEUTS SEG NFR BLD: 3.73 K/UL (ref 1.8–7.3)
PHOSPHATE SERPL-MCNC: 4.3 MG/DL (ref 2.5–4.5)
PLATELET # BLD AUTO: 156 K/UL (ref 130–450)
PMV BLD AUTO: 9.6 FL (ref 7–12)
POTASSIUM SERPL-SCNC: 3.8 MMOL/L (ref 3.5–5)
PROT SERPL-MCNC: 6.3 G/DL (ref 6.4–8.3)
RBC # BLD AUTO: 2.95 M/UL (ref 3.8–5.8)
SERVICE CMNT-IMP: NORMAL
SERVICE CMNT-IMP: NORMAL
SODIUM SERPL-SCNC: 140 MMOL/L (ref 132–146)
SPECIMEN DESCRIPTION: NORMAL
SPECIMEN DESCRIPTION: NORMAL
WBC OTHER # BLD: 6.1 K/UL (ref 4.5–11.5)

## 2024-08-16 PROCEDURE — 6370000000 HC RX 637 (ALT 250 FOR IP): Performed by: STUDENT IN AN ORGANIZED HEALTH CARE EDUCATION/TRAINING PROGRAM

## 2024-08-16 PROCEDURE — 2500000003 HC RX 250 WO HCPCS: Performed by: STUDENT IN AN ORGANIZED HEALTH CARE EDUCATION/TRAINING PROGRAM

## 2024-08-16 PROCEDURE — 6360000002 HC RX W HCPCS: Performed by: STUDENT IN AN ORGANIZED HEALTH CARE EDUCATION/TRAINING PROGRAM

## 2024-08-16 PROCEDURE — 99232 SBSQ HOSP IP/OBS MODERATE 35: CPT | Performed by: STUDENT IN AN ORGANIZED HEALTH CARE EDUCATION/TRAINING PROGRAM

## 2024-08-16 PROCEDURE — 83605 ASSAY OF LACTIC ACID: CPT

## 2024-08-16 PROCEDURE — 6360000002 HC RX W HCPCS: Performed by: INTERNAL MEDICINE

## 2024-08-16 PROCEDURE — 80053 COMPREHEN METABOLIC PANEL: CPT

## 2024-08-16 PROCEDURE — 82330 ASSAY OF CALCIUM: CPT

## 2024-08-16 PROCEDURE — 2580000003 HC RX 258: Performed by: STUDENT IN AN ORGANIZED HEALTH CARE EDUCATION/TRAINING PROGRAM

## 2024-08-16 PROCEDURE — 6360000002 HC RX W HCPCS: Performed by: NURSE PRACTITIONER

## 2024-08-16 PROCEDURE — 2580000003 HC RX 258: Performed by: INTERNAL MEDICINE

## 2024-08-16 PROCEDURE — 6370000000 HC RX 637 (ALT 250 FOR IP): Performed by: INTERNAL MEDICINE

## 2024-08-16 PROCEDURE — 2060000000 HC ICU INTERMEDIATE R&B

## 2024-08-16 PROCEDURE — 2580000003 HC RX 258: Performed by: HOSPITALIST

## 2024-08-16 PROCEDURE — 85025 COMPLETE CBC W/AUTO DIFF WBC: CPT

## 2024-08-16 PROCEDURE — 82962 GLUCOSE BLOOD TEST: CPT

## 2024-08-16 PROCEDURE — 84100 ASSAY OF PHOSPHORUS: CPT

## 2024-08-16 PROCEDURE — 36415 COLL VENOUS BLD VENIPUNCTURE: CPT

## 2024-08-16 PROCEDURE — 83735 ASSAY OF MAGNESIUM: CPT

## 2024-08-16 RX ORDER — CALCIUM GLUCONATE 20 MG/ML
2000 INJECTION, SOLUTION INTRAVENOUS ONCE
Status: DISCONTINUED | OUTPATIENT
Start: 2024-08-16 | End: 2024-08-16 | Stop reason: SDUPTHER

## 2024-08-16 RX ORDER — CALCIUM GLUCONATE 20 MG/ML
1000 INJECTION, SOLUTION INTRAVENOUS
Status: COMPLETED | OUTPATIENT
Start: 2024-08-16 | End: 2024-08-16

## 2024-08-16 RX ORDER — MAGNESIUM SULFATE IN WATER 40 MG/ML
2000 INJECTION, SOLUTION INTRAVENOUS ONCE
Status: COMPLETED | OUTPATIENT
Start: 2024-08-16 | End: 2024-08-16

## 2024-08-16 RX ORDER — TRAMADOL HYDROCHLORIDE 50 MG/1
50 TABLET ORAL EVERY 12 HOURS PRN
Status: DISCONTINUED | OUTPATIENT
Start: 2024-08-16 | End: 2024-08-19 | Stop reason: HOSPADM

## 2024-08-16 RX ORDER — TRAMADOL HYDROCHLORIDE 50 MG/1
100 TABLET ORAL EVERY 12 HOURS PRN
Status: DISCONTINUED | OUTPATIENT
Start: 2024-08-16 | End: 2024-08-19 | Stop reason: HOSPADM

## 2024-08-16 RX ADMIN — CALCIUM GLUCONATE 1000 MG: 20 INJECTION, SOLUTION INTRAVENOUS at 11:25

## 2024-08-16 RX ADMIN — MAGNESIUM SULFATE HEPTAHYDRATE 2000 MG: 40 INJECTION, SOLUTION INTRAVENOUS at 16:52

## 2024-08-16 RX ADMIN — HEPARIN SODIUM 5000 UNITS: 5000 INJECTION INTRAVENOUS; SUBCUTANEOUS at 20:58

## 2024-08-16 RX ADMIN — PANTOPRAZOLE SODIUM 40 MG: 40 TABLET, DELAYED RELEASE ORAL at 06:27

## 2024-08-16 RX ADMIN — SODIUM CHLORIDE, POTASSIUM CHLORIDE, SODIUM LACTATE AND CALCIUM CHLORIDE: 600; 310; 30; 20 INJECTION, SOLUTION INTRAVENOUS at 14:09

## 2024-08-16 RX ADMIN — MICAFUNGIN SODIUM 100 MG: 100 INJECTION, POWDER, LYOPHILIZED, FOR SOLUTION INTRAVENOUS at 14:11

## 2024-08-16 RX ADMIN — SEVELAMER CARBONATE 800 MG: 800 TABLET, FILM COATED ORAL at 10:17

## 2024-08-16 RX ADMIN — HEPARIN SODIUM 5000 UNITS: 5000 INJECTION INTRAVENOUS; SUBCUTANEOUS at 06:27

## 2024-08-16 RX ADMIN — CALCIUM GLUCONATE 1000 MG: 20 INJECTION, SOLUTION INTRAVENOUS at 10:21

## 2024-08-16 RX ADMIN — CALCIUM GLUCONATE 1000 MG: 20 INJECTION, SOLUTION INTRAVENOUS at 12:16

## 2024-08-16 RX ADMIN — HEPARIN SODIUM 5000 UNITS: 5000 INJECTION INTRAVENOUS; SUBCUTANEOUS at 14:05

## 2024-08-16 RX ADMIN — SEVELAMER CARBONATE 800 MG: 800 TABLET, FILM COATED ORAL at 14:04

## 2024-08-16 RX ADMIN — SODIUM CHLORIDE, PRESERVATIVE FREE 10 ML: 5 INJECTION INTRAVENOUS at 21:55

## 2024-08-16 ASSESSMENT — PAIN SCALES - GENERAL: PAINLEVEL_OUTOF10: 0

## 2024-08-16 NOTE — CARE COORDINATION
Social Work/Discharge Planning:  Kevin with [facility] states pre-cert to LTAC is still pending.  Will continue to follow and wait for pre-cert.  Electronically signed by JUWAN Amador on 8/16/2024 at 10:39 AM

## 2024-08-17 LAB
ALBUMIN SERPL-MCNC: 1.9 G/DL (ref 3.5–5.2)
ALP SERPL-CCNC: 104 U/L (ref 40–129)
ALT SERPL-CCNC: 19 U/L (ref 0–40)
ANION GAP SERPL CALCULATED.3IONS-SCNC: 10 MMOL/L (ref 7–16)
AST SERPL-CCNC: 46 U/L (ref 0–39)
BASOPHILS # BLD: 0.06 K/UL (ref 0–0.2)
BASOPHILS NFR BLD: 1 % (ref 0–2)
BILIRUB SERPL-MCNC: 0.3 MG/DL (ref 0–1.2)
BUN SERPL-MCNC: 26 MG/DL (ref 6–20)
CA-I BLD-SCNC: 1.05 MMOL/L (ref 1.15–1.33)
CALCIUM SERPL-MCNC: 7.5 MG/DL (ref 8.6–10.2)
CHLORIDE SERPL-SCNC: 103 MMOL/L (ref 98–107)
CO2 SERPL-SCNC: 25 MMOL/L (ref 22–29)
CREAT SERPL-MCNC: 3.3 MG/DL (ref 0.7–1.2)
EOSINOPHIL # BLD: 0.27 K/UL (ref 0.05–0.5)
EOSINOPHILS RELATIVE PERCENT: 5 % (ref 0–6)
ERYTHROCYTE [DISTWIDTH] IN BLOOD BY AUTOMATED COUNT: 16.7 % (ref 11.5–15)
GFR, ESTIMATED: 24 ML/MIN/1.73M2
GLUCOSE BLD-MCNC: 102 MG/DL (ref 74–99)
GLUCOSE BLD-MCNC: 107 MG/DL (ref 74–99)
GLUCOSE BLD-MCNC: 113 MG/DL (ref 74–99)
GLUCOSE BLD-MCNC: 76 MG/DL (ref 74–99)
GLUCOSE SERPL-MCNC: 77 MG/DL (ref 74–99)
HCT VFR BLD AUTO: 30.3 % (ref 37–54)
HGB BLD-MCNC: 9.4 G/DL (ref 12.5–16.5)
IMM GRANULOCYTES # BLD AUTO: 0.2 K/UL (ref 0–0.58)
IMM GRANULOCYTES NFR BLD: 3 % (ref 0–5)
LACTATE BLDV-SCNC: 2.3 MMOL/L (ref 0.5–2.2)
LYMPHOCYTES NFR BLD: 2 K/UL (ref 1.5–4)
LYMPHOCYTES RELATIVE PERCENT: 34 % (ref 20–42)
MAGNESIUM SERPL-MCNC: 1.8 MG/DL (ref 1.6–2.6)
MCH RBC QN AUTO: 28.7 PG (ref 26–35)
MCHC RBC AUTO-ENTMCNC: 31 G/DL (ref 32–34.5)
MCV RBC AUTO: 92.7 FL (ref 80–99.9)
MONOCYTES NFR BLD: 0.46 K/UL (ref 0.1–0.95)
MONOCYTES NFR BLD: 8 % (ref 2–12)
NEUTROPHILS NFR BLD: 50 % (ref 43–80)
NEUTS SEG NFR BLD: 2.96 K/UL (ref 1.8–7.3)
PHOSPHATE SERPL-MCNC: 4.3 MG/DL (ref 2.5–4.5)
PLATELET # BLD AUTO: 188 K/UL (ref 130–450)
PMV BLD AUTO: 9.4 FL (ref 7–12)
POTASSIUM SERPL-SCNC: 3.5 MMOL/L (ref 3.5–5)
PROT SERPL-MCNC: 6.7 G/DL (ref 6.4–8.3)
RBC # BLD AUTO: 3.27 M/UL (ref 3.8–5.8)
SODIUM SERPL-SCNC: 138 MMOL/L (ref 132–146)
WBC OTHER # BLD: 6 K/UL (ref 4.5–11.5)

## 2024-08-17 PROCEDURE — 6370000000 HC RX 637 (ALT 250 FOR IP): Performed by: STUDENT IN AN ORGANIZED HEALTH CARE EDUCATION/TRAINING PROGRAM

## 2024-08-17 PROCEDURE — 99232 SBSQ HOSP IP/OBS MODERATE 35: CPT | Performed by: STUDENT IN AN ORGANIZED HEALTH CARE EDUCATION/TRAINING PROGRAM

## 2024-08-17 PROCEDURE — 83735 ASSAY OF MAGNESIUM: CPT

## 2024-08-17 PROCEDURE — 82962 GLUCOSE BLOOD TEST: CPT

## 2024-08-17 PROCEDURE — 36415 COLL VENOUS BLD VENIPUNCTURE: CPT

## 2024-08-17 PROCEDURE — 83605 ASSAY OF LACTIC ACID: CPT

## 2024-08-17 PROCEDURE — 6360000002 HC RX W HCPCS: Performed by: NURSE PRACTITIONER

## 2024-08-17 PROCEDURE — 2580000003 HC RX 258: Performed by: HOSPITALIST

## 2024-08-17 PROCEDURE — 80053 COMPREHEN METABOLIC PANEL: CPT

## 2024-08-17 PROCEDURE — 2580000003 HC RX 258: Performed by: INTERNAL MEDICINE

## 2024-08-17 PROCEDURE — 6360000002 HC RX W HCPCS: Performed by: STUDENT IN AN ORGANIZED HEALTH CARE EDUCATION/TRAINING PROGRAM

## 2024-08-17 PROCEDURE — 84100 ASSAY OF PHOSPHORUS: CPT

## 2024-08-17 PROCEDURE — 82330 ASSAY OF CALCIUM: CPT

## 2024-08-17 PROCEDURE — 2060000000 HC ICU INTERMEDIATE R&B

## 2024-08-17 PROCEDURE — 2500000003 HC RX 250 WO HCPCS: Performed by: STUDENT IN AN ORGANIZED HEALTH CARE EDUCATION/TRAINING PROGRAM

## 2024-08-17 PROCEDURE — 2580000003 HC RX 258: Performed by: STUDENT IN AN ORGANIZED HEALTH CARE EDUCATION/TRAINING PROGRAM

## 2024-08-17 PROCEDURE — 85025 COMPLETE CBC W/AUTO DIFF WBC: CPT

## 2024-08-17 RX ORDER — CALCIUM GLUCONATE 20 MG/ML
1000 INJECTION, SOLUTION INTRAVENOUS
Status: COMPLETED | OUTPATIENT
Start: 2024-08-17 | End: 2024-08-17

## 2024-08-17 RX ORDER — SODIUM CHLORIDE 9 MG/ML
INJECTION, SOLUTION INTRAVENOUS ONCE
Status: COMPLETED | OUTPATIENT
Start: 2024-08-17 | End: 2024-08-17

## 2024-08-17 RX ADMIN — HEPARIN SODIUM 5000 UNITS: 5000 INJECTION INTRAVENOUS; SUBCUTANEOUS at 20:42

## 2024-08-17 RX ADMIN — SODIUM CHLORIDE, POTASSIUM CHLORIDE, SODIUM LACTATE AND CALCIUM CHLORIDE: 600; 310; 30; 20 INJECTION, SOLUTION INTRAVENOUS at 23:43

## 2024-08-17 RX ADMIN — CALCIUM GLUCONATE 1000 MG: 20 INJECTION, SOLUTION INTRAVENOUS at 14:06

## 2024-08-17 RX ADMIN — HEPARIN SODIUM 5000 UNITS: 5000 INJECTION INTRAVENOUS; SUBCUTANEOUS at 14:01

## 2024-08-17 RX ADMIN — SODIUM CHLORIDE, POTASSIUM CHLORIDE, SODIUM LACTATE AND CALCIUM CHLORIDE: 600; 310; 30; 20 INJECTION, SOLUTION INTRAVENOUS at 09:33

## 2024-08-17 RX ADMIN — PANTOPRAZOLE SODIUM 40 MG: 40 TABLET, DELAYED RELEASE ORAL at 06:18

## 2024-08-17 RX ADMIN — SODIUM CHLORIDE: 9 INJECTION, SOLUTION INTRAVENOUS at 15:55

## 2024-08-17 RX ADMIN — CALCIUM GLUCONATE 1000 MG: 20 INJECTION, SOLUTION INTRAVENOUS at 14:56

## 2024-08-17 RX ADMIN — MICAFUNGIN SODIUM 100 MG: 100 INJECTION, POWDER, LYOPHILIZED, FOR SOLUTION INTRAVENOUS at 12:10

## 2024-08-17 RX ADMIN — HEPARIN SODIUM 5000 UNITS: 5000 INJECTION INTRAVENOUS; SUBCUTANEOUS at 06:18

## 2024-08-17 RX ADMIN — TRAMADOL HYDROCHLORIDE 50 MG: 50 TABLET ORAL at 09:32

## 2024-08-17 RX ADMIN — SODIUM CHLORIDE, POTASSIUM CHLORIDE, SODIUM LACTATE AND CALCIUM CHLORIDE: 600; 310; 30; 20 INJECTION, SOLUTION INTRAVENOUS at 16:56

## 2024-08-17 RX ADMIN — SODIUM CHLORIDE, PRESERVATIVE FREE 10 ML: 5 INJECTION INTRAVENOUS at 20:41

## 2024-08-17 RX ADMIN — CALCIUM GLUCONATE 1000 MG: 20 INJECTION, SOLUTION INTRAVENOUS at 15:39

## 2024-08-17 ASSESSMENT — PAIN DESCRIPTION - DESCRIPTORS: DESCRIPTORS: ACHING;SHARP;SHOOTING

## 2024-08-17 ASSESSMENT — PAIN DESCRIPTION - PAIN TYPE: TYPE: SURGICAL PAIN

## 2024-08-17 ASSESSMENT — PAIN DESCRIPTION - LOCATION: LOCATION: BUTTOCKS

## 2024-08-17 ASSESSMENT — PAIN DESCRIPTION - ONSET: ONSET: ON-GOING

## 2024-08-17 ASSESSMENT — PAIN SCALES - GENERAL
PAINLEVEL_OUTOF10: 0
PAINLEVEL_OUTOF10: 5

## 2024-08-17 ASSESSMENT — PAIN - FUNCTIONAL ASSESSMENT: PAIN_FUNCTIONAL_ASSESSMENT: PREVENTS OR INTERFERES SOME ACTIVE ACTIVITIES AND ADLS

## 2024-08-17 ASSESSMENT — PAIN DESCRIPTION - FREQUENCY: FREQUENCY: CONTINUOUS

## 2024-08-17 ASSESSMENT — PAIN DESCRIPTION - ORIENTATION: ORIENTATION: RIGHT;LEFT

## 2024-08-17 NOTE — CONSULTS
Nutrition Note    Pt requested consult for DM education. Went to provide education, pt declined d/t feeling tired. Left education materials for pt w/ contact information and encouraged him to call if he has any questions/concerns. Will follow up per policy.     Electronically signed by SAMIRA GAY MPH, RD, LD on 8/17/24 at 2:40 PM EDT    Contact: x [phone]    
Critical Care Admit/Consult Note     Patient - David Wetzel   MRN -  [id number]   [id number]   - 1985      Date of Admission -  2024  1:11 PM  Date of evaluation -  2024   Hospital Day - 0    ADMIT/CONSULT DETAILS     Reason for Admit/Consult   Septic Shock    Consulting Service/Physician   Consulting - Leander Monahan MD  Primary Care Physician - No primary care provider on file.         HPI   Mr. Wetzel presented to the ED for evaluation of hypotension and altered mentation.  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  He was managed with an insulin infusion with antibiotics that included vanc, zosyn, and clindamycin.  He required further debridement , , , and  at which time a wound vac was placed unsuccessfully due to stool contamination, habitus and wound location ultimately continued on wet to dry dressings.  Admission course was complicated by CONSTANTIN.  He discharged to  LTAC on .      On  he developed fever of 101 and hypotension unresponsive to 4L fluid bolus and was transferred to the ED. He arrived with calderón and FMS in place.  In the ED his BP was 60s/30s which failed to improve after an additional liter of fluid and was started on levo. Labs revealed leukocytosis 18.1 (recently 5.0 on ), procal 78.89, lactic acidosis 3.0, hypoglycemia 60, and CONSTANTIN with BUN/Cr 39/7.6. Nephrology was consulted for CONSTANTIN and may need dialysis.  He is admitted to the ICU for further management of septic shock.        Past Medical History         Diagnosis Date    Blood circulation, collateral     Chronic acquired lymphedema     Keratoconus     Morbid obesity (HCC)     Sepsis (HCC) 2014    due to cellulitis of LLE        Past Surgical 
GENERAL SURGERY  CONSULT NOTE    Patient's Name/Date of Birth: David Wetzel / 1985    Date: August 12, 2024      Chief Complaint:   Chief Complaint   Patient presents with    hypotension       Physician Consulted: Dr. Karimi  Reason for Consult: Recent Homa's  Referring Physician: Dr. Law TALLEY  David Wetzel is a 39 y.o. male who presents for evaluation of septic shock with hypotension and fever of 101 from his LTAC. Patient has a recent hx of Homa's gangrene which was managed at  in which he had several takeback debridements for source control and was able to be discharged to [facility] on 8/8. General surgery consulted secondary to recent hx of Homa's in the setting of septic shock.  Patient confused this morning, reports he is unsure of why he was brought to the ED.  Patient additionally thought that the surgical rounding team where his daughters and his friends.  Denies any pain around his prior debridement sites.    Patient admitted to the ICU from the ED, requiring Levophed for BP control. Given 1 dose Zosyn and Vanc in the ED. Labs are grossly abnormal with Cr 7.4, Ca 5.7, LA 3, WBC 18, hgb 8.1, INR 2.       Past Medical History:   Diagnosis Date    Blood circulation, collateral     Chronic acquired lymphedema     Keratoconus     Morbid obesity (HCC)     Sepsis (HCC) 11/14/2014    due to cellulitis of LLE       Past Surgical History:   Procedure Laterality Date    BRONCHOSCOPY  04/18/2017    OTHER SURGICAL HISTORY Left 04/11/2017    direct excision of medial thigh lymphedema w/Dr Hutchinson, Disection of lymph node w/Dr Henley ,and control of bleeding w/dr DOWD    OTHER SURGICAL HISTORY Bilateral 08/22/2017    split thickness skin graft to l thigh, Right thigh shin graft site       Medications Prior to Admission:    Prior to Admission medications    Medication Sig Start Date End Date Taking? Authorizing Provider   bacitracin 500 UNIT/GM ointment Apply topically 2 times daily.  Patient not taking: 
Nephrology Consult Note  Patient's Name: David Wetzel  4:01 PM  8/11/2024    Nephrologist: SOO Nevarez MD    Reason for Consult:  CONSTANTIN  Requesting Physician: Dr. MARIA LUZ Stanley    Chief Complaint:  Hypotension    History Obtained From:  patient and past medical records    History of Present Ilness:    David Wetzel is a 39 y.o. male with prior history of DM2 and HTN. Pt was initially admitted to [facility] for purulence from the R buttock with spread to deep subcut fat with necrotizing fasciitis and Homa's. He went for debridement in the OR, post op he was transferred to [facility]. His initial cr were 0.6 until 7/21 when it erin to 0.99, then began to trend up. He was intubated on 7/19/24 at [facility]. On 7/20/24 he went to OR for debridement and required vasopressin and norepi ,for hemodynamic support. He was extubated and ultimately on 8/8/24 transferred to [facility]. Pt developed worsening hypotension on 8/11/24 unresponsive to 4L vol resuscitation and was sent to [facility] ED. In the ED SBP in the 80's and bolused with 2 additional liters of 0.9NS and started on norepi. Cr 7.4, HCO3 15, Lactic Acid 3.0, WBC 18.4. Pt was awake alert and oriented. He is  [place]. He states he is not SOB, but feels weak and tired. He denies pain. He has a calderón and FMS    Past Medical History:   Diagnosis Date    Blood circulation, collateral     Chronic acquired lymphedema     Keratoconus     Morbid obesity (HCC)     Sepsis (HCC) 11/14/2014    due to cellulitis of LLE       Past Surgical History:   Procedure Laterality Date    BRONCHOSCOPY  04/18/2017    OTHER SURGICAL HISTORY Left 04/11/2017    direct excision of medial thigh lymphedema w/Dr Hutchinson, Disection of lymph node w/Dr Henley ,and control of bleeding w/dr DOWD    OTHER SURGICAL HISTORY Bilateral 08/22/2017    split thickness skin graft to l thigh, Right thigh shin graft site       Family History   Problem Relation Age of Onset    Diabetes Mother     Diabetes Father     Heart Disease Father  
Measures:  Height: 175.3 cm (5' 9\")  Ideal Body Weight (IBW): 160 lbs (73 kg)    Admission Body Weight: 159.8 kg (352 lb 4.7 oz) (8/11)  Current Body Weight: 159 kg (350 lb 8.5 oz), 219.1 % IBW. Weight Source: Bed Scale (8/12)  Current BMI (kg/m2): 51.7  Usual Body Weight: 181 kg (399 lb 0.5 oz) (7/19 admit to  wt)  % Weight Change (Calculated): -12.2                    BMI Categories: Obese Class 3 (BMI 40.0 or greater)    Estimated Daily Nutrient Needs:  Energy Requirements Based On: Kcal/kg  Weight Used for Energy Requirements: Current  Energy (kcal/day):  (11-14 isidro/kg)  Weight Used for Protein Requirements: Ideal  Protein (g/day):  (1.3-1.5 g/kg as marly)  Method Used for Fluid Requirements: Other (Comment)  Fluid (ml/day): per Critical care and Renal management    Nutrition Diagnosis:   Increased nutrient needs related to other (comment) (nutrient demand to promote wound healing) as evidenced by wounds, poor intake prior to admission    Nutrition Interventions:   Food and/or Nutrient Delivery: Continue Current Diet, Start Oral Nutrition Supplement  Nutrition Education/Counseling: No recommendation at this time  Coordination of Nutrition Care: Continue to monitor while inpatient       Goals:     Goals: PO intake 50% or greater, by next RD assessment       Nutrition Monitoring and Evaluation:   Behavioral-Environmental Outcomes: None Identified  Food/Nutrient Intake Outcomes: Food and Nutrient Intake, Supplement Intake  Physical Signs/Symptoms Outcomes: Biochemical Data, GI Status, Fluid Status or Edema, Hemodynamic Status, Nutrition Focused Physical Findings, Skin, Weight    Discharge Planning:    Too soon to determine     Josy Chairez RD, CNSC, LD  Contact: x [phone]    
culture in process.  CT head and CT abdomen pelvis did not show any sign of infection.  Patient received IV vancomycin and Zosyn and I got consult further recommendations.    Past Medical History:        Diagnosis Date    Blood circulation, collateral     Chronic acquired lymphedema     Keratoconus     Morbid obesity (HCC)     Sepsis (HCC) 11/14/2014    due to cellulitis of LLE       Past Surgical History:        Procedure Laterality Date    BRONCHOSCOPY  04/18/2017    OTHER SURGICAL HISTORY Left 04/11/2017    direct excision of medial thigh lymphedema w/Dr Hutchinson, Disection of lymph node w/Dr Henley ,and control of bleeding w/dr DOWD    OTHER SURGICAL HISTORY Bilateral 08/22/2017    split thickness skin graft to l thigh, Right thigh shin graft site       Current Medications:   Scheduled Meds:   sodium chloride flush  5-40 mL IntraVENous 2 times per day    pantoprazole (PROTONIX) 40 mg in sodium chloride (PF) 0.9 % 10 mL injection  40 mg IntraVENous Daily    heparin (porcine)  5,000 Units SubCUTAneous 3 times per day    insulin lispro  0-4 Units SubCUTAneous Q6H     Continuous Infusions:   norepinephrine 4 mcg/min (08/12/24 0939)    sodium bicarbonate 150 mEq in dextrose 5 % 1,000 mL infusion 150 mL/hr at 08/11/24 2248    sodium chloride       PRN Meds:ondansetron, sodium chloride flush, sodium chloride, polyethylene glycol, acetaminophen **OR** acetaminophen    Allergies:  Patient has no known allergies.    Social History:   Social History     Socioeconomic History    Marital status: Single    Number of children: 1    Years of education: 11   Tobacco Use    Smoking status: Every Day     Types: E-Cigarettes, Cigarettes    Smokeless tobacco: Never   Substance and Sexual Activity    Alcohol use: No    Drug use: No    Sexual activity: Not Currently     Social Determinants of Health     Financial Resource Strain: Medium Risk (8/9/2024)    Received from [facility]    Overall Financial Resource Strain ([facility])

## 2024-08-18 LAB
ALBUMIN SERPL-MCNC: 1.9 G/DL (ref 3.5–5.2)
ALP SERPL-CCNC: 97 U/L (ref 40–129)
ALT SERPL-CCNC: 17 U/L (ref 0–40)
ANION GAP SERPL CALCULATED.3IONS-SCNC: 11 MMOL/L (ref 7–16)
ANION GAP SERPL CALCULATED.3IONS-SCNC: 13 MMOL/L (ref 7–16)
AST SERPL-CCNC: 44 U/L (ref 0–39)
BASOPHILS # BLD: 0.05 K/UL (ref 0–0.2)
BASOPHILS NFR BLD: 1 % (ref 0–2)
BILIRUB SERPL-MCNC: 0.4 MG/DL (ref 0–1.2)
BUN SERPL-MCNC: 22 MG/DL (ref 6–20)
BUN SERPL-MCNC: 23 MG/DL (ref 6–20)
CA-I BLD-SCNC: 1.06 MMOL/L (ref 1.15–1.33)
CALCIUM SERPL-MCNC: 7 MG/DL (ref 8.6–10.2)
CALCIUM SERPL-MCNC: 7.2 MG/DL (ref 8.6–10.2)
CHLORIDE SERPL-SCNC: 105 MMOL/L (ref 98–107)
CHLORIDE SERPL-SCNC: 107 MMOL/L (ref 98–107)
CO2 SERPL-SCNC: 21 MMOL/L (ref 22–29)
CO2 SERPL-SCNC: 23 MMOL/L (ref 22–29)
CREAT SERPL-MCNC: 2.4 MG/DL (ref 0.7–1.2)
CREAT SERPL-MCNC: 2.9 MG/DL (ref 0.7–1.2)
EOSINOPHIL # BLD: 0.28 K/UL (ref 0.05–0.5)
EOSINOPHILS RELATIVE PERCENT: 5 % (ref 0–6)
ERYTHROCYTE [DISTWIDTH] IN BLOOD BY AUTOMATED COUNT: 16.6 % (ref 11.5–15)
GFR, ESTIMATED: 28 ML/MIN/1.73M2
GFR, ESTIMATED: 34 ML/MIN/1.73M2
GLUCOSE BLD-MCNC: 107 MG/DL (ref 74–99)
GLUCOSE BLD-MCNC: 255 MG/DL (ref 74–99)
GLUCOSE BLD-MCNC: 64 MG/DL (ref 74–99)
GLUCOSE BLD-MCNC: 79 MG/DL (ref 74–99)
GLUCOSE BLD-MCNC: 89 MG/DL (ref 74–99)
GLUCOSE SERPL-MCNC: 73 MG/DL (ref 74–99)
GLUCOSE SERPL-MCNC: 95 MG/DL (ref 74–99)
HCT VFR BLD AUTO: 30.8 % (ref 37–54)
HGB BLD-MCNC: 9.7 G/DL (ref 12.5–16.5)
IMM GRANULOCYTES # BLD AUTO: 0.12 K/UL (ref 0–0.58)
IMM GRANULOCYTES NFR BLD: 2 % (ref 0–5)
LACTATE BLDV-SCNC: 1.8 MMOL/L (ref 0.5–2.2)
LYMPHOCYTES NFR BLD: 1.61 K/UL (ref 1.5–4)
LYMPHOCYTES RELATIVE PERCENT: 27 % (ref 20–42)
MAGNESIUM SERPL-MCNC: 1.6 MG/DL (ref 1.6–2.6)
MCH RBC QN AUTO: 28.9 PG (ref 26–35)
MCHC RBC AUTO-ENTMCNC: 31.5 G/DL (ref 32–34.5)
MCV RBC AUTO: 91.7 FL (ref 80–99.9)
MONOCYTES NFR BLD: 0.45 K/UL (ref 0.1–0.95)
MONOCYTES NFR BLD: 8 % (ref 2–12)
NEUTROPHILS NFR BLD: 58 % (ref 43–80)
NEUTS SEG NFR BLD: 3.45 K/UL (ref 1.8–7.3)
PHOSPHATE SERPL-MCNC: 4.3 MG/DL (ref 2.5–4.5)
PLATELET # BLD AUTO: 179 K/UL (ref 130–450)
PMV BLD AUTO: 9 FL (ref 7–12)
POTASSIUM SERPL-SCNC: 3.3 MMOL/L (ref 3.5–5)
POTASSIUM SERPL-SCNC: 3.4 MMOL/L (ref 3.5–5)
PROT SERPL-MCNC: 6.7 G/DL (ref 6.4–8.3)
RBC # BLD AUTO: 3.36 M/UL (ref 3.8–5.8)
SODIUM SERPL-SCNC: 139 MMOL/L (ref 132–146)
SODIUM SERPL-SCNC: 141 MMOL/L (ref 132–146)
WBC OTHER # BLD: 6 K/UL (ref 4.5–11.5)

## 2024-08-18 PROCEDURE — 2580000003 HC RX 258: Performed by: STUDENT IN AN ORGANIZED HEALTH CARE EDUCATION/TRAINING PROGRAM

## 2024-08-18 PROCEDURE — 2500000003 HC RX 250 WO HCPCS: Performed by: STUDENT IN AN ORGANIZED HEALTH CARE EDUCATION/TRAINING PROGRAM

## 2024-08-18 PROCEDURE — 83735 ASSAY OF MAGNESIUM: CPT

## 2024-08-18 PROCEDURE — 99232 SBSQ HOSP IP/OBS MODERATE 35: CPT | Performed by: STUDENT IN AN ORGANIZED HEALTH CARE EDUCATION/TRAINING PROGRAM

## 2024-08-18 PROCEDURE — 80053 COMPREHEN METABOLIC PANEL: CPT

## 2024-08-18 PROCEDURE — 6370000000 HC RX 637 (ALT 250 FOR IP): Performed by: STUDENT IN AN ORGANIZED HEALTH CARE EDUCATION/TRAINING PROGRAM

## 2024-08-18 PROCEDURE — 2500000003 HC RX 250 WO HCPCS: Performed by: INTERNAL MEDICINE

## 2024-08-18 PROCEDURE — 82330 ASSAY OF CALCIUM: CPT

## 2024-08-18 PROCEDURE — 2580000003 HC RX 258: Performed by: HOSPITALIST

## 2024-08-18 PROCEDURE — 82962 GLUCOSE BLOOD TEST: CPT

## 2024-08-18 PROCEDURE — 84100 ASSAY OF PHOSPHORUS: CPT

## 2024-08-18 PROCEDURE — 83605 ASSAY OF LACTIC ACID: CPT

## 2024-08-18 PROCEDURE — 36415 COLL VENOUS BLD VENIPUNCTURE: CPT

## 2024-08-18 PROCEDURE — 2060000000 HC ICU INTERMEDIATE R&B

## 2024-08-18 PROCEDURE — 82248 BILIRUBIN DIRECT: CPT

## 2024-08-18 PROCEDURE — 6360000002 HC RX W HCPCS: Performed by: HOSPITALIST

## 2024-08-18 PROCEDURE — 85025 COMPLETE CBC W/AUTO DIFF WBC: CPT

## 2024-08-18 PROCEDURE — 6360000002 HC RX W HCPCS: Performed by: STUDENT IN AN ORGANIZED HEALTH CARE EDUCATION/TRAINING PROGRAM

## 2024-08-18 PROCEDURE — 6360000002 HC RX W HCPCS: Performed by: INTERNAL MEDICINE

## 2024-08-18 PROCEDURE — 6360000002 HC RX W HCPCS: Performed by: NURSE PRACTITIONER

## 2024-08-18 PROCEDURE — 2580000003 HC RX 258: Performed by: INTERNAL MEDICINE

## 2024-08-18 PROCEDURE — 80048 BASIC METABOLIC PNL TOTAL CA: CPT

## 2024-08-18 RX ORDER — CALCIUM GLUCONATE 20 MG/ML
2000 INJECTION, SOLUTION INTRAVENOUS
Status: COMPLETED | OUTPATIENT
Start: 2024-08-18 | End: 2024-08-19

## 2024-08-18 RX ORDER — POTASSIUM CHLORIDE 7.45 MG/ML
10 INJECTION INTRAVENOUS
Status: COMPLETED | OUTPATIENT
Start: 2024-08-18 | End: 2024-08-18

## 2024-08-18 RX ORDER — CALCIUM GLUCONATE 20 MG/ML
1000 INJECTION, SOLUTION INTRAVENOUS
Status: COMPLETED | OUTPATIENT
Start: 2024-08-18 | End: 2024-08-18

## 2024-08-18 RX ORDER — SODIUM CHLORIDE 9 MG/ML
INJECTION, SOLUTION INTRAVENOUS ONCE
Status: COMPLETED | OUTPATIENT
Start: 2024-08-18 | End: 2024-08-18

## 2024-08-18 RX ORDER — MAGNESIUM SULFATE IN WATER 40 MG/ML
2000 INJECTION, SOLUTION INTRAVENOUS ONCE
Status: COMPLETED | OUTPATIENT
Start: 2024-08-18 | End: 2024-08-18

## 2024-08-18 RX ADMIN — TRAMADOL HYDROCHLORIDE 100 MG: 50 TABLET ORAL at 03:58

## 2024-08-18 RX ADMIN — CALCIUM GLUCONATE 2000 MG: 20 INJECTION, SOLUTION INTRAVENOUS at 17:43

## 2024-08-18 RX ADMIN — CALCIUM GLUCONATE 2000 MG: 20 INJECTION, SOLUTION INTRAVENOUS at 19:42

## 2024-08-18 RX ADMIN — CALCIUM GLUCONATE 2000 MG: 20 INJECTION, SOLUTION INTRAVENOUS at 23:36

## 2024-08-18 RX ADMIN — HEPARIN SODIUM 5000 UNITS: 5000 INJECTION INTRAVENOUS; SUBCUTANEOUS at 06:00

## 2024-08-18 RX ADMIN — CALCIUM GLUCONATE 1000 MG: 20 INJECTION, SOLUTION INTRAVENOUS at 10:46

## 2024-08-18 RX ADMIN — POTASSIUM CHLORIDE 10 MEQ: 7.46 INJECTION, SOLUTION INTRAVENOUS at 21:29

## 2024-08-18 RX ADMIN — CALCIUM GLUCONATE 1000 MG: 20 INJECTION, SOLUTION INTRAVENOUS at 11:49

## 2024-08-18 RX ADMIN — CALCIUM GLUCONATE 1000 MG: 20 INJECTION, SOLUTION INTRAVENOUS at 10:32

## 2024-08-18 RX ADMIN — CALCIUM GLUCONATE 2000 MG: 20 INJECTION, SOLUTION INTRAVENOUS at 21:29

## 2024-08-18 RX ADMIN — MAGNESIUM SULFATE HEPTAHYDRATE 2000 MG: 40 INJECTION, SOLUTION INTRAVENOUS at 08:27

## 2024-08-18 RX ADMIN — POTASSIUM CHLORIDE 10 MEQ: 7.46 INJECTION, SOLUTION INTRAVENOUS at 10:44

## 2024-08-18 RX ADMIN — HEPARIN SODIUM 5000 UNITS: 5000 INJECTION INTRAVENOUS; SUBCUTANEOUS at 13:34

## 2024-08-18 RX ADMIN — SODIUM CHLORIDE, POTASSIUM CHLORIDE, SODIUM LACTATE AND CALCIUM CHLORIDE: 600; 310; 30; 20 INJECTION, SOLUTION INTRAVENOUS at 16:44

## 2024-08-18 RX ADMIN — SODIUM CHLORIDE, POTASSIUM CHLORIDE, SODIUM LACTATE AND CALCIUM CHLORIDE: 600; 310; 30; 20 INJECTION, SOLUTION INTRAVENOUS at 23:19

## 2024-08-18 RX ADMIN — POTASSIUM CHLORIDE 10 MEQ: 7.46 INJECTION, SOLUTION INTRAVENOUS at 19:09

## 2024-08-18 RX ADMIN — MICAFUNGIN SODIUM 100 MG: 100 INJECTION, POWDER, LYOPHILIZED, FOR SOLUTION INTRAVENOUS at 13:20

## 2024-08-18 RX ADMIN — POTASSIUM CHLORIDE 10 MEQ: 7.46 INJECTION, SOLUTION INTRAVENOUS at 17:43

## 2024-08-18 RX ADMIN — HEPARIN SODIUM 5000 UNITS: 5000 INJECTION INTRAVENOUS; SUBCUTANEOUS at 20:34

## 2024-08-18 RX ADMIN — POTASSIUM CHLORIDE 10 MEQ: 7.46 INJECTION, SOLUTION INTRAVENOUS at 13:44

## 2024-08-18 RX ADMIN — SODIUM CHLORIDE, PRESERVATIVE FREE 10 ML: 5 INJECTION INTRAVENOUS at 20:34

## 2024-08-18 RX ADMIN — CALCIUM GLUCONATE 1000 MG: 20 INJECTION, SOLUTION INTRAVENOUS at 13:21

## 2024-08-18 RX ADMIN — POTASSIUM CHLORIDE 10 MEQ: 7.46 INJECTION, SOLUTION INTRAVENOUS at 20:32

## 2024-08-18 RX ADMIN — PANTOPRAZOLE SODIUM 40 MG: 40 TABLET, DELAYED RELEASE ORAL at 06:37

## 2024-08-18 RX ADMIN — POTASSIUM CHLORIDE 10 MEQ: 7.46 INJECTION, SOLUTION INTRAVENOUS at 09:34

## 2024-08-18 RX ADMIN — ONDANSETRON 4 MG: 2 INJECTION INTRAMUSCULAR; INTRAVENOUS at 20:34

## 2024-08-18 RX ADMIN — SODIUM CHLORIDE: 9 INJECTION, SOLUTION INTRAVENOUS at 08:24

## 2024-08-18 RX ADMIN — POTASSIUM CHLORIDE 10 MEQ: 7.46 INJECTION, SOLUTION INTRAVENOUS at 12:07

## 2024-08-18 RX ADMIN — SODIUM CHLORIDE, POTASSIUM CHLORIDE, SODIUM LACTATE AND CALCIUM CHLORIDE: 600; 310; 30; 20 INJECTION, SOLUTION INTRAVENOUS at 06:36

## 2024-08-18 ASSESSMENT — PAIN SCALES - GENERAL: PAINLEVEL_OUTOF10: 10

## 2024-08-18 ASSESSMENT — PAIN DESCRIPTION - ORIENTATION: ORIENTATION: RIGHT;LEFT

## 2024-08-18 ASSESSMENT — PAIN - FUNCTIONAL ASSESSMENT: PAIN_FUNCTIONAL_ASSESSMENT: PREVENTS OR INTERFERES WITH MANY ACTIVE NOT PASSIVE ACTIVITIES

## 2024-08-18 ASSESSMENT — PAIN DESCRIPTION - FREQUENCY: FREQUENCY: INTERMITTENT

## 2024-08-18 ASSESSMENT — PAIN SCALES - WONG BAKER: WONGBAKER_NUMERICALRESPONSE: NO HURT

## 2024-08-18 ASSESSMENT — PAIN DESCRIPTION - PAIN TYPE: TYPE: ACUTE PAIN

## 2024-08-18 ASSESSMENT — PAIN DESCRIPTION - LOCATION: LOCATION: BUTTOCKS

## 2024-08-18 ASSESSMENT — PAIN DESCRIPTION - DESCRIPTORS: DESCRIPTORS: SHARP;SORE;TENDER

## 2024-08-19 ENCOUNTER — APPOINTMENT (OUTPATIENT)
Dept: WOUND CARE | Facility: HOSPITAL | Age: 39
End: 2024-08-19
Payer: COMMERCIAL

## 2024-08-19 VITALS
TEMPERATURE: 98 F | DIASTOLIC BLOOD PRESSURE: 54 MMHG | BODY MASS INDEX: 46.65 KG/M2 | WEIGHT: 315 LBS | RESPIRATION RATE: 16 BRPM | OXYGEN SATURATION: 96 % | HEIGHT: 69 IN | SYSTOLIC BLOOD PRESSURE: 114 MMHG | HEART RATE: 73 BPM

## 2024-08-19 LAB
ALBUMIN SERPL-MCNC: 1.7 G/DL (ref 3.5–5.2)
ALP SERPL-CCNC: 88 U/L (ref 40–129)
ALT SERPL-CCNC: 14 U/L (ref 0–40)
ANION GAP SERPL CALCULATED.3IONS-SCNC: 10 MMOL/L (ref 7–16)
ANION GAP SERPL CALCULATED.3IONS-SCNC: 8 MMOL/L (ref 7–16)
AST SERPL-CCNC: 35 U/L (ref 0–39)
BASOPHILS # BLD: 0.08 K/UL (ref 0–0.2)
BASOPHILS NFR BLD: 2 % (ref 0–2)
BILIRUB DIRECT SERPL-MCNC: <0.2 MG/DL (ref 0–0.3)
BILIRUB SERPL-MCNC: 0.4 MG/DL (ref 0–1.2)
BUN SERPL-MCNC: 18 MG/DL (ref 6–20)
BUN SERPL-MCNC: 19 MG/DL (ref 6–20)
CA-I BLD-SCNC: 1.12 MMOL/L (ref 1.15–1.33)
CA-I BLD-SCNC: 1.14 MMOL/L (ref 1.15–1.33)
CALCIUM SERPL-MCNC: 7.7 MG/DL (ref 8.6–10.2)
CALCIUM SERPL-MCNC: 7.8 MG/DL (ref 8.6–10.2)
CHLORIDE SERPL-SCNC: 105 MMOL/L (ref 98–107)
CHLORIDE SERPL-SCNC: 105 MMOL/L (ref 98–107)
CO2 SERPL-SCNC: 23 MMOL/L (ref 22–29)
CO2 SERPL-SCNC: 23 MMOL/L (ref 22–29)
CREAT SERPL-MCNC: 2.1 MG/DL (ref 0.7–1.2)
CREAT SERPL-MCNC: 2.4 MG/DL (ref 0.7–1.2)
EOSINOPHIL # BLD: 0.33 K/UL (ref 0.05–0.5)
EOSINOPHILS RELATIVE PERCENT: 8 % (ref 0–6)
ERYTHROCYTE [DISTWIDTH] IN BLOOD BY AUTOMATED COUNT: 16.3 % (ref 11.5–15)
GFR, ESTIMATED: 34 ML/MIN/1.73M2
GFR, ESTIMATED: 39 ML/MIN/1.73M2
GLUCOSE BLD-MCNC: 158 MG/DL (ref 74–99)
GLUCOSE BLD-MCNC: 79 MG/DL (ref 74–99)
GLUCOSE BLD-MCNC: 94 MG/DL (ref 74–99)
GLUCOSE SERPL-MCNC: 79 MG/DL (ref 74–99)
GLUCOSE SERPL-MCNC: 81 MG/DL (ref 74–99)
HCT VFR BLD AUTO: 28.2 % (ref 37–54)
HGB BLD-MCNC: 8.8 G/DL (ref 12.5–16.5)
LACTATE BLDV-SCNC: 2 MMOL/L (ref 0.5–2.2)
LYMPHOCYTES NFR BLD: 0.94 K/UL (ref 1.5–4)
LYMPHOCYTES RELATIVE PERCENT: 23 % (ref 20–42)
MAGNESIUM SERPL-MCNC: 1.5 MG/DL (ref 1.6–2.6)
MAGNESIUM SERPL-MCNC: 1.6 MG/DL (ref 1.6–2.6)
MCH RBC QN AUTO: 29.2 PG (ref 26–35)
MCHC RBC AUTO-ENTMCNC: 31.2 G/DL (ref 32–34.5)
MCV RBC AUTO: 93.7 FL (ref 80–99.9)
METAMYELOCYTES ABSOLUTE COUNT: 0.04 K/UL (ref 0–0.12)
METAMYELOCYTES: 1 % (ref 0–1)
MONOCYTES NFR BLD: 0.16 K/UL (ref 0.1–0.95)
MONOCYTES NFR BLD: 4 % (ref 2–12)
NEUTROPHILS NFR BLD: 62 % (ref 43–80)
NEUTS SEG NFR BLD: 2.54 K/UL (ref 1.8–7.3)
PHOSPHATE SERPL-MCNC: 4.1 MG/DL (ref 2.5–4.5)
PLATELET # BLD AUTO: 172 K/UL (ref 130–450)
PMV BLD AUTO: 9 FL (ref 7–12)
POTASSIUM SERPL-SCNC: 3.4 MMOL/L (ref 3.5–5)
POTASSIUM SERPL-SCNC: 4.2 MMOL/L (ref 3.5–5)
PROT SERPL-MCNC: 6.1 G/DL (ref 6.4–8.3)
RBC # BLD AUTO: 3.01 M/UL (ref 3.8–5.8)
RBC # BLD: ABNORMAL 10*6/UL
SODIUM SERPL-SCNC: 136 MMOL/L (ref 132–146)
SODIUM SERPL-SCNC: 138 MMOL/L (ref 132–146)
WBC OTHER # BLD: 4.1 K/UL (ref 4.5–11.5)

## 2024-08-19 PROCEDURE — 80048 BASIC METABOLIC PNL TOTAL CA: CPT

## 2024-08-19 PROCEDURE — 36415 COLL VENOUS BLD VENIPUNCTURE: CPT

## 2024-08-19 PROCEDURE — 84100 ASSAY OF PHOSPHORUS: CPT

## 2024-08-19 PROCEDURE — 82330 ASSAY OF CALCIUM: CPT

## 2024-08-19 PROCEDURE — 80053 COMPREHEN METABOLIC PANEL: CPT

## 2024-08-19 PROCEDURE — 6360000002 HC RX W HCPCS: Performed by: NURSE PRACTITIONER

## 2024-08-19 PROCEDURE — 82962 GLUCOSE BLOOD TEST: CPT

## 2024-08-19 PROCEDURE — 2500000003 HC RX 250 WO HCPCS: Performed by: INTERNAL MEDICINE

## 2024-08-19 PROCEDURE — 2580000003 HC RX 258: Performed by: INTERNAL MEDICINE

## 2024-08-19 PROCEDURE — 6360000002 HC RX W HCPCS: Performed by: INTERNAL MEDICINE

## 2024-08-19 PROCEDURE — 83735 ASSAY OF MAGNESIUM: CPT

## 2024-08-19 PROCEDURE — 6370000000 HC RX 637 (ALT 250 FOR IP): Performed by: STUDENT IN AN ORGANIZED HEALTH CARE EDUCATION/TRAINING PROGRAM

## 2024-08-19 PROCEDURE — 83605 ASSAY OF LACTIC ACID: CPT

## 2024-08-19 PROCEDURE — 2580000003 HC RX 258: Performed by: HOSPITALIST

## 2024-08-19 PROCEDURE — 85025 COMPLETE CBC W/AUTO DIFF WBC: CPT

## 2024-08-19 PROCEDURE — 99239 HOSP IP/OBS DSCHRG MGMT >30: CPT | Performed by: STUDENT IN AN ORGANIZED HEALTH CARE EDUCATION/TRAINING PROGRAM

## 2024-08-19 RX ORDER — CALCIUM GLUCONATE 20 MG/ML
1000 INJECTION, SOLUTION INTRAVENOUS
Status: COMPLETED | OUTPATIENT
Start: 2024-08-19 | End: 2024-08-19

## 2024-08-19 RX ORDER — TRAMADOL HYDROCHLORIDE 50 MG/1
50 TABLET ORAL EVERY 12 HOURS PRN
Qty: 10 TABLET | Refills: 0 | Status: ON HOLD | DISCHARGE
Start: 2024-08-19 | End: 2024-08-24

## 2024-08-19 RX ORDER — ERGOCALCIFEROL 1.25 MG/1
50000 CAPSULE ORAL WEEKLY
Status: ON HOLD | DISCHARGE
Start: 2024-08-20

## 2024-08-19 RX ORDER — MAGNESIUM SULFATE IN WATER 40 MG/ML
2000 INJECTION, SOLUTION INTRAVENOUS ONCE
Status: COMPLETED | OUTPATIENT
Start: 2024-08-19 | End: 2024-08-19

## 2024-08-19 RX ORDER — POTASSIUM CHLORIDE 7.45 MG/ML
10 INJECTION INTRAVENOUS
Status: COMPLETED | OUTPATIENT
Start: 2024-08-19 | End: 2024-08-19

## 2024-08-19 RX ADMIN — HEPARIN SODIUM 5000 UNITS: 5000 INJECTION INTRAVENOUS; SUBCUTANEOUS at 06:53

## 2024-08-19 RX ADMIN — CALCIUM GLUCONATE 1000 MG: 20 INJECTION, SOLUTION INTRAVENOUS at 09:49

## 2024-08-19 RX ADMIN — MAGNESIUM SULFATE HEPTAHYDRATE 2000 MG: 40 INJECTION, SOLUTION INTRAVENOUS at 08:42

## 2024-08-19 RX ADMIN — HEPARIN SODIUM 5000 UNITS: 5000 INJECTION INTRAVENOUS; SUBCUTANEOUS at 13:48

## 2024-08-19 RX ADMIN — POTASSIUM CHLORIDE 10 MEQ: 7.46 INJECTION, SOLUTION INTRAVENOUS at 08:49

## 2024-08-19 RX ADMIN — POTASSIUM CHLORIDE 10 MEQ: 7.46 INJECTION, SOLUTION INTRAVENOUS at 09:50

## 2024-08-19 RX ADMIN — SODIUM CHLORIDE, POTASSIUM CHLORIDE, SODIUM LACTATE AND CALCIUM CHLORIDE: 600; 310; 30; 20 INJECTION, SOLUTION INTRAVENOUS at 06:42

## 2024-08-19 RX ADMIN — SODIUM CHLORIDE, PRESERVATIVE FREE 10 ML: 5 INJECTION INTRAVENOUS at 08:49

## 2024-08-19 RX ADMIN — SODIUM CHLORIDE, POTASSIUM CHLORIDE, SODIUM LACTATE AND CALCIUM CHLORIDE: 600; 310; 30; 20 INJECTION, SOLUTION INTRAVENOUS at 13:48

## 2024-08-19 RX ADMIN — PANTOPRAZOLE SODIUM 40 MG: 40 TABLET, DELAYED RELEASE ORAL at 06:53

## 2024-08-19 RX ADMIN — CALCIUM GLUCONATE 1000 MG: 20 INJECTION, SOLUTION INTRAVENOUS at 12:14

## 2024-08-19 RX ADMIN — CALCIUM GLUCONATE 1000 MG: 20 INJECTION, SOLUTION INTRAVENOUS at 08:48

## 2024-08-19 RX ADMIN — CALCIUM GLUCONATE 1000 MG: 20 INJECTION, SOLUTION INTRAVENOUS at 11:08

## 2024-08-19 NOTE — PLAN OF CARE
Problem: Skin/Tissue Integrity  Goal: Absence of new skin breakdown  Description: 1.  Monitor for areas of redness and/or skin breakdown  2.  Assess vascular access sites hourly  3.  Every 4-6 hours minimum:  Change oxygen saturation probe site  4.  Every 4-6 hours:  If on nasal continuous positive airway pressure, respiratory therapy assess nares and determine need for appliance change or resting period.  8/13/2024 0930 by Yessenia Cornelius, RN  Outcome: Progressing     
  Problem: Skin/Tissue Integrity  Goal: Absence of new skin breakdown  Description: 1.  Monitor for areas of redness and/or skin breakdown  2.  Assess vascular access sites hourly  3.  Every 4-6 hours minimum:  Change oxygen saturation probe site  4.  Every 4-6 hours:  If on nasal continuous positive airway pressure, respiratory therapy assess nares and determine need for appliance change or resting period.  8/14/2024 0957 by Leander Amin RN  Outcome: Progressing  8/14/2024 0407 by Ceci Raza RN  Outcome: Progressing     Problem: Discharge Planning  Goal: Discharge to home or other facility with appropriate resources  8/14/2024 0957 by Leander Amin RN  Outcome: Progressing  8/14/2024 0407 by Ceci Raza RN  Outcome: Progressing     Problem: Pain  Goal: Verbalizes/displays adequate comfort level or baseline comfort level  8/14/2024 0957 by Leander Amin RN  Outcome: Progressing  8/14/2024 0407 by Ceci Raza RN  Outcome: Progressing     Problem: Safety - Adult  Goal: Free from fall injury  8/14/2024 0957 by Leander Amin RN  Outcome: Progressing  Flowsheets (Taken 8/14/2024 0651 by Ceci Raza, RN)  Free From Fall Injury: Instruct family/caregiver on patient safety  8/14/2024 0407 by Ceci Raza RN  Outcome: Progressing     Problem: ABCDS Injury Assessment  Goal: Absence of physical injury  8/14/2024 0957 by Leander Amin RN  Outcome: Progressing  Flowsheets (Taken 8/14/2024 0651 by Ceci Raza, RN)  Absence of Physical Injury: Implement safety measures based on patient assessment  8/14/2024 0407 by Ceci Raza RN  Outcome: Progressing     Problem: Nutrition Deficit:  Goal: Optimize nutritional status  8/14/2024 0957 by Leander Amin RN  Outcome: Progressing  8/14/2024 0407 by Ceci Raza RN  Outcome: Progressing     
  Problem: Skin/Tissue Integrity  Goal: Absence of new skin breakdown  Description: 1.  Monitor for areas of redness and/or skin breakdown  2.  Assess vascular access sites hourly  3.  Every 4-6 hours minimum:  Change oxygen saturation probe site  4.  Every 4-6 hours:  If on nasal continuous positive airway pressure, respiratory therapy assess nares and determine need for appliance change or resting period.  Outcome: Progressing     Problem: Discharge Planning  Goal: Discharge to home or other facility with appropriate resources  Outcome: Progressing     Problem: Pain  Goal: Verbalizes/displays adequate comfort level or baseline comfort level  Outcome: Progressing     Problem: Safety - Adult  Goal: Free from fall injury  Outcome: Progressing     Problem: ABCDS Injury Assessment  Goal: Absence of physical injury  Outcome: Progressing     Problem: Nutrition Deficit:  Goal: Optimize nutritional status  Outcome: Progressing     
  Problem: Skin/Tissue Integrity  Goal: Absence of new skin breakdown  Description: 1.  Monitor for areas of redness and/or skin breakdown  2.  Assess vascular access sites hourly  3.  Every 4-6 hours minimum:  Change oxygen saturation probe site  4.  Every 4-6 hours:  If on nasal continuous positive airway pressure, respiratory therapy assess nares and determine need for appliance change or resting period.  Outcome: Progressing     Problem: Discharge Planning  Goal: Discharge to home or other facility with appropriate resources  Outcome: Progressing     Problem: Pain  Goal: Verbalizes/displays adequate comfort level or baseline comfort level  Outcome: Progressing     Problem: Safety - Adult  Goal: Free from fall injury  Outcome: Progressing     Problem: ABCDS Injury Assessment  Goal: Absence of physical injury  Outcome: Progressing     Problem: Nutrition Deficit:  Goal: Optimize nutritional status  Outcome: Progressing     
  Problem: Skin/Tissue Integrity  Goal: Absence of new skin breakdown  Description: 1.  Monitor for areas of redness and/or skin breakdown  2.  Assess vascular access sites hourly  3.  Every 4-6 hours minimum:  Change oxygen saturation probe site  4.  Every 4-6 hours:  If on nasal continuous positive airway pressure, respiratory therapy assess nares and determine need for appliance change or resting period.  Outcome: Progressing     Problem: Discharge Planning  Goal: Discharge to home or other facility with appropriate resources  Outcome: Progressing     Problem: Pain  Goal: Verbalizes/displays adequate comfort level or baseline comfort level  Outcome: Progressing     Problem: Safety - Adult  Goal: Free from fall injury  Outcome: Progressing     Problem: ABCDS Injury Assessment  Goal: Absence of physical injury  Outcome: Progressing     Problem: Nutrition Deficit:  Goal: Optimize nutritional status  Outcome: Progressing     Problem: Chronic Conditions and Co-morbidities  Goal: Patient's chronic conditions and co-morbidity symptoms are monitored and maintained or improved  Outcome: Progressing     
  Problem: Skin/Tissue Integrity  Goal: Absence of new skin breakdown  Description: 1.  Monitor for areas of redness and/or skin breakdown  2.  Assess vascular access sites hourly  3.  Every 4-6 hours minimum:  Change oxygen saturation probe site  4.  Every 4-6 hours:  If on nasal continuous positive airway pressure, respiratory therapy assess nares and determine need for appliance change or resting period.  Outcome: Progressing     Problem: Discharge Planning  Goal: Discharge to home or other facility with appropriate resources  Outcome: Progressing     Problem: Pain  Goal: Verbalizes/displays adequate comfort level or baseline comfort level  Outcome: Progressing     Problem: Safety - Adult  Goal: Free from fall injury  Outcome: Progressing     Problem: ABCDS Injury Assessment  Goal: Absence of physical injury  Outcome: Progressing     Problem: Nutrition Deficit:  Goal: Optimize nutritional status  Outcome: Progressing     Problem: Chronic Conditions and Co-morbidities  Goal: Patient's chronic conditions and co-morbidity symptoms are monitored and maintained or improved  Outcome: Progressing     
  Problem: Skin/Tissue Integrity  Goal: Absence of new skin breakdown  Description: 1.  Monitor for areas of redness and/or skin breakdown  2.  Assess vascular access sites hourly  3.  Every 4-6 hours minimum:  Change oxygen saturation probe site  4.  Every 4-6 hours:  If on nasal continuous positive airway pressure, respiratory therapy assess nares and determine need for appliance change or resting period.  Outcome: Progressing     Problem: Discharge Planning  Goal: Discharge to home or other facility with appropriate resources  Outcome: Progressing     Problem: Pain  Goal: Verbalizes/displays adequate comfort level or baseline comfort level  Outcome: Progressing     Problem: Safety - Adult  Goal: Free from fall injury  Outcome: Progressing     Problem: ABCDS Injury Assessment  Goal: Absence of physical injury  Outcome: Progressing     Problem: Nutrition Deficit:  Goal: Optimize nutritional status  Outcome: Progressing  Flowsheets (Taken 8/15/2024 1425 by Josy Chairez, RD, CNSC, LD)  Nutrient intake appropriate for improving, restoring, or maintaining nutritional needs:   Assess nutritional status and recommend course of action   Monitor oral intake, labs, and treatment plans   Recommend appropriate diets, oral nutritional supplements, and vitamin/mineral supplements     
(Taken 8/12/2024 1341)  Free From Fall Injury: Instruct family/caregiver on patient safety  8/12/2024 0112 by Caroline Blake RN  Outcome: Progressing  Flowsheets (Taken 8/12/2024 0039)  Free From Fall Injury: Instruct family/caregiver on patient safety     Problem: ABCDS Injury Assessment  Goal: Absence of physical injury  8/12/2024 1344 by Mahsa Monique RN  Outcome: Progressing  Flowsheets (Taken 8/12/2024 1341)  Absence of Physical Injury: Implement safety measures based on patient assessment  8/12/2024 0112 by Caroline Blake RN  Outcome: Progressing     Problem: Nutrition Deficit:  Goal: Optimize nutritional status  Outcome: Progressing

## 2024-08-19 NOTE — DISCHARGE SUMMARY
[facility] Hospitalist Physician Discharge Summary       [facility]  [address] [address]  [phone]          Activity level: As tolerated     Dispo: LTAC      Condition on discharge: Stable     Patient ID:  David Wetzel  [id number]  39 y.o.  1985    Admit date: 8/11/2024    Discharge date and time:  8/19/2024  3:00 PM    Admission Diagnoses: Principal Problem:    Septic shock (HCC)  Active Problems:    Morbid obesity (HCC)    CONSTANTIN (acute kidney injury) (HCC)    Anemia    Hypocalcemia    Type 2 diabetes mellitus without complication, without long-term current use of insulin (HCC)  Resolved Problems:    * No resolved hospital problems. *      Discharge Diagnoses: Principal Problem:    Septic shock (HCC)  Active Problems:    Morbid obesity (HCC)    CONSTANTIN (acute kidney injury) (HCC)    Anemia    Hypocalcemia    Type 2 diabetes mellitus without complication, without long-term current use of insulin (HCC)  Resolved Problems:    * No resolved hospital problems. *      Consults:  IP CONSULT TO CRITICAL CARE  IP CONSULT TO INTERNAL MEDICINE  IP CONSULT TO GENERAL SURGERY  IP CONSULT TO INFECTIOUS DISEASES  IP CONSULT TO DIETITIAN  IP CONSULT TO VASCULAR ACCESS TEAM  IP CONSULT TO DIETITIAN  IP CONSULT TO VASCULAR ACCESS TEAM  IP CONSULT TO VASCULAR ACCESS TEAM  IP CONSULT TO VASCULAR ACCESS TEAM    Procedures:     Hospital Course:   Patient David Wetzel is a 39 y.o. with PMHx homa gangrene, DM, morbid obesity who presented with Hypocalcemia [E83.51]  Hypomagnesemia [E83.42]  Hypoalbuminemia [E88.09]  Septic shock (HCC) [A41.9, R65.21]  Acute renal failure, unspecified acute renal failure type (HCC) [N17.9]  Patient presented from LTAC with AMS, fever and hypotension as well as CONSTANTIN.  Had a recent history of Homa's gangrene, was evaluated by surgery who deemed this was not the source for his fever and hypotension.  He was evaluated by infectious disease as

## 2024-08-19 NOTE — CARE COORDINATION
Transition of Care-Auth obtained. [facility] scheduled to  patient at [phone]. Nurse to nurse to call report at [facility] is [phone]. Envelope created in soft chart.    Estrellita ANTHONYN, RN  [facility]

## 2024-08-20 NOTE — PROGRESS NOTES
[facility] Hospitalist Progress Note    Admitting Date and Time: 8/11/2024  1:11 PM  Admit Dx: Hypocalcemia [E83.51]  Hypomagnesemia [E83.42]  Hypoalbuminemia [E88.09]  Septic shock (HCC) [A41.9, R65.21]  Acute renal failure, unspecified acute renal failure type (HCC) [N17.9]    Subjective:  Patient is being followed for Hypocalcemia [E83.51]  Hypomagnesemia [E83.42]  Hypoalbuminemia [E88.09]  Septic shock (HCC) [A41.9, R65.21]  Acute renal failure, unspecified acute renal failure type (HCC) [N17.9]   Pt was seen and examined today. Denies any new issues    ROS: denies fever, chills, cp, sob, n/v, HA unless stated above.     calcium gluconate  1,000 mg IntraVENous Q1H    pantoprazole  40 mg Oral QAM AC    vitamin D  50,000 Units Oral Weekly    micafungin  100 mg IntraVENous Daily    sevelamer  800 mg Oral TID WC    insulin lispro  0-4 Units SubCUTAneous 4x Daily AC & HS    sodium chloride flush  5-40 mL IntraVENous 2 times per day    heparin (porcine)  5,000 Units SubCUTAneous 3 times per day     traMADol, 50 mg, Q12H PRN   Or  traMADol, 100 mg, Q12H PRN  ondansetron, 4 mg, Q6H PRN  sodium chloride flush, 5-40 mL, PRN  sodium chloride, , PRN  polyethylene glycol, 17 g, Daily PRN  acetaminophen, 650 mg, Q6H PRN   Or  acetaminophen, 650 mg, Q6H PRN         Objective:    BP (!) 108/57   Pulse 80   Temp 98.1 °F (36.7 °C) (Oral)   Resp 18   Ht 1.753 m (5' 9\")   Wt (!) 178.6 kg (393 lb 11.2 oz)   SpO2 99%   BMI 58.14 kg/m²     General Appearance: alert and oriented to person, place and time and in no acute distress  Skin: warm and dry  Head: normocephalic and atraumatic  Pulmonary/Chest: clear to auscultation bilaterally- no wheezes, rales or rhonchi, normal air movement, no respiratory distress  Cardiovascular: normal rate, normal S1 and S2  Abdomen: soft, non-tender, non-distended, normal bowel sounds  Extremities: no cyanosis, no clubbing and 2+ edema      Recent Labs     08/15/24  0434 08/15/24  1114 
       [facility] Hospitalist Progress Note    Admitting Date and Time: 8/11/2024  1:11 PM  Admit Dx: Hypocalcemia [E83.51]  Hypomagnesemia [E83.42]  Hypoalbuminemia [E88.09]  Septic shock (HCC) [A41.9, R65.21]  Acute renal failure, unspecified acute renal failure type (HCC) [N17.9]    Subjective:  Patient is being followed for Hypocalcemia [E83.51]  Hypomagnesemia [E83.42]  Hypoalbuminemia [E88.09]  Septic shock (HCC) [A41.9, R65.21]  Acute renal failure, unspecified acute renal failure type (HCC) [N17.9]   Pt was seen and examined today. Denies any new issues    ROS: denies fever, chills, cp, sob, n/v, HA unless stated above.     calcium gluconate  1,000 mg IntraVENous Q1H    pantoprazole  40 mg Oral QAM AC    vitamin D  50,000 Units Oral Weekly    micafungin  100 mg IntraVENous Daily    insulin lispro  0-4 Units SubCUTAneous 4x Daily AC & HS    sodium chloride flush  5-40 mL IntraVENous 2 times per day    heparin (porcine)  5,000 Units SubCUTAneous 3 times per day     traMADol, 50 mg, Q12H PRN   Or  traMADol, 100 mg, Q12H PRN  ondansetron, 4 mg, Q6H PRN  sodium chloride flush, 5-40 mL, PRN  sodium chloride, , PRN  polyethylene glycol, 17 g, Daily PRN  acetaminophen, 650 mg, Q6H PRN   Or  acetaminophen, 650 mg, Q6H PRN         Objective:    BP (!) 99/55   Pulse 79   Temp 97.9 °F (36.6 °C) (Oral)   Resp 18   Ht 1.753 m (5' 9\")   Wt (!) 173.3 kg (382 lb 1.6 oz)   SpO2 98%   BMI 56.43 kg/m²     General Appearance: alert and oriented to person, place and time and in no acute distress  Skin: warm and dry  Head: normocephalic and atraumatic  Pulmonary/Chest: clear to auscultation bilaterally- no wheezes, rales or rhonchi, normal air movement, no respiratory distress  Cardiovascular: normal rate, normal S1 and S2  Abdomen: soft, non-tender, non-distended, normal bowel sounds  Extremities: no cyanosis, no clubbing and 1+ edema      Recent Labs     08/15/24  1114 08/16/24  0557 08/17/24  1012    140 138   K 
       [facility] Hospitalist Progress Note    Admitting Date and Time: 8/11/2024  1:11 PM  Admit Dx: Hypocalcemia [E83.51]  Hypomagnesemia [E83.42]  Hypoalbuminemia [E88.09]  Septic shock (HCC) [A41.9, R65.21]  Acute renal failure, unspecified acute renal failure type (HCC) [N17.9]    Subjective:  Patient is being followed for Hypocalcemia [E83.51]  Hypomagnesemia [E83.42]  Hypoalbuminemia [E88.09]  Septic shock (HCC) [A41.9, R65.21]  Acute renal failure, unspecified acute renal failure type (HCC) [N17.9]   Pt was seen and examined today. Denies any new issues    ROS: denies fever, chills, cp, sob, n/v, HA unless stated above.     potassium chloride  10 mEq IntraVENous Q1H    calcium gluconate  1,000 mg IntraVENous Q1H    pantoprazole  40 mg Oral QAM AC    vitamin D  50,000 Units Oral Weekly    micafungin  100 mg IntraVENous Daily    insulin lispro  0-4 Units SubCUTAneous 4x Daily AC & HS    sodium chloride flush  5-40 mL IntraVENous 2 times per day    heparin (porcine)  5,000 Units SubCUTAneous 3 times per day     traMADol, 50 mg, Q12H PRN   Or  traMADol, 100 mg, Q12H PRN  ondansetron, 4 mg, Q6H PRN  sodium chloride flush, 5-40 mL, PRN  sodium chloride, , PRN  polyethylene glycol, 17 g, Daily PRN  acetaminophen, 650 mg, Q6H PRN   Or  acetaminophen, 650 mg, Q6H PRN         Objective:    BP (!) 96/48   Pulse 90   Temp 98.6 °F (37 °C) (Oral)   Resp 18   Ht 1.753 m (5' 9\")   Wt (!) 173.1 kg (381 lb 9.6 oz)   SpO2 96%   BMI 56.35 kg/m²     General Appearance: alert and oriented to person, place and time and in no acute distress  Skin: warm and dry  Head: normocephalic and atraumatic  Pulmonary/Chest: clear to auscultation bilaterally- no wheezes, rales or rhonchi, normal air movement, no respiratory distress  Cardiovascular: normal rate, normal S1 and S2  Abdomen: soft, non-tender, non-distended, normal bowel sounds  Extremities: no cyanosis, no clubbing and 1+ edema      Recent Labs     08/16/24  0593 
     [facility] Hospitalist   Progress Note    Admitting Date and Time: 8/11/2024  1:11 PM  Admit Dx: Hypocalcemia [E83.51]  Hypomagnesemia [E83.42]  Hypoalbuminemia [E88.09]  Septic shock (HCC) [A41.9, R65.21]  Acute renal failure, unspecified acute renal failure type (HCC) [N17.9]    Subjective: Admitted on 12th with change in mental status, from LT[facility], s/p debridement of for near gangrene in past, known diabetes, morbid obesity, worsened kidney functions, presented with fever and hypotension, evaluated by renal for stage III CONSTANTIN, as per general surgery wound healthy, patient remained on bicarb drip, also hypocalcemia was supplemented.  Impression from ID of sepsis from unclear source, patient remains on IV meropenem and micafungin, patient with issues with chronic lymphedema,    Patient was admitted with Hypocalcemia [E83.51]  Hypomagnesemia [E83.42]  Hypoalbuminemia [E88.09]  Septic shock (HCC) [A41.9, R65.21]  Acute renal failure, unspecified acute renal failure type (HCC) [N17.9]. Patient feels little better, at this time awake, alert, in ICU, able to communicate, denies any complaints.  Does feel better.    Per RN: Likely will be downgraded soon.    ROS: denies fever, chills, cp, sob, n/v, HA unless stated above.     [START ON 8/15/2024] pantoprazole  40 mg Oral QAM AC    vitamin D  50,000 Units Oral Weekly    micafungin  100 mg IntraVENous Daily    sevelamer  800 mg Oral TID WC    insulin lispro  0-4 Units SubCUTAneous 4x Daily AC & HS    sodium chloride flush  5-40 mL IntraVENous 2 times per day    heparin (porcine)  5,000 Units SubCUTAneous 3 times per day     ondansetron, 4 mg, Q6H PRN  sodium chloride flush, 5-40 mL, PRN  sodium chloride, , PRN  polyethylene glycol, 17 g, Daily PRN  acetaminophen, 650 mg, Q6H PRN   Or  acetaminophen, 650 mg, Q6H PRN         Objective:    /71   Pulse 62   Temp 97.6 °F (36.4 °C) (Axillary)   Resp 17   Ht 1.753 m (5' 9\")   Wt (!) 158.9 kg (350 lb 5 
   Critical Care Team - Daily Progress Note      Date and time: 8/12/2024 11:16 AM  Patient's name:  David Wetzel  Medical Record Number: [id number]  Patient's account/billing number: [id number]  Patient's YOB: 1985  Age: 39 y.o.  Date of Admission: 8/11/2024  1:11 PM  Length of stay during current admission: 1      Primary Care Physician: No primary care provider on file.  ICU Attending Physician: Dr. Wiggins    Code Status: Full Code    Reason for ICU admission:   Septic shock    SUBJECTIVE:     OVERNIGHT EVENTS:      No fevers overnight.  Patient with appetite this morning.  Patient reports pain to his chronic sacral wounds.    Intake/Output:   No intake/output data recorded.  No intake/output data recorded.    Awake and following commands: Yes  Current Ventilation: - No ventilator support  Secretions: none  Sedation: none  Paralyzed: No  Vasopressors: norepinephrine    ROS:  Unless stated above, ROS is otherwise negative.    Initial HPI + past overnight events:   Mr. Wetzel presented to the ED for evaluation of hypotension and altered mentation.  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  He was managed with an insulin infusion with antibiotics that included vanc, zosyn, and clindamycin.  He required further debridement 7/20, 7/21, 7/23, and 7/26 at which time a wound vac was placed unsuccessfully due to stool contamination, habitus and wound location ultimately continued on wet to dry dressings.  Admission course was complicated by CONSTANTIN.  He discharged to  [facility] on 8/8.       On 8/11 he developed fever of 101 and hypotension unresponsive to 4L fluid bolus and was transferred to the ED. He arrived with calderón and FMS in place.  In the ED his BP was 60s/30s which failed 
   Critical Care Team - Daily Progress Note      Date and time: 8/13/2024 1:19 PM  Patient's name:  David Wetzel  Medical Record Number: [id number]  Patient's account/billing number: [id number]  Patient's YOB: 1985  Age: 39 y.o.  Date of Admission: 8/11/2024  1:11 PM  Length of stay during current admission: 2      Primary Care Physician: No primary care provider on file.  ICU Attending Physician: Dr. Wiggins    Code Status: Full Code    Reason for ICU admission:   Septic shock    SUBJECTIVE:     OVERNIGHT EVENTS:    Overnight patient tolerated oral intake.  Patient states that pain has been improved.  No fevers overnight.    Intake/Output:   I/O this shift:  In: 332.5 [Blood:332.5]  Out: -   I/O last 3 completed shifts:  In: 6274.8 [I.V.:5455.8; IV Piggyback:819]  Out: 2125 [Urine:1725; Stool:400]    Awake and following commands: Yes  Current Ventilation: - No ventilator support  Secretions: none  Sedation: none  Paralyzed: No  Vasopressors: norepinephrine    ROS:  Unless stated above, ROS is otherwise negative.    Initial HPI + past overnight events:   Mr. Wetzel presented to the ED for evaluation of hypotension and altered mentation.  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  He was managed with an insulin infusion with antibiotics that included vanc, zosyn, and clindamycin.  He required further debridement 7/20, 7/21, 7/23, and 7/26 at which time a wound vac was placed unsuccessfully due to stool contamination, habitus and wound location ultimately continued on wet to dry dressings.  Admission course was complicated by CONSTANTIN.  He discharged to  LTAC on 8/8.       On 8/11 he developed fever of 101 and hypotension unresponsive to 4L fluid bolus and was transferred to 
  Physician Progress Note      PATIENT:               TARAN WHITMAN  [facility] #:                  [id number]  :                       1985  ADMIT DATE:       2024 1:11 PM  DISCH DATE:        2024 4:53 PM  RESPONDING  PROVIDER #:        Myles Aldrich MD          QUERY TEXT:    Patient admitted with Sepsis. Noted documentation of Malnutrition per critical   care consult note on 24. In order to support the diagnosis of   malnutrition, please include additional clinical indicators in your   documentation.  Or please document if the diagnosis of malnutrition has been   ruled out after further study.    The medical record reflects the following:  Risk Factors: Sepsis; septic shock; Diabetes  Clinical Indicators:  24: Critical Care consult note: Malnutrition consult dietician.  24: dietician consult note: Malnutrition Status:  At risk for   malnutrition.  Context:  Acute Illness.  Findings of the 6 clinical   characteristics of malnutrition:  Energy Intake:  50% or less of estimated   energy requirements for 5 or more days.  Weight Loss:  Unable to assess (some   component of fluid balance/edema component) .  Body Fat Loss:  No significant   body fat loss  Muscle Mass Loss:  No significant muscle mass loss .  Fluid Accumulation:    Unable to assess Generalized (multiple factors (CONTSANTIN, hx lymphedema)).     Strength:  Not Performed  8/15/24: Dietician: Malnutrition Status:  At risk for malnutrition.  Context:    Acute Illness.  Findings of the 6 clinical characteristics of malnutrition:    Energy Intake:  50% or less of estimated energy requirements for 5 or more   days.  Weight Loss:  Unable to assess (some component of fluid balance/edema   component).  Body Fat Loss:  No significant body fat loss  Muscle Mass Loss:  No significant muscle mass loss.  Fluid Accumulation:    Unable to assess Generalized (multiple factors (CONSTANTIN, hx lymphedema)).     Strength:  Not Performed  BMI 
4 Eyes Skin Assessment     NAME:  David Wetzel  YOB: 1985  MEDICAL RECORD NUMBER:  [id number]    The patient is being assessed for  Admission    I agree that at least one RN has performed a thorough Head to Toe Skin Assessment on the patient. ALL assessment sites listed below have been assessed.      Areas assessed by both nurses:    Head, Face, Ears, Shoulders, Back, Chest, Arms, Elbows, Hands, Sacrum. Buttock, Coccyx, Ischium, Legs. Feet and Heels, and Under Medical Devices         Does the Patient have a Wound? Yes wound(s) were present on assessment. LDA wound assessment was Initiated and completed by RN       Ralph Prevention initiated by RN: Yes  Wound Care Orders initiated by RN: Yes    Pressure Injury (Stage 3,4, Unstageable, DTI, NWPT, and Complex wounds) if present, place Wound referral order by RN under : Yes    New Ostomies, if present place, Ostomy referral order under : No     Nurse 1 eSignature: Electronically signed by Caroline Blake RN on 8/12/24 at 4:12 AM EDT    **SHARE this note so that the co-signing nurse can place an eSignature**    Nurse 2 eSignature: Electronically signed by Rita Reyes RN on 8/12/24 at 4:12 AM EDT   
Complete wet to dry dressing change complete, nurse to nurse called to 4s.   
Comprehensive Nutrition Assessment    Type and Reason for Visit:  Reassess    Nutrition Recommendations/Plan:   Recommend liberalize current Low K+, Low phos diet to promote improved PO  Continue ONS with all meals  Continue inpatient monitoring      Malnutrition Assessment:  Malnutrition Status:  At risk for malnutrition (Comment) (08/12/24 1101)    Context:  Acute Illness     Findings of the 6 clinical characteristics of malnutrition:  Energy Intake:  50% or less of estimated energy requirements for 5 or more days  Weight Loss:  Unable to assess (some component of fluid balance/edema component)     Body Fat Loss:  No significant body fat loss     Muscle Mass Loss:  No significant muscle mass loss    Fluid Accumulation:  Unable to assess Generalized (multiple factors (CONSTANTIN, hx lymphedema))   Strength:  Not Performed    Nutrition Assessment:    Discharge planning to return to [facility]. Pt improved UO and improving renal fxn labs, so KRT on hold. Out of ICU now. Pt feeling weak/tired. Recommend continue ONS with all meals and liberalize K+, Phos diet restrictions to promote good PO at meals and as K+ level WNL and pt on Renvela. Continue inpatient monitoring    Nutrition Related Findings:    A&Ox4, [place], UO 2-3L, diarrhea/FMS, +I/O 2.6L, +1 gen edema, BUN/creat 32/5.3, abd +BS Wound Type: Multiple, Open Wounds, Pressure Injury, Stage III, Surgical Incision (buttocks necrotizing fascitis debridement sites, back PI - per wound care consult)       Current Nutrition Intake & Therapies:    Average Meal Intake: 1-25%  Average Supplements Intake: Unable to assess  ADULT DIET; Regular; Low Potassium (Less than 3000 mg/day); Low Phosphorus (Less than 1000 mg)  ADULT ORAL NUTRITION SUPPLEMENT; Breakfast, Dinner; Wound Healing Oral Supplement  ADULT ORAL NUTRITION SUPPLEMENT; Lunch, Dinner; Fortified Gelatin Oral Supplement    Anthropometric Measures:  Height: 175.3 cm (5' 9\")  Ideal Body Weight (IBW): 160 lbs (73 kg)  
Dr. Nevarez messaged regarding BMP results.  
Dr. Nevarez notified of BP 87/49 and mild lightheadedness.  
GENERAL SURGERY  DAILY PROGRESS NOTE    Patient's Name/Date of Birth: David Wetzel / 1985    Date: 2024     Chief Complaint   Patient presents with    hypotension        Subjective:  Nursing just changed wound dressings prior to rounds. State they look the same as when we had seen them previously    No new drainage or complaints      Objective:  Last 24Hrs  Temp  Av.1 °F (36.7 °C)  Min: 97.5 °F (36.4 °C)  Max: 98.4 °F (36.9 °C)  Resp  Avg: 15.1  Min: 7  Max: 20  Pulse  Av.9  Min: 54  Max: 71  Systolic (24hrs), Av , Min:77 , Max:132     Diastolic (24hrs), Av, Min:47, Max:70    SpO2  Av %  Min: 96 %  Max: 100 %    I/O last 3 completed shifts:  In: 6274.8 [I.V.:5455.8; IV Piggyback:819]  Out: 1200 [Urine:800; Stool:400]      General: resting in bed  Cardiovascular: Warm throughout, no edema  Respiratory: no respiratory distress, equal chest rise  Abdomen: soft,  nontender, nondistended  Extremities: ldressing present to wounds without strikethrough      CBC  Recent Labs     24  1333 24  0456 24  0440   WBC 18.4* 17.1* 10.3   RBC 2.83* 2.56* 2.41*   HGB 8.1* 7.4* 7.0*   HCT 26.1* 23.7* 22.0*   MCV 92.2 92.6 91.3   MCH 28.6 28.9 29.0   MCHC 31.0* 31.2* 31.8*   RDW 17.1* 17.4* 17.4*    197 163   MPV 9.7 9.6 9.4       CMP  Recent Labs     24  0920 24  1333 24  0007 24  0456 24  0805 24  1310 24  1600 24    132   < > 138   < > 138 137 136   K 4.8 4.7   < > 3.9   < > 3.6 3.4* 3.5    104   < > 106   < > 106 105 103   CO2 14* 15*   < > 18*   < > 19* 22 21*   BUN 39* 36*   < > 39*   < > 40* 40* 40*   CREATININE 7.6* 7.4*   < > 7.1*   < > 7.0* 7.0* 6.9*   GLUCOSE 71* 60*   < > 144*   < > 163* 167* 163*   CALCIUM 6.0* 5.9*   < > 6.4*   < > 6.1* 6.2* 6.4*   BILITOT 0.4 0.4  --  0.4  --   --   --   --    ALKPHOS 95 100  --  90  --   --   --   --    AST 55* 62*  --  51*  --   --   --   --    ALT 15 18  -- 
Infectious Disease  Progress Note  [facility]    Chief Complaint: low BP    Subjective:  he says he is feeling ok. No new complaints.  Has FMS in place has minimal amount of diarrhea.    Scheduled Meds:   [START ON 8/15/2024] pantoprazole  40 mg Oral QAM AC    vitamin D  50,000 Units Oral Weekly    micafungin  100 mg IntraVENous Daily    sevelamer  800 mg Oral TID WC    insulin lispro  0-4 Units SubCUTAneous 4x Daily AC & HS    sodium chloride flush  5-40 mL IntraVENous 2 times per day    heparin (porcine)  5,000 Units SubCUTAneous 3 times per day     Continuous Infusions:   lactated ringers IV soln 150 mL/hr at 08/14/24 0615    sodium chloride       PRN Meds:ondansetron, sodium chloride flush, sodium chloride, polyethylene glycol, acetaminophen **OR** acetaminophen    Prior to Admission medications    Medication Sig Start Date End Date Taking? Authorizing Provider   bacitracin 500 UNIT/GM ointment Apply topically 2 times daily.  Patient not taking: Reported on 3/4/2022 8/30/17   Pranav Joya PA   oxyCODONE-acetaminophen (PERCOCET) 5-325 MG per tablet Take 2 tablets by mouth every 4 hours as needed for Pain .  Patient not taking: Reported on 3/4/2022 8/22/17   Pranav Joya PA   metFORMIN (GLUCOPHAGE) 500 MG tablet Take 500 mg by mouth 2 times daily (with meals)  Patient not taking: Reported on 3/4/2022    Sarita Yuan MD   ibuprofen (ADVIL;MOTRIN) 800 MG tablet  3/14/17   Sarita Yuan MD   mineral oil-hydrophilic petrolatum (HYDROPHOR) ointment Apply topically as needed.  Patient not taking: Reported on 3/4/2022 5/4/17   Raissa Chacko, APRN - CNP   furosemide (LASIX) 40 MG tablet Take 40 mg by mouth 2 times daily   Patient not taking: Reported on 3/4/2022 3/6/17   Sarita Yuan MD   Probiotic Product (ADVANCED PROBIOTIC 10) CAPS as needed   Patient not taking: Reported on 8/12/2024 3/6/17   Sarita Yuan MD   magnesium oxide (MAG-OX) 400 (240 MG) MG tablet Take 1 
Infectious Disease  Progress Note  [facility]    Chief Complaint: low BP    Subjective:  he says he is feeling ok. No new complaints.  No fever overnight.  Tolerating antibiotics well.     Scheduled Meds:   calcium gluconate  1,000 mg IntraVENous Q2H    pantoprazole  40 mg Oral QAM AC    vitamin D  50,000 Units Oral Weekly    insulin lispro  0-4 Units SubCUTAneous 4x Daily AC & HS    sodium chloride flush  5-40 mL IntraVENous 2 times per day    heparin (porcine)  5,000 Units SubCUTAneous 3 times per day     Continuous Infusions:   lactated ringers IV soln 150 mL/hr at 08/19/24 0654    sodium chloride       PRN Meds:traMADol **OR** traMADol, ondansetron, sodium chloride flush, sodium chloride, polyethylene glycol, acetaminophen **OR** acetaminophen    Prior to Admission medications    Medication Sig Start Date End Date Taking? Authorizing Provider   bacitracin 500 UNIT/GM ointment Apply topically 2 times daily.  Patient not taking: Reported on 3/4/2022 8/30/17   Pranav Joya PA   oxyCODONE-acetaminophen (PERCOCET) 5-325 MG per tablet Take 2 tablets by mouth every 4 hours as needed for Pain .  Patient not taking: Reported on 3/4/2022 8/22/17   Pranav Joya PA   metFORMIN (GLUCOPHAGE) 500 MG tablet Take 500 mg by mouth 2 times daily (with meals)  Patient not taking: Reported on 3/4/2022    Sarita Yuan MD   ibuprofen (ADVIL;MOTRIN) 800 MG tablet  3/14/17   Sarita Yuan MD   mineral oil-hydrophilic petrolatum (HYDROPHOR) ointment Apply topically as needed.  Patient not taking: Reported on 3/4/2022 5/4/17   Raissa Chacko, APRN - CNP   furosemide (LASIX) 40 MG tablet Take 40 mg by mouth 2 times daily   Patient not taking: Reported on 3/4/2022 3/6/17   Sarita Yuan MD   Probiotic Product (ADVANCED PROBIOTIC 10) CAPS as needed   Patient not taking: Reported on 8/12/2024 3/6/17   Sarita Yuan MD   magnesium oxide (MAG-OX) 400 (240 MG) MG tablet Take 1 tablet by mouth 
Infectious Disease  Progress Note  [facility]    Chief Complaint: low BP    Subjective:  he says he is feeling ok. No new complaints.  No fever overnight.  Tolerating antibiotics well.    Scheduled Meds:   calcium gluconate  1,000 mg IntraVENous Q1H    pantoprazole  40 mg Oral QAM AC    vitamin D  50,000 Units Oral Weekly    micafungin  100 mg IntraVENous Daily    sevelamer  800 mg Oral TID WC    insulin lispro  0-4 Units SubCUTAneous 4x Daily AC & HS    sodium chloride flush  5-40 mL IntraVENous 2 times per day    heparin (porcine)  5,000 Units SubCUTAneous 3 times per day     Continuous Infusions:   lactated ringers IV soln 150 mL/hr at 08/16/24 0627    sodium chloride       PRN Meds:ondansetron, sodium chloride flush, sodium chloride, polyethylene glycol, acetaminophen **OR** acetaminophen    Prior to Admission medications    Medication Sig Start Date End Date Taking? Authorizing Provider   bacitracin 500 UNIT/GM ointment Apply topically 2 times daily.  Patient not taking: Reported on 3/4/2022 8/30/17   Pranav Joya PA   oxyCODONE-acetaminophen (PERCOCET) 5-325 MG per tablet Take 2 tablets by mouth every 4 hours as needed for Pain .  Patient not taking: Reported on 3/4/2022 8/22/17   Pranav Joya PA   metFORMIN (GLUCOPHAGE) 500 MG tablet Take 500 mg by mouth 2 times daily (with meals)  Patient not taking: Reported on 3/4/2022    Sarita Yuan MD   ibuprofen (ADVIL;MOTRIN) 800 MG tablet  3/14/17   Sarita Yuan MD   mineral oil-hydrophilic petrolatum (HYDROPHOR) ointment Apply topically as needed.  Patient not taking: Reported on 3/4/2022 5/4/17   Raissa Chacko, APRN - CNP   furosemide (LASIX) 40 MG tablet Take 40 mg by mouth 2 times daily   Patient not taking: Reported on 3/4/2022 3/6/17   Sarita Yuan MD   Probiotic Product (ADVANCED PROBIOTIC 10) CAPS as needed   Patient not taking: Reported on 8/12/2024 3/6/17   Sarita Yuan MD   magnesium oxide (MAG-OX) 400 
Infectious Disease  Progress Note  [facility]    Chief Complaint: low BP    Subjective:  he says he is feeling ok. No new complaints.  No fever overnight.  Tolerating antibiotics well.    Scheduled Meds:   pantoprazole  40 mg Oral QAM AC    vitamin D  50,000 Units Oral Weekly    micafungin  100 mg IntraVENous Daily    insulin lispro  0-4 Units SubCUTAneous 4x Daily AC & HS    sodium chloride flush  5-40 mL IntraVENous 2 times per day    heparin (porcine)  5,000 Units SubCUTAneous 3 times per day     Continuous Infusions:   lactated ringers IV soln 75 mL/hr at 08/17/24 1129    sodium chloride       PRN Meds:traMADol **OR** traMADol, ondansetron, sodium chloride flush, sodium chloride, polyethylene glycol, acetaminophen **OR** acetaminophen    Prior to Admission medications    Medication Sig Start Date End Date Taking? Authorizing Provider   bacitracin 500 UNIT/GM ointment Apply topically 2 times daily.  Patient not taking: Reported on 3/4/2022 8/30/17   Pranav Joya PA   oxyCODONE-acetaminophen (PERCOCET) 5-325 MG per tablet Take 2 tablets by mouth every 4 hours as needed for Pain .  Patient not taking: Reported on 3/4/2022 8/22/17   Pranav Joya PA   metFORMIN (GLUCOPHAGE) 500 MG tablet Take 500 mg by mouth 2 times daily (with meals)  Patient not taking: Reported on 3/4/2022    Sarita Yuan MD   ibuprofen (ADVIL;MOTRIN) 800 MG tablet  3/14/17   Sarita Yuan MD   mineral oil-hydrophilic petrolatum (HYDROPHOR) ointment Apply topically as needed.  Patient not taking: Reported on 3/4/2022 5/4/17   Raissa Chacko, APRN - CNP   furosemide (LASIX) 40 MG tablet Take 40 mg by mouth 2 times daily   Patient not taking: Reported on 3/4/2022 3/6/17   Sarita Yuan MD   Probiotic Product (ADVANCED PROBIOTIC 10) CAPS as needed   Patient not taking: Reported on 8/12/2024 3/6/17   Sarita Yuan MD   magnesium oxide (MAG-OX) 400 (240 MG) MG tablet Take 1 tablet by mouth daily  Patient 
Infectious Disease  Progress Note  [facility]    Chief Complaint: low BP    Subjective:  he says he is feeling ok. No new complaints.  No fever overnight.  Tolerating antibiotics well.    Scheduled Meds:   pantoprazole  40 mg Oral QAM AC    vitamin D  50,000 Units Oral Weekly    micafungin  100 mg IntraVENous Daily    sevelamer  800 mg Oral TID WC    insulin lispro  0-4 Units SubCUTAneous 4x Daily AC & HS    sodium chloride flush  5-40 mL IntraVENous 2 times per day    heparin (porcine)  5,000 Units SubCUTAneous 3 times per day     Continuous Infusions:   lactated ringers IV soln 150 mL/hr at 08/15/24 0248    sodium chloride       PRN Meds:ondansetron, sodium chloride flush, sodium chloride, polyethylene glycol, acetaminophen **OR** acetaminophen    Prior to Admission medications    Medication Sig Start Date End Date Taking? Authorizing Provider   bacitracin 500 UNIT/GM ointment Apply topically 2 times daily.  Patient not taking: Reported on 3/4/2022 8/30/17   rPanav Joya PA   oxyCODONE-acetaminophen (PERCOCET) 5-325 MG per tablet Take 2 tablets by mouth every 4 hours as needed for Pain .  Patient not taking: Reported on 3/4/2022 8/22/17   Pranav Joya PA   metFORMIN (GLUCOPHAGE) 500 MG tablet Take 500 mg by mouth 2 times daily (with meals)  Patient not taking: Reported on 3/4/2022    Sarita Yuan MD   ibuprofen (ADVIL;MOTRIN) 800 MG tablet  3/14/17   Sarita Yuan MD   mineral oil-hydrophilic petrolatum (HYDROPHOR) ointment Apply topically as needed.  Patient not taking: Reported on 3/4/2022 5/4/17   Raissa Chacko, APRN - CNP   furosemide (LASIX) 40 MG tablet Take 40 mg by mouth 2 times daily   Patient not taking: Reported on 3/4/2022 3/6/17   Sarita Yuan MD   Probiotic Product (ADVANCED PROBIOTIC 10) CAPS as needed   Patient not taking: Reported on 8/12/2024 3/6/17   Sarita Yuan MD   magnesium oxide (MAG-OX) 400 (240 MG) MG tablet Take 1 tablet by mouth 
Infectious Disease  Progress Note  [facility]    Chief Complaint: low BP    Subjective:  he says he is feeling ok. No new complaints.  No fever overnight.  Tolerating antibiotics well. Had low BP this morning and getting fluid boluses,.    Scheduled Meds:   potassium chloride  10 mEq IntraVENous Q1H    magnesium sulfate  2,000 mg IntraVENous Once    calcium gluconate  1,000 mg IntraVENous Q1H    pantoprazole  40 mg Oral QAM AC    vitamin D  50,000 Units Oral Weekly    micafungin  100 mg IntraVENous Daily    insulin lispro  0-4 Units SubCUTAneous 4x Daily AC & HS    sodium chloride flush  5-40 mL IntraVENous 2 times per day    heparin (porcine)  5,000 Units SubCUTAneous 3 times per day     Continuous Infusions:   lactated ringers IV soln 150 mL/hr at 08/18/24 0637    sodium chloride       PRN Meds:traMADol **OR** traMADol, ondansetron, sodium chloride flush, sodium chloride, polyethylene glycol, acetaminophen **OR** acetaminophen    Prior to Admission medications    Medication Sig Start Date End Date Taking? Authorizing Provider   bacitracin 500 UNIT/GM ointment Apply topically 2 times daily.  Patient not taking: Reported on 3/4/2022 8/30/17   Pranav Joya PA   oxyCODONE-acetaminophen (PERCOCET) 5-325 MG per tablet Take 2 tablets by mouth every 4 hours as needed for Pain .  Patient not taking: Reported on 3/4/2022 8/22/17   Pranav Joya PA   metFORMIN (GLUCOPHAGE) 500 MG tablet Take 500 mg by mouth 2 times daily (with meals)  Patient not taking: Reported on 3/4/2022    Sarita Yuan MD   ibuprofen (ADVIL;MOTRIN) 800 MG tablet  3/14/17   Sarita Yuan MD   mineral oil-hydrophilic petrolatum (HYDROPHOR) ointment Apply topically as needed.  Patient not taking: Reported on 3/4/2022 5/4/17   Raissa Chacko, APRN - CNP   furosemide (LASIX) 40 MG tablet Take 40 mg by mouth 2 times daily   Patient not taking: Reported on 3/4/2022 3/6/17   Sarita Yuan MD   Probiotic Product (ADVANCED 
Message sent to Dr. Nevarez regarding bp 88/49 and K 3.3.   
Message sent to wound care to assess leaking FMS.   
Name: David Wetzel  : 1985  MRN: [id number]    Date: 2024    Benefits of immediately proceeding with Radiology exam outweigh the risks and therefore the following is being waived:      [] Pregnancy test    [] Protocol for Iodine allergy    [] MRI questionnaire    [x] BUN/Creatinine        Trudi Marino DO        
Nephrology Plrogress Note  Patient's Name: David Wetzel  4:52 PM  8/14/2024    Nephrologist: SOO Nevarez MD    Reason for Consult:  CONSTANTIN    History of Present Ilness from the 8/11/24  note:    David Wetzel is a 39 y.o. male with prior history of DM2 and HTN. Pt was initially admitted to [facility] for purulence from the R buttock with spread to deep subcut fat with necrotizing fasciitis and Homa's. He went for debridement in the OR, post op he was transferred to [facility]. His initial cr were 0.6 until 7/21 when it erin to 0.99, then began to trend up. He was intubated on 7/19/24 at [facility]. On 7/20/24 he went to OR for debridement and required vasopressin and norepi ,for hemodynamic support. He was extubated and ultimately on 8/8/24 transferred to [facility]. Pt developed worsening hypotension on 8/11/24 unresponsive to 4L vol resuscitation and was sent to [facility] ED. In the ED SBP in the 80's and bolused with 2 additional liters of 0.9NS and started on norepi. Cr 7.4, HCO3 15, Lactic Acid 3.0, WBC 18.4. Pt was awake alert and oriented. He is  [place]. He states he is not SOB, but feels weak and tired. He denies pain. He has a calderón and FMS    INTERVAL HX:    8/14/24: Pt awake alert and states he feels better but still cold and weak, no CP or SOB    Past Medical History:   Diagnosis Date    Blood circulation, collateral     Chronic acquired lymphedema     Keratoconus     Morbid obesity (HCC)     Sepsis (HCC) 11/14/2014    due to cellulitis of LLE       Past Surgical History:   Procedure Laterality Date    BRONCHOSCOPY  04/18/2017    OTHER SURGICAL HISTORY Left 04/11/2017    direct excision of medial thigh lymphedema w/Dr Hutchinson, Disection of lymph node w/Dr Henley ,and control of bleeding w/dr DOWD    OTHER SURGICAL HISTORY Bilateral 08/22/2017    split thickness skin graft to l thigh, Right thigh shin graft site       Family History   Problem Relation Age of Onset    Diabetes Mother     Diabetes Father     Heart Disease Father 
Nephrology Progress Note  Patient's Name: David Wetzel  10:45 AM  8/17/2024    Nephrologist: SOO Nevarez MD    Reason for Consult:  CONSTANTIN    History of Present Ilness from the 8/11/24  note:    David Wetzel is a 39 y.o. male with prior history of DM2 and HTN. Pt was initially admitted to [facility] for purulence from the R buttock with spread to deep subcut fat with necrotizing fasciitis and Homa's. He went for debridement in the OR, post op he was transferred to [facility]. His initial cr were 0.6 until 7/21 when it erin to 0.99, then began to trend up. He was intubated on 7/19/24 at [facility]. On 7/20/24 he went to OR for debridement and required vasopressin and norepi ,for hemodynamic support. He was extubated and ultimately on 8/8/24 transferred to [facility]. Pt developed worsening hypotension on 8/11/24 unresponsive to 4L vol resuscitation and was sent to [facility]. In the ED SBP in the 80's and bolused with 2 additional liters of 0.9NS and started on norepi. Cr 7.4, HCO3 15, Lactic Acid 3.0, WBC 18.4. Pt was awake alert and oriented. He is  [place]. He states he is not SOB, but feels weak and tired. He denies pain. He has a calderón and FMS    INTERVAL HX:    8/17/24: Pt awake alert and states he feels good today, appetite ok, no CP or SOB    Past Medical History:   Diagnosis Date    Blood circulation, collateral     Chronic acquired lymphedema     Keratoconus     Morbid obesity (HCC)     Sepsis (HCC) 11/14/2014    due to cellulitis of LLE    Type 2 diabetes mellitus without complication, without long-term current use of insulin (formerly [facility]) 8/15/2024       Past Surgical History:   Procedure Laterality Date    BRONCHOSCOPY  04/18/2017    OTHER SURGICAL HISTORY Left 04/11/2017    direct excision of medial thigh lymphedema w/Dr Hutchinson, Disection of lymph node w/Dr Henley ,and control of bleeding w/dr DOWD    OTHER SURGICAL HISTORY Bilateral 08/22/2017    split thickness skin graft to l thigh, Right thigh shin graft site       Family 
Nephrology Progress Note  Patient's Name: David Wetzel  3:54 PM  8/15/2024    Nephrologist: SOO Nevarez MD    Reason for Consult:  CONSTANTIN    History of Present Ilness from the 8/11/24  note:    David Wetzel is a 39 y.o. male with prior history of DM2 and HTN. Pt was initially admitted to [facility] for purulence from the R buttock with spread to deep subcut fat with necrotizing fasciitis and Homa's. He went for debridement in the OR, post op he was transferred to [facility]. His initial cr were 0.6 until 7/21 when it erin to 0.99, then began to trend up. He was intubated on 7/19/24 at [facility]. On 7/20/24 he went to OR for debridement and required vasopressin and norepi ,for hemodynamic support. He was extubated and ultimately on 8/8/24 transferred to [facility]. Pt developed worsening hypotension on 8/11/24 unresponsive to 4L vol resuscitation and was sent to [facility]. In the ED SBP in the 80's and bolused with 2 additional liters of 0.9NS and started on norepi. Cr 7.4, HCO3 15, Lactic Acid 3.0, WBC 18.4. Pt was awake alert and oriented. He is  [place]. He states he is not SOB, but feels weak and tired. He denies pain. He has a calderón and FMS    INTERVAL HX:    8/15/24: Pt awake alert and states he feels no CP or SOB, appetite fair    Past Medical History:   Diagnosis Date    Blood circulation, collateral     Chronic acquired lymphedema     Keratoconus     Morbid obesity (HCC)     Sepsis ([facility]) 11/14/2014    due to cellulitis of LLE    Type 2 diabetes mellitus without complication, without long-term current use of insulin ([facility]) 8/15/2024       Past Surgical History:   Procedure Laterality Date    BRONCHOSCOPY  04/18/2017    OTHER SURGICAL HISTORY Left 04/11/2017    direct excision of medial thigh lymphedema w/Dr Hutchinson, Disection of lymph node w/Dr Henley ,and control of bleeding w/dr DOWD    OTHER SURGICAL HISTORY Bilateral 08/22/2017    split thickness skin graft to l thigh, Right thigh shin graft site       Family History 
Nephrology Progress Note  Patient's Name: David Wetzel  4:27 PM  8/16/2024    Nephrologist: SOO Nevarez MD    Reason for Consult:  CONSTANTIN    History of Present Ilness from the 8/11/24  note:    David Wetzel is a 39 y.o. male with prior history of DM2 and HTN. Pt was initially admitted to [facility] for purulence from the R buttock with spread to deep subcut fat with necrotizing fasciitis and Homa's. He went for debridement in the OR, post op he was transferred to [facility]. His initial cr were 0.6 until 7/21 when it erin to 0.99, then began to trend up. He was intubated on 7/19/24 at [facility]. On 7/20/24 he went to OR for debridement and required vasopressin and norepi ,for hemodynamic support. He was extubated and ultimately on 8/8/24 transferred to [facility]. Pt developed worsening hypotension on 8/11/24 unresponsive to 4L vol resuscitation and was sent to [facility]. In the ED SBP in the 80's and bolused with 2 additional liters of 0.9NS and started on norepi. Cr 7.4, HCO3 15, Lactic Acid 3.0, WBC 18.4. Pt was awake alert and oriented. He is  [place]. He states he is not SOB, but feels weak and tired. He denies pain. He has a calderón and FMS    INTERVAL HX:    8/16/24: Pt awake alert and states he feels OK, no new CP or SOB    Past Medical History:   Diagnosis Date    Blood circulation, collateral     Chronic acquired lymphedema     Keratoconus     Morbid obesity (HCC)     Sepsis ([facility]) 11/14/2014    due to cellulitis of LLE    Type 2 diabetes mellitus without complication, without long-term current use of insulin ([facility]) 8/15/2024       Past Surgical History:   Procedure Laterality Date    BRONCHOSCOPY  04/18/2017    OTHER SURGICAL HISTORY Left 04/11/2017    direct excision of medial thigh lymphedema w/Dr Hutchinson, Disection of lymph node w/Dr Henley ,and control of bleeding w/dr DOWD    OTHER SURGICAL HISTORY Bilateral 08/22/2017    split thickness skin graft to l thigh, Right thigh shin graft site       Family History   Problem 
Nephrology notified of labs  
Notified Dr. Nevarez of BP after bolus as requested.   
Nurse to nurse given to Nessa at [facility]. PAS to p/u at 4:30pm.  
Occupational Therapy  OT consult received to eval/treat and chart review complete. Attempted OT evaluation, patient refused. OT to re-attempt at a later date/time as able and appropriate.     Brandy Huitron OTR/L  License Number: [id number]    
Occupational Therapy  Patient treatment attempted this AM.  Patient declined participation, will continue OT POC as able and appropriate.    Rosalina ESPINOZA/RAYMON [id number]    
Patient admitted from ER to , with the following belongings; no belongings at bedside, placed on monitor, patient oriented to room and unit visiting hours.  Patient guide at bedside, reviewed patient rights and responsibilities. MRSA nasal swab obtained.  Bed alarm on.  Call light within reach.   
Patient transported to , all questions answered at this time.   
Physical Therapy    Pt refused Rx, states just re positioned in bed, is comfortable and fatigued from repositioning with staff. Wishes respected.  Thompson Cramer PTA [id number]    
Physical Therapy  Facility/Department: [address] INTERMEDIATE 1  Physical Therapy Initial Assessment    Name: David Wetzel  : 1985  MRN: [id number]  Date of Service: 8/15/2024        Patient Diagnosis(es): The primary encounter diagnosis was Septic shock (HCC). Diagnoses of Acute renal failure, unspecified acute renal failure type (HCC), Hypocalcemia, Hypomagnesemia, and Hypoalbuminemia were also pertinent to this visit.  Past Medical History:  has a past medical history of Blood circulation, collateral, Chronic acquired lymphedema, Keratoconus, Morbid obesity (HCC), Sepsis (HCC), and Type 2 diabetes mellitus without complication, without long-term current use of insulin (HCC).  Past Surgical History:  has a past surgical history that includes other surgical history (Left, 2017); bronchoscopy (2017); and other surgical history (Bilateral, 2017).      Evaluating Therapist: Daisy Flores PT    Room #:  0444/0444-A  Diagnosis:  Hypocalcemia [E83.51]  Hypomagnesemia [E83.42]  Hypoalbuminemia [E88.09]  Septic shock (HCC) [A41.9, R65.21]  Acute renal failure, unspecified acute renal failure type (HCC) [N17.9]  PMHx/PSHx:  DM, lymphedema, morbid obesity, Homa's gangrene   Precautions:  falls, wounds (groin and buttocks)    Social:  Pt admitted from [facility].  Prior to that was at [facility]. Prior  lived with daughter and roommates, ambulates short distances.    Initial Evaluation  Date: 8/15/24 Treatment      Short Term/ Long Term   Goals   Was pt agreeable to Eval/treatment? yes     Does pt have pain? None reported     Bed Mobility  Rolling: mod assist  Supine to sit: NT  Sit to supine: NT  Scooting: NT  Rolling SBA  Supine to sit max assist   Transfers Sit to stand: NT  Stand to sit: NT  Stand pivot: NT  N/A recommend mariela lift at this time   Ambulation    NT  N/A   Stair Negotiation  Ascended and descended  NT   N/A   LE strength     2-/5    3/5   balance      NT     AM-PAC Raw score      
Physical Therapy  Facility/Department: [address] ICU    Name: David Wetzel  : 1985  MRN: [id number]    Chart reviewed and PT stephanie attempted this pm.  Pt declined at this time.  Will check back at later time date.     Lyndsay Monge, PT [id number]          
Repeat BMP results sent to Dr. Nevarez, orders received.   
Spiritual Health Assessment/Progress Note  [facility]    Initial Encounter, Spiritual/Emotional Needs,  ,  ,      Name: David Wetzel MRN: [id number]    Age: 39 y.o.     Sex: male   Language: English   Alevism: Non-Anabaptist   Septic shock (HCC)     Date: 8/12/2024                           Spiritual Assessment began in [facility] 2 ICU        Referral/Consult From: Rounding   Encounter Overview/Reason: Initial Encounter, Spiritual/Emotional Needs  Service Provided For: Patient    Cinthia, Belief, Meaning:   Patient identifies as spiritual and is connected with a cinthia tradition or spiritual practice  Family/Friends No family/friends present      Importance and Influence:  Patient has no beliefs influential to healthcare decision-making identified during this visit  Family/Friends no family/friends present    Community:  Patient feels well-supported. Support system includes: Friends and Unknown  Family/Friends     Assessment and Plan of Care:     Patient Interventions include: Facilitated expression of thoughts and feelings and Affirmed coping skills/support systems  Family/Friends Interventions include: Other: none at this time.     Patient Plan of Care: Spiritual Care available upon further referral  Family/Friends Plan of Care: Spiritual Care available upon further referral    Electronically signed by Chaplain Gayle on 8/12/2024 at 1:09 PM   
Spoke with Dr. Nevarez regarding Mag sulfate and Calcium gluconate, these were given already Ok to discontinue.  
[facility]  Nephrology Progress Note  Patient's Name: David Wetzel  4:35 PM  8/19/2024    Nephrologist: SOO Nevarez MD    Reason for Consult:  CONSTANTIN    History of Present Ilness from the 8/11/24  note:    David Wetzel is a 39 y.o. male with prior history of DM2 and HTN. Pt was initially admitted to [facility] for purulence from the R buttock with spread to deep subcut fat with necrotizing fasciitis and Homa's. He went for debridement in the OR, post op he was transferred to [facility]. His initial cr were 0.6 until 7/21 when it erin to 0.99, then began to trend up. He was intubated on 7/19/24 at [facility]. On 7/20/24 he went to OR for debridement and required vasopressin and norepi ,for hemodynamic support. He was extubated and ultimately on 8/8/24 transferred to [facility]. Pt developed worsening hypotension on 8/11/24 unresponsive to 4L vol resuscitation and was sent to [facility]. In the ED SBP in the 80's and bolused with 2 additional liters of 0.9NS and started on norepi. Cr 7.4, HCO3 15, Lactic Acid 3.0, WBC 18.4. Pt was awake alert and oriented. He is  [place]. He states he is not SOB, but feels weak and tired. He denies pain. He has a calderón and FMS    INTERVAL HX:    8/18/24-patient seen and examined.  He was laying in bed in no acute distress.  Was receiving IV potassium was tolerating well although he did complain of some stinging.    8/19: Patient seen and examined. Has had good urine output, he is being discharged to [facility] today. No acute events overnight.     Past Medical History:   Diagnosis Date    Blood circulation, collateral     Chronic acquired lymphedema     Keratoconus     Morbid obesity (HCC)     Sepsis (HCC) 11/14/2014    due to cellulitis of LLE    Type 2 diabetes mellitus without complication, without long-term current use of insulin (HCC) 8/15/2024       Past Surgical History:   Procedure Laterality Date    BRONCHOSCOPY  04/18/2017    OTHER SURGICAL HISTORY Left 04/11/2017    direct 
Wound care: attempt to see patient for wound care consult. Nurse already changed dressings, asked to come back tomorrow.   
Wound care: attempted to see patient for wound care consult, patient has a low blood sugar, unstable status per nurse.   
  CO2 23 22 23   BUN 33* 32* 32*   CREATININE 5.7* 5.5* 5.3*   GLUCOSE 82 74 75   CALCIUM 7.0* 6.9* 7.2*       Recent Labs     08/13/24  0440 08/13/24  1300 08/13/24  2100 08/14/24  0400 08/15/24  0434   WBC 10.3  --   --  6.3 4.7   RBC 2.41*  --   --  2.63* 2.66*   HGB 7.0*   < > 7.4* 7.6* 7.6*   HCT 22.0*   < > 23.2* 23.6* 24.7*   MCV 91.3  --   --  89.7 92.9   MCH 29.0  --   --  28.9 28.6   MCHC 31.8*  --   --  32.2 30.8*   RDW 17.4*  --   --  17.4* 17.1*     --   --  149 144   MPV 9.4  --   --  9.3 9.9    < > = values in this interval not displayed.       Radiology:   XR CHEST PORTABLE   Final Result   1. Left-sided central venous catheter with the tip near the lower   brachiocephalic vein.   2. Redemonstration of prominent interstitial markings and peribronchial   cuffing which may be related to pulmonary edema or atelectasis.         XR CHEST PORTABLE   Final Result   1. Left IJ venous catheter tip near the medial left clavicle, probably in the   jugular vein. No visible pneumothorax.   2. Bronchial wall thickening and interstitial coarsening. Some of this is   probably chronic or related to subsegmental atelectasis, though there could   be a superimposed component of edema, bronchitis or atypical infection.         CT Head W/O Contrast   Final Result      No acute intracranial abnormality noted.         CT ABDOMEN PELVIS W IV CONTRAST Additional Contrast? None   Final Result   1. No acute intra-abdominal or pelvic process.   2. Soft tissue defects in the posterior perineal area.  No abscess visible.   3. Fatty liver.   4. Small fat filled inguinal hernias.   5. Gynecomastia.   6. Borderline cardiomegaly.   7. Bibasilar subsegmental atelectasis.         XR CHEST 1 VIEW   Final Result   Cardiomegaly.  There are no findings of failure or pneumonia.         XR FOOT RIGHT (MIN 3 VIEWS)   Final Result   1. No fracture or dislocation.   2. Soft tissue swelling diffusely about the foot.   3. Mild 
Daily PRN  acetaminophen, 650 mg, Q6H PRN   Or  acetaminophen, 650 mg, Q6H PRN         Objective:    BP (!) 93/56   Pulse 59   Temp 97.8 °F (36.6 °C) (Oral)   Resp 13   Ht 1.753 m (5' 9\")   Wt (!) 159.2 kg (351 lb 1.3 oz)   SpO2 95%   BMI 51.85 kg/m²   General Appearance: alert and oriented to person, place and time, well-developed and well-nourished, in no acute distress  Skin: warm and dry, no rash or erythema  Head: normocephalic and atraumatic  Eyes: pupils equal, round, and reactive to light, extraocular eye movements intact, conjunctivae normal  ENT: tympanic membrane, external ear and ear canal normal bilaterally, oropharynx clear and moist with normal mucous membranes  Neck: neck supple and non tender without mass, no thyromegaly or thyroid nodules, no cervical lymphadenopathy   Pulmonary/Chest: clear to auscultation bilaterally- no wheezes, rales or rhonchi, normal air movement, no respiratory distress  Cardiovascular: normal rate, normal S1 and S2, no gallops, intact distal pulses, and no carotid bruits  Abdomen: soft, non-tender, non-distended, normal bowel sounds, no masses or organomegaly  Morbid Obesity  Has Left IJ TLC  Does have suprapubic, working well  Wound involving right buttock as well as right groin  rectal tube.    Recent Labs     08/12/24  1600 08/12/24 2011 08/13/24  0440    136 140   K 3.4* 3.5 3.3*    103 103   CO2 22 21* 23   BUN 40* 40* 38*   CREATININE 7.0* 6.9* 6.8*   GLUCOSE 167* 163* 166*   CALCIUM 6.2* 6.4* 6.2*       Recent Labs     08/11/24  1333 08/12/24  0456 08/13/24  0440   WBC 18.4* 17.1* 10.3   RBC 2.83* 2.56* 2.41*   HGB 8.1* 7.4* 7.0*   HCT 26.1* 23.7* 22.0*   MCV 92.2 92.6 91.3   MCH 28.6 28.9 29.0   MCHC 31.0* 31.2* 31.8*   RDW 17.1* 17.4* 17.4*    197 163   MPV 9.7 9.6 9.4     Last hemoglobin 7    Radiology:   XR CHEST PORTABLE   Final Result   1. Left-sided central venous catheter with the tip near the lower   brachiocephalic vein.   2. 
environmental modifications for increased safety with functional transfers/mobility and ADLs  * Cognitive retraining/development of therapeutic activities to improve problem solving, judgement, memory, and attention for increased safety/participation in ADL/IADL tasks  * Therapeutic exercise to improve motor endurance, ROM, and functional strength for ADLs/functional transfers  * Therapeutic activities to facilitate/challenge dynamic balance, stand tolerance for increased safety and independence with ADLs  * Neuro-muscular re-education: facilitation of righting/equilibrium reactions, midline orientation, scapular stability/mobility, normalization of muscle tone, and facilitation of volitional active controled movement  * Positioning to improve skin integrity, interaction with environment and functional independence    Recommended Adaptive Equipment: TBD    Home Living: Patient admitted from [facility]; patient had been living with his daughter and roommate(s) prior to recent hospitalization(s).    Prior Level of Function (PLOF): Patient was needing assistance with most ADLs and has been bedbound recently.     Pain Level: Patient denied experiencing pain.  Cognition: Patient alert and oriented grossly. WFL command follow demonstrated. Patient cooperative. Impaired insight to current abilities/limitations demonstrated.  Memory: WFL  Sequencing: WFL  Problem Solving: WFL  Judgement/Safety: WFL    Functional Assessment:  AM-PAC Daily Activity Raw Score: 11/24   Initial Eval Status  Date: 8/15/2024 Treatment Status  Date:  Short Term Goals = Long Term Goals   Feeding Setup  Independent   Grooming Min A  Setup   UB Dressing Mod A  SBA   LB Dressing Dependent  Mod A - with use of AE, as needed/appropriate   Bathing Dependent  Mod A - with use of AE/DME, as needed/appropriate   Toileting Dependent  Patient with FMS and catheter.  Max A   Bed Mobility  Log Rolling: Mod A  SBA in order to maximize patient's 
taking: Reported on 8/12/2024 3/6/17   Provider, MD Sarita   magnesium oxide (MAG-OX) 400 (240 MG) MG tablet Take 1 tablet by mouth daily  Patient not taking: Reported on 3/4/2022 8/20/15   Mady Landin, , ROSANA   potassium chloride SA (K-DUR;KLOR-CON M) 20 MEQ tablet Take 1 tablet by mouth 2 times daily  Patient not taking: Reported on 3/4/2022 8/20/15   Mady Landin DO, ROSANA        ROS:  As mentioned in subjective, all other systems negative      BP (!) 94/57   Pulse 62   Temp 98 °F (36.7 °C) (Oral)   Resp 12   Ht 1.753 m (5' 9\")   Wt (!) 159.2 kg (351 lb 1.3 oz)   SpO2 98%   BMI 51.85 kg/m²   Physical exam:   Constitutional: The patient is awake, alert, and oriented. Morbidly obese.  Skin: Warm and dry. No rashes were noted. No jaundice.  HEENT: Eyes show round, and reactive pupils. Moist mucous membranes, no ulcerations, no thrush.   Neck: Supple to movements. No lymphadenopathy.   Chest: No use of accessory muscles to breathe. Symmetrical expansion. Auscultation reveals no wheezing, crackles, or rhonchi.   Cardiovascular: S1 and S2 are rhythmic and regular. No murmurs appreciated.   Abdomen: Positive bowel sounds to auscultation. Benign to palpation. No masses felt. No hepatosplenomegaly. FMS   Genitourinary: calderón in place  Extremities: No clubbing, no cyanosis, no edema.  Musculoskeletal: multiple groin wounds.   Neurological: awake alert  Lines: peripheral, left IJ CVC                    Labs, Cultures reviewed  Radiology and other consultants notes reviewed    Microbiology:  Blood cx 8/11: negative  MRSA NS: negative  Urine cx: mixed enmanuel  1,3 BD glucan in process    Assessment:  Sepsis from unclear source at this time:  Hx of NSTI bilateral groin s/p multiple debridements in [facility]. No wound cx taken.   Leucocytosis/elevated pct.      Plan:    Continue IV meropenem and micafungin.   Will follow cx  Monitor labs  Will follow with you      Electronically signed by SERGEY 
0.67mg/dl on 7/21/24 and now on admission SEB on 8//11/24 cr 7.4mg/dl  Serology done at [facility] show REBEKA (-), C3 & C4 not suppressed, MPO and NM-3 not elevated, Hep B (-), Hep C (+) but RNA undetectable  7/19/2024 UA SG 1.037 pH6.0, protein 100, gluc 200, blood 3+, ketone and bili (-), Urobilinogen 1+, Ni (-),   8/3, 7/31, 7/29 UA's with decreased levels protein and  LE, and Ni (-)protein (-) to 1+  8/11 UA clear, gluc (-), Bili small, ketones (-), SG 1.025, protein >300, ni (-), LE tr, WBC 0-5, RBC 10-20, HgB large  FeNa >1% & FeUrea  8/15/24 CXR Interstitial prominence  Resolving ATN with post ATN Diuresis Creatinine slowly trending down and UO 7650 ml yesterday.  Creatinine this morning 2.9 mg/dL  PLAN:  Continue IVF LR at 75ml/hr  Follow Labs  Follow I/O's    2- Acute Anion Gap Met Acidosis with a Lactic Acidosis presumed a Type A from tissue decreased perfusion in the setting of septic shock  Lactic Acid WNL  PLAN:  Follow CMP Qd    3- Hypocalcemia in the setting of Hypoalbuminemia and Hypomagnesemia and Hyperphosphatemia and Unspecified Vit D Def  PTH appropriately elevated 262 for the hypocalcemia and Hyperphosphatemia  Vit D <6  PLAN:  1. Supplement with IV Ca++ as needed and  Mg++ for level <2.0  2. Follow Ca++ and Mg++ and PO4  3. Continue Vit D    4-Anemia  Ferritin 1044, iron sat 48%. Vit B12 1017, Folate 6.8  SPEP and UPEP (-) Monoclonal protein  PLAN:  Follow CBC    5- Septic Shock in the setting of Necrotizing Fasciitis  Hx of Staph Hominis Bacteremia  Previous on Vanc Zosyn and Clinda  off since 8/3/2024 now off meropenem and on micafungin  UC mixed GNR + mixed GP both <10k  PLAN:  Defer Antibx to ID    6- Chronic Lymphedema    7.  Hypokalemia-  Potassium this morning 3.3 mmol/L  Plan:  Dose IV potassium chloride 10 mEq IV x 4 doses  Recheck potassium at noon    Thank you for allowing us to participate in care of ALFRED Morgan - CNP  11:54 AM  8/18/2024  Patient seen 
elevated, Hep B (-), Hep C (+) but RNA undetectable  7/19/2024 UA SG 1.037 pH6.0, protein 100, gluc 200, blood 3+, ketone and bili (-), Urobilinogen 1+, Ni (-),   8/3, 7/31, 7/29 UA's with decreased levels protein and  LE, and Ni (-)protein (-) to 1+  8/11 UA clear, gluc (-), Bili small, ketones (-), SG 1.025, protein >300, ni (-), LE tr, WBC 0-5, RBC 10-20, HgB large  FeNa >1% & FeUrea  Creatinine slowly trending down and UO 1725ml  PLAN:  Continue IVF-change LR   Follow Labs  Follow I/O's  Given UO and absence of symptoms, hyperkalemia and improved acidosis will hold on KRT with IHD for present    2- Acute Anion Gap Met Acidosis with a Lactic Acidosis presumed a Type A from tissue decreased perfusion in the setting of septic shock  Lactic Acid WNL  PLAN:  DisContinue IV HCO3  Change BMP's to Q8hrs    3- Hypocalcemia in the setting of Hypoalbuminemia and Hypomagnesemia and Hyperphosphatemia and Unspecified Vit D Def  PTH appropriately elevated 262 for the hypocalcemia and Hyperphosphatemia  Vit D <6  PLAN:  1. Supplement with IV Ca++ 2gr this AM with 2gr IV Mg++ for level <2.0  2. Follow Ca++ and Mg++ and PO4  3. Start Vit D  4. Start PO4 binder    4-Anemia  Ferritin 1044, iron sat 48%. Vit B12 1017, Folate 6.8  PLAN:  Await SPEP and UPEP  Follow CBC    5- Septic Shock in the setting of Necrotizing Fasciitis  Hx of Staph Hominis Bacteremia  Previous on Vanc Zosyn and Clinda  off since 8/3/2024 now on meropenem and micafungin  UC mixed GNR + mixed GP both <10k  PLAN:  Defer Antibx to ID    6- Chronic Lymphedema    Thank you for allowing us to participate in care of David Nevarez MD  12:49 PM  8/13/2024  
levels protein and  LE, and Ni (-)protein (-) to 1+  8/11 UA clear, gluc (-), Bili small, ketones (-), SG 1.025, protein >300, ni (-), LE tr, WBC 0-5, RBC 10-20, HgB large  FeNa >1% & FeUrea  PLAN:  Continue IVF   Follow Labs  Follow I/O's  If no improvement in cr and remains oligoanuric will need TDC and IHD.As he has 500ml of UO from 4060-2388 will hold on TDC placement for today    2- Acute Anion Gap Met Acidosis with a Lactic Acidosis presumed a Type A from tissue decreased perfusion in the setting of septic shock  PLAN:  Continue IV HCO3  Follow Q4 BMP's    3- Hypocalcemia in the setting of Hypoalbuminemia and Hypomagnesemia and Unspecified Vit D Def  PTH appropriately elevated 262 for the hypocalcemia and Hyperphosphatemia  Vit D <6  PLAN:  1. Supplement with IV Ca++ 2gr this AM  2. Follow Ca++ and Mg++ and PO4  3. Start Vit D  4. Start PO4 binder    4-Anemia  Ferritin 1044, iron sat 48%. Vit B12 1017, Folate 6.8  PLAN:  Await SPEP and UPEP  Follow CBC    5- Septic Shock in the setting of Necrotizing Fasciitis  Hx of Staph Hominis Bacteremia  Previous on Vanc Zosyn and Clinda  off since 8/3/2024  PLAN:  Await BC and UC  Defer Antibx to ID    6- Chronic Lymphedema    Thank you for allowing us to participate in care of David Nevarez MD  1:39 PM  8/12/2024  
probability of sudden clinically significant deterioration. I managed/supervised life or organ supporting interventions that required frequent physician assessment. I devoted my full attention to the direct care of this patient for the period of time indicated below. In addition to above, time was devoted to teaching and to any procedure.     NOTE: This report, in part or full, may have been transcribed using voice recognition software. Every effort was made to ensure accuracy; however, inadvertent computerized transcription errors may be present. Please excuse any transcriptional grammatical or spelling errors that may have escaped my editorial review.    Total critical care time caring for this patient with life threatening, unstable organ failure, including direct patient contact, management of life support systems, review of data including imaging and labs, discussions with other team members and physicians is at least 31 min so far today, excluding procedures.    Jossue Wiggins MD 3:04 PM 8/14/2024

## 2024-09-22 LAB — PHOSPHORUS: 5.4 MG/DL (ref 2.5–4.5)

## 2025-03-27 NOTE — DISCHARGE INSTRUCTIONS
Visit Discharge/Physician Orders    Discharge condition: Stable    Assessment of pain at discharge: mild    Anesthetic used: 4% Lidocaine    Discharge to: Home    Left via:Private automobile    Accompanied by: accompanied by self    ECF/HHA: [facility]    Dressing Orders: Clean wounds right and left posterior thigh and right groin with normal saline. Apply plain alginate and secure with silicone bordered gauze. Change daily.Keep dressings clean and dry. Sponge bath only, no showering for now.     Treatment Orders: Follow a nutritious diet. Choose foods high in protein: chicken, fish, and eggs. Choose foods high in Vitamin C. Multivitamin daily unless contraindicated. Keep pressure off of wounds.    [facility] followup visit _______1 week______________________  (Please note your next appointment above and if you are unable to keep, kindly give a 24 hour notice. Thank you.)    Physician signature:__________________________      If you experience any of the following, please call the Wound Care Center during business hours:    * Increase in Pain  * Temperature over 101  * Increase in drainage from your wound  * Drainage with a foul odor  * Bleeding  * Increase in swelling  * Need for compression bandage changes due to slippage, breakthrough drainage.    If you need medical attention outside of the business hours of the Wound Care Centers please contact your PCP or go to the nearest emergency room.

## 2025-04-01 ENCOUNTER — HOSPITAL ENCOUNTER (OUTPATIENT)
Dept: WOUND CARE | Age: 40
Discharge: HOME | End: 2025-04-01
Attending: NURSE PRACTITIONER
Payer: COMMERCIAL

## 2025-04-01 VITALS
HEIGHT: 69 IN | BODY MASS INDEX: 44.88 KG/M2 | TEMPERATURE: 96.8 F | RESPIRATION RATE: 20 BRPM | HEART RATE: 82 BPM | SYSTOLIC BLOOD PRESSURE: 107 MMHG | WEIGHT: 303 LBS | DIASTOLIC BLOOD PRESSURE: 54 MMHG

## 2025-04-01 DIAGNOSIS — E11.9 TYPE 2 DIABETES MELLITUS WITHOUT COMPLICATION, WITHOUT LONG-TERM CURRENT USE OF INSULIN: ICD-10-CM

## 2025-04-01 DIAGNOSIS — L97.912 LOWER EXTREMITY ULCERATION, RIGHT, WITH FAT LAYER EXPOSED: ICD-10-CM

## 2025-04-01 DIAGNOSIS — L97.922 LOWER EXTREMITY ULCERATION, LEFT, WITH FAT LAYER EXPOSED: ICD-10-CM

## 2025-04-01 DIAGNOSIS — L98.492 RIGHT GROIN ULCER, WITH FAT LAYER EXPOSED (HCC): ICD-10-CM

## 2025-04-01 DIAGNOSIS — I89.0 LYMPHEDEMA: ICD-10-CM

## 2025-04-01 DIAGNOSIS — Z87.39 HISTORY OF NECROTIZING FASCIITIS: Primary | ICD-10-CM

## 2025-04-01 PROCEDURE — 99213 OFFICE O/P EST LOW 20 MIN: CPT

## 2025-04-01 PROCEDURE — 11045 DBRDMT SUBQ TISS EACH ADDL: CPT

## 2025-04-01 PROCEDURE — 99203 OFFICE O/P NEW LOW 30 MIN: CPT | Performed by: NURSE PRACTITIONER

## 2025-04-01 PROCEDURE — 11042 DBRDMT SUBQ TIS 1ST 20SQCM/<: CPT | Performed by: NURSE PRACTITIONER

## 2025-04-01 PROCEDURE — 11042 DBRDMT SUBQ TIS 1ST 20SQCM/<: CPT

## 2025-04-01 PROCEDURE — 11045 DBRDMT SUBQ TISS EACH ADDL: CPT | Performed by: NURSE PRACTITIONER

## 2025-04-01 RX ORDER — SEVELAMER CARBONATE 800 MG/1
1 TABLET, FILM COATED ORAL
COMMUNITY

## 2025-04-01 RX ORDER — LIDOCAINE HYDROCHLORIDE 40 MG/ML
SOLUTION TOPICAL ONCE
Status: COMPLETED | OUTPATIENT
Start: 2025-04-01 | End: 2025-04-01

## 2025-04-01 RX ORDER — MUPIROCIN 20 MG/G
1 OINTMENT TOPICAL 2 TIMES DAILY
COMMUNITY
Start: 2025-03-24

## 2025-04-01 RX ORDER — HYDROXYZINE PAMOATE 25 MG/1
25 CAPSULE ORAL 3 TIMES DAILY PRN
COMMUNITY

## 2025-04-01 RX ORDER — CYPROHEPTADINE HYDROCHLORIDE 4 MG/1
4 TABLET ORAL DAILY
COMMUNITY

## 2025-04-01 RX ORDER — M-VIT,TX,IRON,MINS/CALC/FOLIC 27MG-0.4MG
1 TABLET ORAL DAILY
COMMUNITY

## 2025-04-01 RX ORDER — FAMOTIDINE 20 MG/1
20 TABLET, FILM COATED ORAL 2 TIMES DAILY
COMMUNITY

## 2025-04-01 RX ADMIN — LIDOCAINE HYDROCHLORIDE 20 ML: 40 SOLUTION TOPICAL at 14:03

## 2025-04-01 NOTE — PROGRESS NOTES
Wound Healing Center /Hyperbarics   History and Physical/Consultation  General Surgery/Medicine    Referring Physician : No primary care provider on file.  David Wetzel  MEDICAL RECORD NUMBER:  [id number]  AGE: 40 y.o.   GENDER: male  : 1985  EPISODE DATE:  2025  Subjective:     Chief Complaint   Patient presents with    Wound Check     Buttocks, thighs, groin          HISTORY of PRESENT ILLNESS HPI     David Wetzel is a 40 y.o. male who presents today for wound/ulcer evaluation.   History of Wound Context:  The patient has had a wound of right groin and bilateral posterior thighs which was first noted approximately 2024 following necrotizing soft tissue infection.  This has been treated surgical debridement and dressing changes. On their initial visit to the wound healing center, 2025 ,  the patient has noted that the wound has been improving.  The patient has not had similar previous wounds in the past.      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    25   Patient presents to wound care for right groin and bilateral posterior thigh ulcers   Tissue appearance overall pink and healthy   Wounds debrided   Patient reports skin irritation with tape but tolerates silicone dressing   Aquacel, dry dressing and silicone border dressing      Wound/Ulcer Pain Timing/Severity: intermittent  Quality of pain: aching  Severity:  4 / 10   Modifying Factors: None  Associated Signs/Symptoms: edema, erythema, drainage, and pain    Ulcer Identification:  Ulcer Type: pressure and non-healing surgical  Contributing Factors: lymphedema, diabetes, chronic pressure, decreased mobility, shear force, obesity, and smoking    Diabetic/Pressure/Non Pressure Ulcers only:  Ulcer: Non-Pressure ulcer, fat layer exposed    If patient

## 2025-04-01 NOTE — PROGRESS NOTES
Wound Care Supplies      Supply Company:     [facility] [address] [facility] [address] [place] [address]  p: [phone] f: [phone]     Ordering Center:     [address] [address]  Telephone: [phone]       FAX [phone]    Patient Information:      David Wetzel  [address] [address] [facility] [address]   [phone]   : 1985  AGE: 40 y.o.     GENDER: male   EPISODE DATE: 2025    Insurance:      PRIMARY INSURANCE:  Plan: [employer] [place] MEDICAID  Coverage: CAR[employer]OURCE  Effective Date: 2017  Group Number: [unfilled]  Subscriber Number: [id number] - (Medicaid Managed)    Payer/Plan Subscr  Sex Relation Sub. Ins. ID Effective Group Num   1. [employer] - * ANTONETTE 1985 Male Self [id number] [address] [facility] [address]       Patient Wound Information:      Problem List Items Addressed This Visit    None      WOUNDS REQUIRING DRESSING SUPPLIES:     Wound 25 Groin Right #1 (Active)   Wound Image   25 1401   Dressing Status New dressing applied 25 1523   Wound Cleansed Cleansed with saline 25 1523   Dressing/Treatment Alginate;ABD 25 1523   Wound Length (cm) 3 cm 25 1401   Wound Width (cm) 0.3 cm 25 1401   Wound Depth (cm) 0.1 cm 25 1401   Wound Surface Area (cm^2) 0.9 cm^2 25 1401   Wound Volume (cm^3) 0.09 cm^3 25 1401   Post-Procedure Length (cm) 3.1 cm 25 1438   Post-Procedure Width (cm) 0.4 cm 25 1438   Post-Procedure Depth (cm) 0.2 cm 25 1438   Post-Procedure Surface Area (cm^2) 1.24 cm^2 25 1438   Post-Procedure Volume (cm^3) 0.248 cm^3 25 1438   Wound Assessment Fibrin;Pink/red 25 1401   Drainage Amount Moderate (25-50%) 25 1401   Drainage Description Serosanguinous 25 1401   Odor None 25 1401   Domi-wound Assessment Blanchable erythema;Fragile 25

## 2025-04-02 NOTE — PROGRESS NOTES
Wound Care Request for Home Health      Home Health Company:     [facility] [phone]    Ordering Center:     [address] [address]  Telephone: [phone]       FAX [phone]    Patient Information:      David Wetzel  [address] [facility] [address] [facility] [place] [address]   [phone]   : 1985  AGE: 40 y.o.     GENDER: male   EPISODE DATE: 2025    Insurance:      PRIMARY INSURANCE:  Plan: [employer] [place] MEDICA[place]  Coverage: CARESOURCE  Effective Date: 2017  Group Number: [unfilled]  Subscriber Number: [id number] - (Medicaid Managed)    Payer/Plan Subscr  Sex Relation Sub. Ins. ID Effective Group Num   1. [employer] - * DAVID WETZEL 1985 Male Self [id number] [address] [facility] [address]       Patient Wound Information:      Problem List Items Addressed This Visit          Endocrine    Type 2 diabetes mellitus without complication, without long-term current use of insulin       Musculoskeletal and Integument    Right groin ulcer, with fat layer exposed (HCC)       Other    Lymphedema    History of necrotizing fasciitis - Primary    Lower extremity ulceration, left, with fat layer exposed    Lower extremity ulceration, right, with fat layer exposed       Wound 25 Groin Right #1 (Active)   Wound Image   25 1401   Dressing Status New dressing applied 25 1523   Wound Cleansed Cleansed with saline 25 1523   Dressing/Treatment Alginate;ABD 25 1523   Wound Length (cm) 3 cm 25 1401   Wound Width (cm) 0.3 cm 25 1401   Wound Depth (cm) 0.1 cm 25 1401   Wound Surface Area (cm^2) 0.9 cm^2 25 1401   Wound Volume (cm^3) 0.09 cm^3 25 1401   Post-Procedure Length (cm) 3.1 cm 25 1438   Post-Procedure Width (cm) 0.4 cm 25 1438   Post-Procedure Depth (cm) 0.2 cm 25 1438   Post-Procedure Surface Area (cm^2) 1.24 cm^2 25 1438   Post-Procedure Volume

## 2025-04-02 NOTE — PROGRESS NOTES
Home care referral sent to [facility] 4/1/25. Awaiting callback.        Unable to reach [facility] health intake department, sent new referral to [facility].

## 2025-04-03 NOTE — PROGRESS NOTES
Wound Care Request for Home Health      [facility] Company:     [facility] [phone]    Ordering Center:     [facility]  [address] [facility] [id number] [phone]  WOUND CARE Dept: [phone]   FAX NUMBER     Patient Information:      David Wetzel  [address] [facility] [address]   [phone]   : 1985  AGE: 40 y.o.     GENDER: male   EPISODE DATE: 2025    Insurance:      PRIMARY INSURANCE:  Plan: [employer] [place]ICA[place]  Coverage: CARESOURCE  Effective Date: 2017  Group Number: [unfilled]  Subscriber Number: [id number] - (Medicaid Managed)    Payer/Plan Subscr  Sex Relation Sub. Ins. ID Effective Group Num   1. [employer] - * DAVID WETZEL 1985 Male Self [id number] [address] [facility]                                   [address]       Patient Wound Information:      Problem List Items Addressed This Visit          Endocrine    Type 2 diabetes mellitus without complication, without long-term current use of insulin       Musculoskeletal and Integument    Right groin ulcer, with fat layer exposed (HCC)       Other    Lymphedema    History of necrotizing fasciitis - Primary    Lower extremity ulceration, left, with fat layer exposed    Lower extremity ulceration, right, with fat layer exposed       Wound 25 Groin Right #1 (Active)   Wound Image   25 1401   Dressing Status New dressing applied 25 1523   Wound Cleansed Cleansed with saline 25 1523   Dressing/Treatment Alginate;ABD 25 1523   Wound Length (cm) 3 cm 25 1401   Wound Width (cm) 0.3 cm 25 1401   Wound Depth (cm) 0.1 cm 25 1401   Wound Surface Area (cm^2) 0.9 cm^2 25 1401   Wound Volume (cm^3) 0.09 cm^3 25 1401   Post-Procedure Length (cm) 3.1 cm 25 1438   Post-Procedure Width (cm) 0.4 cm 25 1438   Post-Procedure Depth (cm) 0.2 cm 25 1438   Post-Procedure Surface Area (cm^2) 1.24 cm^2 25 1438   Post-Procedure Volume (cm^3)

## 2025-04-03 NOTE — PROGRESS NOTES
Patient was denied by [facility] and a new referral was made to Replaced by [facility].

## 2025-04-08 ENCOUNTER — HOSPITAL ENCOUNTER (OUTPATIENT)
Dept: WOUND CARE | Age: 40
Discharge: HOME | End: 2025-04-08
Attending: NURSE PRACTITIONER
Payer: COMMERCIAL

## 2025-04-08 VITALS
HEART RATE: 94 BPM | SYSTOLIC BLOOD PRESSURE: 141 MMHG | BODY MASS INDEX: 44.88 KG/M2 | DIASTOLIC BLOOD PRESSURE: 91 MMHG | RESPIRATION RATE: 20 BRPM | HEIGHT: 69 IN | WEIGHT: 303 LBS | TEMPERATURE: 96.3 F

## 2025-04-08 DIAGNOSIS — L98.492 RIGHT GROIN ULCER, WITH FAT LAYER EXPOSED (HCC): Primary | ICD-10-CM

## 2025-04-08 DIAGNOSIS — I89.0 LYMPHEDEMA: ICD-10-CM

## 2025-04-08 DIAGNOSIS — L97.922 LOWER EXTREMITY ULCERATION, LEFT, WITH FAT LAYER EXPOSED: ICD-10-CM

## 2025-04-08 DIAGNOSIS — B37.2 YEAST INFECTION OF THE SKIN: ICD-10-CM

## 2025-04-08 DIAGNOSIS — L97.912 LOWER EXTREMITY ULCERATION, RIGHT, WITH FAT LAYER EXPOSED: ICD-10-CM

## 2025-04-08 PROCEDURE — 11045 DBRDMT SUBQ TISS EACH ADDL: CPT

## 2025-04-08 PROCEDURE — 11042 DBRDMT SUBQ TIS 1ST 20SQCM/<: CPT

## 2025-04-08 PROCEDURE — 11045 DBRDMT SUBQ TISS EACH ADDL: CPT | Performed by: NURSE PRACTITIONER

## 2025-04-08 PROCEDURE — 11042 DBRDMT SUBQ TIS 1ST 20SQCM/<: CPT | Performed by: NURSE PRACTITIONER

## 2025-04-08 RX ORDER — NYSTATIN 100000 [USP'U]/G
POWDER TOPICAL
Qty: 60 G | Refills: 3 | Status: SHIPPED | OUTPATIENT
Start: 2025-04-08

## 2025-04-08 NOTE — DISCHARGE INSTRUCTIONS
Visit Discharge/Physician Orders     Discharge condition: Stable     Assessment of pain at discharge: mild     Anesthetic used: 4% Lidocaine     Discharge to: Home     Left via:Private automobile     Accompanied by: accompanied by self     ECF/HHA: [facility]     Dressing Orders: Clean wounds right and left posterior thigh and right groin with normal saline. Apply plain alginate and secure with silicone bordered gauze. Change daily.Keep dressings clean and dry. Sponge bath only, no showering for now.      Treatment Orders: Follow a nutritious diet. Choose foods high in protein: chicken, fish, and eggs. Choose foods high in Vitamin C. Multivitamin daily unless contraindicated. Keep pressure off of wounds.    Nystatin powder ordered for skin folds, begin to use as directed     C followup visit _______1 week___Tuesday afternoon (Marie)__________________  (Please note your next appointment above and if you are unable to keep, kindly give a 24 hour notice. Thank you.)     Physician signature:__________________________        If you experience any of the following, please call the Wound Care Center during business hours:     * Increase in Pain  * Temperature over 101  * Increase in drainage from your wound  * Drainage with a foul odor  * Bleeding  * Increase in swelling  * Need for compression bandage changes due to slippage, breakthrough drainage.     If you need medical attention outside of the business hours of the Wound Care Centers please contact your PCP or go to the nearest emergency room.

## 2025-04-08 NOTE — PROGRESS NOTES
drainage, and pain    Ulcer Identification:  Ulcer Type: pressure and non-healing surgical  Contributing Factors: lymphedema, diabetes, chronic pressure, decreased mobility, shear force, obesity, and smoking    Diabetic/Pressure/Non Pressure Ulcers only:  Ulcer: Non-Pressure ulcer, fat layer exposed    If patient has diabetic lower extremity wounds  Sommers Classification of diabetic lower extremity wounds:    Grade Description   []  0 No open wound   []  1 Superficial ulcer involving the full skin thickness   []  2 Deep ulcer involves ligament, tendon, joint capsule, or fascia  No bone involvement or abscess presence   []  3 Deep Ulcer with abcess formation and/or osteomyelitis   []  4 Localized gangrene   []  5 Extensive gangrene of the foot     Wound: Nonhealing surgical due to infection        PAST MEDICAL HISTORY      Diagnosis Date    Blood circulation, collateral     Chronic acquired lymphedema     Keratoconus     Morbid obesity     Sepsis (HCC) 11/14/2014    due to cellulitis of LLE    Type 2 diabetes mellitus without complication, without long-term current use of insulin 8/15/2024     Past Surgical History:   Procedure Laterality Date    BRONCHOSCOPY  04/18/2017    OTHER SURGICAL HISTORY Left 04/11/2017    direct excision of medial thigh lymphedema w/Dr Hutchinson, Disection of lymph node w/Dr Henley ,and control of bleeding w/dr DOWD    OTHER SURGICAL HISTORY Bilateral 08/22/2017    split thickness skin graft to l thigh, Right thigh shin graft site     Family History   Problem Relation Age of Onset    Diabetes Mother     Diabetes Father     Heart Disease Father      Social History     Tobacco Use    Smoking status: Every Day     Types: Cigarettes    Smokeless tobacco: Never    Tobacco comments:     Chewing tobacco as well    Vaping Use    Vaping status: Never Used   Substance Use Topics    Alcohol use: No    Drug use: No     No Known Allergies  Current Outpatient Medications on File Prior to Encounter   Medication

## 2025-04-14 NOTE — DISCHARGE INSTRUCTIONS
Visit Discharge/Physician Orders     Discharge condition: Stable     Assessment of pain at discharge: mild     Anesthetic used: 4% Lidocaine     Discharge to: Home     Left via:Private automobile     Accompanied by: accompanied by self     ECF/HHA: [facility]     Dressing Orders: Clean wounds right and left posterior thigh and right groin with normal saline. Apply plain alginate and secure with silicone bordered gauze. Change daily.Keep dressings clean and dry. Sponge bath only, no showering for now.      Treatment Orders: Follow a nutritious diet. Choose foods high in protein: chicken, fish, and eggs. Choose foods high in Vitamin C. Multivitamin daily unless contraindicated. Keep pressure off of wounds.     Nystatin powder ordered for skin folds, begin to use as directed     C followup visit _______1 week thurs________________  (Please note your next appointment above and if you are unable to keep, kindly give a 24 hour notice. Thank you.)     Physician signature:__________________________        If you experience any of the following, please call the Wound Care Center during business hours:     * Increase in Pain  * Temperature over 101  * Increase in drainage from your wound  * Drainage with a foul odor  * Bleeding  * Increase in swelling  * Need for compression bandage changes due to slippage, breakthrough drainage.     If you need medical attention outside of the business hours of the Wound Care Centers please contact your PCP or go to the nearest emergency room.

## 2025-04-15 ENCOUNTER — HOSPITAL ENCOUNTER (OUTPATIENT)
Dept: WOUND CARE | Age: 40
Discharge: HOME | End: 2025-04-15
Attending: NURSE PRACTITIONER
Payer: COMMERCIAL

## 2025-04-15 VITALS
TEMPERATURE: 96.7 F | RESPIRATION RATE: 16 BRPM | HEIGHT: 69 IN | WEIGHT: 303 LBS | HEART RATE: 94 BPM | SYSTOLIC BLOOD PRESSURE: 113 MMHG | DIASTOLIC BLOOD PRESSURE: 71 MMHG | BODY MASS INDEX: 44.88 KG/M2

## 2025-04-15 DIAGNOSIS — I89.0 LYMPHEDEMA: ICD-10-CM

## 2025-04-15 DIAGNOSIS — L98.492 RIGHT GROIN ULCER, WITH FAT LAYER EXPOSED (HCC): Primary | ICD-10-CM

## 2025-04-15 DIAGNOSIS — B37.2 YEAST INFECTION OF THE SKIN: ICD-10-CM

## 2025-04-15 DIAGNOSIS — L97.912 LOWER EXTREMITY ULCERATION, RIGHT, WITH FAT LAYER EXPOSED: ICD-10-CM

## 2025-04-15 DIAGNOSIS — L97.922 LOWER EXTREMITY ULCERATION, LEFT, WITH FAT LAYER EXPOSED: ICD-10-CM

## 2025-04-15 PROCEDURE — 11045 DBRDMT SUBQ TISS EACH ADDL: CPT

## 2025-04-15 PROCEDURE — 11042 DBRDMT SUBQ TIS 1ST 20SQCM/<: CPT

## 2025-04-15 PROCEDURE — 11042 DBRDMT SUBQ TIS 1ST 20SQCM/<: CPT | Performed by: NURSE PRACTITIONER

## 2025-04-15 PROCEDURE — 11045 DBRDMT SUBQ TISS EACH ADDL: CPT | Performed by: NURSE PRACTITIONER

## 2025-04-15 RX ORDER — LIDOCAINE HYDROCHLORIDE 40 MG/ML
SOLUTION TOPICAL ONCE
Status: COMPLETED | OUTPATIENT
Start: 2025-04-15 | End: 2025-04-15

## 2025-04-15 RX ADMIN — LIDOCAINE HYDROCHLORIDE 10 ML: 40 SOLUTION TOPICAL at 14:07

## 2025-04-15 NOTE — PROGRESS NOTES
Wound Healing Center /[facility]   History and Physical/Consultation  General Surgery/Medicine    Referring Physician : No primary care provider on file.  David Wetzel  MEDICAL RECORD NUMBER:  [id number]  AGE: 40 y.o.   GENDER: male  : 1985  EPISODE DATE:  4/15/2025  Subjective:     Chief Complaint   Patient presents with    Wound Check     Wounds; refer LDA         HISTORY of PRESENT ILLNESS HPI     David Wetzel is a 40 y.o. male who presents today for wound/ulcer evaluation.   History of Wound Context:  The patient has had a wound of right groin and bilateral posterior thighs which was first noted approximately 2024 following necrotizing soft tissue infection.  This has been treated surgical debridement and dressing changes. On their initial visit to the wound healing center, 2025 ,  the patient has noted that the wound has been improving.  The patient has not had similar previous wounds in the past.      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    25   Patient presents to wound care for right groin and bilateral posterior thigh ulcers   Tissue appearance overall pink and healthy   Wounds debrided   Patient reports skin irritation with tape but tolerates silicone dressing   Aquacel, dry dressing and silicone border dressing    25   Right thigh wound measuring larger   Left thigh wound measuring smaller   Groin wound measurements stable   Tissue appearance overall pink and healthy   Yeast/moisture present at skin folds   Wounds debrided   Nystatin powder prescribed   Continue Aquacel, dry dressing and silicone border dressing    4/15/25   Right thigh wound measuring smaller   Left thigh wound measuring larger, possibly positional   Groin wound measurements significantly smaller    Tissue appearance

## 2025-04-24 ENCOUNTER — HOSPITAL ENCOUNTER (OUTPATIENT)
Dept: WOUND CARE | Age: 40
Discharge: HOME | End: 2025-04-24
Attending: NURSE PRACTITIONER

## 2025-04-24 VITALS
DIASTOLIC BLOOD PRESSURE: 69 MMHG | WEIGHT: 315 LBS | RESPIRATION RATE: 18 BRPM | SYSTOLIC BLOOD PRESSURE: 142 MMHG | HEART RATE: 83 BPM | TEMPERATURE: 96 F | HEIGHT: 69 IN | BODY MASS INDEX: 46.65 KG/M2

## 2025-04-24 DIAGNOSIS — L97.912 LOWER EXTREMITY ULCERATION, RIGHT, WITH FAT LAYER EXPOSED (HCC): ICD-10-CM

## 2025-04-24 DIAGNOSIS — B37.2 YEAST INFECTION OF THE SKIN: Primary | ICD-10-CM

## 2025-04-24 DIAGNOSIS — L97.922 LOWER EXTREMITY ULCERATION, LEFT, WITH FAT LAYER EXPOSED (HCC): ICD-10-CM

## 2025-04-24 DIAGNOSIS — L98.492 RIGHT GROIN ULCER, WITH FAT LAYER EXPOSED (HCC): ICD-10-CM

## 2025-04-24 DIAGNOSIS — I89.0 LYMPHEDEMA: ICD-10-CM

## 2025-04-24 RX ORDER — POTASSIUM CHLORIDE 1500 MG/1
20 TABLET, EXTENDED RELEASE ORAL DAILY
COMMUNITY
Start: 2025-04-21

## 2025-04-24 RX ORDER — FUROSEMIDE 40 MG/1
40 TABLET ORAL DAILY
COMMUNITY
Start: 2025-04-21

## 2025-04-24 RX ORDER — LIDOCAINE HYDROCHLORIDE 40 MG/ML
SOLUTION TOPICAL ONCE
Status: COMPLETED | OUTPATIENT
Start: 2025-04-24 | End: 2025-04-24

## 2025-04-24 RX ADMIN — LIDOCAINE HYDROCHLORIDE 10 ML: 40 SOLUTION TOPICAL at 10:58

## 2025-04-25 RX ORDER — GINSENG 100 MG
CAPSULE ORAL PRN
OUTPATIENT
Start: 2025-04-25

## 2025-04-25 RX ORDER — NEOMYCIN/BACITRACIN/POLYMYXINB 3.5-400-5K
OINTMENT (GRAM) TOPICAL PRN
OUTPATIENT
Start: 2025-04-25

## 2025-04-25 RX ORDER — LIDOCAINE 50 MG/G
OINTMENT TOPICAL PRN
OUTPATIENT
Start: 2025-04-25

## 2025-04-25 RX ORDER — CLOBETASOL PROPIONATE 0.5 MG/G
OINTMENT TOPICAL PRN
OUTPATIENT
Start: 2025-04-25

## 2025-04-25 RX ORDER — BACITRACIN ZINC AND POLYMYXIN B SULFATE 500; 1000 [USP'U]/G; [USP'U]/G
OINTMENT TOPICAL PRN
OUTPATIENT
Start: 2025-04-25

## 2025-04-25 RX ORDER — LIDOCAINE HYDROCHLORIDE 20 MG/ML
JELLY TOPICAL PRN
OUTPATIENT
Start: 2025-04-25

## 2025-04-25 RX ORDER — LIDOCAINE 40 MG/G
CREAM TOPICAL PRN
OUTPATIENT
Start: 2025-04-25

## 2025-04-25 RX ORDER — SODIUM CHLOR/HYPOCHLOROUS ACID 0.033 %
SOLUTION, IRRIGATION IRRIGATION PRN
OUTPATIENT
Start: 2025-04-25

## 2025-04-25 RX ORDER — LIDOCAINE HYDROCHLORIDE 40 MG/ML
SOLUTION TOPICAL PRN
OUTPATIENT
Start: 2025-04-25

## 2025-04-25 RX ORDER — MUPIROCIN 20 MG/G
OINTMENT TOPICAL PRN
OUTPATIENT
Start: 2025-04-25

## 2025-04-25 RX ORDER — BETAMETHASONE DIPROPIONATE 0.5 MG/G
CREAM TOPICAL PRN
OUTPATIENT
Start: 2025-04-25

## 2025-04-25 RX ORDER — TRIAMCINOLONE ACETONIDE 1 MG/G
OINTMENT TOPICAL PRN
OUTPATIENT
Start: 2025-04-25

## 2025-04-25 RX ORDER — SILVER SULFADIAZINE 10 MG/G
CREAM TOPICAL PRN
OUTPATIENT
Start: 2025-04-25

## 2025-04-25 RX ORDER — GENTAMICIN SULFATE 1 MG/G
OINTMENT TOPICAL PRN
OUTPATIENT
Start: 2025-04-25

## 2025-04-25 NOTE — PROGRESS NOTES
Wound Healing Center /[facility]   History and Physical/Consultation  General Surgery/Medicine    Referring Physician : No primary care provider on file.  David Wetzel  MEDICAL RECORD NUMBER:  [id number]  AGE: 40 y.o.   GENDER: male  : 1985  EPISODE DATE:  2025  Subjective:     Chief Complaint   Patient presents with    Wound Check         HISTORY of PRESENT ILLNESS HPI     David Wetzel is a 40 y.o. male who presents today for wound/ulcer evaluation.   History of Wound Context:  The patient has had a wound of right groin and bilateral posterior thighs which was first noted approximately 2024 following necrotizing soft tissue infection.  This has been treated surgical debridement and dressing changes. On their initial visit to the wound healing center, 2025 ,  the patient has noted that the wound has been improving.  The patient has not had similar previous wounds in the past.      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    25   Patient presents to wound care for right groin and bilateral posterior thigh ulcers   Tissue appearance overall pink and healthy   Wounds debrided   Patient reports skin irritation with tape but tolerates silicone dressing   Aquacel, dry dressing and silicone border dressing    25   Right thigh wound measuring larger   Left thigh wound measuring smaller   Groin wound measurements stable   Tissue appearance overall pink and healthy   Yeast/moisture present at skin folds   Wounds debrided   Nystatin powder prescribed   Continue Aquacel, dry dressing and silicone border dressing    4/15/25   Right thigh wound measuring smaller   Left thigh wound measuring larger, possibly positional   Groin wound measurements significantly smaller    Tissue appearance overall pink and

## 2025-04-28 ENCOUNTER — TRANSCRIBE ORDERS (OUTPATIENT)
Dept: ADMINISTRATIVE | Age: 40
End: 2025-04-28

## 2025-04-28 DIAGNOSIS — I89.0 LYMPHEDEMA: Primary | ICD-10-CM

## 2025-04-28 NOTE — DISCHARGE INSTRUCTIONS
Written             Visit Discharge/Physician Orders     Discharge condition: Stable     Assessment of pain at discharge: mild     Anesthetic used: 4% Lidocaine     Discharge to: Home     Left via:Private automobile     Accompanied by: accompanied by self     ECF/HHA: [facility]     Dressing Orders: Clean wounds right and left posterior thigh and right groin with normal saline. Apply plain alginate and secure with silicone bordered gauze. Change daily.Keep dressings clean and dry. Sponge bath only, no showering for now.      Treatment Orders: Follow a nutritious diet. Choose foods high in protein: chicken, fish, and eggs. Choose foods high in Vitamin C. Multivitamin daily unless contraindicated. Keep pressure off of wounds.     Nystatin powder ordered for skin folds, begin to use as directed     C followup visit _______1 week thurs________________  (Please note your next appointment above and if you are unable to keep, kindly give a 24 hour notice. Thank you.)     Physician signature:__________________________        If you experience any of the following, please call the Wound Care Center during business hours:     * Increase in Pain  * Temperature over 101  * Increase in drainage from your wound  * Drainage with a foul odor  * Bleeding  * Increase in swelling  * Need for compression bandage changes due to slippage, breakthrough drainage.     If you need medical attention outside of the business hours of the Wound Care Centers please contact your PCP or go to the nearest emergency room.

## 2025-05-01 ENCOUNTER — HOSPITAL ENCOUNTER (OUTPATIENT)
Dept: WOUND CARE | Age: 40
Discharge: HOME | End: 2025-05-01
Attending: NURSE PRACTITIONER

## 2025-05-08 ENCOUNTER — TELEPHONE (OUTPATIENT)
Dept: CARDIOLOGY | Age: 40
End: 2025-05-08

## 2025-05-08 NOTE — TELEPHONE ENCOUNTER
Called pt to let him know that we have to cancel echo due to insurance denial. VM not working.    Electronically signed by Yasmin He on 5/8/2025 at 8:19 AM

## 2025-05-09 ENCOUNTER — HOSPITAL ENCOUNTER (OUTPATIENT)
Dept: WOUND CARE | Age: 40
Discharge: HOME | End: 2025-05-09
Attending: NURSE PRACTITIONER
Payer: COMMERCIAL

## 2025-05-09 VITALS
HEART RATE: 78 BPM | RESPIRATION RATE: 18 BRPM | DIASTOLIC BLOOD PRESSURE: 76 MMHG | SYSTOLIC BLOOD PRESSURE: 124 MMHG | TEMPERATURE: 96.3 F | WEIGHT: 315 LBS | BODY MASS INDEX: 46.65 KG/M2 | HEIGHT: 69 IN

## 2025-05-09 DIAGNOSIS — L97.922 LOWER EXTREMITY ULCERATION, LEFT, WITH FAT LAYER EXPOSED (HCC): Primary | ICD-10-CM

## 2025-05-09 DIAGNOSIS — L97.912 LOWER EXTREMITY ULCERATION, RIGHT, WITH FAT LAYER EXPOSED (HCC): ICD-10-CM

## 2025-05-09 PROCEDURE — 11042 DBRDMT SUBQ TIS 1ST 20SQCM/<: CPT

## 2025-05-09 PROCEDURE — 11045 DBRDMT SUBQ TISS EACH ADDL: CPT

## 2025-05-09 RX ORDER — TRIAMCINOLONE ACETONIDE 1 MG/G
OINTMENT TOPICAL PRN
OUTPATIENT
Start: 2025-05-09

## 2025-05-09 RX ORDER — LIDOCAINE HYDROCHLORIDE 40 MG/ML
SOLUTION TOPICAL PRN
OUTPATIENT
Start: 2025-05-09

## 2025-05-09 RX ORDER — SILVER SULFADIAZINE 10 MG/G
CREAM TOPICAL PRN
OUTPATIENT
Start: 2025-05-09

## 2025-05-09 RX ORDER — GINSENG 100 MG
CAPSULE ORAL PRN
OUTPATIENT
Start: 2025-05-09

## 2025-05-09 RX ORDER — MUPIROCIN 20 MG/G
OINTMENT TOPICAL PRN
OUTPATIENT
Start: 2025-05-09

## 2025-05-09 RX ORDER — LIDOCAINE HYDROCHLORIDE 20 MG/ML
JELLY TOPICAL PRN
OUTPATIENT
Start: 2025-05-09

## 2025-05-09 RX ORDER — CLOBETASOL PROPIONATE 0.5 MG/G
OINTMENT TOPICAL PRN
OUTPATIENT
Start: 2025-05-09

## 2025-05-09 RX ORDER — LIDOCAINE 40 MG/G
CREAM TOPICAL PRN
OUTPATIENT
Start: 2025-05-09

## 2025-05-09 RX ORDER — LIDOCAINE 50 MG/G
OINTMENT TOPICAL PRN
OUTPATIENT
Start: 2025-05-09

## 2025-05-09 RX ORDER — BETAMETHASONE DIPROPIONATE 0.5 MG/G
CREAM TOPICAL PRN
OUTPATIENT
Start: 2025-05-09

## 2025-05-09 RX ORDER — SODIUM CHLOR/HYPOCHLOROUS ACID 0.033 %
SOLUTION, IRRIGATION IRRIGATION PRN
OUTPATIENT
Start: 2025-05-09

## 2025-05-09 RX ORDER — NEOMYCIN/BACITRACIN/POLYMYXINB 3.5-400-5K
OINTMENT (GRAM) TOPICAL PRN
OUTPATIENT
Start: 2025-05-09

## 2025-05-09 RX ORDER — BACITRACIN ZINC AND POLYMYXIN B SULFATE 500; 1000 [USP'U]/G; [USP'U]/G
OINTMENT TOPICAL PRN
OUTPATIENT
Start: 2025-05-09

## 2025-05-09 RX ORDER — LIDOCAINE HYDROCHLORIDE 40 MG/ML
SOLUTION TOPICAL PRN
Status: DISCONTINUED | OUTPATIENT
Start: 2025-05-09 | End: 2025-05-10 | Stop reason: HOSPADM

## 2025-05-09 RX ORDER — GENTAMICIN SULFATE 1 MG/G
OINTMENT TOPICAL PRN
OUTPATIENT
Start: 2025-05-09

## 2025-05-09 RX ADMIN — LIDOCAINE HYDROCHLORIDE 10 ML: 40 SOLUTION TOPICAL at 10:41

## 2025-05-15 ENCOUNTER — HOSPITAL ENCOUNTER (OUTPATIENT)
Dept: WOUND CARE | Age: 40
Discharge: HOME | End: 2025-05-15
Attending: NURSE PRACTITIONER

## 2025-05-20 NOTE — DISCHARGE INSTRUCTIONS
Visit Discharge/Physician Orders     Discharge condition: Stable     Assessment of pain at discharge: mild     Anesthetic used: 4% Lidocaine     Discharge to: Home     Left via:Private automobile     Accompanied by: accompanied by self     ECF/HHA: [facility]     Dressing Orders: Clean wounds right/left posterior thigh and right/left/mid  groin with normal saline. Apply plain alginate and secure with silicone bordered gauze. Change daily.Keep dressings clean and dry. Sponge bath only, no showering for now.      Treatment Orders: Follow a nutritious diet. Choose foods high in protein: chicken, fish, and eggs. Choose foods high in Vitamin C. Multivitamin daily unless contraindicated. Keep pressure off of wounds.     Nystatin powder ordered for skin folds, begin to use as directed     [facility] followup visit _______2 weeks  _______________  (Please note your next appointment above and if you are unable to keep, kindly give a 24 hour notice. Thank you.)     Physician signature:__________________________        If you experience any of the following, please call the Wound Care Center during business hours:     * Increase in Pain  * Temperature over 101  * Increase in drainage from your wound  * Drainage with a foul odor  * Bleeding  * Increase in swelling  * Need for compression bandage changes due to slippage, breakthrough drainage.     If you need medical attention outside of the business hours of the Wound Care Centers please contact your PCP or go to the nearest emergency room.

## 2025-05-22 ENCOUNTER — HOSPITAL ENCOUNTER (OUTPATIENT)
Dept: WOUND CARE | Age: 40
Discharge: HOME | End: 2025-05-22
Attending: NURSE PRACTITIONER
Payer: COMMERCIAL

## 2025-05-22 VITALS
SYSTOLIC BLOOD PRESSURE: 141 MMHG | TEMPERATURE: 96.8 F | DIASTOLIC BLOOD PRESSURE: 80 MMHG | HEART RATE: 77 BPM | RESPIRATION RATE: 18 BRPM

## 2025-05-22 DIAGNOSIS — I89.0 LYMPHEDEMA: ICD-10-CM

## 2025-05-22 DIAGNOSIS — L98.492 RIGHT GROIN ULCER, WITH FAT LAYER EXPOSED (HCC): ICD-10-CM

## 2025-05-22 DIAGNOSIS — B37.2 YEAST INFECTION OF THE SKIN: ICD-10-CM

## 2025-05-22 DIAGNOSIS — N50.89 SCROTAL ULCER: ICD-10-CM

## 2025-05-22 DIAGNOSIS — L97.922 LOWER EXTREMITY ULCERATION, LEFT, WITH FAT LAYER EXPOSED (HCC): Primary | ICD-10-CM

## 2025-05-22 DIAGNOSIS — L97.912 LOWER EXTREMITY ULCERATION, RIGHT, WITH FAT LAYER EXPOSED (HCC): ICD-10-CM

## 2025-05-22 PROCEDURE — 11042 DBRDMT SUBQ TIS 1ST 20SQCM/<: CPT

## 2025-05-22 PROCEDURE — 11045 DBRDMT SUBQ TISS EACH ADDL: CPT

## 2025-05-22 RX ORDER — MUPIROCIN 20 MG/G
OINTMENT TOPICAL PRN
OUTPATIENT
Start: 2025-05-22

## 2025-05-22 RX ORDER — LIDOCAINE HYDROCHLORIDE 20 MG/ML
JELLY TOPICAL PRN
OUTPATIENT
Start: 2025-05-22

## 2025-05-22 RX ORDER — LIDOCAINE HYDROCHLORIDE 40 MG/ML
SOLUTION TOPICAL PRN
Status: DISCONTINUED | OUTPATIENT
Start: 2025-05-22 | End: 2025-05-23 | Stop reason: HOSPADM

## 2025-05-22 RX ORDER — LIDOCAINE 50 MG/G
OINTMENT TOPICAL PRN
OUTPATIENT
Start: 2025-05-22

## 2025-05-22 RX ORDER — LIDOCAINE 40 MG/G
CREAM TOPICAL PRN
OUTPATIENT
Start: 2025-05-22

## 2025-05-22 RX ORDER — GINSENG 100 MG
CAPSULE ORAL PRN
OUTPATIENT
Start: 2025-05-22

## 2025-05-22 RX ORDER — SILVER SULFADIAZINE 10 MG/G
CREAM TOPICAL PRN
OUTPATIENT
Start: 2025-05-22

## 2025-05-22 RX ORDER — GENTAMICIN SULFATE 1 MG/G
OINTMENT TOPICAL PRN
OUTPATIENT
Start: 2025-05-22

## 2025-05-22 RX ORDER — BACITRACIN ZINC AND POLYMYXIN B SULFATE 500; 1000 [USP'U]/G; [USP'U]/G
OINTMENT TOPICAL PRN
OUTPATIENT
Start: 2025-05-22

## 2025-05-22 RX ORDER — TRIAMCINOLONE ACETONIDE 1 MG/G
OINTMENT TOPICAL PRN
OUTPATIENT
Start: 2025-05-22

## 2025-05-22 RX ORDER — CLOBETASOL PROPIONATE 0.5 MG/G
OINTMENT TOPICAL PRN
OUTPATIENT
Start: 2025-05-22

## 2025-05-22 RX ORDER — LIDOCAINE HYDROCHLORIDE 40 MG/ML
SOLUTION TOPICAL PRN
OUTPATIENT
Start: 2025-05-22

## 2025-05-22 RX ORDER — NEOMYCIN/BACITRACIN/POLYMYXINB 3.5-400-5K
OINTMENT (GRAM) TOPICAL PRN
OUTPATIENT
Start: 2025-05-22

## 2025-05-22 RX ORDER — SODIUM CHLOR/HYPOCHLOROUS ACID 0.033 %
SOLUTION, IRRIGATION IRRIGATION PRN
OUTPATIENT
Start: 2025-05-22

## 2025-05-22 RX ORDER — BETAMETHASONE DIPROPIONATE 0.5 MG/G
CREAM TOPICAL PRN
OUTPATIENT
Start: 2025-05-22

## 2025-05-22 RX ADMIN — LIDOCAINE HYDROCHLORIDE: 40 SOLUTION TOPICAL at 10:07

## 2025-05-22 NOTE — PROGRESS NOTES
nutritious diet. Choose foods high in protein: chicken, fish, and eggs. Choose foods high in Vitamin C. Multivitamin daily unless contraindicated. Keep pressure off of wounds.     Nystatin powder ordered for skin folds, begin to use as directed     C followup visit _______2 weeks  _______________  (Please note your next appointment above and if you are unable to keep, kindly give a 24 hour notice. Thank you.)     Physician signature:__________________________        If you experience any of the following, please call the Wound Care Center during business hours:     * Increase in Pain  * Temperature over 101  * Increase in drainage from your wound  * Drainage with a foul odor  * Bleeding  * Increase in swelling  * Need for compression bandage changes due to slippage, breakthrough drainage.     If you need medical attention outside of the business hours of the Wound Care Centers please contact your PCP or go to the nearest emergency room.           Electronically signed by ALFRED Da Silva CNP on 5/22/2025 at 1:23 PM

## 2025-06-03 NOTE — DISCHARGE INSTRUCTIONS
Visit Discharge/Physician Orders     Discharge condition: Stable     Assessment of pain at discharge: mild     Anesthetic used: 4% Lidocaine     Discharge to: Home     Left via:Private automobile     Accompanied by: accompanied by self     ECF/HHA:  [facility] for supplies      Dressing Orders: Clean wounds right/left posterior thigh and right/left/mid  groin with normal saline. Apply plain alginate and secure with silicone bordered gauze. Change daily.Keep dressings clean and dry. Sponge bath only, no showering for now.      Treatment Orders: Follow a nutritious diet. Choose foods high in protein: chicken, fish, and eggs. Choose foods high in Vitamin C. Multivitamin daily unless contraindicated. Keep pressure off of wounds.     Nystatin powder ordered for skin folds, begin to use as directed    Wound Culture 6/3     [facility] followup visit _______2 weeks  _______________  (Please note your next appointment above and if you are unable to keep, kindly give a 24 hour notice. Thank you.)     Physician signature:__________________________        If you experience any of the following, please call the Wound Care Center during business hours:     * Increase in Pain  * Temperature over 101  * Increase in drainage from your wound  * Drainage with a foul odor  * Bleeding  * Increase in swelling  * Need for compression bandage changes due to slippage, breakthrough drainage.     If you need medical attention outside of the business hours of the Wound Care Centers please contact your PCP or go to the nearest emergency room.

## 2025-06-05 ENCOUNTER — HOSPITAL ENCOUNTER (OUTPATIENT)
Dept: WOUND CARE | Age: 40
Discharge: HOME | End: 2025-06-05
Attending: NURSE PRACTITIONER
Payer: COMMERCIAL

## 2025-06-05 VITALS
HEIGHT: 69 IN | WEIGHT: 315 LBS | HEART RATE: 84 BPM | BODY MASS INDEX: 46.65 KG/M2 | SYSTOLIC BLOOD PRESSURE: 116 MMHG | RESPIRATION RATE: 18 BRPM | TEMPERATURE: 96.7 F | DIASTOLIC BLOOD PRESSURE: 66 MMHG

## 2025-06-05 DIAGNOSIS — L97.922 LOWER EXTREMITY ULCERATION, LEFT, WITH FAT LAYER EXPOSED (HCC): Primary | ICD-10-CM

## 2025-06-05 DIAGNOSIS — L97.912 LOWER EXTREMITY ULCERATION, RIGHT, WITH FAT LAYER EXPOSED (HCC): ICD-10-CM

## 2025-06-05 PROCEDURE — 87070 CULTURE OTHR SPECIMN AEROBIC: CPT

## 2025-06-05 PROCEDURE — 11042 DBRDMT SUBQ TIS 1ST 20SQCM/<: CPT

## 2025-06-05 PROCEDURE — 11045 DBRDMT SUBQ TISS EACH ADDL: CPT

## 2025-06-05 PROCEDURE — 87077 CULTURE AEROBIC IDENTIFY: CPT

## 2025-06-05 PROCEDURE — 86403 PARTICLE AGGLUT ANTBDY SCRN: CPT

## 2025-06-05 PROCEDURE — 87205 SMEAR GRAM STAIN: CPT

## 2025-06-05 RX ORDER — TRIAMCINOLONE ACETONIDE 1 MG/G
OINTMENT TOPICAL PRN
OUTPATIENT
Start: 2025-06-05

## 2025-06-05 RX ORDER — LIDOCAINE HYDROCHLORIDE 20 MG/ML
JELLY TOPICAL PRN
OUTPATIENT
Start: 2025-06-05

## 2025-06-05 RX ORDER — NEOMYCIN/BACITRACIN/POLYMYXINB 3.5-400-5K
OINTMENT (GRAM) TOPICAL PRN
OUTPATIENT
Start: 2025-06-05

## 2025-06-05 RX ORDER — BACITRACIN ZINC AND POLYMYXIN B SULFATE 500; 1000 [USP'U]/G; [USP'U]/G
OINTMENT TOPICAL PRN
OUTPATIENT
Start: 2025-06-05

## 2025-06-05 RX ORDER — LIDOCAINE HYDROCHLORIDE 40 MG/ML
SOLUTION TOPICAL PRN
OUTPATIENT
Start: 2025-06-05

## 2025-06-05 RX ORDER — SILVER SULFADIAZINE 10 MG/G
CREAM TOPICAL PRN
OUTPATIENT
Start: 2025-06-05

## 2025-06-05 RX ORDER — SODIUM CHLOR/HYPOCHLOROUS ACID 0.033 %
SOLUTION, IRRIGATION IRRIGATION PRN
OUTPATIENT
Start: 2025-06-05

## 2025-06-05 RX ORDER — GINSENG 100 MG
CAPSULE ORAL PRN
OUTPATIENT
Start: 2025-06-05

## 2025-06-05 RX ORDER — LIDOCAINE HYDROCHLORIDE 40 MG/ML
SOLUTION TOPICAL PRN
Status: DISCONTINUED | OUTPATIENT
Start: 2025-06-05 | End: 2025-06-06 | Stop reason: HOSPADM

## 2025-06-05 RX ORDER — BETAMETHASONE DIPROPIONATE 0.5 MG/G
CREAM TOPICAL PRN
OUTPATIENT
Start: 2025-06-05

## 2025-06-05 RX ORDER — LIDOCAINE 50 MG/G
OINTMENT TOPICAL PRN
OUTPATIENT
Start: 2025-06-05

## 2025-06-05 RX ORDER — GENTAMICIN SULFATE 1 MG/G
OINTMENT TOPICAL PRN
OUTPATIENT
Start: 2025-06-05

## 2025-06-05 RX ORDER — MUPIROCIN 20 MG/G
OINTMENT TOPICAL PRN
OUTPATIENT
Start: 2025-06-05

## 2025-06-05 RX ORDER — LIDOCAINE 40 MG/G
CREAM TOPICAL PRN
OUTPATIENT
Start: 2025-06-05

## 2025-06-05 RX ORDER — CLOBETASOL PROPIONATE 0.5 MG/G
OINTMENT TOPICAL PRN
OUTPATIENT
Start: 2025-06-05

## 2025-06-05 RX ADMIN — LIDOCAINE HYDROCHLORIDE 20 ML: 40 SOLUTION TOPICAL at 10:26

## 2025-06-07 LAB
MICROORGANISM SPEC CULT: ABNORMAL
MICROORGANISM/AGENT SPEC: ABNORMAL
SPECIMEN DESCRIPTION: ABNORMAL

## 2025-06-13 NOTE — DISCHARGE INSTRUCTIONS
Visit Discharge/Physician Orders     Discharge condition: Stable     Assessment of pain at discharge: mild     Anesthetic used: 4% Lidocaine     Discharge to: Home     Left via:Private automobile     Accompanied by: accompanied by self     ECF/HHA:  [facility] for supplies      Dressing Orders: Clean wounds right/left posterior thigh and right/left/mid  groin with normal saline. Apply plain alginate and secure with silicone bordered gauze. Change daily.Keep dressings clean and dry. Sponge bath only, no showering for now.      Treatment Orders: Follow a nutritious diet. Choose foods high in protein: chicken, fish, and eggs. Choose foods high in Vitamin C. Multivitamin daily unless contraindicated. Keep pressure off of wounds.     Nystatin powder ordered for skin folds, begin to use as directed     Wound Culture 6/3     [facility] followup visit _______2 weeks  _______________  (Please note your next appointment above and if you are unable to keep, kindly give a 24 hour notice. Thank you.)     Physician signature:__________________________        If you experience any of the following, please call the Wound Care Center during business hours:     * Increase in Pain  * Temperature over 101  * Increase in drainage from your wound  * Drainage with a foul odor  * Bleeding  * Increase in swelling  * Need for compression bandage changes due to slippage, breakthrough drainage.     If you need medical attention outside of the business hours of the Wound Care Centers please contact your PCP or go to the nearest emergency room.

## 2025-06-19 ENCOUNTER — HOSPITAL ENCOUNTER (OUTPATIENT)
Dept: WOUND CARE | Age: 40
Discharge: HOME | End: 2025-06-19
Attending: NURSE PRACTITIONER

## 2025-06-23 NOTE — DISCHARGE INSTRUCTIONS
Visit Discharge/Physician Orders     Discharge condition: Stable     Assessment of pain at discharge: mild     Anesthetic used: 4% Lidocaine     Discharge to: Home     Left via:Private automobile     Accompanied by: accompanied by self     ECF/HHA:  [facility] for supplies      Dressing Orders: Clean wounds right/left posterior thigh and right/left/mid  groin with normal saline. Apply plain alginate and secure with silicone bordered gauze. Change daily.Keep dressings clean and dry. Sponge bath only, no showering for now.      Treatment Orders: Follow a nutritious diet. Choose foods high in protein: chicken, fish, and eggs. Choose foods high in Vitamin C. Multivitamin daily unless contraindicated. Keep pressure off of wounds.     Nystatin powder ordered for skin folds, begin to use as directed     Wound Culture 6/3     [facility] followup visit _______2 weeks  _______________  (Please note your next appointment above and if you are unable to keep, kindly give a 24 hour notice. Thank you.)     Physician signature:__________________________        If you experience any of the following, please call the Wound Care Center during business hours:     * Increase in Pain  * Temperature over 101  * Increase in drainage from your wound  * Drainage with a foul odor  * Bleeding  * Increase in swelling  * Need for compression bandage changes due to slippage, breakthrough drainage.     If you need medical attention outside of the business hours of the Wound Care Centers please contact your PCP or go to the nearest emergency room.

## 2025-06-26 ENCOUNTER — HOSPITAL ENCOUNTER (OUTPATIENT)
Dept: WOUND CARE | Age: 40
Discharge: HOME | End: 2025-06-26
Attending: NURSE PRACTITIONER

## 2025-07-10 ENCOUNTER — HOSPITAL ENCOUNTER (OUTPATIENT)
Dept: WOUND CARE | Age: 40
Discharge: HOME | End: 2025-07-10
Attending: NURSE PRACTITIONER
Payer: COMMERCIAL

## 2025-07-10 VITALS
BODY MASS INDEX: 46.65 KG/M2 | HEART RATE: 75 BPM | TEMPERATURE: 97.5 F | WEIGHT: 315 LBS | SYSTOLIC BLOOD PRESSURE: 117 MMHG | HEIGHT: 69 IN | RESPIRATION RATE: 18 BRPM | DIASTOLIC BLOOD PRESSURE: 75 MMHG

## 2025-07-10 DIAGNOSIS — L97.912 LOWER EXTREMITY ULCERATION, RIGHT, WITH FAT LAYER EXPOSED (HCC): ICD-10-CM

## 2025-07-10 DIAGNOSIS — L97.922 LOWER EXTREMITY ULCERATION, LEFT, WITH FAT LAYER EXPOSED (HCC): Primary | ICD-10-CM

## 2025-07-10 DIAGNOSIS — L98.492 RIGHT GROIN ULCER, WITH FAT LAYER EXPOSED (HCC): ICD-10-CM

## 2025-07-10 DIAGNOSIS — N50.89 SCROTAL ULCER: ICD-10-CM

## 2025-07-10 DIAGNOSIS — I89.0 LYMPHEDEMA: ICD-10-CM

## 2025-07-10 PROCEDURE — 11045 DBRDMT SUBQ TISS EACH ADDL: CPT

## 2025-07-10 PROCEDURE — 11042 DBRDMT SUBQ TIS 1ST 20SQCM/<: CPT

## 2025-07-10 RX ORDER — LIDOCAINE HYDROCHLORIDE 40 MG/ML
SOLUTION TOPICAL ONCE
Status: COMPLETED | OUTPATIENT
Start: 2025-07-10 | End: 2025-07-10

## 2025-07-10 RX ORDER — FERROUS SULFATE 325(65) MG
325 TABLET ORAL
COMMUNITY

## 2025-07-10 RX ADMIN — LIDOCAINE HYDROCHLORIDE 15 ML: 40 SOLUTION TOPICAL at 11:37

## 2025-07-10 NOTE — DISCHARGE INSTRUCTIONS
Visit Discharge/Physician Orders     Discharge condition: Stable     Assessment of pain at discharge: mild     Anesthetic used: 4% Lidocaine     Discharge to: Home     Left via:Private automobile     Accompanied by: accompanied by self     ECF/HHA:  [facility] for supplies      Dressing Orders: Clean wounds right/left posterior thigh and right/left/mid  groin with normal saline. Apply plain alginate and secure with silicone bordered gauze (may use abd pads if needed). Change daily.Keep dressings clean and dry. Sponge bath only, no showering for now.   Left foot wound: Cleanse with normal saline. Apply plain alginate, foam padding, and secure with silicone bordered gauze. Change daily.     Treatment Orders: Follow a nutritious diet. Choose foods high in protein: chicken, fish, and eggs. Choose foods high in Vitamin C. Multivitamin daily unless contraindicated. Keep pressure off of wounds.     Nystatin powder ordered for skin folds, begin to use as directed. Apply Nystatin in between toes as well.      Wound Culture 6/3-Doxycycline sent; take as directed.     [facility] followup visit _______ 2 weeks  _______________  (Please note your next appointment above and if you are unable to keep, kindly give a 24 hour notice. Thank you.)     Physician signature:__________________________        If you experience any of the following, please call the Wound Care Center during business hours:     * Increase in Pain  * Temperature over 101  * Increase in drainage from your wound  * Drainage with a foul odor  * Bleeding  * Increase in swelling  * Need for compression bandage changes due to slippage, breakthrough drainage.     If you need medical attention outside of the business hours of the Wound Care Centers please contact your PCP or go to the nearest emergency room.

## 2025-07-11 RX ORDER — DOXYCYCLINE HYCLATE 100 MG
100 TABLET ORAL 2 TIMES DAILY
Qty: 20 TABLET | Refills: 0 | Status: SHIPPED | OUTPATIENT
Start: 2025-07-11 | End: 2025-07-21

## 2025-07-11 NOTE — PROGRESS NOTES
Wound Care Supplies      Supply Company:     [employer] [facility] [employer] [facility] [address] [facility] [address] [place] [id number]  p: [phone] f: [phone]     Ordering Center:     [facility]  [address] [place] [id number]  [phone]  WOUND CARE Dept: [phone]   FAX NUMBER     Patient Information:      David Wetzel  [address] [facility] [facility] [address]   I have attempted without success to contact this patient by phone for Preadmission Testing phone assessment.   Message left for pt to return call.   : 1985  AGE: 40 y.o.     GENDER: male   EPISODE DATE: 7/10/2025    Insurance:      PRIMARY INSURANCE:  Plan: [employer] [place] MEDICAID  Coverage: [employer]  Effective Date: 2017  Group Number: [unfilled]  Subscriber Number: [id number] - (Medicaid Managed)    Payer/Plan Subscr  Sex Relation Sub. Ins. ID Effective Group Num   1. DONN - * DAVID WETZEL 1985 Male Self [id number] [address] [facility] [address] [facility] [address]       Patient Wound Information:      Problem List Items Addressed This Visit          Other    Lower extremity ulceration, left, with fat layer exposed (HCC) - Primary    Lower extremity ulceration, right, with fat layer exposed (HCC)       WOUNDS REQUIRING DRESSING SUPPLIES:     Wound 25 Groin Right #1 (Active)   Wound Image   07/10/25 1117   Dressing Status New dressing applied 25 1149   Wound Cleansed Cleansed with saline 25 1149   Dressing/Treatment Alginate;Silicone pad 25 1149   Wound Length (cm) 0 cm 07/10/25 1117   Wound Width (cm) 0 cm 07/10/25 1117   Wound Depth (cm) 0 cm 07/10/25 1117   Wound Surface Area (cm^2) 0 cm^2 07/10/25 1117   Change in Wound Size % (l*w) 100 07/10/25 1117   Wound Volume (cm^3) 0 cm^3 07/10/25 1117   Wound Healing % 100 07/10/25 1117   Post-Procedure Length (cm) 0 cm 07/10/25 1159   Post-Procedure Width (cm) 0 cm 07/10/25 1159   Post-Procedure Depth (cm) 0 cm 07/10/25 1154 
saline. Apply plain alginate and secure with silicone bordered gauze (may use abd pads if needed). Change daily.Keep dressings clean and dry. Sponge bath only, no showering for now.   Left foot wound: Cleanse with normal saline. Apply plain alginate, foam padding, and secure with silicone bordered gauze. Change daily.     Treatment Orders: Follow a nutritious diet. Choose foods high in protein: chicken, fish, and eggs. Choose foods high in Vitamin C. Multivitamin daily unless contraindicated. Keep pressure off of wounds.     Nystatin powder ordered for skin folds, begin to use as directed. Apply Nystatin in between toes as well.      Wound Culture 6/3-Doxycycline sent; take as directed.     [facility] followup visit _______ 2 weeks  _______________  (Please note your next appointment above and if you are unable to keep, kindly give a 24 hour notice. Thank you.)     Physician signature:__________________________        If you experience any of the following, please call the Wound Care Center during business hours:     * Increase in Pain  * Temperature over 101  * Increase in drainage from your wound  * Drainage with a foul odor  * Bleeding  * Increase in swelling  * Need for compression bandage changes due to slippage, breakthrough drainage.     If you need medical attention outside of the business hours of the Wound Care Centers please contact your PCP or go to the nearest emergency room.          Electronically signed by ALFRED Da Silva CNP on 7/11/2025 at 7:15 PM

## 2025-07-18 NOTE — DISCHARGE INSTRUCTIONS
Visit Discharge/Physician Orders     Discharge condition: Stable     Assessment of pain at discharge: mild     Anesthetic used: 4% Lidocaine     Discharge to: Home     Left via:Private automobile     Accompanied by: accompanied by self     ECF/HHA:  [facility] for supplies      Dressing Orders: Clean wounds right/left posterior thigh and right/left/mid  groin with normal saline. Apply plain alginate and secure with silicone bordered gauze (may use abd pads if needed). Change daily.Keep dressings clean and dry. Sponge bath only, no showering for now.   Left foot wound: Cleanse with normal saline. Apply plain alginate, foam padding, and secure with silicone bordered gauze. Change daily.     Treatment Orders: Follow a nutritious diet. Choose foods high in protein: chicken, fish, and eggs. Choose foods high in Vitamin C. Multivitamin daily unless contraindicated. Keep pressure off of wounds.     Nystatin powder ordered for skin folds, begin to use as directed. Apply Nystatin in between toes as well.      Wound Culture 6/3-Doxycycline sent; take as directed.     [facility] followup visit _______ 2 weeks  _______________  (Please note your next appointment above and if you are unable to keep, kindly give a 24 hour notice. Thank you.)     Physician signature:__________________________        If you experience any of the following, please call the Wound Care Center during business hours:     * Increase in Pain  * Temperature over 101  * Increase in drainage from your wound  * Drainage with a foul odor  * Bleeding  * Increase in swelling  * Need for compression bandage changes due to slippage, breakthrough drainage.     If you need medical attention outside of the business hours of the Wound Care Centers please contact your PCP or go to the nearest emergency room.

## 2025-07-24 ENCOUNTER — HOSPITAL ENCOUNTER (OUTPATIENT)
Dept: WOUND CARE | Age: 40
Discharge: HOME | End: 2025-07-24
Attending: NURSE PRACTITIONER

## 2025-07-25 NOTE — DISCHARGE INSTRUCTIONS
Visit Discharge/Physician Orders     Discharge condition: Stable     Assessment of pain at discharge: mild     Anesthetic used: 4% Lidocaine     Discharge to: Home     Left via:Private automobile     Accompanied by: accompanied by self     [facility]F/[facility]:  [place] for supplies      Dressing Orders: Clean wounds right/left posterior thigh and right/left/mid  groin with normal saline. Apply plain alginate and secure with silicone bordered gauze (may use abd pads if needed). Change daily.Keep dressings clean and dry. Sponge bath only, no showering for now.   Left foot wound: Cleanse with normal saline. Apply plain alginate, foam padding, and secure with silicone bordered gauze. Change daily.     Treatment Orders: Follow a nutritious diet. Choose foods high in protein: chicken, fish, and eggs. Choose foods high in Vitamin C. Multivitamin daily unless contraindicated. Keep pressure off of wounds.     Nystatin powder ordered for skin folds, begin to use as directed. Apply Nystatin in between toes as well.      Wound Culture 6/3-Doxycycline sent; take as directed.     [facility] followup visit _______ 2 weeks  _______________  (Please note your next appointment above and if you are unable to keep, kindly give a 24 hour notice. Thank you.)     Physician signature:__________________________        If you experience any of the following, please call the Wound Care Center during business hours:     * Increase in Pain  * Temperature over 101  * Increase in drainage from your wound  * Drainage with a foul odor  * Bleeding  * Increase in swelling  * Need for compression bandage changes due to slippage, breakthrough drainage.     If you need medical attention outside of the business hours of the W[facility] Care Centers please contact your PCP or go to the nearest emergency room.

## 2025-07-31 ENCOUNTER — HOSPITAL ENCOUNTER (OUTPATIENT)
Dept: WOUND CARE | Age: 40
Discharge: HOME | End: 2025-07-31
Attending: NURSE PRACTITIONER
Payer: COMMERCIAL

## 2025-07-31 VITALS
SYSTOLIC BLOOD PRESSURE: 129 MMHG | DIASTOLIC BLOOD PRESSURE: 80 MMHG | BODY MASS INDEX: 46.65 KG/M2 | HEART RATE: 76 BPM | TEMPERATURE: 98.6 F | HEIGHT: 69 IN | RESPIRATION RATE: 18 BRPM | WEIGHT: 315 LBS

## 2025-07-31 DIAGNOSIS — E66.01 MORBID OBESITY (HCC): ICD-10-CM

## 2025-07-31 DIAGNOSIS — E11.9 TYPE 2 DIABETES MELLITUS WITHOUT COMPLICATION, WITHOUT LONG-TERM CURRENT USE OF INSULIN (HCC): ICD-10-CM

## 2025-07-31 DIAGNOSIS — L89.893 PRESSURE ULCER OF LEFT FOOT, STAGE 3 (HCC): ICD-10-CM

## 2025-07-31 DIAGNOSIS — L97.912 LOWER EXTREMITY ULCERATION, RIGHT, WITH FAT LAYER EXPOSED (HCC): ICD-10-CM

## 2025-07-31 DIAGNOSIS — I89.0 LYMPHEDEMA: ICD-10-CM

## 2025-07-31 DIAGNOSIS — L98.492 RIGHT GROIN ULCER, WITH FAT LAYER EXPOSED (HCC): ICD-10-CM

## 2025-07-31 DIAGNOSIS — N50.89 SCROTAL ULCER: ICD-10-CM

## 2025-07-31 DIAGNOSIS — L97.922 LOWER EXTREMITY ULCERATION, LEFT, WITH FAT LAYER EXPOSED (HCC): Primary | ICD-10-CM

## 2025-07-31 PROCEDURE — 11042 DBRDMT SUBQ TIS 1ST 20SQCM/<: CPT | Performed by: NURSE PRACTITIONER

## 2025-07-31 PROCEDURE — 11042 DBRDMT SUBQ TIS 1ST 20SQCM/<: CPT

## 2025-07-31 PROCEDURE — 11045 DBRDMT SUBQ TISS EACH ADDL: CPT | Performed by: NURSE PRACTITIONER

## 2025-07-31 PROCEDURE — 11045 DBRDMT SUBQ TISS EACH ADDL: CPT

## 2025-07-31 RX ORDER — SILVER SULFADIAZINE 10 MG/G
CREAM TOPICAL PRN
OUTPATIENT
Start: 2025-07-31

## 2025-07-31 RX ORDER — BACITRACIN ZINC AND POLYMYXIN B SULFATE 500; 1000 [USP'U]/G; [USP'U]/G
OINTMENT TOPICAL PRN
OUTPATIENT
Start: 2025-07-31

## 2025-07-31 RX ORDER — MUPIROCIN 2 %
OINTMENT (GRAM) TOPICAL PRN
OUTPATIENT
Start: 2025-07-31

## 2025-07-31 RX ORDER — GINSENG 100 MG
CAPSULE ORAL PRN
OUTPATIENT
Start: 2025-07-31

## 2025-07-31 RX ORDER — SODIUM CHLOR/HYPOCHLOROUS ACID 0.033 %
SOLUTION, IRRIGATION IRRIGATION PRN
OUTPATIENT
Start: 2025-07-31

## 2025-07-31 RX ORDER — LIDOCAINE HYDROCHLORIDE 20 MG/ML
JELLY TOPICAL PRN
OUTPATIENT
Start: 2025-07-31

## 2025-07-31 RX ORDER — CLOBETASOL PROPIONATE 0.5 MG/G
OINTMENT TOPICAL PRN
OUTPATIENT
Start: 2025-07-31

## 2025-07-31 RX ORDER — GENTAMICIN SULFATE 1 MG/G
OINTMENT TOPICAL PRN
OUTPATIENT
Start: 2025-07-31

## 2025-07-31 RX ORDER — LIDOCAINE HYDROCHLORIDE 40 MG/ML
SOLUTION TOPICAL PRN
Status: DISCONTINUED | OUTPATIENT
Start: 2025-07-31 | End: 2025-08-01 | Stop reason: HOSPADM

## 2025-07-31 RX ORDER — LIDOCAINE 50 MG/G
OINTMENT TOPICAL PRN
OUTPATIENT
Start: 2025-07-31

## 2025-07-31 RX ORDER — NEOMYCIN/BACITRACIN/POLYMYXINB 3.5-400-5K
OINTMENT (GRAM) TOPICAL PRN
OUTPATIENT
Start: 2025-07-31

## 2025-07-31 RX ORDER — LIDOCAINE 40 MG/G
CREAM TOPICAL PRN
OUTPATIENT
Start: 2025-07-31

## 2025-07-31 RX ORDER — BETAMETHASONE DIPROPIONATE 0.5 MG/G
CREAM TOPICAL PRN
OUTPATIENT
Start: 2025-07-31

## 2025-07-31 RX ORDER — TRIAMCINOLONE ACETONIDE 1 MG/G
OINTMENT TOPICAL PRN
OUTPATIENT
Start: 2025-07-31

## 2025-07-31 RX ORDER — LIDOCAINE HYDROCHLORIDE 40 MG/ML
SOLUTION TOPICAL PRN
OUTPATIENT
Start: 2025-07-31

## 2025-07-31 RX ADMIN — LIDOCAINE HYDROCHLORIDE 10 ML: 40 SOLUTION TOPICAL at 10:37

## 2025-07-31 NOTE — PROGRESS NOTES
Wound Healing Center /Hyperbarics   History and Physical/Consultation  General Surgery/Medicine    Referring Physician : Meenu Arenas PA-C  David Wetzel  MEDICAL RECORD NUMBER:  [id number]  AGE: 40 y.o.   GENDER: male  : 1985  EPISODE DATE:  2025  Subjective:     Chief Complaint   Patient presents with    Wound Check     Left foot and groin         HISTORY of PRESENT ILLNESS HPI     David Wetzel is a 40 y.o. male who presents today for wound/ulcer evaluation.   History of Wound Context:  The patient has had a wound of right groin and bilateral posterior thighs which was first noted approximately 2024 following necrotizing soft tissue infection.  This has been treated surgical debridement and dressing changes. On their initial visit to the wound healing center, 2025 ,  the patient has noted that the wound has been improving.  The patient has not had similar previous wounds in the past.      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    25   Patient presents to wound care for right groin and bilateral posterior thigh ulcers   Tissue appearance overall pink and healthy   Wounds debrided   Patient reports skin irritation with tape but tolerates silicone dressing   Aquacel, dry dressing and silicone border dressing    25   Right thigh wound measuring larger   Left thigh wound measuring smaller   Groin wound measurements stable   Tissue appearance overall pink and healthy   Yeast/moisture present at skin folds   Wounds debrided   Nystatin powder prescribed   Continue Aquacel, dry dressing and silicone border dressing    4/15/25   Right thigh wound measuring smaller   Left thigh wound measuring larger, possibly positional   Groin wound measurements significantly smaller    Tissue appearance overall pink

## 2025-08-14 ENCOUNTER — HOSPITAL ENCOUNTER (OUTPATIENT)
Dept: WOUND CARE | Age: 40
Discharge: HOME | End: 2025-08-14
Attending: NURSE PRACTITIONER
Payer: COMMERCIAL

## 2025-08-14 VITALS
DIASTOLIC BLOOD PRESSURE: 62 MMHG | RESPIRATION RATE: 16 BRPM | WEIGHT: 315 LBS | TEMPERATURE: 96 F | HEART RATE: 81 BPM | BODY MASS INDEX: 46.65 KG/M2 | HEIGHT: 69 IN | SYSTOLIC BLOOD PRESSURE: 102 MMHG

## 2025-08-14 DIAGNOSIS — E66.01 MORBID OBESITY (HCC): ICD-10-CM

## 2025-08-14 DIAGNOSIS — E11.9 TYPE 2 DIABETES MELLITUS WITHOUT COMPLICATION, WITHOUT LONG-TERM CURRENT USE OF INSULIN (HCC): ICD-10-CM

## 2025-08-14 DIAGNOSIS — I89.0 LYMPHEDEMA: ICD-10-CM

## 2025-08-14 DIAGNOSIS — L97.912 LOWER EXTREMITY ULCERATION, RIGHT, WITH FAT LAYER EXPOSED (HCC): ICD-10-CM

## 2025-08-14 DIAGNOSIS — L97.922 LOWER EXTREMITY ULCERATION, LEFT, WITH FAT LAYER EXPOSED (HCC): Primary | ICD-10-CM

## 2025-08-14 DIAGNOSIS — L98.492 RIGHT GROIN ULCER, WITH FAT LAYER EXPOSED (HCC): ICD-10-CM

## 2025-08-14 DIAGNOSIS — L89.893 PRESSURE ULCER OF LEFT FOOT, STAGE 3 (HCC): ICD-10-CM

## 2025-08-14 DIAGNOSIS — N50.89 SCROTAL ULCER: ICD-10-CM

## 2025-08-14 PROCEDURE — 11045 DBRDMT SUBQ TISS EACH ADDL: CPT

## 2025-08-14 PROCEDURE — 87205 SMEAR GRAM STAIN: CPT

## 2025-08-14 PROCEDURE — 11042 DBRDMT SUBQ TIS 1ST 20SQCM/<: CPT

## 2025-08-14 PROCEDURE — 87070 CULTURE OTHR SPECIMN AEROBIC: CPT

## 2025-08-14 PROCEDURE — 87077 CULTURE AEROBIC IDENTIFY: CPT

## 2025-08-14 RX ORDER — LIDOCAINE HYDROCHLORIDE 40 MG/ML
SOLUTION TOPICAL PRN
Status: DISCONTINUED | OUTPATIENT
Start: 2025-08-14 | End: 2025-08-15 | Stop reason: HOSPADM

## 2025-08-14 RX ORDER — LIDOCAINE HYDROCHLORIDE 20 MG/ML
JELLY TOPICAL PRN
OUTPATIENT
Start: 2025-08-14

## 2025-08-14 RX ORDER — LIDOCAINE 50 MG/G
OINTMENT TOPICAL PRN
OUTPATIENT
Start: 2025-08-14

## 2025-08-14 RX ORDER — GINSENG 100 MG
CAPSULE ORAL PRN
OUTPATIENT
Start: 2025-08-14

## 2025-08-14 RX ORDER — LIDOCAINE 40 MG/G
CREAM TOPICAL PRN
OUTPATIENT
Start: 2025-08-14

## 2025-08-14 RX ORDER — NEOMYCIN/BACITRACIN/POLYMYXINB 3.5-400-5K
OINTMENT (GRAM) TOPICAL PRN
OUTPATIENT
Start: 2025-08-14

## 2025-08-14 RX ORDER — BETAMETHASONE DIPROPIONATE 0.5 MG/G
CREAM TOPICAL PRN
OUTPATIENT
Start: 2025-08-14

## 2025-08-14 RX ORDER — LIDOCAINE HYDROCHLORIDE 40 MG/ML
SOLUTION TOPICAL PRN
OUTPATIENT
Start: 2025-08-14

## 2025-08-14 RX ORDER — GENTAMICIN SULFATE 1 MG/G
OINTMENT TOPICAL PRN
OUTPATIENT
Start: 2025-08-14

## 2025-08-14 RX ORDER — SODIUM CHLOR/HYPOCHLOROUS ACID 0.033 %
SOLUTION, IRRIGATION IRRIGATION PRN
OUTPATIENT
Start: 2025-08-14

## 2025-08-14 RX ORDER — BACITRACIN ZINC AND POLYMYXIN B SULFATE 500; 1000 [USP'U]/G; [USP'U]/G
OINTMENT TOPICAL PRN
OUTPATIENT
Start: 2025-08-14

## 2025-08-14 RX ORDER — MUPIROCIN 2 %
OINTMENT (GRAM) TOPICAL PRN
OUTPATIENT
Start: 2025-08-14

## 2025-08-14 RX ORDER — CLOBETASOL PROPIONATE 0.5 MG/G
OINTMENT TOPICAL PRN
OUTPATIENT
Start: 2025-08-14

## 2025-08-14 RX ORDER — SILVER SULFADIAZINE 10 MG/G
CREAM TOPICAL PRN
OUTPATIENT
Start: 2025-08-14

## 2025-08-14 RX ORDER — TRIAMCINOLONE ACETONIDE 1 MG/G
OINTMENT TOPICAL PRN
OUTPATIENT
Start: 2025-08-14

## 2025-08-14 RX ADMIN — LIDOCAINE HYDROCHLORIDE 30 ML: 40 SOLUTION TOPICAL at 10:40

## 2025-08-14 ASSESSMENT — PAIN SCALES - GENERAL: PAINLEVEL_OUTOF10: 0

## 2025-08-16 LAB
MICROORGANISM SPEC CULT: ABNORMAL
MICROORGANISM/AGENT SPEC: ABNORMAL
SERVICE CMNT-IMP: ABNORMAL
SPECIMEN DESCRIPTION: ABNORMAL

## 2025-08-28 ENCOUNTER — HOSPITAL ENCOUNTER (OUTPATIENT)
Dept: WOUND CARE | Age: 40
Discharge: HOME | End: 2025-08-28
Attending: NURSE PRACTITIONER

## 2025-09-04 ENCOUNTER — HOSPITAL ENCOUNTER (OUTPATIENT)
Dept: WOUND CARE | Age: 40
Discharge: HOME | End: 2025-09-04
Attending: NURSE PRACTITIONER
Payer: COMMERCIAL

## 2025-09-04 VITALS
SYSTOLIC BLOOD PRESSURE: 118 MMHG | RESPIRATION RATE: 18 BRPM | DIASTOLIC BLOOD PRESSURE: 80 MMHG | TEMPERATURE: 97.3 F | HEART RATE: 79 BPM

## 2025-09-04 DIAGNOSIS — I89.0 LYMPHEDEMA: ICD-10-CM

## 2025-09-04 DIAGNOSIS — E66.01 MORBID OBESITY (HCC): ICD-10-CM

## 2025-09-04 DIAGNOSIS — L89.893 PRESSURE ULCER OF LEFT FOOT, STAGE 3 (HCC): ICD-10-CM

## 2025-09-04 DIAGNOSIS — L98.492 RIGHT GROIN ULCER, WITH FAT LAYER EXPOSED (HCC): ICD-10-CM

## 2025-09-04 DIAGNOSIS — N50.89 SCROTAL ULCER: ICD-10-CM

## 2025-09-04 DIAGNOSIS — L97.922 LOWER EXTREMITY ULCERATION, LEFT, WITH FAT LAYER EXPOSED (HCC): Primary | ICD-10-CM

## 2025-09-04 DIAGNOSIS — L97.912 LOWER EXTREMITY ULCERATION, RIGHT, WITH FAT LAYER EXPOSED (HCC): ICD-10-CM

## 2025-09-04 DIAGNOSIS — E11.9 TYPE 2 DIABETES MELLITUS WITHOUT COMPLICATION, WITHOUT LONG-TERM CURRENT USE OF INSULIN (HCC): ICD-10-CM

## 2025-09-04 PROCEDURE — 11042 DBRDMT SUBQ TIS 1ST 20SQCM/<: CPT

## 2025-09-04 PROCEDURE — 11042 DBRDMT SUBQ TIS 1ST 20SQCM/<: CPT | Performed by: NURSE PRACTITIONER

## 2025-09-04 PROCEDURE — 11045 DBRDMT SUBQ TISS EACH ADDL: CPT | Performed by: NURSE PRACTITIONER

## 2025-09-04 PROCEDURE — 11045 DBRDMT SUBQ TISS EACH ADDL: CPT

## 2025-09-04 RX ORDER — TRIAMCINOLONE ACETONIDE 1 MG/G
OINTMENT TOPICAL PRN
OUTPATIENT
Start: 2025-09-04

## 2025-09-04 RX ORDER — LIDOCAINE HYDROCHLORIDE 20 MG/ML
JELLY TOPICAL PRN
OUTPATIENT
Start: 2025-09-04

## 2025-09-04 RX ORDER — NEOMYCIN/BACITRACIN/POLYMYXINB 3.5-400-5K
OINTMENT (GRAM) TOPICAL PRN
OUTPATIENT
Start: 2025-09-04

## 2025-09-04 RX ORDER — BACITRACIN ZINC AND POLYMYXIN B SULFATE 500; 1000 [USP'U]/G; [USP'U]/G
OINTMENT TOPICAL PRN
OUTPATIENT
Start: 2025-09-04

## 2025-09-04 RX ORDER — GENTAMICIN SULFATE 1 MG/G
OINTMENT TOPICAL PRN
OUTPATIENT
Start: 2025-09-04

## 2025-09-04 RX ORDER — LIDOCAINE 40 MG/G
CREAM TOPICAL PRN
OUTPATIENT
Start: 2025-09-04

## 2025-09-04 RX ORDER — GINSENG 100 MG
CAPSULE ORAL PRN
OUTPATIENT
Start: 2025-09-04

## 2025-09-04 RX ORDER — LIDOCAINE HYDROCHLORIDE 40 MG/ML
SOLUTION TOPICAL PRN
OUTPATIENT
Start: 2025-09-04

## 2025-09-04 RX ORDER — LIDOCAINE HYDROCHLORIDE 40 MG/ML
SOLUTION TOPICAL PRN
Status: DISCONTINUED | OUTPATIENT
Start: 2025-09-04 | End: 2025-09-05 | Stop reason: HOSPADM

## 2025-09-04 RX ORDER — LIDOCAINE 50 MG/G
OINTMENT TOPICAL PRN
OUTPATIENT
Start: 2025-09-04

## 2025-09-04 RX ORDER — SODIUM CHLOR/HYPOCHLOROUS ACID 0.033 %
SOLUTION, IRRIGATION IRRIGATION PRN
OUTPATIENT
Start: 2025-09-04

## 2025-09-04 RX ORDER — BETAMETHASONE DIPROPIONATE 0.5 MG/G
CREAM TOPICAL PRN
OUTPATIENT
Start: 2025-09-04

## 2025-09-04 RX ORDER — SILVER SULFADIAZINE 10 MG/G
CREAM TOPICAL PRN
OUTPATIENT
Start: 2025-09-04

## 2025-09-04 RX ORDER — MUPIROCIN 2 %
OINTMENT (GRAM) TOPICAL PRN
OUTPATIENT
Start: 2025-09-04

## 2025-09-04 RX ORDER — CLOBETASOL PROPIONATE 0.5 MG/G
OINTMENT TOPICAL PRN
OUTPATIENT
Start: 2025-09-04

## 2025-09-04 RX ADMIN — LIDOCAINE HYDROCHLORIDE: 40 SOLUTION TOPICAL at 09:57

## (undated) DEVICE — SUCTION TIP , YANKAUER, W/BULB SUCTION

## (undated) DEVICE — DRAPE, LEGGINGS, 28.5 X 43 IN, DISPOSABLE, LF, STERILE

## (undated) DEVICE — HIGH FLOW TIP FOR INTERPULSE HANDPIECE SET

## (undated) DEVICE — DRAPE, PAD, PREP, W/ 9 IN CUFF, 24 X 41, LF, NS

## (undated) DEVICE — Device

## (undated) DEVICE — PREP TRAY, SKIN, DRY, W/GLOVES

## (undated) DEVICE — MANIFOLD, 4 PORT NEPTUNE STANDARD

## (undated) DEVICE — DRAPE, SHEET, EXTREMITY, W/ARM BOARD COVERS, 87 X 106 X 128 IN, DISPOSABLE, LF, STERILE

## (undated) DEVICE — PAD ONLY, SENSA TRAC

## (undated) DEVICE — SPONGE, LAP, XRAY DECT, 18IN X 18IN, W/MASTER DMT, STERILE

## (undated) DEVICE — COVER, CART, 45 X 27 X 48 IN, CLEAR

## (undated) DEVICE — DRESSING KIT, VAC XLG FOAM BLACK

## (undated) DEVICE — WOUND VAC KIT, W/CANNISTER, 500ML, 5/PK

## (undated) DEVICE — GOWN, SURGICAL, SMARTGOWN, XLARGE, STERILE

## (undated) DEVICE — CONNECTOR, Y F/ WOUND VACUUM STRL

## (undated) DEVICE — INTERPULSE HANDPIECE SET W/ 10FT SUCTION TUBING

## (undated) DEVICE — SUTURE, PERMA HAND 2-0, TAPER POINT, SH BLACK 8-18 INCH

## (undated) DEVICE — WOUND VAC KIT, W/CANNISTER, 120 CC

## (undated) DEVICE — DRAPE, SHEET, FAN FOLDED, HALF, 44 X 58 IN, DISPOSABLE, LF, STERILE